# Patient Record
Sex: MALE | Race: WHITE | NOT HISPANIC OR LATINO | Employment: FULL TIME | URBAN - METROPOLITAN AREA
[De-identification: names, ages, dates, MRNs, and addresses within clinical notes are randomized per-mention and may not be internally consistent; named-entity substitution may affect disease eponyms.]

---

## 2017-01-17 ENCOUNTER — TRANSCRIBE ORDERS (OUTPATIENT)
Dept: ADMINISTRATIVE | Facility: HOSPITAL | Age: 42
End: 2017-01-17

## 2017-01-17 ENCOUNTER — APPOINTMENT (OUTPATIENT)
Dept: LAB | Facility: MEDICAL CENTER | Age: 42
End: 2017-01-17
Payer: COMMERCIAL

## 2017-01-17 DIAGNOSIS — D69.6 THROMBOCYTOPENIA, UNSPECIFIED (HCC): ICD-10-CM

## 2017-01-17 DIAGNOSIS — R79.89 OTHER ABNORMAL BLOOD CHEMISTRY: Primary | ICD-10-CM

## 2017-01-17 DIAGNOSIS — E11.9 SEVERE DIABETES MELLITUS (HCC): Primary | ICD-10-CM

## 2017-01-17 DIAGNOSIS — D69.59 THROMBOCYTOPENIA DUE TO DIMINISHED PLATELET PRODUCTION: Primary | ICD-10-CM

## 2017-01-17 DIAGNOSIS — I10 ESSENTIAL HYPERTENSION, MALIGNANT: ICD-10-CM

## 2017-01-17 DIAGNOSIS — D69.59 THROMBOCYTOPENIA DUE TO DIMINISHED PLATELET PRODUCTION: ICD-10-CM

## 2017-01-17 DIAGNOSIS — R79.89 OTHER ABNORMAL BLOOD CHEMISTRY: ICD-10-CM

## 2017-01-17 DIAGNOSIS — E11.9 SEVERE DIABETES MELLITUS (HCC): ICD-10-CM

## 2017-01-17 LAB
ALBUMIN SERPL BCP-MCNC: 3.8 G/DL (ref 3.5–5)
ALP SERPL-CCNC: 82 U/L (ref 46–116)
ALT SERPL W P-5'-P-CCNC: 52 U/L (ref 12–78)
ANION GAP SERPL CALCULATED.3IONS-SCNC: 4 MMOL/L (ref 4–13)
AST SERPL W P-5'-P-CCNC: 34 U/L (ref 5–45)
BASOPHILS # BLD AUTO: 0.03 THOUSANDS/ΜL (ref 0–0.1)
BASOPHILS NFR BLD AUTO: 1 % (ref 0–1)
BILIRUB DIRECT SERPL-MCNC: 0.27 MG/DL (ref 0–0.2)
BILIRUB SERPL-MCNC: 0.92 MG/DL (ref 0.2–1)
BILIRUB UR QL STRIP: ABNORMAL
BUN SERPL-MCNC: 14 MG/DL (ref 5–25)
CALCIUM SERPL-MCNC: 9.2 MG/DL (ref 8.3–10.1)
CHLORIDE SERPL-SCNC: 105 MMOL/L (ref 100–108)
CLARITY UR: ABNORMAL
CO2 SERPL-SCNC: 30 MMOL/L (ref 21–32)
COLOR UR: ABNORMAL
CREAT SERPL-MCNC: 0.95 MG/DL (ref 0.6–1.3)
CREAT UR-MCNC: 229 MG/DL
EOSINOPHIL # BLD AUTO: 0.51 THOUSAND/ΜL (ref 0–0.61)
EOSINOPHIL NFR BLD AUTO: 11 % (ref 0–6)
ERYTHROCYTE [DISTWIDTH] IN BLOOD BY AUTOMATED COUNT: 12.9 % (ref 11.6–15.1)
EST. AVERAGE GLUCOSE BLD GHB EST-MCNC: 100 MG/DL
GFR SERPL CREATININE-BSD FRML MDRD: >60 ML/MIN/1.73SQ M
GLUCOSE SERPL-MCNC: 129 MG/DL (ref 65–140)
GLUCOSE UR STRIP-MCNC: NEGATIVE MG/DL
HBA1C MFR BLD: 5.1 % (ref 4.2–6.3)
HBV CORE AB SER QL: NORMAL
HBV CORE IGM SER QL: NORMAL
HBV SURFACE AG SER QL: NORMAL
HCT VFR BLD AUTO: 43.5 % (ref 36.5–49.3)
HCV AB SER QL: NORMAL
HGB BLD-MCNC: 15.5 G/DL (ref 12–17)
HGB UR QL STRIP.AUTO: NEGATIVE
KETONES UR STRIP-MCNC: NEGATIVE MG/DL
LEUKOCYTE ESTERASE UR QL STRIP: NEGATIVE
LYMPHOCYTES # BLD AUTO: 1.09 THOUSANDS/ΜL (ref 0.6–4.47)
LYMPHOCYTES NFR BLD AUTO: 23 % (ref 14–44)
MCH RBC QN AUTO: 37.4 PG (ref 26.8–34.3)
MCHC RBC AUTO-ENTMCNC: 35.6 G/DL (ref 31.4–37.4)
MCV RBC AUTO: 105 FL (ref 82–98)
MICROALBUMIN UR-MCNC: 13.2 MG/L (ref 0–20)
MICROALBUMIN/CREAT 24H UR: 6 MG/G CREATININE (ref 0–30)
MONOCYTES # BLD AUTO: 0.43 THOUSAND/ΜL (ref 0.17–1.22)
MONOCYTES NFR BLD AUTO: 9 % (ref 4–12)
NEUTROPHILS # BLD AUTO: 2.73 THOUSANDS/ΜL (ref 1.85–7.62)
NEUTS SEG NFR BLD AUTO: 56 % (ref 43–75)
NITRITE UR QL STRIP: NEGATIVE
NRBC BLD AUTO-RTO: 0 /100 WBCS
PH UR STRIP.AUTO: 6 [PH] (ref 4.5–8)
PLATELET # BLD AUTO: 108 THOUSANDS/UL (ref 149–390)
PMV BLD AUTO: 9.8 FL (ref 8.9–12.7)
POTASSIUM SERPL-SCNC: 4.1 MMOL/L (ref 3.5–5.3)
PROT SERPL-MCNC: 7.2 G/DL (ref 6.4–8.2)
PROT UR STRIP-MCNC: NEGATIVE MG/DL
RBC # BLD AUTO: 4.14 MILLION/UL (ref 3.88–5.62)
SODIUM SERPL-SCNC: 139 MMOL/L (ref 136–145)
SP GR UR STRIP.AUTO: 1.02 (ref 1–1.03)
UROBILINOGEN UR QL STRIP.AUTO: 1 E.U./DL
WBC # BLD AUTO: 4.81 THOUSAND/UL (ref 4.31–10.16)

## 2017-01-17 PROCEDURE — 86704 HEP B CORE ANTIBODY TOTAL: CPT

## 2017-01-17 PROCEDURE — 83036 HEMOGLOBIN GLYCOSYLATED A1C: CPT

## 2017-01-17 PROCEDURE — 85025 COMPLETE CBC W/AUTO DIFF WBC: CPT

## 2017-01-17 PROCEDURE — 86705 HEP B CORE ANTIBODY IGM: CPT

## 2017-01-17 PROCEDURE — 87340 HEPATITIS B SURFACE AG IA: CPT

## 2017-01-17 PROCEDURE — 81003 URINALYSIS AUTO W/O SCOPE: CPT | Performed by: INTERNAL MEDICINE

## 2017-01-17 PROCEDURE — 82043 UR ALBUMIN QUANTITATIVE: CPT | Performed by: INTERNAL MEDICINE

## 2017-01-17 PROCEDURE — 36415 COLL VENOUS BLD VENIPUNCTURE: CPT

## 2017-01-17 PROCEDURE — 80053 COMPREHEN METABOLIC PANEL: CPT

## 2017-01-17 PROCEDURE — 82248 BILIRUBIN DIRECT: CPT

## 2017-01-17 PROCEDURE — 86803 HEPATITIS C AB TEST: CPT

## 2017-01-17 PROCEDURE — 82570 ASSAY OF URINE CREATININE: CPT | Performed by: INTERNAL MEDICINE

## 2017-01-18 ENCOUNTER — GENERIC CONVERSION - ENCOUNTER (OUTPATIENT)
Dept: OTHER | Facility: OTHER | Age: 42
End: 2017-01-18

## 2017-04-05 ENCOUNTER — GENERIC CONVERSION - ENCOUNTER (OUTPATIENT)
Dept: OTHER | Facility: OTHER | Age: 42
End: 2017-04-05

## 2017-04-05 LAB
A/G RATIO (HISTORICAL): 1.8 (ref 1.2–2.2)
ALBUMIN SERPL BCP-MCNC: 4.1 G/DL (ref 3.5–5.5)
ALP SERPL-CCNC: 69 IU/L (ref 39–117)
ALT SERPL W P-5'-P-CCNC: 52 IU/L (ref 0–44)
AMBIG ABBREV CMP14 DEFAULT (HISTORICAL): NORMAL
AST SERPL W P-5'-P-CCNC: 38 IU/L (ref 0–40)
BASOPHILS # BLD AUTO: 0 X10E3/UL (ref 0–0.2)
BASOPHILS # BLD AUTO: 1 %
BILIRUB SERPL-MCNC: 1 MG/DL (ref 0–1.2)
BUN SERPL-MCNC: 18 MG/DL (ref 6–24)
BUN/CREA RATIO (HISTORICAL): 19 (ref 9–20)
CALCIUM SERPL-MCNC: 9.1 MG/DL (ref 8.7–10.2)
CHLORIDE SERPL-SCNC: 100 MMOL/L (ref 96–106)
CO2 SERPL-SCNC: 22 MMOL/L (ref 18–29)
CREAT SERPL-MCNC: 0.95 MG/DL (ref 0.76–1.27)
DEPRECATED RDW RBC AUTO: 13.5 % (ref 12.3–15.4)
EGFR AFRICAN AMERICAN (HISTORICAL): 114 ML/MIN/1.73
EGFR-AMERICAN CALC (HISTORICAL): 98 ML/MIN/1.73
EOSINOPHIL # BLD AUTO: 0.1 X10E3/UL (ref 0–0.4)
EOSINOPHIL # BLD AUTO: 1 %
GLUCOSE SERPL-MCNC: 102 MG/DL (ref 65–99)
HCT VFR BLD AUTO: 42.8 % (ref 37.5–51)
HGB BLD-MCNC: 15 G/DL (ref 12.6–17.7)
IMM.GRANULOCYTES (CD4/8) (HISTORICAL): 0 X10E3/UL (ref 0–0.1)
IMM.GRANULOCYTES (CD4/8) (HISTORICAL): 1 %
LYMPHOCYTES # BLD AUTO: 0.9 X10E3/UL (ref 0.7–3.1)
LYMPHOCYTES # BLD AUTO: 20 %
MCH RBC QN AUTO: 37 PG (ref 26.6–33)
MCHC RBC AUTO-ENTMCNC: 35 G/DL (ref 31.5–35.7)
MCV RBC AUTO: 106 FL (ref 79–97)
MONOCYTES # BLD AUTO: 0.4 X10E3/UL (ref 0.1–0.9)
MONOCYTES (HISTORICAL): 10 %
NEUTROPHILS # BLD AUTO: 3 X10E3/UL (ref 1.4–7)
NEUTROPHILS # BLD AUTO: 67 %
PLATELET # BLD AUTO: 123 X10E3/UL (ref 150–379)
POTASSIUM SERPL-SCNC: 4.1 MMOL/L (ref 3.5–5.2)
RBC (HISTORICAL): 4.05 X10E6/UL (ref 4.14–5.8)
SODIUM SERPL-SCNC: 140 MMOL/L (ref 134–144)
TOT. GLOBULIN, SERUM (HISTORICAL): 2.3 G/DL (ref 1.5–4.5)
TOTAL PROTEIN (HISTORICAL): 6.4 G/DL (ref 6–8.5)
WBC # BLD AUTO: 4.4 X10E3/UL (ref 3.4–10.8)

## 2017-05-19 ENCOUNTER — ALLSCRIPTS OFFICE VISIT (OUTPATIENT)
Dept: OTHER | Facility: OTHER | Age: 42
End: 2017-05-19

## 2018-01-12 NOTE — RESULT NOTES
Message   Vital hepatitis panel is negative    Liver enzymes are back down to normal    Well hepatitis panel is normal     Verified Results  (1) BILIRUBIN, DIRECT [id number] 08:45AM [facility]     Test Name Result Flag Reference   BILI, DIRECT 0 27 mg/dL H 0 00-0 20     (1) CHRONIC HEPATITIS PANEL [id number] 08:45AM [facility]     Test Name Result Flag Reference   HEPATITIS B SURFACE ANTIGEN Non-reactive  Non-reactive, NonReactive - Confirmed   HEPATITIS C ANTIBODY Non-reactive  Non-reactive   HEPATITIS B CORE IGM ANTIBODY Non-reactive  Non-reactive   HEPATITIS B CORE TOTAL ANTIBODY Non-reactive  Non-reactive

## 2018-01-14 VITALS
TEMPERATURE: 98.5 F | HEART RATE: 53 BPM | RESPIRATION RATE: 16 BRPM | WEIGHT: 287.31 LBS | DIASTOLIC BLOOD PRESSURE: 82 MMHG | OXYGEN SATURATION: 96 % | BODY MASS INDEX: 36.87 KG/M2 | HEIGHT: 74 IN | SYSTOLIC BLOOD PRESSURE: 124 MMHG
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
## 2018-05-14 ENCOUNTER — TELEPHONE (OUTPATIENT)
Dept: HEMATOLOGY ONCOLOGY | Facility: HOSPITAL | Age: 43
End: 2018-05-14
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
## 2019-03-01 ENCOUNTER — OFFICE VISIT (OUTPATIENT)
Dept: HEMATOLOGY ONCOLOGY | Facility: CLINIC | Age: 44
End: 2019-03-01
Payer: COMMERCIAL

## 2019-03-01 VITALS
WEIGHT: 305 LBS | OXYGEN SATURATION: 97 % | RESPIRATION RATE: 16 BRPM | TEMPERATURE: 98.1 F | DIASTOLIC BLOOD PRESSURE: 82 MMHG | BODY MASS INDEX: 39.14 KG/M2 | HEART RATE: 83 BPM | HEIGHT: 74 IN | SYSTOLIC BLOOD PRESSURE: 124 MMHG

## 2019-03-01 DIAGNOSIS — R79.89 ELEVATED LFTS: ICD-10-CM

## 2019-03-01 DIAGNOSIS — Z83.2 FAMILY HISTORY OF THROMBOCYTOPENIA: ICD-10-CM

## 2019-03-01 DIAGNOSIS — D69.3 IDIOPATHIC THROMBOCYTOPENIA (HCC): ICD-10-CM

## 2019-03-01 DIAGNOSIS — R71.8 ELEVATED MCV: ICD-10-CM

## 2019-03-01 DIAGNOSIS — D69.6 ACQUIRED THROMBOCYTOPENIA (HCC): Primary | ICD-10-CM

## 2019-03-01 DIAGNOSIS — K76.0 FATTY LIVER: ICD-10-CM

## 2019-03-01 PROCEDURE — 99214 OFFICE O/P EST MOD 30 MIN: CPT | Performed by: PHYSICIAN ASSISTANT

## 2019-03-01 RX ORDER — OMEPRAZOLE 40 MG/1
40 CAPSULE, DELAYED RELEASE ORAL DAILY
COMMUNITY
Start: 2019-02-26 | End: 2021-04-22 | Stop reason: SDUPTHER

## 2019-03-01 RX ORDER — METOPROLOL SUCCINATE 25 MG/1
25 TABLET, EXTENDED RELEASE ORAL DAILY
COMMUNITY
Start: 2019-02-25 | End: 2021-03-07 | Stop reason: CLARIF

## 2019-03-01 RX ORDER — GLIMEPIRIDE 1 MG/1
TABLET ORAL
COMMUNITY
Start: 2019-02-26 | End: 2020-10-29

## 2019-03-01 RX ORDER — LOSARTAN POTASSIUM 25 MG/1
50 TABLET ORAL
COMMUNITY
Start: 2019-02-25 | End: 2021-01-21 | Stop reason: DRUGHIGH

## 2019-03-01 NOTE — PATIENT INSTRUCTIONS
Please obtain blood work 1 week prior to Dr Denny Zapata visit  CBC, CMP, MMA  We will schedule a bone marrow biopsy, and CT abdomen

## 2019-03-20 ENCOUNTER — HOSPITAL ENCOUNTER (OUTPATIENT)
Dept: RADIOLOGY | Facility: HOSPITAL | Age: 44
Discharge: HOME | End: 2019-03-20
Attending: RADIOLOGY | Admitting: RADIOLOGY
Payer: COMMERCIAL

## 2019-03-20 VITALS
RESPIRATION RATE: 18 BRPM | HEIGHT: 74 IN | WEIGHT: 301 LBS | HEART RATE: 66 BPM | SYSTOLIC BLOOD PRESSURE: 118 MMHG | DIASTOLIC BLOOD PRESSURE: 62 MMHG | TEMPERATURE: 96 F | BODY MASS INDEX: 38.63 KG/M2 | OXYGEN SATURATION: 92 %

## 2019-03-20 DIAGNOSIS — D69.3 IDIOPATHIC THROMBOCYTOPENIA (HCC): ICD-10-CM

## 2019-03-20 DIAGNOSIS — D69.6 ACQUIRED THROMBOCYTOPENIA (HCC): ICD-10-CM

## 2019-03-20 PROCEDURE — 88341 IMHCHEM/IMCYTCHM EA ADD ANTB: CPT | Performed by: PATHOLOGY

## 2019-03-20 PROCEDURE — 88262 CHROMOSOME ANALYSIS 15-20: CPT | Performed by: INTERNAL MEDICINE

## 2019-03-20 PROCEDURE — 88313 SPECIAL STAINS GROUP 2: CPT | Performed by: PATHOLOGY

## 2019-03-20 PROCEDURE — 88184 FLOWCYTOMETRY/ TC 1 MARKER: CPT | Performed by: INTERNAL MEDICINE

## 2019-03-20 PROCEDURE — 81406 MOPATH PROCEDURE LEVEL 7: CPT | Performed by: INTERNAL MEDICINE

## 2019-03-20 PROCEDURE — 88342 IMHCHEM/IMCYTCHM 1ST ANTB: CPT | Performed by: PATHOLOGY

## 2019-03-20 PROCEDURE — 99152 MOD SED SAME PHYS/QHP 5/>YRS: CPT

## 2019-03-20 PROCEDURE — 77012 CT SCAN FOR NEEDLE BIOPSY: CPT | Performed by: RADIOLOGY

## 2019-03-20 PROCEDURE — 99152 MOD SED SAME PHYS/QHP 5/>YRS: CPT | Performed by: RADIOLOGY

## 2019-03-20 PROCEDURE — 85097 BONE MARROW INTERPRETATION: CPT | Performed by: PATHOLOGY

## 2019-03-20 PROCEDURE — 88185 FLOWCYTOMETRY/TC ADD-ON: CPT

## 2019-03-20 PROCEDURE — 88305 TISSUE EXAM BY PATHOLOGIST: CPT | Performed by: PATHOLOGY

## 2019-03-20 PROCEDURE — 38222 DX BONE MARROW BX & ASPIR: CPT

## 2019-03-20 PROCEDURE — 81450 HL NEO GSAP 5-50DNA/DNA&RNA: CPT | Performed by: INTERNAL MEDICINE

## 2019-03-20 PROCEDURE — 77012 CT SCAN FOR NEEDLE BIOPSY: CPT

## 2019-03-20 PROCEDURE — 88311 DECALCIFY TISSUE: CPT | Performed by: PATHOLOGY

## 2019-03-20 PROCEDURE — 38222 DX BONE MARROW BX & ASPIR: CPT | Performed by: RADIOLOGY

## 2019-03-20 PROCEDURE — 88237 TISSUE CULTURE BONE MARROW: CPT | Performed by: INTERNAL MEDICINE

## 2019-03-20 PROCEDURE — 99153 MOD SED SAME PHYS/QHP EA: CPT

## 2019-03-20 RX ORDER — FENTANYL CITRATE 50 UG/ML
INJECTION, SOLUTION INTRAMUSCULAR; INTRAVENOUS CODE/TRAUMA/SEDATION MEDICATION
Status: COMPLETED | OUTPATIENT
Start: 2019-03-20 | End: 2019-03-20

## 2019-03-20 RX ORDER — MIDAZOLAM HYDROCHLORIDE 1 MG/ML
INJECTION INTRAMUSCULAR; INTRAVENOUS CODE/TRAUMA/SEDATION MEDICATION
Status: COMPLETED | OUTPATIENT
Start: 2019-03-20 | End: 2019-03-20

## 2019-03-20 RX ORDER — SODIUM CHLORIDE 9 MG/ML
75 INJECTION, SOLUTION INTRAVENOUS CONTINUOUS
Status: DISCONTINUED | OUTPATIENT
Start: 2019-03-20 | End: 2019-03-20 | Stop reason: HOSPADM

## 2019-03-20 RX ADMIN — FENTANYL CITRATE 50 MCG: 50 INJECTION, SOLUTION INTRAMUSCULAR; INTRAVENOUS at 11:22

## 2019-03-20 RX ADMIN — MIDAZOLAM 1 MG: 1 INJECTION INTRAMUSCULAR; INTRAVENOUS at 11:37

## 2019-03-20 RX ADMIN — MIDAZOLAM 1 MG: 1 INJECTION INTRAMUSCULAR; INTRAVENOUS at 11:29

## 2019-03-20 RX ADMIN — FENTANYL CITRATE 50 MCG: 50 INJECTION, SOLUTION INTRAMUSCULAR; INTRAVENOUS at 11:29

## 2019-03-20 RX ADMIN — SODIUM CHLORIDE 75 ML/HR: 0.9 INJECTION, SOLUTION INTRAVENOUS at 09:56

## 2019-03-20 RX ADMIN — FENTANYL CITRATE 50 MCG: 50 INJECTION, SOLUTION INTRAMUSCULAR; INTRAVENOUS at 11:37

## 2019-03-20 RX ADMIN — MIDAZOLAM 1 MG: 1 INJECTION INTRAMUSCULAR; INTRAVENOUS at 11:22

## 2019-03-20 NOTE — DISCHARGE INSTRUCTIONS
Bone Marrow Biopsy     WHAT YOU NEED TO KNOW:   A bone marrow biopsy is a procedure to remove a small amount of bone marrow from your bone  Bone marrow is the soft tissue inside your bone that helps to make blood cells  The sample is tested for disease or infection  DISCHARGE INSTRUCTIONS:     1  Limit your activities day of biopsy as directed by your doctor  2  Use medication as ordered  3  Return to your normal diet  Small sips of flat soda will help with nausea  4  Remove band-aid or dressing 24 hours after procedure  Contact Interventional Radiology at [phone] [place] PATIENTS: Contact Interventional Radiology at [phone]) Ashley Farris PATIENTS: Contact Interventional Radiology at [phone]) if:    1  Difficulty breathing, nausea or vomiting  2  Chills or fever above 101 F     3  Pain at biopsy site not relieved by medication  4  Develop any redness, swelling, heat, unusual drainage, heavy bruising or bleeding from biopsy site

## 2019-03-20 NOTE — H&P
IR H&P    HPI:  39year old male with mild thrombocytopenia is referred for bone marrow biopsy  PMH:  GERD  HTN    PSH:  Cholecystectomy    Physical exam:  Gen: NAD    A/P:  39year old male with mild thrombocytopenia     - CT guided bone marrow biopsy

## 2019-03-20 NOTE — BRIEF OP NOTE (RAD/CATH)
IR BONE MARROW BIOPSY/ASPIRATION  Procedure Note    PATIENT NAME: Nelia Valerio  : 1975  MRN: [id number]     Pre-op Diagnosis:   1  Acquired thrombocytopenia ([facility] [address] )    2  Idiopathic thrombocytopenia (HCC)      Post-op Diagnosis:   1  [facility]    2  Idiopathic thrombocytopenia [facility])        Surgeon:   Cassia Mcghee MD    Estimated Blood Loss: 1 cc    Findings: Successful CT guided right iliac bone marrow biopsy and aspirate  Specimens: 7 cc bone marrow aspirate and 2 core needle samples obtained      Complications:  None    Anesthesia: Conscious sedation and Local    Cassia Mcghee MD     Date: 3/20/2019  Time: 11:53 AM

## 2019-03-22 LAB — SCAN RESULT: NORMAL

## 2019-03-27 LAB
MISCELLANEOUS LAB TEST RESULT: NORMAL
MISCELLANEOUS LAB TEST RESULT: NORMAL
SCAN RESULT: NORMAL

## 2019-03-29 ENCOUNTER — HOSPITAL ENCOUNTER (OUTPATIENT)
Dept: CT IMAGING | Facility: HOSPITAL | Age: 44
Discharge: HOME | End: 2019-03-29
Payer: COMMERCIAL

## 2019-03-29 DIAGNOSIS — K76.0 FATTY LIVER: ICD-10-CM

## 2019-03-29 DIAGNOSIS — R79.89 ELEVATED LFTS: ICD-10-CM

## 2019-03-29 DIAGNOSIS — D69.6 ACQUIRED THROMBOCYTOPENIA (HCC): ICD-10-CM

## 2019-03-29 PROCEDURE — 74160 CT ABDOMEN W/CONTRAST: CPT

## 2019-03-29 RX ADMIN — IOHEXOL 100 ML: 350 INJECTION, SOLUTION INTRAVENOUS at 07:02

## 2019-04-03 LAB
BASOPHILS # BLD AUTO: 0 X10E3/UL (ref 0–0.2)
BASOPHILS NFR BLD AUTO: 0 %
EOSINOPHIL # BLD AUTO: 0.1 X10E3/UL (ref 0–0.4)
EOSINOPHIL NFR BLD AUTO: 2 %
ERYTHROCYTE [DISTWIDTH] IN BLOOD BY AUTOMATED COUNT: 13.8 % (ref 12.3–15.4)
HCT VFR BLD AUTO: 41.4 % (ref 37.5–51)
HGB BLD-MCNC: 14 G/DL (ref 13–17.7)
IMM GRANULOCYTES # BLD: 0 X10E3/UL (ref 0–0.1)
IMM GRANULOCYTES NFR BLD: 0 %
LYMPHOCYTES # BLD AUTO: 0.8 X10E3/UL (ref 0.7–3.1)
LYMPHOCYTES NFR BLD AUTO: 30 %
MCH RBC QN AUTO: 35.7 PG (ref 26.6–33)
MCHC RBC AUTO-ENTMCNC: 33.8 G/DL (ref 31.5–35.7)
MCV RBC AUTO: 106 FL (ref 79–97)
METHYLMALONATE SERPL-SCNC: 176 NMOL/L (ref 0–378)
MONOCYTES # BLD AUTO: 0.2 X10E3/UL (ref 0.1–0.9)
MONOCYTES NFR BLD AUTO: 8 %
MORPHOLOGY BLD-IMP: ABNORMAL
NEUTROPHILS # BLD AUTO: 1.6 X10E3/UL (ref 1.4–7)
NEUTROPHILS NFR BLD AUTO: 60 %
PLATELET # BLD AUTO: 42 X10E3/UL (ref 150–379)
RBC # BLD AUTO: 3.92 X10E6/UL (ref 4.14–5.8)
SL AMB DISCLAIMER: NORMAL
WBC # BLD AUTO: 2.6 X10E3/UL (ref 3.4–10.8)

## 2019-04-05 ENCOUNTER — OFFICE VISIT (OUTPATIENT)
Dept: HEMATOLOGY ONCOLOGY | Facility: CLINIC | Age: 44
End: 2019-04-05
Payer: COMMERCIAL

## 2019-04-05 VITALS
OXYGEN SATURATION: 97 % | BODY MASS INDEX: 39.27 KG/M2 | SYSTOLIC BLOOD PRESSURE: 122 MMHG | HEART RATE: 83 BPM | HEIGHT: 74 IN | TEMPERATURE: 97.5 F | WEIGHT: 306 LBS | RESPIRATION RATE: 16 BRPM | DIASTOLIC BLOOD PRESSURE: 82 MMHG

## 2019-04-05 DIAGNOSIS — D69.3 IDIOPATHIC THROMBOCYTOPENIA (HCC): Primary | ICD-10-CM

## 2019-04-05 DIAGNOSIS — R16.1 SPLENOMEGALY: ICD-10-CM

## 2019-04-05 PROCEDURE — 99214 OFFICE O/P EST MOD 30 MIN: CPT | Performed by: INTERNAL MEDICINE

## 2019-04-15 ENCOUNTER — DOCUMENTATION (OUTPATIENT)
Dept: HEMATOLOGY ONCOLOGY | Facility: CLINIC | Age: 44
End: 2019-04-15

## 2019-04-15 ENCOUNTER — TELEPHONE (OUTPATIENT)
Dept: HEMATOLOGY ONCOLOGY | Facility: CLINIC | Age: 44
End: 2019-04-15

## 2019-04-16 DIAGNOSIS — D69.6 THROMBOCYTOPENIA (HCC): Primary | ICD-10-CM

## 2019-05-16 ENCOUNTER — TELEPHONE (OUTPATIENT)
Dept: HEMATOLOGY ONCOLOGY | Facility: CLINIC | Age: 44
End: 2019-05-16

## 2019-05-22 ENCOUNTER — TELEPHONE (OUTPATIENT)
Dept: HEMATOLOGY ONCOLOGY | Facility: CLINIC | Age: 44
End: 2019-05-22

## 2019-05-24 ENCOUNTER — OFFICE VISIT (OUTPATIENT)
Dept: HEMATOLOGY ONCOLOGY | Facility: CLINIC | Age: 44
End: 2019-05-24
Payer: COMMERCIAL

## 2019-05-24 VITALS
BODY MASS INDEX: 38.89 KG/M2 | RESPIRATION RATE: 16 BRPM | OXYGEN SATURATION: 97 % | HEIGHT: 74 IN | SYSTOLIC BLOOD PRESSURE: 130 MMHG | HEART RATE: 85 BPM | DIASTOLIC BLOOD PRESSURE: 78 MMHG | TEMPERATURE: 98.1 F | WEIGHT: 303 LBS

## 2019-05-24 DIAGNOSIS — D69.6 ACQUIRED THROMBOCYTOPENIA (HCC): Primary | ICD-10-CM

## 2019-05-24 DIAGNOSIS — D69.3 IDIOPATHIC THROMBOCYTOPENIA (HCC): ICD-10-CM

## 2019-05-24 DIAGNOSIS — R16.1 SPLENOMEGALY: ICD-10-CM

## 2019-05-24 LAB
ALBUMIN SERPL-MCNC: 4.1 G/DL (ref 3.5–5.5)
ALBUMIN/GLOB SERPL: 1.8 {RATIO} (ref 1.2–2.2)
ALP SERPL-CCNC: 145 IU/L (ref 39–117)
ALT SERPL-CCNC: 60 IU/L (ref 0–44)
AST SERPL-CCNC: 61 IU/L (ref 0–40)
BASOPHILS # BLD AUTO: 0 X10E3/UL (ref 0–0.2)
BASOPHILS NFR BLD AUTO: 0 %
BILIRUB SERPL-MCNC: 0.8 MG/DL (ref 0–1.2)
BUN SERPL-MCNC: 17 MG/DL (ref 6–24)
BUN/CREAT SERPL: 19 (ref 9–20)
CALCIUM SERPL-MCNC: 9.1 MG/DL (ref 8.7–10.2)
CHLORIDE SERPL-SCNC: 105 MMOL/L (ref 96–106)
CO2 SERPL-SCNC: 21 MMOL/L (ref 20–29)
CREAT SERPL-MCNC: 0.9 MG/DL (ref 0.76–1.27)
EOSINOPHIL # BLD AUTO: 0.1 X10E3/UL (ref 0–0.4)
EOSINOPHIL NFR BLD AUTO: 3 %
ERYTHROCYTE [DISTWIDTH] IN BLOOD BY AUTOMATED COUNT: 14 % (ref 12.3–15.4)
GLOBULIN SER-MCNC: 2.3 G/DL (ref 1.5–4.5)
GLUCOSE SERPL-MCNC: 211 MG/DL (ref 65–99)
HCT VFR BLD AUTO: 38.8 % (ref 37.5–51)
HGB BLD-MCNC: 13.5 G/DL (ref 13–17.7)
IMM GRANULOCYTES # BLD: 0 X10E3/UL (ref 0–0.1)
IMM GRANULOCYTES NFR BLD: 0 %
LABCORP COMMENT: NORMAL
LDH SERPL-CCNC: 225 IU/L (ref 121–224)
LYMPHOCYTES # BLD AUTO: 0.7 X10E3/UL (ref 0.7–3.1)
LYMPHOCYTES NFR BLD AUTO: 28 %
MCH RBC QN AUTO: 36.7 PG (ref 26.6–33)
MCHC RBC AUTO-ENTMCNC: 34.8 G/DL (ref 31.5–35.7)
MCV RBC AUTO: 105 FL (ref 79–97)
MONOCYTES # BLD AUTO: 0.2 X10E3/UL (ref 0.1–0.9)
MONOCYTES NFR BLD AUTO: 7 %
MORPHOLOGY BLD-IMP: ABNORMAL
NEUTROPHILS # BLD AUTO: 1.4 X10E3/UL (ref 1.4–7)
NEUTROPHILS NFR BLD AUTO: 62 %
PLATELET # BLD AUTO: 35 X10E3/UL (ref 150–450)
POTASSIUM SERPL-SCNC: 4 MMOL/L (ref 3.5–5.2)
PROT SERPL-MCNC: 6.4 G/DL (ref 6–8.5)
RBC # BLD AUTO: 3.68 X10E6/UL (ref 4.14–5.8)
SL AMB EGFR AFRICAN AMERICAN: 120 ML/MIN/1.73
SL AMB EGFR NON AFRICAN AMERICAN: 104 ML/MIN/1.73
SODIUM SERPL-SCNC: 141 MMOL/L (ref 134–144)
WBC # BLD AUTO: 2.3 X10E3/UL (ref 3.4–10.8)

## 2019-05-24 PROCEDURE — 99214 OFFICE O/P EST MOD 30 MIN: CPT | Performed by: INTERNAL MEDICINE

## 2019-05-24 RX ORDER — DEXAMETHASONE 4 MG/1
40 TABLET ORAL
Qty: 40 TABLET | Refills: 0 | Status: SHIPPED | OUTPATIENT
Start: 2019-05-24 | End: 2019-05-28

## 2019-06-06 LAB
ALBUMIN SERPL-MCNC: 3.8 G/DL (ref 3.5–5.5)
ALBUMIN/GLOB SERPL: 1.7 {RATIO} (ref 1.2–2.2)
ALP SERPL-CCNC: 160 IU/L (ref 39–117)
ALT SERPL-CCNC: 111 IU/L (ref 0–44)
AST SERPL-CCNC: 77 IU/L (ref 0–40)
BASOPHILS # BLD AUTO: 0 X10E3/UL (ref 0–0.2)
BASOPHILS NFR BLD AUTO: 0 %
BILIRUB SERPL-MCNC: 0.7 MG/DL (ref 0–1.2)
BUN SERPL-MCNC: 15 MG/DL (ref 6–24)
BUN/CREAT SERPL: 16 (ref 9–20)
CALCIUM SERPL-MCNC: 8.8 MG/DL (ref 8.7–10.2)
CHLORIDE SERPL-SCNC: 106 MMOL/L (ref 96–106)
CO2 SERPL-SCNC: 21 MMOL/L (ref 20–29)
CREAT SERPL-MCNC: 0.92 MG/DL (ref 0.76–1.27)
EOSINOPHIL # BLD AUTO: 0 X10E3/UL (ref 0–0.4)
EOSINOPHIL NFR BLD AUTO: 1 %
ERYTHROCYTE [DISTWIDTH] IN BLOOD BY AUTOMATED COUNT: 13.6 % (ref 12.3–15.4)
GLOBULIN SER-MCNC: 2.3 G/DL (ref 1.5–4.5)
GLUCOSE SERPL-MCNC: 156 MG/DL (ref 65–99)
HCT VFR BLD AUTO: 40.3 % (ref 37.5–51)
HGB BLD-MCNC: 14.4 G/DL (ref 13–17.7)
IMM GRANULOCYTES # BLD: 0 X10E3/UL (ref 0–0.1)
IMM GRANULOCYTES NFR BLD: 1 %
LABCORP COMMENT: NORMAL
LDH SERPL-CCNC: 218 IU/L (ref 121–224)
LYMPHOCYTES # BLD AUTO: 0.8 X10E3/UL (ref 0.7–3.1)
LYMPHOCYTES NFR BLD AUTO: 24 %
MCH RBC QN AUTO: 36.3 PG (ref 26.6–33)
MCHC RBC AUTO-ENTMCNC: 35.7 G/DL (ref 31.5–35.7)
MCV RBC AUTO: 102 FL (ref 79–97)
MONOCYTES # BLD AUTO: 0.4 X10E3/UL (ref 0.1–0.9)
MONOCYTES NFR BLD AUTO: 12 %
MORPHOLOGY BLD-IMP: ABNORMAL
NEUTROPHILS # BLD AUTO: 2 X10E3/UL (ref 1.4–7)
NEUTROPHILS NFR BLD AUTO: 62 %
PLATELET # BLD AUTO: 37 X10E3/UL (ref 150–450)
POTASSIUM SERPL-SCNC: 4.4 MMOL/L (ref 3.5–5.2)
PROT SERPL-MCNC: 6.1 G/DL (ref 6–8.5)
RBC # BLD AUTO: 3.97 X10E6/UL (ref 4.14–5.8)
SL AMB EGFR AFRICAN AMERICAN: 117 ML/MIN/1.73
SL AMB EGFR NON AFRICAN AMERICAN: 101 ML/MIN/1.73
SODIUM SERPL-SCNC: 140 MMOL/L (ref 134–144)
WBC # BLD AUTO: 3.3 X10E3/UL (ref 3.4–10.8)

## 2019-06-11 ENCOUNTER — DOCUMENTATION (OUTPATIENT)
Dept: HEMATOLOGY ONCOLOGY | Facility: CLINIC | Age: 44
End: 2019-06-11

## 2019-06-11 ENCOUNTER — OFFICE VISIT (OUTPATIENT)
Dept: HEMATOLOGY ONCOLOGY | Facility: CLINIC | Age: 44
End: 2019-06-11
Payer: COMMERCIAL

## 2019-06-11 VITALS
SYSTOLIC BLOOD PRESSURE: 128 MMHG | WEIGHT: 299.6 LBS | BODY MASS INDEX: 38.45 KG/M2 | HEIGHT: 74 IN | TEMPERATURE: 98.2 F | RESPIRATION RATE: 16 BRPM | DIASTOLIC BLOOD PRESSURE: 70 MMHG | HEART RATE: 76 BPM | OXYGEN SATURATION: 98 %

## 2019-06-11 DIAGNOSIS — D69.3 IDIOPATHIC THROMBOCYTOPENIA (HCC): ICD-10-CM

## 2019-06-11 DIAGNOSIS — D69.3 ACUTE ITP (HCC): Primary | ICD-10-CM

## 2019-06-11 PROCEDURE — 99214 OFFICE O/P EST MOD 30 MIN: CPT | Performed by: INTERNAL MEDICINE

## 2019-06-11 RX ORDER — ACETAMINOPHEN 325 MG/1
650 TABLET ORAL ONCE
Status: CANCELLED | OUTPATIENT
Start: 2019-07-03

## 2019-06-11 RX ORDER — ACETAMINOPHEN 325 MG/1
650 TABLET ORAL ONCE
Status: CANCELLED | OUTPATIENT
Start: 2019-06-27

## 2019-06-11 RX ORDER — ACETAMINOPHEN 325 MG/1
650 TABLET ORAL ONCE
Status: CANCELLED | OUTPATIENT
Start: 2019-06-20

## 2019-06-11 RX ORDER — ACETAMINOPHEN 325 MG/1
650 TABLET ORAL ONCE
Status: CANCELLED | OUTPATIENT
Start: 2019-07-11

## 2019-06-17 RX ORDER — SODIUM CHLORIDE 9 MG/ML
20 INJECTION, SOLUTION INTRAVENOUS ONCE
Status: CANCELLED | OUTPATIENT
Start: 2019-07-03

## 2019-06-17 RX ORDER — SODIUM CHLORIDE 9 MG/ML
20 INJECTION, SOLUTION INTRAVENOUS ONCE
Status: CANCELLED | OUTPATIENT
Start: 2019-06-27

## 2019-06-17 RX ORDER — SODIUM CHLORIDE 9 MG/ML
20 INJECTION, SOLUTION INTRAVENOUS ONCE
Status: CANCELLED | OUTPATIENT
Start: 2019-07-11

## 2019-06-20 ENCOUNTER — HOSPITAL ENCOUNTER (OUTPATIENT)
Dept: INFUSION CENTER | Facility: CLINIC | Age: 44
Discharge: HOME | End: 2019-06-20
Payer: COMMERCIAL

## 2019-06-20 VITALS
HEART RATE: 78 BPM | RESPIRATION RATE: 16 BRPM | TEMPERATURE: 98 F | WEIGHT: 294.5 LBS | HEIGHT: 74 IN | SYSTOLIC BLOOD PRESSURE: 124 MMHG | OXYGEN SATURATION: 98 % | BODY MASS INDEX: 37.79 KG/M2 | DIASTOLIC BLOOD PRESSURE: 78 MMHG

## 2019-06-20 DIAGNOSIS — D69.3 IDIOPATHIC THROMBOCYTOPENIA (HCC): Primary | ICD-10-CM

## 2019-06-20 DIAGNOSIS — D69.3 ACUTE ITP (HCC): ICD-10-CM

## 2019-06-20 PROCEDURE — 96413 CHEMO IV INFUSION 1 HR: CPT

## 2019-06-20 PROCEDURE — 96375 TX/PRO/DX INJ NEW DRUG ADDON: CPT

## 2019-06-20 PROCEDURE — 96415 CHEMO IV INFUSION ADDL HR: CPT

## 2019-06-20 PROCEDURE — 96367 TX/PROPH/DG ADDL SEQ IV INF: CPT

## 2019-06-20 RX ORDER — SODIUM CHLORIDE 9 MG/ML
20 INJECTION, SOLUTION INTRAVENOUS ONCE
Status: COMPLETED | OUTPATIENT
Start: 2019-06-20 | End: 2019-06-20

## 2019-06-20 RX ORDER — ACETAMINOPHEN 325 MG/1
650 TABLET ORAL ONCE
Status: COMPLETED | OUTPATIENT
Start: 2019-06-20 | End: 2019-06-20

## 2019-06-20 RX ADMIN — ACETAMINOPHEN 650 MG: 325 TABLET, FILM COATED ORAL at 08:23

## 2019-06-20 RX ADMIN — DIPHENHYDRAMINE HYDROCHLORIDE 25 MG: 50 INJECTION, SOLUTION INTRAMUSCULAR; INTRAVENOUS at 08:30

## 2019-06-20 RX ADMIN — RITUXIMAB 967.6 MG: 10 INJECTION, SOLUTION INTRAVENOUS at 09:18

## 2019-06-20 RX ADMIN — HYDROCORTISONE SODIUM SUCCINATE 100 MG: 100 INJECTION, POWDER, FOR SOLUTION INTRAMUSCULAR; INTRAVENOUS at 08:30

## 2019-06-20 RX ADMIN — SODIUM CHLORIDE 20 ML/HR: 0.9 INJECTION, SOLUTION INTRAVENOUS at 08:26

## 2019-06-20 NOTE — PROGRESS NOTES
Patient tolerated initial rate of Rituxan today without issues  Patient verified upcoming appointments and AVS provided

## 2019-06-20 NOTE — PATIENT INSTRUCTIONS
June 2019 Sunday Monday Tuesday Wednesday Thursday Friday Saturday                                 1                2     3     4     5     6     7     8                9     10     11    FOLLOW UP PG   7:45 AM   (30 min )   Halle Rosenberg MD   [facility] 12     13     14     15         Prescriptions, Day 1       16     17     18     19     20    INF ONCOLOGY TX-TREATMENT PLAN   8:00 AM   (350 min )   AN INF CHAIR [address]     22           Cycle 1, Day 1     23     24     25     26     27    INF IV MED RITUXAN 90 MIN   9:00 AM   (210 min )   BE INF CHAIR [address] 28     29           Cycle 1, Day 8     30                                                   Treatment Details       6/11/2019 - Prescriptions, Day 1      Supportive Care: [facility] PROVIDER Oscar Cerna [facility] PROVIDER COMMUNICATION, ON[facility] PROVIDER COMMUNICATION3    6/20/2019 - Cycle 1, Day 1      Chemotherapy: RITUXIMAB FIRST TITRATED CHEMO INFUSION    6/27/2019 - Cycle 1, Day 8      Chemotherapy: RITUXIMAB SUBSEQUENT TITRATED CHEMO INFUSION        July 2019 Sunday Monday Tuesday Wednesday Thursday Friday Saturday        1     2     3    INF IV MED RITUXAN 90 MIN  10:00 AM   (300 min )   BE INF CHAIR [address] 4     5     6          Cycle 1, Day 15      7     8     9     10     11    INF ONCOLOGY TX-TREATMENT PLAN   8:00 AM   (350 min )   AN INF CHAIR [address]    FOLLOW UP PG   3:45 PM   (30 min )   Halle Rosenberg MD   [facility] [address]           Cycle 1, Day 22     14     15     16     17     18     19     20                21     22     23     24     25     26     27                28     29     30     31                                    Treatment Details       7/3/2019 - Cycle 1, Day 15      Chemotherapy: RITUXIMAB SUBSEQUENT TITRATED CHEMO INFUSION    7/11/2019 - Cycle 1, Day 22      Chemotherapy: RITUXIMAB SUBSEQUENT TITRATED CHEMO INFUSION

## 2019-06-26 LAB
ALBUMIN SERPL-MCNC: 3.9 G/DL (ref 3.5–5.5)
ALBUMIN/GLOB SERPL: 2 {RATIO} (ref 1.2–2.2)
ALP SERPL-CCNC: 139 IU/L (ref 39–117)
ALT SERPL-CCNC: 67 IU/L (ref 0–44)
AST SERPL-CCNC: 62 IU/L (ref 0–40)
BASOPHILS # BLD AUTO: 0 X10E3/UL (ref 0–0.2)
BASOPHILS NFR BLD AUTO: 1 %
BILIRUB SERPL-MCNC: 0.8 MG/DL (ref 0–1.2)
BUN SERPL-MCNC: 11 MG/DL (ref 6–24)
BUN/CREAT SERPL: 11 (ref 9–20)
CALCIUM SERPL-MCNC: 9 MG/DL (ref 8.7–10.2)
CHLORIDE SERPL-SCNC: 103 MMOL/L (ref 96–106)
CO2 SERPL-SCNC: 20 MMOL/L (ref 20–29)
CREAT SERPL-MCNC: 0.99 MG/DL (ref 0.76–1.27)
EOSINOPHIL # BLD AUTO: 0.1 X10E3/UL (ref 0–0.4)
EOSINOPHIL NFR BLD AUTO: 3 %
ERYTHROCYTE [DISTWIDTH] IN BLOOD BY AUTOMATED COUNT: 14.4 % (ref 12.3–15.4)
GLOBULIN SER-MCNC: 2 G/DL (ref 1.5–4.5)
GLUCOSE SERPL-MCNC: 274 MG/DL (ref 65–99)
HCT VFR BLD AUTO: 40 % (ref 37.5–51)
HGB BLD-MCNC: 13.4 G/DL (ref 13–17.7)
IMM GRANULOCYTES # BLD: 0 X10E3/UL (ref 0–0.1)
IMM GRANULOCYTES NFR BLD: 1 %
LABCORP COMMENT: NORMAL
LYMPHOCYTES # BLD AUTO: 0.4 X10E3/UL (ref 0.7–3.1)
LYMPHOCYTES NFR BLD AUTO: 23 %
MCH RBC QN AUTO: 35.9 PG (ref 26.6–33)
MCHC RBC AUTO-ENTMCNC: 33.5 G/DL (ref 31.5–35.7)
MCV RBC AUTO: 107 FL (ref 79–97)
MONOCYTES # BLD AUTO: 0.2 X10E3/UL (ref 0.1–0.9)
MONOCYTES NFR BLD AUTO: 8 %
MORPHOLOGY BLD-IMP: ABNORMAL
NEUTROPHILS # BLD AUTO: 1.2 X10E3/UL (ref 1.4–7)
NEUTROPHILS NFR BLD AUTO: 64 %
PLATELET # BLD AUTO: 56 X10E3/UL (ref 150–450)
POTASSIUM SERPL-SCNC: 4.2 MMOL/L (ref 3.5–5.2)
PROT SERPL-MCNC: 5.9 G/DL (ref 6–8.5)
RBC # BLD AUTO: 3.73 X10E6/UL (ref 4.14–5.8)
SL AMB EGFR AFRICAN AMERICAN: 107 ML/MIN/1.73
SL AMB EGFR NON AFRICAN AMERICAN: 92 ML/MIN/1.73
SODIUM SERPL-SCNC: 138 MMOL/L (ref 134–144)
WBC # BLD AUTO: 1.9 X10E3/UL (ref 3.4–10.8)

## 2019-06-27 ENCOUNTER — HOSPITAL ENCOUNTER (OUTPATIENT)
Dept: INFUSION CENTER | Facility: HOSPITAL | Age: 44
Discharge: HOME | End: 2019-06-27
Payer: COMMERCIAL

## 2019-06-27 VITALS
SYSTOLIC BLOOD PRESSURE: 130 MMHG | HEART RATE: 77 BPM | DIASTOLIC BLOOD PRESSURE: 65 MMHG | RESPIRATION RATE: 18 BRPM | WEIGHT: 297.62 LBS | TEMPERATURE: 97.3 F | HEIGHT: 75 IN | BODY MASS INDEX: 37.01 KG/M2

## 2019-06-27 DIAGNOSIS — D69.3 IDIOPATHIC THROMBOCYTOPENIA (HCC): Primary | ICD-10-CM

## 2019-06-27 DIAGNOSIS — D69.3 ACUTE ITP (HCC): ICD-10-CM

## 2019-06-27 PROCEDURE — 96375 TX/PRO/DX INJ NEW DRUG ADDON: CPT

## 2019-06-27 PROCEDURE — 96415 CHEMO IV INFUSION ADDL HR: CPT

## 2019-06-27 PROCEDURE — 96367 TX/PROPH/DG ADDL SEQ IV INF: CPT

## 2019-06-27 PROCEDURE — 96413 CHEMO IV INFUSION 1 HR: CPT

## 2019-06-27 RX ORDER — SODIUM CHLORIDE 9 MG/ML
20 INJECTION, SOLUTION INTRAVENOUS ONCE
Status: COMPLETED | OUTPATIENT
Start: 2019-06-27 | End: 2019-06-27

## 2019-06-27 RX ORDER — ACETAMINOPHEN 325 MG/1
650 TABLET ORAL ONCE
Status: COMPLETED | OUTPATIENT
Start: 2019-06-27 | End: 2019-06-27

## 2019-06-27 RX ADMIN — RITUXIMAB 967.6 MG: 10 INJECTION, SOLUTION INTRAVENOUS at 10:41

## 2019-06-27 RX ADMIN — HYDROCORTISONE SODIUM SUCCINATE 100 MG: 100 INJECTION, POWDER, FOR SOLUTION INTRAMUSCULAR; INTRAVENOUS at 09:30

## 2019-06-27 RX ADMIN — ACETAMINOPHEN 650 MG: 325 TABLET ORAL at 09:30

## 2019-06-27 RX ADMIN — SODIUM CHLORIDE 20 ML/HR: 0.9 INJECTION, SOLUTION INTRAVENOUS at 09:30

## 2019-06-27 RX ADMIN — DIPHENHYDRAMINE HYDROCHLORIDE 25 MG: 50 INJECTION, SOLUTION INTRAMUSCULAR; INTRAVENOUS at 09:30

## 2019-06-27 NOTE — PLAN OF CARE
Problem: Potential for Falls  Goal: Patient will remain free of falls  Description  INTERVENTIONS:  - Assess patient frequently for physical needs  -  Identify cognitive and physical deficits and behaviors that affect risk of falls    -  [place] fall precautions as indicated by assessment   - Educate patient/family on patient safety including physical limitations  - Instruct patient to call for assistance with activity based on assessment  - Modify environment to reduce risk of injury  - Consider OT/PT consult to assist with strengthening/mobility  Outcome: Progressing

## 2019-07-02 LAB
ALBUMIN SERPL-MCNC: 4 G/DL (ref 3.5–5.5)
ALBUMIN/GLOB SERPL: 1.9 {RATIO} (ref 1.2–2.2)
ALP SERPL-CCNC: 141 IU/L (ref 39–117)
ALT SERPL-CCNC: 62 IU/L (ref 0–44)
AST SERPL-CCNC: 55 IU/L (ref 0–40)
BASOPHILS # BLD AUTO: 0 X10E3/UL (ref 0–0.2)
BASOPHILS NFR BLD AUTO: 0 %
BILIRUB SERPL-MCNC: 1.1 MG/DL (ref 0–1.2)
BUN SERPL-MCNC: 17 MG/DL (ref 6–24)
BUN/CREAT SERPL: 18 (ref 9–20)
CALCIUM SERPL-MCNC: 9.1 MG/DL (ref 8.7–10.2)
CHLORIDE SERPL-SCNC: 105 MMOL/L (ref 96–106)
CO2 SERPL-SCNC: 21 MMOL/L (ref 20–29)
CREAT SERPL-MCNC: 0.97 MG/DL (ref 0.76–1.27)
EOSINOPHIL # BLD AUTO: 0 X10E3/UL (ref 0–0.4)
EOSINOPHIL NFR BLD AUTO: 1 %
ERYTHROCYTE [DISTWIDTH] IN BLOOD BY AUTOMATED COUNT: 14.3 % (ref 12.3–15.4)
GLOBULIN SER-MCNC: 2.1 G/DL (ref 1.5–4.5)
GLUCOSE SERPL-MCNC: 222 MG/DL (ref 65–99)
HCT VFR BLD AUTO: 42.2 % (ref 37.5–51)
HGB BLD-MCNC: 13.9 G/DL (ref 13–17.7)
IMM GRANULOCYTES # BLD: 0 X10E3/UL (ref 0–0.1)
IMM GRANULOCYTES NFR BLD: 0 %
LABCORP COMMENT: NORMAL
LYMPHOCYTES # BLD AUTO: 0.5 X10E3/UL (ref 0.7–3.1)
LYMPHOCYTES NFR BLD AUTO: 19 %
MCH RBC QN AUTO: 35.5 PG (ref 26.6–33)
MCHC RBC AUTO-ENTMCNC: 32.9 G/DL (ref 31.5–35.7)
MCV RBC AUTO: 108 FL (ref 79–97)
MONOCYTES # BLD AUTO: 0.2 X10E3/UL (ref 0.1–0.9)
MONOCYTES NFR BLD AUTO: 8 %
MORPHOLOGY BLD-IMP: ABNORMAL
NEUTROPHILS # BLD AUTO: 2 X10E3/UL (ref 1.4–7)
NEUTROPHILS NFR BLD AUTO: 72 %
PLATELET # BLD AUTO: 43 X10E3/UL (ref 150–450)
POTASSIUM SERPL-SCNC: 3.9 MMOL/L (ref 3.5–5.2)
PROT SERPL-MCNC: 6.1 G/DL (ref 6–8.5)
RBC # BLD AUTO: 3.92 X10E6/UL (ref 4.14–5.8)
SL AMB EGFR AFRICAN AMERICAN: 109 ML/MIN/1.73
SL AMB EGFR NON AFRICAN AMERICAN: 95 ML/MIN/1.73
SODIUM SERPL-SCNC: 140 MMOL/L (ref 134–144)
WBC # BLD AUTO: 2.8 X10E3/UL (ref 3.4–10.8)

## 2019-07-03 ENCOUNTER — HOSPITAL ENCOUNTER (OUTPATIENT)
Dept: INFUSION CENTER | Facility: HOSPITAL | Age: 44
Discharge: HOME | End: 2019-07-03
Attending: INTERNAL MEDICINE
Payer: COMMERCIAL

## 2019-07-03 VITALS
SYSTOLIC BLOOD PRESSURE: 130 MMHG | HEIGHT: 74 IN | WEIGHT: 300.27 LBS | BODY MASS INDEX: 38.54 KG/M2 | TEMPERATURE: 97.4 F | DIASTOLIC BLOOD PRESSURE: 76 MMHG | RESPIRATION RATE: 18 BRPM | HEART RATE: 71 BPM

## 2019-07-03 DIAGNOSIS — D69.3 ACUTE ITP (HCC): ICD-10-CM

## 2019-07-03 DIAGNOSIS — D69.3 IDIOPATHIC THROMBOCYTOPENIA (HCC): Primary | ICD-10-CM

## 2019-07-03 PROCEDURE — 96367 TX/PROPH/DG ADDL SEQ IV INF: CPT

## 2019-07-03 PROCEDURE — 96375 TX/PRO/DX INJ NEW DRUG ADDON: CPT

## 2019-07-03 PROCEDURE — 96415 CHEMO IV INFUSION ADDL HR: CPT

## 2019-07-03 PROCEDURE — 96413 CHEMO IV INFUSION 1 HR: CPT

## 2019-07-03 RX ORDER — SODIUM CHLORIDE 9 MG/ML
20 INJECTION, SOLUTION INTRAVENOUS ONCE
Status: COMPLETED | OUTPATIENT
Start: 2019-07-03 | End: 2019-07-03

## 2019-07-03 RX ORDER — ACETAMINOPHEN 325 MG/1
650 TABLET ORAL ONCE
Status: COMPLETED | OUTPATIENT
Start: 2019-07-03 | End: 2019-07-03

## 2019-07-03 RX ADMIN — SODIUM CHLORIDE 20 ML/HR: 0.9 INJECTION, SOLUTION INTRAVENOUS at 10:46

## 2019-07-03 RX ADMIN — ACETAMINOPHEN 650 MG: 325 TABLET ORAL at 10:46

## 2019-07-03 RX ADMIN — DIPHENHYDRAMINE HYDROCHLORIDE 25 MG: 50 INJECTION, SOLUTION INTRAMUSCULAR; INTRAVENOUS at 10:46

## 2019-07-03 RX ADMIN — HYDROCORTISONE SODIUM SUCCINATE 100 MG: 100 INJECTION, POWDER, FOR SOLUTION INTRAMUSCULAR; INTRAVENOUS at 10:46

## 2019-07-03 RX ADMIN — RITUXIMAB 1000 MG: 10 INJECTION, SOLUTION INTRAVENOUS at 11:42

## 2019-07-03 NOTE — PLAN OF CARE
Problem: Potential for Falls  Goal: Patient will remain free of falls  Description  INTERVENTIONS:  - Assess patient frequently for physical needs  -  Identify cognitive and physical deficits and behaviors that affect risk of falls    -  [place] fall precautions as indicated by assessment   - Educate patient/family on patient safety including physical limitations  - Instruct patient to call for assistance with activity based on assessment  - Modify environment to reduce risk of injury  - Consider OT/PT consult to assist with strengthening/mobility  Outcome: Progressing

## 2019-07-11 ENCOUNTER — HOSPITAL ENCOUNTER (OUTPATIENT)
Dept: INFUSION CENTER | Facility: CLINIC | Age: 44
Discharge: HOME | End: 2019-07-11
Payer: COMMERCIAL

## 2019-07-11 VITALS
RESPIRATION RATE: 18 BRPM | HEART RATE: 72 BPM | HEIGHT: 74 IN | WEIGHT: 298 LBS | BODY MASS INDEX: 38.24 KG/M2 | DIASTOLIC BLOOD PRESSURE: 80 MMHG | SYSTOLIC BLOOD PRESSURE: 110 MMHG | TEMPERATURE: 97.3 F

## 2019-07-11 DIAGNOSIS — D69.3 ACUTE ITP (HCC): ICD-10-CM

## 2019-07-11 DIAGNOSIS — D69.3 IDIOPATHIC THROMBOCYTOPENIA (HCC): Primary | ICD-10-CM

## 2019-07-11 LAB
ALBUMIN SERPL BCP-MCNC: 3.4 G/DL (ref 3.5–5)
ALP SERPL-CCNC: 163 U/L (ref 46–116)
ALT SERPL W P-5'-P-CCNC: 89 U/L (ref 12–78)
ANION GAP SERPL CALCULATED.3IONS-SCNC: 6 MMOL/L (ref 4–13)
AST SERPL W P-5'-P-CCNC: 80 U/L (ref 5–45)
BASOPHILS NFR BLD AUTO: 1 % (ref 0–1)
BILIRUB SERPL-MCNC: 1.4 MG/DL (ref 0.2–1)
BUN SERPL-MCNC: 14 MG/DL (ref 5–25)
CALCIUM SERPL-MCNC: 8.1 MG/DL (ref 8.3–10.1)
CHLORIDE SERPL-SCNC: 105 MMOL/L (ref 100–108)
CO2 SERPL-SCNC: 24 MMOL/L (ref 21–32)
CREAT SERPL-MCNC: 0.94 MG/DL (ref 0.6–1.3)
EOSINOPHIL NFR BLD AUTO: 2 % (ref 0–6)
ERYTHROCYTE [DISTWIDTH] IN BLOOD BY AUTOMATED COUNT: 13.2 % (ref 11.6–15.1)
GFR SERPL CREATININE-BSD FRML MDRD: 98 ML/MIN/1.73SQ M
GLUCOSE SERPL-MCNC: 330 MG/DL (ref 65–140)
HCT VFR BLD AUTO: 39.1 % (ref 36.5–49.3)
HGB BLD-MCNC: 14.1 G/DL (ref 12–17)
IMM GRANULOCYTES NFR BLD AUTO: 0 % (ref 0–2)
LYMPHOCYTES NFR BLD AUTO: 16 % (ref 14–44)
MCH RBC QN AUTO: 36.9 PG (ref 26.8–34.3)
MCHC RBC AUTO-ENTMCNC: 36.1 G/DL (ref 31.4–37.4)
MCV RBC AUTO: 102 FL (ref 82–98)
MONOCYTES NFR BLD AUTO: 9 % (ref 4–12)
NEUTS SEG NFR BLD AUTO: 72 % (ref 43–75)
NRBC BLD AUTO-RTO: 0 /100 WBCS
PLATELET # BLD AUTO: 46 THOUSANDS/UL (ref 149–390)
PMV BLD AUTO: 10.7 FL (ref 8.9–12.7)
POTASSIUM SERPL-SCNC: 4.2 MMOL/L (ref 3.5–5.3)
PROT SERPL-MCNC: 6.6 G/DL (ref 6.4–8.2)
RBC # BLD AUTO: 3.82 MILLION/UL (ref 3.88–5.62)
SODIUM SERPL-SCNC: 135 MMOL/L (ref 136–145)
WBC # BLD AUTO: 2.35 THOUSAND/UL (ref 4.31–10.16)

## 2019-07-11 PROCEDURE — 96415 CHEMO IV INFUSION ADDL HR: CPT

## 2019-07-11 PROCEDURE — 96375 TX/PRO/DX INJ NEW DRUG ADDON: CPT

## 2019-07-11 PROCEDURE — 80053 COMPREHEN METABOLIC PANEL: CPT

## 2019-07-11 PROCEDURE — 85025 COMPLETE CBC W/AUTO DIFF WBC: CPT

## 2019-07-11 PROCEDURE — 36415 COLL VENOUS BLD VENIPUNCTURE: CPT

## 2019-07-11 PROCEDURE — 96367 TX/PROPH/DG ADDL SEQ IV INF: CPT

## 2019-07-11 PROCEDURE — 96413 CHEMO IV INFUSION 1 HR: CPT

## 2019-07-11 RX ORDER — SODIUM CHLORIDE 9 MG/ML
20 INJECTION, SOLUTION INTRAVENOUS ONCE
Status: COMPLETED | OUTPATIENT
Start: 2019-07-11 | End: 2019-07-11

## 2019-07-11 RX ORDER — ACETAMINOPHEN 325 MG/1
650 TABLET ORAL ONCE
Status: COMPLETED | OUTPATIENT
Start: 2019-07-11 | End: 2019-07-11

## 2019-07-11 RX ADMIN — ACETAMINOPHEN 650 MG: 325 TABLET, FILM COATED ORAL at 10:40

## 2019-07-11 RX ADMIN — SODIUM CHLORIDE 20 ML/HR: 0.9 INJECTION, SOLUTION INTRAVENOUS at 08:50

## 2019-07-11 RX ADMIN — RITUXIMAB 1000 MG: 10 INJECTION, SOLUTION INTRAVENOUS at 11:09

## 2019-07-11 RX ADMIN — HYDROCORTISONE SODIUM SUCCINATE 100 MG: 100 INJECTION, POWDER, FOR SOLUTION INTRAMUSCULAR; INTRAVENOUS at 10:42

## 2019-07-11 RX ADMIN — DIPHENHYDRAMINE HYDROCHLORIDE 25 MG: 50 INJECTION, SOLUTION INTRAMUSCULAR; INTRAVENOUS at 10:40

## 2019-07-11 NOTE — PROGRESS NOTES
Pt is without complaints  Labs done yesterday at [facility]  Waiting on results to be sent   PIV in place  Pt has call bell within reach

## 2019-07-11 NOTE — PROGRESS NOTES
[facility] response was that the "systems were down yesterday,and pts labs have not been resulted yet"  Infusion  Staff Gentry Patrick was unable to obtain a timeframe for lab results for pts treatment today    I called Crystal Farnsworth RN at [facility] office for orders to draw labs today and send     Pt agreeable to the plan to avoid furtherdelay in todays treatment starting    CBC CMP drawn and sent from peripheral Iv stick

## 2019-07-11 NOTE — PROGRESS NOTES
Parameters reviewed- critical lab value noted with Onofre Hess RN Myrl Patches - pt can proceed as ordered for todays Rituxan

## 2019-07-12 ENCOUNTER — OFFICE VISIT (OUTPATIENT)
Dept: HEMATOLOGY ONCOLOGY | Facility: CLINIC | Age: 44
End: 2019-07-12
Payer: COMMERCIAL

## 2019-07-12 VITALS
SYSTOLIC BLOOD PRESSURE: 146 MMHG | WEIGHT: 300 LBS | RESPIRATION RATE: 16 BRPM | HEART RATE: 80 BPM | OXYGEN SATURATION: 97 % | DIASTOLIC BLOOD PRESSURE: 84 MMHG | TEMPERATURE: 97.5 F | BODY MASS INDEX: 38.5 KG/M2 | HEIGHT: 74 IN

## 2019-07-12 DIAGNOSIS — D69.3 ACUTE ITP (HCC): Primary | ICD-10-CM

## 2019-07-12 LAB
ALBUMIN SERPL-MCNC: 4.1 G/DL (ref 3.5–5.5)
ALBUMIN/GLOB SERPL: 2.1 {RATIO} (ref 1.2–2.2)
ALP SERPL-CCNC: 162 IU/L (ref 39–117)
ALT SERPL-CCNC: 62 IU/L (ref 0–44)
AST SERPL-CCNC: 60 IU/L (ref 0–40)
BASOPHILS # BLD AUTO: 0 X10E3/UL (ref 0–0.2)
BASOPHILS NFR BLD AUTO: 0 %
BILIRUB SERPL-MCNC: 0.7 MG/DL (ref 0–1.2)
BUN SERPL-MCNC: 17 MG/DL (ref 6–24)
BUN/CREAT SERPL: 17 (ref 9–20)
CALCIUM SERPL-MCNC: 9.1 MG/DL (ref 8.7–10.2)
CHLORIDE SERPL-SCNC: 107 MMOL/L (ref 96–106)
CO2 SERPL-SCNC: 20 MMOL/L (ref 20–29)
CREAT SERPL-MCNC: 0.98 MG/DL (ref 0.76–1.27)
EOSINOPHIL # BLD AUTO: 0 X10E3/UL (ref 0–0.4)
EOSINOPHIL NFR BLD AUTO: 2 %
ERYTHROCYTE [DISTWIDTH] IN BLOOD BY AUTOMATED COUNT: 14.2 % (ref 12.3–15.4)
GLOBULIN SER-MCNC: 2 G/DL (ref 1.5–4.5)
GLUCOSE SERPL-MCNC: 182 MG/DL (ref 65–99)
HAV IGM SERPL QL IA: NEGATIVE
HBV CORE IGM SERPL QL IA: NEGATIVE
HBV SURFACE AG SERPL QL IA: NEGATIVE
HCT VFR BLD AUTO: 42.3 % (ref 37.5–51)
HCV AB S/CO SERPL IA: <0.1 S/CO RATIO (ref 0–0.9)
HGB BLD-MCNC: 14.1 G/DL (ref 13–17.7)
IMM GRANULOCYTES # BLD: 0 X10E3/UL (ref 0–0.1)
IMM GRANULOCYTES NFR BLD: 0 %
LYMPHOCYTES # BLD AUTO: 0.5 X10E3/UL (ref 0.7–3.1)
LYMPHOCYTES NFR BLD AUTO: 18 %
MCH RBC QN AUTO: 35.3 PG (ref 26.6–33)
MCHC RBC AUTO-ENTMCNC: 33.3 G/DL (ref 31.5–35.7)
MCV RBC AUTO: 106 FL (ref 79–97)
MONOCYTES # BLD AUTO: 0.2 X10E3/UL (ref 0.1–0.9)
MONOCYTES NFR BLD AUTO: 8 %
MORPHOLOGY BLD-IMP: ABNORMAL
NEUTROPHILS # BLD AUTO: 1.9 X10E3/UL (ref 1.4–7)
NEUTROPHILS NFR BLD AUTO: 72 %
PLATELET # BLD AUTO: 45 X10E3/UL (ref 150–450)
POTASSIUM SERPL-SCNC: 4 MMOL/L (ref 3.5–5.2)
PROT SERPL-MCNC: 6.1 G/DL (ref 6–8.5)
RBC # BLD AUTO: 4 X10E6/UL (ref 4.14–5.8)
SL AMB EGFR AFRICAN AMERICAN: 108 ML/MIN/1.73
SL AMB EGFR NON AFRICAN AMERICAN: 93 ML/MIN/1.73
SODIUM SERPL-SCNC: 139 MMOL/L (ref 134–144)
WBC # BLD AUTO: 2.6 X10E3/UL (ref 3.4–10.8)

## 2019-07-12 PROCEDURE — 99214 OFFICE O/P EST MOD 30 MIN: CPT | Performed by: INTERNAL MEDICINE

## 2019-07-12 NOTE — PROGRESS NOTES
Hematology/Oncology Outpatient Follow- up Note  Lizabeth Trinidad 39 y o  male MRN: @ Encounter: [id number]        Date:  7/12/2019    Presenting Complaint/Diagnosis :      Splenomegaly with thrombocytopenia with a question of ITP  Previous Hematologic/ Oncologic History:      Workup    Current Hematologic/ Oncologic Treatment:      The patient has failed high-dose steroids as he was treated with Decadron  He then got 4 doses of Rituxan and the last one being on 11 July  Interval History:      The patient returns for follow-up visit  He has finished 4 doses of Rituxan  His platelet count Day #4 was 46  It does not seem he has had much of a response to the Rituxan  The patient's white count was 2 35  Hemoglobin was normal at 14 1  Patient is otherwise at baseline  Denies any nausea denies any vomiting denies any diarrhea  The rest of his 14 point review of systems today was negative  Test Results:    Imaging: No results found  Labs:   Lab Results   Component Value Date    WBC 2 35 (L) 07/11/2019    HGB 14 1 07/11/2019    HCT 39 1 07/11/2019     (H) 07/11/2019    PLT 46 (LL) 07/11/2019     Lab Results   Component Value Date     04/05/2017    K 4 2 07/11/2019     07/11/2019    CO2 24 07/11/2019    BUN 14 07/11/2019    CREATININE 0 94 07/11/2019    GLUCOSE 102 (H) 04/05/2017    CALCIUM 8 1 (L) 07/11/2019    AST 80 (H) 07/11/2019    ALT 89 (H) 07/11/2019    ALKPHOS 163 (H) 07/11/2019    PROT 6 4 04/05/2017    BILITOT 1 0 04/05/2017    EGFR 98 07/11/2019         ROS: As stated in the history of present illness otherwise his 14 point review of systems today was negative        Active Problems:   Patient Active Problem List   Diagnosis    Acquired thrombocytopenia ([facility] [address] )    Elevated LFTs    Fatty liver    GERD (gastroesophageal reflux disease)    Hypertension    Tick bite    Acute upper respiratory infection    Diabetes ([facility] [address] [facility]    Idiopathic thrombocytopenia (HCC)    Elevated MCV    Family history of thrombocytopenia    Splenomegaly    Acute ITP ([facility] [address] )       Past Medical History:   Past Medical History:   Diagnosis Date    Fatty liver     GERD (gastroesophageal reflux disease)     Hyperglycemia     Hypertension        Surgical History:   Past Surgical History:   Procedure Laterality Date    CHOLECYSTECTOMY  1's    IR BONE MARROW BIOPSY/ASPIRATION  3/20/2019    WISDOM TOOTH EXTRACTION      In early to mid 19's       Family History:    Family History   Problem Relation Age of Onset    Heart attack Mother     Thrombocytopenia Mother     Testicular cancer Father     No Known Problems Brother     No Known Problems Brother     No Known Problems Brother     No Known Problems Son        Cancer-related family history includes Testicular cancer in his father  Social History:   Social History     Socioeconomic History    Marital status: /Civil Union     Spouse name: Not on file    Number of children: Not on file    Years of education: Not on file    Highest education level: Not on file   Occupational History    Not on file   Social Needs    Financial resource strain: Not on file    Food insecurity:     Worry: Not on file     Inability: Not on file    Transportation needs:     Medical: Not on file     Non-medical: Not on file   Tobacco Use    Smoking status: Former Smoker    Smokeless tobacco: Former User     Quit date: 3/1/2014    Tobacco comment: Social   Last cigarette years ago     Substance and Sexual Activity    Alcohol use: Yes     Frequency: 2-4 times a month     Drinks per session: 1 or 2     Binge frequency: Never    Drug use: Not on file    Sexual activity: Not on file     Comment: Resides with wife and kids   Lifestyle    Physical activity:     Days per week: Not on file     Minutes per session: Not on file    Stress: Not on file   Relationships    Social connections:     Talks on phone: Not on file     Gets together: Not on file     Attends [employer] service: Not on file     Active member of club or organization: Not on file     Attends meetings of clubs or organizations: Not on file     Relationship status: Not on file    Intimate partner violence:     Fear of current or ex partner: Not on file     Emotionally abused: Not on file     Physically abused: Not on file     Forced sexual activity: Not on file   Other Topics Concern    Not on file   Social History Narrative    Not on file       Current Medications:   Current Outpatient Medications   Medication Sig Dispense Refill    glimepiride (AMARYL) 1 mg tablet       losartan (COZAAR) 25 mg tablet       metoprolol succinate (TOPROL-XL) 25 mg 24 hr tablet       omeprazole (PriLOSEC) 40 MG capsule        No current facility-administered medications for this visit  Allergies: Allergies   Allergen Reactions    Aspirin        Physical Exam:    Body surface area is 2 58 meters squared  Wt Readings from Last 3 Encounters:   07/12/19 136 kg (300 lb)   07/11/19 135 kg (298 lb)   07/03/19 (!) 136 kg (300 lb 4 3 oz)        Temp Readings from Last 3 Encounters:   07/12/19 97 5 °F (36 4 °C) (Tympanic)   07/11/19 (!) 97 3 °F (36 3 °C) (Temporal)   07/03/19 (!) 97 4 °F (36 3 °C) (Tympanic)        BP Readings from Last 3 Encounters:   07/12/19 146/84   07/11/19 110/80   07/03/19 130/76         Pulse Readings from Last 3 Encounters:   07/12/19 80   07/11/19 72   07/03/19 71        Physical Exam     Constitutional   General appearance: No acute distress, well appearing and well nourished  Eyes   Conjunctiva and lids: No swelling, erythema or discharge  Pupils and irises: Equal, round and reactive to light  Ears, Nose, Mouth, and Throat   External inspection of ears and nose: Normal     Nasal mucosa, septum, and turbinates: Normal without edema or erythema  Oropharynx: Normal with no erythema, edema, exudate or lesions      Pulmonary   Respiratory effort: No increased work of breathing or signs of respiratory distress  Auscultation of lungs: Clear to auscultation  Cardiovascular   Palpation of heart: Normal PMI, no thrills  Auscultation of heart: Normal rate and rhythm, normal S1 and S2, without murmurs  Examination of extremities for edema and/or varicosities: Normal     Carotid pulses: Normal     Abdomen   Abdomen: Non-tender, no masses  Liver and spleen: No hepatomegaly or splenomegaly  Lymphatic   Palpation of lymph nodes in neck: No lymphadenopathy  Musculoskeletal   Gait and station: Normal     Digits and nails: Normal without clubbing or cyanosis  Inspection/palpation of joints, bones, and muscles: Normal     Skin   Skin and subcutaneous tissue: Normal without rashes or lesions  Neurologic   Cranial nerves: Cranial nerves 2-12 intact  Sensation: No sensory loss  Psychiatric   Orientation to person, place, and time: Normal     Mood and affect: Normal         Assessment / Plan:      The patient is a pleasant 68-year-old male with what appears to be ITP which has been resistant to both steroids and Rituxan  I advised him we would wait a few weeks for the Rituxan to have an effect  If his platelet count does not go up we will start him on Promacta  The patient agrees with this  The patient got his last dose of Rituxan yesterday  We will wait a few weeks and see him back in a month  If his platelet count does not Respond, I will start him on his new medication  Goals and Barriers:  Current Goal:  Prolong Survival from ITP  Barriers: None  Patient's Capacity to Self Care:  Patient able to self care  Portions of the record may have been created with voice recognition software   Occasional wrong word or "sound a like" substitutions may have occurred due to the inherent limitations of voice recognition software   Read the chart carefully and recognize, using context, where substitutions have occurred

## 2019-08-03 LAB
ALBUMIN SERPL-MCNC: 4 G/DL (ref 3.5–5.5)
ALBUMIN/GLOB SERPL: 1.8 {RATIO} (ref 1.2–2.2)
ALP SERPL-CCNC: 138 IU/L (ref 39–117)
ALT SERPL-CCNC: 61 IU/L (ref 0–44)
AST SERPL-CCNC: 59 IU/L (ref 0–40)
BASOPHILS # BLD AUTO: 0 X10E3/UL (ref 0–0.2)
BASOPHILS NFR BLD AUTO: 1 %
BILIRUB SERPL-MCNC: 0.9 MG/DL (ref 0–1.2)
BUN SERPL-MCNC: 16 MG/DL (ref 6–24)
BUN/CREAT SERPL: 17 (ref 9–20)
CALCIUM SERPL-MCNC: 9.2 MG/DL (ref 8.7–10.2)
CHLORIDE SERPL-SCNC: 107 MMOL/L (ref 96–106)
CO2 SERPL-SCNC: 21 MMOL/L (ref 20–29)
CREAT SERPL-MCNC: 0.92 MG/DL (ref 0.76–1.27)
EOSINOPHIL # BLD AUTO: 0 X10E3/UL (ref 0–0.4)
EOSINOPHIL NFR BLD AUTO: 2 %
ERYTHROCYTE [DISTWIDTH] IN BLOOD BY AUTOMATED COUNT: 14 % (ref 12.3–15.4)
GLOBULIN SER-MCNC: 2.2 G/DL (ref 1.5–4.5)
GLUCOSE SERPL-MCNC: 167 MG/DL (ref 65–99)
HCT VFR BLD AUTO: 39.5 % (ref 37.5–51)
HGB BLD-MCNC: 13.7 G/DL (ref 13–17.7)
IMM GRANULOCYTES # BLD: 0 X10E3/UL (ref 0–0.1)
IMM GRANULOCYTES NFR BLD: 0 %
LYMPHOCYTES # BLD AUTO: 0.4 X10E3/UL (ref 0.7–3.1)
LYMPHOCYTES NFR BLD AUTO: 22 %
MCH RBC QN AUTO: 35.8 PG (ref 26.6–33)
MCHC RBC AUTO-ENTMCNC: 34.7 G/DL (ref 31.5–35.7)
MCV RBC AUTO: 103 FL (ref 79–97)
MONOCYTES # BLD AUTO: 0.2 X10E3/UL (ref 0.1–0.9)
MONOCYTES NFR BLD AUTO: 12 %
MORPHOLOGY BLD-IMP: ABNORMAL
NEUTROPHILS # BLD AUTO: 1.2 X10E3/UL (ref 1.4–7)
NEUTROPHILS NFR BLD AUTO: 63 %
PLATELET # BLD AUTO: 39 X10E3/UL (ref 150–450)
POTASSIUM SERPL-SCNC: 4.3 MMOL/L (ref 3.5–5.2)
PROT SERPL-MCNC: 6.2 G/DL (ref 6–8.5)
RBC # BLD AUTO: 3.83 X10E6/UL (ref 4.14–5.8)
SL AMB EGFR AFRICAN AMERICAN: 117 ML/MIN/1.73
SL AMB EGFR NON AFRICAN AMERICAN: 101 ML/MIN/1.73
SODIUM SERPL-SCNC: 141 MMOL/L (ref 134–144)
WBC # BLD AUTO: 1.8 X10E3/UL (ref 3.4–10.8)

## 2019-08-09 ENCOUNTER — OFFICE VISIT (OUTPATIENT)
Dept: HEMATOLOGY ONCOLOGY | Facility: CLINIC | Age: 44
End: 2019-08-09
Payer: COMMERCIAL

## 2019-08-09 ENCOUNTER — TELEPHONE (OUTPATIENT)
Dept: HEMATOLOGY ONCOLOGY | Facility: CLINIC | Age: 44
End: 2019-08-09

## 2019-08-09 ENCOUNTER — DOCUMENTATION (OUTPATIENT)
Dept: HEMATOLOGY ONCOLOGY | Facility: CLINIC | Age: 44
End: 2019-08-09

## 2019-08-09 ENCOUNTER — APPOINTMENT (OUTPATIENT)
Dept: LAB | Facility: CLINIC | Age: 44
End: 2019-08-09
Payer: COMMERCIAL

## 2019-08-09 VITALS
WEIGHT: 302 LBS | HEART RATE: 81 BPM | OXYGEN SATURATION: 97 % | DIASTOLIC BLOOD PRESSURE: 86 MMHG | SYSTOLIC BLOOD PRESSURE: 122 MMHG | HEIGHT: 74 IN | TEMPERATURE: 98.5 F | BODY MASS INDEX: 38.76 KG/M2 | RESPIRATION RATE: 16 BRPM

## 2019-08-09 DIAGNOSIS — R16.1 SPLENOMEGALY: ICD-10-CM

## 2019-08-09 DIAGNOSIS — D69.3 IDIOPATHIC THROMBOCYTOPENIA (HCC): Primary | ICD-10-CM

## 2019-08-09 DIAGNOSIS — D69.3 ACUTE ITP (HCC): Primary | ICD-10-CM

## 2019-08-09 DIAGNOSIS — D69.3 IDIOPATHIC THROMBOCYTOPENIA (HCC): ICD-10-CM

## 2019-08-09 LAB
BASOPHILS # BLD AUTO: 0.02 THOUSANDS/ΜL (ref 0–0.1)
BASOPHILS NFR BLD AUTO: 1 % (ref 0–1)
EOSINOPHIL # BLD AUTO: 0.04 THOUSAND/ΜL (ref 0–0.61)
EOSINOPHIL NFR BLD AUTO: 2 % (ref 0–6)
ERYTHROCYTE [DISTWIDTH] IN BLOOD BY AUTOMATED COUNT: 12.8 % (ref 11.6–15.1)
HCT VFR BLD AUTO: 40 % (ref 36.5–49.3)
HGB BLD-MCNC: 14.4 G/DL (ref 12–17)
IMM GRANULOCYTES # BLD AUTO: 0.01 THOUSAND/UL (ref 0–0.2)
IMM GRANULOCYTES NFR BLD AUTO: 0 % (ref 0–2)
LYMPHOCYTES # BLD AUTO: 0.48 THOUSANDS/ΜL (ref 0.6–4.47)
LYMPHOCYTES NFR BLD AUTO: 19 % (ref 14–44)
MCH RBC QN AUTO: 36.6 PG (ref 26.8–34.3)
MCHC RBC AUTO-ENTMCNC: 36 G/DL (ref 31.4–37.4)
MCV RBC AUTO: 102 FL (ref 82–98)
MONOCYTES # BLD AUTO: 0.32 THOUSAND/ΜL (ref 0.17–1.22)
MONOCYTES NFR BLD AUTO: 13 % (ref 4–12)
NEUTROPHILS # BLD AUTO: 1.65 THOUSANDS/ΜL (ref 1.85–7.62)
NEUTS SEG NFR BLD AUTO: 65 % (ref 43–75)
NRBC BLD AUTO-RTO: 0 /100 WBCS
PLATELET # BLD AUTO: 46 THOUSANDS/UL (ref 149–390)
PMV BLD AUTO: 10 FL (ref 8.9–12.7)
RBC # BLD AUTO: 3.93 MILLION/UL (ref 3.88–5.62)
WBC # BLD AUTO: 2.52 THOUSAND/UL (ref 4.31–10.16)

## 2019-08-09 PROCEDURE — 99214 OFFICE O/P EST MOD 30 MIN: CPT | Performed by: NURSE PRACTITIONER

## 2019-08-09 PROCEDURE — 36415 COLL VENOUS BLD VENIPUNCTURE: CPT | Performed by: NURSE PRACTITIONER

## 2019-08-09 PROCEDURE — 85025 COMPLETE CBC W/AUTO DIFF WBC: CPT | Performed by: NURSE PRACTITIONER

## 2019-08-09 NOTE — PROGRESS NOTES
Hematology/Oncology Outpatient Follow- up Note  Leona Messina 39 y o  male MRN: @ Encounter: [id number]        Date:  8/9/2019    Presenting Complaint/Diagnosis :  Splenomegaly with ITP      Previous Hematologic/ Oncologic History:    High dose steroids  Rituxan x4 doses, last dose 7/11/19    Current Hematologic/ Oncologic Treatment:    Will start promacta if stat plt count today is less than 50k  Interval History:    The patient is here for follow up regarding a low plt count  His current platelet count is 38O, however it was reported as possibly inaccurate due to plt aggregation  He reports experiencing extreme fatigue  Besides that, he really denies any other symptoms  Denies any B symptoms  Denies chest pain, SOB, N/V/D, bleeding/bruising, and lymphadenopathy  His WBC today is 1 8, ANC 1 2  He did have a CT scan in March showing enlarged spleen measuring 20cm  Test Results:    Imaging: No results found  Labs:   Lab Results   Component Value Date    WBC 1 8 (LL) 08/02/2019    HGB 13 7 08/02/2019    HCT 39 5 08/02/2019     (H) 08/02/2019    PLT 39 (LL) 08/02/2019     Lab Results   Component Value Date     04/05/2017    K 4 3 08/02/2019     (H) 08/02/2019    CO2 21 08/02/2019    BUN 16 08/02/2019    CREATININE 0 92 08/02/2019    GLUCOSE 102 (H) 04/05/2017    CALCIUM 8 1 (L) 07/11/2019    AST 59 (H) 08/02/2019    ALT 61 (H) 08/02/2019    ALKPHOS 163 (H) 07/11/2019    PROT 6 4 04/05/2017    BILITOT 1 0 04/05/2017    EGFR 98 07/11/2019         No results found for: SPEP, UPEP    No results found for: PSA    No results found for: CEA    No results found for:     No results found for: AFP    No results found for: IRON, TIBC, FERRITIN    No results found for: [id number]      ROS: As stated in the history of present illness otherwise his 14 point review of systems today was negative        Active Problems:   Patient Active Problem List   Diagnosis    Acquired thrombocytopenia (HCC)    Elevated LFTs    Fatty liver    GERD (gastroesophageal reflux disease)    Hypertension    Tick bite    Acute upper respiratory infection    Diabetes ([facility] [address] [facility]    Idiopathic thrombocytopenia (HCC)    Elevated MCV    Family history of thrombocytopenia    Splenomegaly    Acute ITP (HCC)       Past Medical History:   Past Medical History:   Diagnosis Date    Fatty liver     GERD (gastroesophageal reflux disease)     Hyperglycemia     Hypertension        Surgical History:   Past Surgical History:   Procedure Laterality Date    CHOLECYSTECTOMY  1990's    IR BONE MARROW BIOPSY/ASPIRATION  3/20/2019    WISDOM TOOTH EXTRACTION      In early to mid 19's       Family History:    Family History   Problem Relation Age of Onset    Heart attack Mother     Thrombocytopenia Mother     Testicular cancer Father     No Known Problems Brother     No Known Problems Brother     No Known Problems Brother     No Known Problems Son        Cancer-related family history includes Testicular cancer in his father  Social History:   Social History     Socioeconomic History    Marital status: /Civil Union     Spouse name: Not on file    Number of children: Not on file    Years of education: Not on file    Highest education level: Not on file   Occupational History    Not on file   Social Needs    Financial resource strain: Not on file    Food insecurity:     Worry: Not on file     Inability: Not on file    Transportation needs:     Medical: Not on file     Non-medical: Not on file   Tobacco Use    Smoking status: Former Smoker    Smokeless tobacco: Former User     Quit date: 3/1/2014    Tobacco comment: Social   Last cigarette years ago     Substance and Sexual Activity    Alcohol use: Yes     Frequency: 2-4 times a month     Drinks per session: 1 or 2     Binge frequency: Never    Drug use: Not on file    Sexual activity: Not on file     Comment: Resides with wife and kids Lifestyle    Physical activity:     Days per week: Not on file     Minutes per session: Not on file    Stress: Not on file   Relationships    Social connections:     Talks on phone: Not on file     Gets together: Not on file     Attends [employer] service: Not on file     Active member of club or organization: Not on file     Attends meetings of clubs or organizations: Not on file     Relationship status: Not on file    Intimate partner violence:     Fear of current or ex partner: Not on file     Emotionally abused: Not on file     Physically abused: Not on file     Forced sexual activity: Not on file   Other Topics Concern    Not on file   Social History Narrative    Not on file       Current Medications:   Current Outpatient Medications   Medication Sig Dispense Refill    glimepiride (AMARYL) 1 mg tablet       losartan (COZAAR) 25 mg tablet       metoprolol succinate (TOPROL-XL) 25 mg 24 hr tablet       omeprazole (PriLOSEC) 40 MG capsule        No current facility-administered medications for this visit  Allergies: Allergies   Allergen Reactions    Aspirin        Physical Exam:    Body surface area is 2 59 meters squared  Wt Readings from Last 3 Encounters:   08/09/19 (!) 137 kg (302 lb)   07/12/19 136 kg (300 lb)   07/11/19 135 kg (298 lb)        Temp Readings from Last 3 Encounters:   08/09/19 98 5 °F (36 9 °C) (Tympanic)   07/12/19 97 5 °F (36 4 °C) (Tympanic)   07/11/19 (!) 97 3 °F (36 3 °C) (Temporal)        BP Readings from Last 3 Encounters:   08/09/19 122/86   07/12/19 146/84   07/11/19 110/80         Pulse Readings from Last 3 Encounters:   08/09/19 81   07/12/19 80   07/11/19 72     @LASTSAO2(3)@      Physical Exam     Constitutional   General appearance: No acute distress, well appearing and well nourished  Eyes   Conjunctiva and lids: No swelling, erythema or discharge  Pupils and irises: Equal, round and reactive to light      Ears, Nose, Mouth, and Throat   External inspection of ears and nose: Normal     Nasal mucosa, septum, and turbinates: Normal without edema or erythema  Oropharynx: Normal with no erythema, edema, exudate or lesions  Pulmonary   Respiratory effort: No increased work of breathing or signs of respiratory distress  Auscultation of lungs: Clear to auscultation  Cardiovascular   Palpation of heart: Normal PMI, no thrills  Auscultation of heart: Normal rate and rhythm, normal S1 and S2, without murmurs  Examination of extremities for edema and/or varicosities: Normal     Carotid pulses: Normal     Abdomen   Abdomen: Non-tender, no masses  Liver and spleen: No hepatomegaly or splenomegaly  Lymphatic   Palpation of lymph nodes in neck: No lymphadenopathy  Musculoskeletal   Gait and station: Normal     Digits and nails: Normal without clubbing or cyanosis  Inspection/palpation of joints, bones, and muscles: Normal     Skin   Skin and subcutaneous tissue: Normal without rashes or lesions  Neurologic   Cranial nerves: Cranial nerves 2-12 intact  Sensation: No sensory loss  Psychiatric   Orientation to person, place, and time: Normal     Mood and affect: Normal         Assessment / Plan:    The patient is a 36yo male with low plt count  He also has a low WBC  He did receive 4 doses of Rituxan, which could potentially be contributing to this  His bone marrow biopsy did not show abnormality and his hepatitis panel was negative  His enlarged spleen is also potentially the cause of his low WBC  His insurance company did not approve the PET CT that Dr Lyndon Garber had ordered last time  I will attempt to order this again as it is important to rule out an isolated splenic lymphoma  Given the possible inaccurate platelet count, I will also order for the patient to have a stat platelet count with a platelet clumper today to reevaluate this  He has already been treated with steroids and 4 doses of Rituxan and failed these therapies    We will need to start him on promacta 50mg PO daily if his plt count is really less than 50  We will continue this to maintain plt count above 50k  If needed, we can increase this to 75mg  I will also order iron studies to be done prior to his next visit in 4 weeks  I will also order iron studies for him to have completed prior to his next visit  He did admit to snoring, so I encouraged him to follow up with his PCP and be tested for sleep apnea  We will see him back in 4 weeks with blood work and hopefully the PET CT  If we can rule out splenic lymphoma, it is likely that his counts are a result of splenic enlargement  We will continue to monitor this and make sure he remains asymptomatic  We discussed the importance of calling us immediately, or going to the ER if he notices any unprovoked bleeding/bruising  The patient is in agreement with the plan  Goals and Barriers:  Current Goal:  Prolong Survival from thrombocytopenia  Barriers: None  Patient's Capacity to Self Care:  Patient able to self care  Portions of the record may have been created with voice recognition software   Occasional wrong word or "sound a like" substitutions may have occurred due to the inherent limitations of voice recognition software   Read the chart carefully and recognize, using context, where substitutions have occurred

## 2019-08-09 NOTE — TELEPHONE ENCOUNTER
Spoke with Kong Peck on the phone to let him know that his plt count is 46k  WBC 2 52  We will go ahead with starting him on promacta  This can take up to 1 month for him to receive the medication  I advised him that in the meantime, if he noticed any bleeding/bruising that he should call us immediately or go to the ER  He understands and is in agreement with the plan

## 2019-08-09 NOTE — PROGRESS NOTES
8/9/2019  Received notification from clinical pt will be starting promacta 50 mg qd since he has failed rituxan and dexamethasone  Per [facility], no Lala Dub is needed  Clinical and financial were also notified  8/12/2019  Received notification from financial :    For Patient Leidy Beach  [birth year]    This patient has a copay card thru the [employer]  They will pay up to $15,000 a year with a $25 copay due from the patient  ID# [id number]  BIN# [id number]  PCN# [id number]  GRP# [id number]    Clinical and [facility] were also notified

## 2019-08-12 ENCOUNTER — DOCUMENTATION (OUTPATIENT)
Dept: HEMATOLOGY ONCOLOGY | Facility: CLINIC | Age: 44
End: 2019-08-12

## 2019-08-12 NOTE — PROGRESS NOTES
8-12-19      For Patient Jaiden Barboza  [birth year]    This patient has a copay card thru the [employer]  They will pay up to $15,000 a year with a $25 copay due from the patient  ID# [id number]  BIN# [id number]  PCN# [id number]  GRP# [id number]    Hope this helps  Let me know if you need anything further      EPIC NOTED, Email to team

## 2019-08-30 ENCOUNTER — HOSPITAL ENCOUNTER (OUTPATIENT)
Dept: RADIOLOGY | Age: 44
Discharge: HOME | End: 2019-08-30
Payer: COMMERCIAL

## 2019-08-30 DIAGNOSIS — R16.1 SPLENOMEGALY: ICD-10-CM

## 2019-08-30 DIAGNOSIS — D69.3 IDIOPATHIC THROMBOCYTOPENIA (HCC): ICD-10-CM

## 2019-08-30 LAB — GLUCOSE SERPL-MCNC: 143 MG/DL (ref 65–140)

## 2019-08-30 PROCEDURE — 82948 REAGENT STRIP/BLOOD GLUCOSE: CPT

## 2019-08-30 PROCEDURE — A9552 F18 FDG: HCPCS

## 2019-08-30 PROCEDURE — 78815 PET IMAGE W/CT SKULL-THIGH: CPT

## 2019-09-05 ENCOUNTER — TELEPHONE (OUTPATIENT)
Dept: HEMATOLOGY ONCOLOGY | Facility: CLINIC | Age: 44
End: 2019-09-05

## 2019-09-06 ENCOUNTER — OFFICE VISIT (OUTPATIENT)
Dept: HEMATOLOGY ONCOLOGY | Facility: CLINIC | Age: 44
End: 2019-09-06
Payer: COMMERCIAL

## 2019-09-06 ENCOUNTER — TELEPHONE (OUTPATIENT)
Dept: HEMATOLOGY ONCOLOGY | Facility: CLINIC | Age: 44
End: 2019-09-06

## 2019-09-06 VITALS
SYSTOLIC BLOOD PRESSURE: 122 MMHG | DIASTOLIC BLOOD PRESSURE: 78 MMHG | HEART RATE: 73 BPM | RESPIRATION RATE: 16 BRPM | OXYGEN SATURATION: 97 % | HEIGHT: 74 IN | WEIGHT: 300 LBS | BODY MASS INDEX: 38.5 KG/M2 | TEMPERATURE: 96.8 F

## 2019-09-06 DIAGNOSIS — D69.1 PLATELET DYSFUNCTION (HCC): Primary | ICD-10-CM

## 2019-09-06 DIAGNOSIS — D69.3 ACUTE ITP (HCC): Primary | ICD-10-CM

## 2019-09-06 PROCEDURE — 99214 OFFICE O/P EST MOD 30 MIN: CPT | Performed by: INTERNAL MEDICINE

## 2019-09-06 NOTE — PROGRESS NOTES
Hematology/Oncology Outpatient Follow- up Note  Tony Saavedra 39 y o  male MRN: @ Encounter: [id number]        Date:  9/6/2019    Presenting Complaint/Diagnosis : Splenomegaly with thrombocytopenia with a question of ITP  Previous Hematologic/ Oncologic History:    Workup  The patient has failed high-dose steroids as he was treated with Decadron  He then got 4 doses of Rituxan and the last one being on 11 July  Current Hematologic/ Oncologic Treatment:      Promacta    Interval History:    The patient returns for follow-up visit  His platelet count has come up to 69  He is getting Promacta 50 mg daily  He is doing well otherwise  Does have fatigue  Denies any nausea denies any vomiting denies any diarrhea  The rest of his 14 point review of systems today was negative  Test Results:    Imaging: Nm Pet Ct Skull Base To Mid Thigh    Result Date: 8/30/2019  Narrative: PET/CT SCAN INDICATION: Abnormal CT scan  Splenomegaly  Assess for possible splenic lymphoma  Fatigue  D69 3: Immune thrombocytopenic purpura R16 1: Splenomegaly, not elsewhere classified MODIFIER: PI COMPARISON: CT abdomen exam 3/29/2019 CELL TYPE:  Bone marrow biopsy 3/20/2019: no evidence for lymphoproliferative disorder TECHNIQUE:   8 6 mCi F-18-FDG administered IV  Multiplanar attenuation corrected and non attenuation corrected PET images are available for interpretation, and contiguous, low dose, axial CT sections were obtained from the skull vertex through the femurs    Intravenous contrast material was not utilized  This examination, like all CT scans performed in the [facility], was performed utilizing techniques to minimize radiation dose exposure, including the use of iterative reconstruction and automated exposure control  Fasting serum glucose: 143 mg/dl FINDINGS: VISUALIZED BRAIN:   No acute abnormalities are seen  HEAD/NECK:   There is a physiologic distribution of FDG   No FDG avid cervical adenopathy is seen  CT images: Unremarkable  CHEST:   No FDG avid soft tissue lesions are seen  CT images: Unremarkable  ABDOMEN:   No FDG avid soft tissue lesions are seen  Marked splenomegaly, unchanged  Spleen measures just over 20 cm in AP dimension  Splenic activity is below that of the liver, normal   Spleen demonstrates SUV max of 2 8 while the liver demonstrates SUV max of 3 7  No suspicious foci of FDG uptake in the spleen  CT images: Prior cholecystectomy  2 mm calculus in left kidney lower pole  Colonic diverticulosis identified  PELVIS: No FDG avid soft tissue lesions are seen  CT images: Trace free fluid in the pelvis, nonspecific but may be secondary to the splenomegaly  OSSEOUS STRUCTURES: No FDG avid lesions are seen  CT images: No significant findings  Impression: 1  No evidence of hypermetabolic malignancy  2   Persistent splenomegaly, unchanged  Workstation performed: [id number]       Labs:   Lab Results   Component Value Date    WBC 2 52 (L) 08/09/2019    HGB 14 4 08/09/2019    HCT 40 0 08/09/2019     (H) 08/09/2019    PLT 46 (LL) 08/09/2019     Lab Results   Component Value Date     04/05/2017    K 4 3 08/02/2019     (H) 08/02/2019    CO2 21 08/02/2019    BUN 16 08/02/2019    CREATININE 0 92 08/02/2019    GLUCOSE 102 (H) 04/05/2017    CALCIUM 8 1 (L) 07/11/2019    AST 59 (H) 08/02/2019    ALT 61 (H) 08/02/2019    ALKPHOS 163 (H) 07/11/2019    PROT 6 4 04/05/2017    BILITOT 1 0 04/05/2017    EGFR 98 07/11/2019       ROS: As stated in the history of present illness otherwise his 14 point review of systems today was negative        Active Problems:   Patient Active Problem List   Diagnosis    Acquired thrombocytopenia ([address] )    Elevated LFTs    Fatty liver    GERD (gastroesophageal reflux disease)    Hypertension    Tick bite    Acute upper respiratory infection    Diabetes ([address] )    Idiopathic thrombocytopenia (HCC)    Elevated MCV    Family history of thrombocytopenia    Splenomegaly    Acute ITP (HCC)       Past Medical History:   Past Medical History:   Diagnosis Date    Fatty liver     GERD (gastroesophageal reflux disease)     Hyperglycemia     Hypertension        Surgical History:   Past Surgical History:   Procedure Laterality Date    CHOLECYSTECTOMY  1's    IR BONE MARROW BIOPSY/ASPIRATION  3/20/2019    WISDOM TOOTH EXTRACTION      In early to mid 19's       Family History:    Family History   Problem Relation Age of Onset    Heart attack Mother     Thrombocytopenia Mother     Testicular cancer Father     No Known Problems Brother     No Known Problems Brother     No Known Problems Brother     No Known Problems Son        Cancer-related family history includes Testicular cancer in his father  Social History:   Social History     Socioeconomic History    Marital status: /Civil Union     Spouse name: Not on file    Number of children: Not on file    Years of education: Not on file    Highest education level: Not on file   Occupational History    Not on file   Social Needs    Financial resource strain: Not on file    Food insecurity:     Worry: Not on file     Inability: Not on file    Transportation needs:     Medical: Not on file     Non-medical: Not on file   Tobacco Use    Smoking status: Former Smoker    Smokeless tobacco: Former User     Quit date: 3/1/2014    Tobacco comment: Social   Last cigarette years ago     Substance and Sexual Activity    Alcohol use: Yes     Frequency: 2-4 times a month     Drinks per session: 1 or 2     Binge frequency: Never    Drug use: Not on file    Sexual activity: Not on file     Comment: Resides with wife and kids   Lifestyle    Physical activity:     Days per week: Not on file     Minutes per session: Not on file    Stress: Not on file   Relationships    Social connections:     Talks on phone: Not on file     Gets together: Not on file     Attends [employer] service: Not on file     Active member of club or organization: Not on file     Attends meetings of clubs or organizations: Not on file     Relationship status: Not on file    Intimate partner violence:     Fear of current or ex partner: Not on file     Emotionally abused: Not on file     Physically abused: Not on file     Forced sexual activity: Not on file   Other Topics Concern    Not on file   Social History Narrative    Not on file       Current Medications:   Current Outpatient Medications   Medication Sig Dispense Refill    eltrombopag (PROMACTA) 50 MG tablet Take 1 tablet (50 mg total) by mouth daily Administer on an empty stomach, 1 hour before or 2 hours after a meal  30 tablet 6    glimepiride (AMARYL) 1 mg tablet       losartan (COZAAR) 25 mg tablet       metoprolol succinate (TOPROL-XL) 25 mg 24 hr tablet       omeprazole (PriLOSEC) 40 MG capsule        No current facility-administered medications for this visit  Allergies: Allergies   Allergen Reactions    Aspirin        Physical Exam:    Body surface area is 2 58 meters squared  Wt Readings from Last 3 Encounters:   09/06/19 136 kg (300 lb)   08/09/19 (!) 137 kg (302 lb)   07/12/19 136 kg (300 lb)        Temp Readings from Last 3 Encounters:   09/06/19 (!) 96 8 °F (36 °C) (Tympanic)   08/09/19 98 5 °F (36 9 °C) (Tympanic)   07/12/19 97 5 °F (36 4 °C) (Tympanic)        BP Readings from Last 3 Encounters:   09/06/19 122/78   08/09/19 122/86   07/12/19 146/84         Pulse Readings from Last 3 Encounters:   09/06/19 73   08/09/19 81   07/12/19 80        Physical Exam     Constitutional   General appearance: No acute distress, well appearing and well nourished  Eyes   Conjunctiva and lids: No swelling, erythema or discharge  Pupils and irises: Equal, round and reactive to light  Ears, Nose, Mouth, and Throat   External inspection of ears and nose: Normal     Nasal mucosa, septum, and turbinates: Normal without edema or erythema      Oropharynx: Normal with no erythema, edema, exudate or lesions  Pulmonary   Respiratory effort: No increased work of breathing or signs of respiratory distress  Auscultation of lungs: Clear to auscultation  Cardiovascular   Palpation of heart: Normal PMI, no thrills  Auscultation of heart: Normal rate and rhythm, normal S1 and S2, without murmurs  Examination of extremities for edema and/or varicosities: Normal     Carotid pulses: Normal     Abdomen   Abdomen: Non-tender, no masses  Liver and spleen: No hepatomegaly or splenomegaly  Lymphatic   Palpation of lymph nodes in neck: No lymphadenopathy  Musculoskeletal   Gait and station: Normal     Digits and nails: Normal without clubbing or cyanosis  Inspection/palpation of joints, bones, and muscles: Normal     Skin   Skin and subcutaneous tissue: Normal without rashes or lesions  Neurologic   Cranial nerves: Cranial nerves 2-12 intact  Sensation: No sensory loss  Psychiatric   Orientation to person, place, and time: Normal     Mood and affect: Normal         Assessment / Plan:      The patient is a 36yo male with low plt count  He also has a low WBC  He did receive 4 doses of Rituxan, which could potentially be contributing to this  His bone marrow biopsy did not show abnormality and his hepatitis panel was negative  His enlarged spleen is also potentially the cause of his low WBC  Regardless we did a PET/CT scan which did not show any uptake in the spleen ruling out a splenic lymphoma for the most part  He was started on Promacta and His platelet count is coming up and is now 71  At this point I will continue him on his current dose  If his platelet count declines or if needed we could increase to 75 mg per for now we will keep him on the 50 mg dose  The patient agrees with this  I'll see him back in 1 month with repeat blood work  Goals and Barriers:  Current Goal:  Prolong Survival from ITP  Barriers: None        Patient's Capacity to Self Care: Patient  able to self care  Portions of the record may have been created with voice recognition software   Occasional wrong word or "sound a like" substitutions may have occurred due to the inherent limitations of voice recognition software   Read the chart carefully and recognize, using context, where substitutions have occurred

## 2019-09-06 NOTE — TELEPHONE ENCOUNTER
Called and spoke with pt  The pt goes to [facility] and Dr Sigifredo Frias ordered a CBC but his platelets clump so an order for [facility] BLOOD #[id number] should be ordered along with the CBC  Pt was informed

## 2019-09-09 ENCOUNTER — DOCUMENTATION (OUTPATIENT)
Dept: HEMATOLOGY ONCOLOGY | Facility: CLINIC | Age: 44
End: 2019-09-09

## 2019-09-09 DIAGNOSIS — D69.3 ACUTE ITP (HCC): ICD-10-CM

## 2019-09-09 NOTE — PROGRESS NOTES
9-9-19  Received e-mail from [facility] specialty pharmacy stating they were unable to continue the fill due to being out of network  All information, demo, meds list, allergies, auth and funding information sent to [address] [place] [address]

## 2019-10-08 DIAGNOSIS — D69.3 ACUTE ITP (HCC): ICD-10-CM

## 2019-10-08 LAB
ALBUMIN SERPL-MCNC: 4.1 G/DL (ref 3.5–5.5)
ALBUMIN/GLOB SERPL: 2.1 {RATIO} (ref 1.2–2.2)
ALP SERPL-CCNC: 146 IU/L (ref 39–117)
ALT SERPL-CCNC: 62 IU/L (ref 0–44)
AST SERPL-CCNC: 63 IU/L (ref 0–40)
BASOPHILS # BLD AUTO: 0 X10E3/UL (ref 0–0.2)
BASOPHILS NFR BLD AUTO: 1 %
BILIRUB SERPL-MCNC: 1.2 MG/DL (ref 0–1.2)
BUN SERPL-MCNC: 16 MG/DL (ref 6–24)
BUN/CREAT SERPL: 16 (ref 9–20)
CALCIUM SERPL-MCNC: 8.7 MG/DL (ref 8.7–10.2)
CHLORIDE SERPL-SCNC: 103 MMOL/L (ref 96–106)
CO2 SERPL-SCNC: 25 MMOL/L (ref 20–29)
CREAT SERPL-MCNC: 1.03 MG/DL (ref 0.76–1.27)
EOSINOPHIL # BLD AUTO: 0.1 X10E3/UL (ref 0–0.4)
EOSINOPHIL NFR BLD AUTO: 4 %
ERYTHROCYTE [DISTWIDTH] IN BLOOD BY AUTOMATED COUNT: 13.9 % (ref 12.3–15.4)
GLOBULIN SER-MCNC: 2 G/DL (ref 1.5–4.5)
GLUCOSE SERPL-MCNC: 157 MG/DL (ref 65–99)
HCT VFR BLD AUTO: 40.2 % (ref 37.5–51)
HGB BLD-MCNC: 14.1 G/DL (ref 13–17.7)
IMM GRANULOCYTES # BLD: 0 X10E3/UL (ref 0–0.1)
IMM GRANULOCYTES NFR BLD: 0 %
LYMPHOCYTES # BLD AUTO: 0.4 X10E3/UL (ref 0.7–3.1)
LYMPHOCYTES NFR BLD AUTO: 20 %
MCH RBC QN AUTO: 35.8 PG (ref 26.6–33)
MCHC RBC AUTO-ENTMCNC: 35.1 G/DL (ref 31.5–35.7)
MCV RBC AUTO: 102 FL (ref 79–97)
MONOCYTES # BLD AUTO: 0.2 X10E3/UL (ref 0.1–0.9)
MONOCYTES NFR BLD AUTO: 11 %
MORPHOLOGY BLD-IMP: ABNORMAL
NEUTROPHILS # BLD AUTO: 1.3 X10E3/UL (ref 1.4–7)
NEUTROPHILS NFR BLD AUTO: 64 %
PLATELET # BLD AUTO: 65 X10E3/UL (ref 150–450)
PLATELET # BLD AUTO: 68 X10E3/UL (ref 150–450)
POTASSIUM SERPL-SCNC: 4.1 MMOL/L (ref 3.5–5.2)
PROT SERPL-MCNC: 6.1 G/DL (ref 6–8.5)
RBC # BLD AUTO: 3.94 X10E6/UL (ref 4.14–5.8)
SL AMB EGFR AFRICAN AMERICAN: 101 ML/MIN/1.73
SL AMB EGFR NON AFRICAN AMERICAN: 87 ML/MIN/1.73
SODIUM SERPL-SCNC: 141 MMOL/L (ref 134–144)
WBC # BLD AUTO: 2 X10E3/UL (ref 3.4–10.8)

## 2019-10-11 ENCOUNTER — OFFICE VISIT (OUTPATIENT)
Dept: HEMATOLOGY ONCOLOGY | Facility: CLINIC | Age: 44
End: 2019-10-11
Payer: COMMERCIAL

## 2019-10-11 VITALS
TEMPERATURE: 98.1 F | WEIGHT: 301 LBS | BODY MASS INDEX: 38.63 KG/M2 | HEART RATE: 82 BPM | SYSTOLIC BLOOD PRESSURE: 128 MMHG | RESPIRATION RATE: 16 BRPM | HEIGHT: 74 IN | OXYGEN SATURATION: 97 % | DIASTOLIC BLOOD PRESSURE: 86 MMHG

## 2019-10-11 DIAGNOSIS — D69.3 IDIOPATHIC THROMBOCYTOPENIA (HCC): Primary | ICD-10-CM

## 2019-10-11 DIAGNOSIS — R16.1 SPLENOMEGALY: ICD-10-CM

## 2019-10-11 PROCEDURE — 99214 OFFICE O/P EST MOD 30 MIN: CPT | Performed by: NURSE PRACTITIONER

## 2019-10-11 NOTE — PROGRESS NOTES
Hematology/Oncology Outpatient Follow- up Note  Arnulfo Pulido 38 y o  male MRN: @ Encounter: [id number]        Date:  10/11/2019    Presenting Complaint/Diagnosis : Splenomegaly with thrombocytopenia with a question of ITP    Previous Hematologic/ Oncologic History:    Workup  The patient has failed high-dose steroids as he was treated with Decadron  He then got 4 doses of Rituxan and the last one being on 11 July  Current Hematologic/ Oncologic Treatment:    Promacta    Interval History:    The patient returns for follow-up visit  He is now taking Promacta and his most recent platelet count is 65  Still remains neutropenic  ANC 1 2, WBC 2 0  Hemoglobin normal at 14 1  His PET scan on 08/30/2019 showed persistent splenomegaly and no evidence of hypermetabolic malignancy which helps rule out splenic lymphoma  Overall he feels well  Denies any bleeding/bruising  Denies chest pain, shortness of breath, nausea, vomiting, diarrhea, and fevers/infection  Test Results:    Imaging: No results found  Labs:   Lab Results   Component Value Date    WBC 2 0 (LL) 10/07/2019    HGB 14 1 10/07/2019    HCT 40 2 10/07/2019     (H) 10/07/2019    PLT 68 (LL) 10/07/2019     Lab Results   Component Value Date     04/05/2017    K 4 1 10/07/2019     10/07/2019    CO2 25 10/07/2019    BUN 16 10/07/2019    CREATININE 1 03 10/07/2019    GLUCOSE 102 (H) 04/05/2017    CALCIUM 8 1 (L) 07/11/2019    AST 63 (H) 10/07/2019    ALT 62 (H) 10/07/2019    ALKPHOS 163 (H) 07/11/2019    PROT 6 4 04/05/2017    BILITOT 1 0 04/05/2017    EGFR 98 07/11/2019         No results found for: SPEP, UPEP    No results found for: PSA    No results found for: CEA    No results found for:     No results found for: AFP    No results found for: IRON, TIBC, FERRITIN    No results found for: [id number]      ROS: As stated in the history of present illness otherwise his 14 point review of systems today was negative        Active Problems:   Patient Active Problem List   Diagnosis    Acquired thrombocytopenia (HCC)    Elevated LFTs    Fatty liver    GERD (gastroesophageal reflux disease)    Hypertension    Tick bite    Acute upper respiratory infection    Diabetes ([facility] )    Idiopathic thrombocytopenia (HCC)    Elevated MCV    Family history of thrombocytopenia    Splenomegaly    Acute ITP ([facility] )       Past Medical History:   Past Medical History:   Diagnosis Date    Fatty liver     GERD (gastroesophageal reflux disease)     Hyperglycemia     Hypertension        Surgical History:   Past Surgical History:   Procedure Laterality Date    CHOLECYSTECTOMY  1990's    IR BONE MARROW BIOPSY/ASPIRATION  3/20/2019    WISDOM TOOTH EXTRACTION      In early to mid 19's       Family History:    Family History   Problem Relation Age of Onset    Heart attack Mother     Thrombocytopenia Mother     Testicular cancer Father     No Known Problems Brother     No Known Problems Brother     No Known Problems Brother     No Known Problems Son        Cancer-related family history includes Testicular cancer in his father  Social History:   Social History     Socioeconomic History    Marital status: /Civil Union     Spouse name: Not on file    Number of children: Not on file    Years of education: Not on file    Highest education level: Not on file   Occupational History    Not on file   Social Needs    Financial resource strain: Not on file    Food insecurity:     Worry: Not on file     Inability: Not on file    Transportation needs:     Medical: Not on file     Non-medical: Not on file   Tobacco Use    Smoking status: Former Smoker    Smokeless tobacco: Former User     Quit date: 3/1/2014    Tobacco comment: Social   Last cigarette years ago     Substance and Sexual Activity    Alcohol use: Yes     Frequency: 2-4 times a month     Drinks per session: 1 or 2     Binge frequency: Never    Drug use: Not on file    Sexual activity: Not on file     Comment: Resides with wife and kids   Lifestyle    Physical activity:     Days per week: Not on file     Minutes per session: Not on file    Stress: Not on file   Relationships    Social connections:     Talks on phone: Not on file     Gets together: Not on file     Attends [employer] service: Not on file     Active member of club or organization: Not on file     Attends meetings of clubs or organizations: Not on file     Relationship status: Not on file    Intimate partner violence:     Fear of current or ex partner: Not on file     Emotionally abused: Not on file     Physically abused: Not on file     Forced sexual activity: Not on file   Other Topics Concern    Not on file   Social History Narrative    Not on file       Current Medications:   Current Outpatient Medications   Medication Sig Dispense Refill    eltrombopag (PROMACTA) 50 MG tablet Take 1 5 tablets (75 mg total) by mouth daily Administer on an empty stomach, 1 hour before or 2 hours after a meal  45 tablet 6    glimepiride (AMARYL) 1 mg tablet       losartan (COZAAR) 25 mg tablet       metoprolol succinate (TOPROL-XL) 25 mg 24 hr tablet       omeprazole (PriLOSEC) 40 MG capsule        No current facility-administered medications for this visit  Allergies: Allergies   Allergen Reactions    Aspirin        Physical Exam:    There is no height or weight on file to calculate BSA      Wt Readings from Last 3 Encounters:   09/06/19 136 kg (300 lb)   08/09/19 (!) 137 kg (302 lb)   07/12/19 136 kg (300 lb)        Temp Readings from Last 3 Encounters:   09/06/19 (!) 96 8 °F (36 °C) (Tympanic)   08/09/19 98 5 °F (36 9 °C) (Tympanic)   07/12/19 97 5 °F (36 4 °C) (Tympanic)        BP Readings from Last 3 Encounters:   09/06/19 122/78   08/09/19 122/86   07/12/19 146/84         Pulse Readings from Last 3 Encounters:   09/06/19 73   08/09/19 81   07/12/19 80     @LASTSAO2(3)@      Physical Exam     Constitutional General appearance: No acute distress, well appearing and well nourished  Eyes   Conjunctiva and lids: No swelling, erythema or discharge  Pupils and irises: Equal, round and reactive to light  Ears, Nose, Mouth, and Throat   External inspection of ears and nose: Normal     Nasal mucosa, septum, and turbinates: Normal without edema or erythema  Oropharynx: Normal with no erythema, edema, exudate or lesions  Pulmonary   Respiratory effort: No increased work of breathing or signs of respiratory distress  Auscultation of lungs: Clear to auscultation  Cardiovascular   Palpation of heart: Normal PMI, no thrills  Auscultation of heart: Normal rate and rhythm, normal S1 and S2, without murmurs  Examination of extremities for edema and/or varicosities: Normal     Carotid pulses: Normal     Abdomen   Abdomen: Non-tender, no masses  Liver and spleen: No hepatomegaly or splenomegaly  Lymphatic   Palpation of lymph nodes in neck: No lymphadenopathy  Musculoskeletal   Gait and station: Normal     Digits and nails: Normal without clubbing or cyanosis  Inspection/palpation of joints, bones, and muscles: Normal     Skin   Skin and subcutaneous tissue: Normal without rashes or lesions  Neurologic   Cranial nerves: Cranial nerves 2-12 intact  Sensation: No sensory loss  Psychiatric   Orientation to person, place, and time: Normal     Mood and affect: Normal         Assessment / Plan:    The patient is a 65-year-old male with a low platelet count  He also has a low WBC and ANC  He did receive high-dose steroids with Decadron and then 4 doses of Rituxan with no response  His bone marrow biopsy did not show any abnormality, hepatitis panel was negative, and PET-CT did not show any uptake in the spleen which helps rule out splenic lymphoma  He does have persistent splenomegaly  This may be contributing to his low counts    Dr Phil Palacios had increased his Promacta from 50 mg to 75 mg on Tuesday however he has not received a new prescription yet so he is still on Promacta 50 mg daily  His platelet count is running in the 60s, most recently is 65  I will send Promacta 75 to [facility] today  Hopefully he will have this by Monday as that is when he runs out of 50 mg pills  We will plan to see him back in 4-6 weeks with repeat blood work  The patient is in agreement with the plan      Goals and Barriers:  Current Goal:  Prolong Survival from thrombocytopenia  Barriers: None  Patient's Capacity to Self Care:  Patient able to self care  Portions of the record may have been created with voice recognition software   Occasional wrong word or "sound a like" substitutions may have occurred due to the inherent limitations of voice recognition software   Read the chart carefully and recognize, using context, where substitutions have occurred

## 2019-10-16 ENCOUNTER — TELEPHONE (OUTPATIENT)
Dept: HEMATOLOGY ONCOLOGY | Facility: CLINIC | Age: 44
End: 2019-10-16

## 2019-10-16 NOTE — TELEPHONE ENCOUNTER
Patient called in stating that he hasn't received a call back from the office stating if his medication Promacta 75mg has been called in to [facility]  Please call

## 2019-11-15 ENCOUNTER — OFFICE VISIT (OUTPATIENT)
Dept: HEMATOLOGY ONCOLOGY | Facility: CLINIC | Age: 44
End: 2019-11-15
Payer: COMMERCIAL

## 2019-11-15 VITALS
SYSTOLIC BLOOD PRESSURE: 122 MMHG | TEMPERATURE: 98.6 F | OXYGEN SATURATION: 96 % | HEIGHT: 74 IN | BODY MASS INDEX: 38.76 KG/M2 | DIASTOLIC BLOOD PRESSURE: 74 MMHG | WEIGHT: 302 LBS | RESPIRATION RATE: 16 BRPM | HEART RATE: 81 BPM

## 2019-11-15 DIAGNOSIS — R11.0 NAUSEA: ICD-10-CM

## 2019-11-15 DIAGNOSIS — R11.0 NAUSEA: Primary | ICD-10-CM

## 2019-11-15 DIAGNOSIS — D69.6 THROMBOCYTOPENIA (HCC): ICD-10-CM

## 2019-11-15 DIAGNOSIS — R16.1 SPLENOMEGALY: Primary | ICD-10-CM

## 2019-11-15 PROCEDURE — 99214 OFFICE O/P EST MOD 30 MIN: CPT | Performed by: NURSE PRACTITIONER

## 2019-11-15 RX ORDER — PROCHLORPERAZINE MALEATE 10 MG
10 TABLET ORAL EVERY 6 HOURS PRN
Qty: 30 TABLET | Refills: 1 | Status: SHIPPED | OUTPATIENT
Start: 2019-11-15 | End: 2021-04-22 | Stop reason: ALTCHOICE

## 2019-11-15 RX ORDER — PROCHLORPERAZINE MALEATE 10 MG
10 TABLET ORAL EVERY 6 HOURS PRN
Qty: 30 TABLET | Refills: 0 | Status: SHIPPED | OUTPATIENT
Start: 2019-11-15 | End: 2019-11-15

## 2019-11-15 RX ORDER — PROCHLORPERAZINE MALEATE 10 MG
10 TABLET ORAL EVERY 6 HOURS PRN
Qty: 30 TABLET | Refills: 0 | Status: SHIPPED | OUTPATIENT
Start: 2019-11-15 | End: 2019-11-15 | Stop reason: CLARIF

## 2019-11-15 NOTE — PROGRESS NOTES
Hematology/Oncology Outpatient Follow- up Note  Lily Wolf 38 y o  male MRN: @ Encounter: [id number]        Date:  11/15/2019    Presenting Complaint/Diagnosis : Splenomegaly with thrombocytopenia with a question of ITP    Previous Hematologic/ Oncologic History:    Workup  The patient has failed high-dose steroids as he was treated with Decadron  He then got 4 doses of Rituxan and the last one being on 11 July  Current Hematologic/ Oncologic Treatment:    Promacta 75mg daily    Interval History:    The patient returns for follow-up visit  He has been taking Promacta daily and his platelets have increased to 78  His WBC is still low at 2 6  The patient does mention that he has been experiencing nausea and about 1-2 loose stools per day since taking the Promacta  Otherwise feels well  Denies any signs of bleeding or bruising  Denies chest pain, shortness of breath, vomiting, and fevers/infection  Test Results:    Imaging: No results found  Labs:   Lab Results   Component Value Date    WBC 2 0 (LL) 10/07/2019    HGB 14 1 10/07/2019    HCT 40 2 10/07/2019     (H) 10/07/2019    PLT 68 (LL) 10/07/2019     Lab Results   Component Value Date     04/05/2017    K 4 1 10/07/2019     10/07/2019    CO2 25 10/07/2019    BUN 16 10/07/2019    CREATININE 1 03 10/07/2019    GLUCOSE 102 (H) 04/05/2017    CALCIUM 8 1 (L) 07/11/2019    AST 63 (H) 10/07/2019    ALT 62 (H) 10/07/2019    ALKPHOS 163 (H) 07/11/2019    PROT 6 4 04/05/2017    BILITOT 1 0 04/05/2017    EGFR 98 07/11/2019         No results found for: SPEP, UPEP    No results found for: PSA    No results found for: CEA    No results found for:     No results found for: AFP    No results found for: IRON, TIBC, FERRITIN    No results found for: [id number]      ROS: As stated in the history of present illness otherwise his 14 point review of systems today was negative        Active Problems:   Patient Active Problem List   Diagnosis    Acquired thrombocytopenia (HCC)    Elevated LFTs    Fatty liver    GERD (gastroesophageal reflux disease)    Hypertension    Tick bite    Acute upper respiratory infection    Diabetes ([facility] [address] [facility]    Idiopathic thrombocytopenia (HCC)    Elevated MCV    Family history of thrombocytopenia    Splenomegaly    Acute ITP (HCC)       Past Medical History:   Past Medical History:   Diagnosis Date    Fatty liver     GERD (gastroesophageal reflux disease)     Hyperglycemia     Hypertension        Surgical History:   Past Surgical History:   Procedure Laterality Date    CHOLECYSTECTOMY  1990's    IR BONE MARROW BIOPSY/ASPIRATION  3/20/2019    WISDOM TOOTH EXTRACTION      In early to mid 19's       Family History:    Family History   Problem Relation Age of Onset    Heart attack Mother     Thrombocytopenia Mother     Testicular cancer Father     No Known Problems Brother     No Known Problems Brother     No Known Problems Brother     No Known Problems Son        Cancer-related family history includes Testicular cancer in his father  Social History:   Social History     Socioeconomic History    Marital status: /Civil Union     Spouse name: Not on file    Number of children: Not on file    Years of education: Not on file    Highest education level: Not on file   Occupational History    Not on file   Social Needs    Financial resource strain: Not on file    Food insecurity:     Worry: Not on file     Inability: Not on file    Transportation needs:     Medical: Not on file     Non-medical: Not on file   Tobacco Use    Smoking status: Former Smoker    Smokeless tobacco: Former User     Quit date: 3/1/2014    Tobacco comment: Social   Last cigarette years ago     Substance and Sexual Activity    Alcohol use: Yes     Frequency: 2-4 times a month     Drinks per session: 1 or 2     Binge frequency: Never    Drug use: Not on file    Sexual activity: Not on file     Comment: Resides with wife and kids   Lifestyle    Physical activity:     Days per week: Not on file     Minutes per session: Not on file    Stress: Not on file   Relationships    Social connections:     Talks on phone: Not on file     Gets together: Not on file     Attends Religion service: Not on file     Active member of club or organization: Not on file     Attends meetings of clubs or organizations: Not on file     Relationship status: Not on file    Intimate partner violence:     Fear of current or ex partner: Not on file     Emotionally abused: Not on file     Physically abused: Not on file     Forced sexual activity: Not on file   Other Topics Concern    Not on file   Social History Narrative    Not on file       Current Medications:   Current Outpatient Medications   Medication Sig Dispense Refill    eltrombopag (PROMACTA) 75 MG TABS Take 1 tablet (75 mg total) by mouth daily 30 tablet 11    glimepiride (AMARYL) 1 mg tablet       losartan (COZAAR) 25 mg tablet       metoprolol succinate (TOPROL-XL) 25 mg 24 hr tablet       omeprazole (PriLOSEC) 40 MG capsule        No current facility-administered medications for this visit  Allergies: Allergies   Allergen Reactions    Aspirin        Physical Exam:    There is no height or weight on file to calculate BSA  Wt Readings from Last 3 Encounters:   10/11/19 (!) 137 kg (301 lb)   09/06/19 136 kg (300 lb)   08/09/19 (!) 137 kg (302 lb)        Temp Readings from Last 3 Encounters:   10/11/19 98 1 °F (36 7 °C) (Tympanic)   09/06/19 (!) 96 8 °F (36 °C) (Tympanic)   08/09/19 98 5 °F (36 9 °C) (Tympanic)        BP Readings from Last 3 Encounters:   10/11/19 128/86   09/06/19 122/78   08/09/19 122/86         Pulse Readings from Last 3 Encounters:   10/11/19 82   09/06/19 73   08/09/19 81     @LASTSAO2(3)@      Physical Exam     Constitutional   General appearance: No acute distress, well appearing and well nourished      Eyes   Conjunctiva and lids: No swelling, erythema or discharge  Pupils and irises: Equal, round and reactive to light  Ears, Nose, Mouth, and Throat   External inspection of ears and nose: Normal     Nasal mucosa, septum, and turbinates: Normal without edema or erythema  Oropharynx: Normal with no erythema, edema, exudate or lesions  Pulmonary   Respiratory effort: No increased work of breathing or signs of respiratory distress  Auscultation of lungs: Clear to auscultation  Cardiovascular   Palpation of heart: Normal PMI, no thrills  Auscultation of heart: Normal rate and rhythm, normal S1 and S2, without murmurs  Examination of extremities for edema and/or varicosities: Normal     Carotid pulses: Normal     Abdomen   Abdomen: Non-tender, no masses  Liver and spleen: No hepatomegaly or splenomegaly  Lymphatic   Palpation of lymph nodes in neck: No lymphadenopathy  Musculoskeletal   Gait and station: Normal     Digits and nails: Normal without clubbing or cyanosis  Inspection/palpation of joints, bones, and muscles: Normal     Skin   Skin and subcutaneous tissue: Normal without rashes or lesions  Neurologic   Cranial nerves: Cranial nerves 2-12 intact  Sensation: No sensory loss  Psychiatric   Orientation to person, place, and time: Normal     Mood and affect: Normal         Assessment / Plan:    The patient is a 79-year-old male with a low platelet count  He also has a low WBC and ANC  He did receive high-dose steroids with Decadron and then 4 doses of Rituxan with no response  His bone marrow biopsy did not show any abnormality, hepatitis panel was negative, and PET-CT did not show any uptake in the spleen which helps rule out splenic lymphoma  He does have persistent splenomegaly  This may be contributing to his low counts  Today his platelets are 78 and his WBC is 2 6  He is taking Promacta 75 mg daily  He has now been taking this for about 4 weeks    He does have some side effects from this I will send Compazine to the pharmacy for his nausea and if the loose stools become worse he will take Imodium  He did state that when he was on 50 mg of Promacta he started with the same side effects but they improved after a few weeks  We will continue him on Promacta 75 mg and see him back in about 2 months  In the meantime he will call with any questions or concerns  If he notices any signs of bleeding or unprovoked bruising he will go to the emergency room  Goals and Barriers:  Current Goal:  Prolong Survival from splenomegaly and thrombocytopenia  Barriers: None  Patient's Capacity to Self Care:  Patient able to self care  Portions of the record may have been created with voice recognition software   Occasional wrong word or "sound a like" substitutions may have occurred due to the inherent limitations of voice recognition software   Read the chart carefully and recognize, using context, where substitutions have occurred  25-Nov-2017 12:24

## 2020-02-10 LAB
ALBUMIN SERPL-MCNC: 4.2 G/DL (ref 4–5)
ALBUMIN/GLOB SERPL: 1.8 {RATIO} (ref 1.2–2.2)
ALP SERPL-CCNC: 137 IU/L (ref 39–117)
ALT SERPL-CCNC: 68 IU/L (ref 0–44)
AST SERPL-CCNC: 68 IU/L (ref 0–40)
BASOPHILS # BLD AUTO: 0 X10E3/UL (ref 0–0.2)
BASOPHILS NFR BLD AUTO: 0 %
BILIRUB SERPL-MCNC: 1.4 MG/DL (ref 0–1.2)
BUN SERPL-MCNC: 17 MG/DL (ref 6–24)
BUN/CREAT SERPL: 18 (ref 9–20)
CALCIUM SERPL-MCNC: 9 MG/DL (ref 8.7–10.2)
CHLORIDE SERPL-SCNC: 107 MMOL/L (ref 96–106)
CO2 SERPL-SCNC: 19 MMOL/L (ref 20–29)
CREAT SERPL-MCNC: 0.97 MG/DL (ref 0.76–1.27)
EOSINOPHIL # BLD AUTO: 0.1 X10E3/UL (ref 0–0.4)
EOSINOPHIL NFR BLD AUTO: 3 %
ERYTHROCYTE [DISTWIDTH] IN BLOOD BY AUTOMATED COUNT: 14.6 % (ref 11.6–15.4)
GLOBULIN SER-MCNC: 2.4 G/DL (ref 1.5–4.5)
GLUCOSE SERPL-MCNC: 143 MG/DL (ref 65–99)
HCT VFR BLD AUTO: 41.3 % (ref 37.5–51)
HGB BLD-MCNC: 14.7 G/DL (ref 13–17.7)
IMM GRANULOCYTES # BLD: 0 X10E3/UL (ref 0–0.1)
IMM GRANULOCYTES NFR BLD: 0 %
LYMPHOCYTES # BLD AUTO: 0.5 X10E3/UL (ref 0.7–3.1)
LYMPHOCYTES NFR BLD AUTO: 21 %
MCH RBC QN AUTO: 36.2 PG (ref 26.6–33)
MCHC RBC AUTO-ENTMCNC: 35.6 G/DL (ref 31.5–35.7)
MCV RBC AUTO: 102 FL (ref 79–97)
MONOCYTES # BLD AUTO: 0.3 X10E3/UL (ref 0.1–0.9)
MONOCYTES NFR BLD AUTO: 10 %
MORPHOLOGY BLD-IMP: ABNORMAL
NEUTROPHILS # BLD AUTO: 1.6 X10E3/UL (ref 1.4–7)
NEUTROPHILS NFR BLD AUTO: 66 %
PLATELET # BLD AUTO: 76 X10E3/UL (ref 150–450)
PLATELET # BLD AUTO: 85 X10E3/UL (ref 150–450)
POTASSIUM SERPL-SCNC: 4.2 MMOL/L (ref 3.5–5.2)
PROT SERPL-MCNC: 6.6 G/DL (ref 6–8.5)
RBC # BLD AUTO: 4.06 X10E6/UL (ref 4.14–5.8)
SL AMB EGFR AFRICAN AMERICAN: 109 ML/MIN/1.73
SL AMB EGFR NON AFRICAN AMERICAN: 94 ML/MIN/1.73
SODIUM SERPL-SCNC: 143 MMOL/L (ref 134–144)
WBC # BLD AUTO: 2.5 X10E3/UL (ref 3.4–10.8)

## 2020-06-15 ENCOUNTER — TELEPHONE (OUTPATIENT)
Dept: HEMATOLOGY ONCOLOGY | Facility: CLINIC | Age: 45
End: 2020-06-15

## 2020-06-16 ENCOUNTER — TELEPHONE (OUTPATIENT)
Dept: HEMATOLOGY ONCOLOGY | Facility: CLINIC | Age: 45
End: 2020-06-16

## 2020-06-16 ENCOUNTER — DOCUMENTATION (OUTPATIENT)
Dept: HEMATOLOGY ONCOLOGY | Facility: CLINIC | Age: 45
End: 2020-06-16

## 2020-07-17 ENCOUNTER — TELEPHONE (OUTPATIENT)
Dept: HEMATOLOGY ONCOLOGY | Facility: HOSPITAL | Age: 45
End: 2020-07-17

## 2020-07-17 NOTE — TELEPHONE ENCOUNTER
[facility] and Located within [facility] for pt to be rescheduled  He was moved from 7/28 with Dr Kaitlin Chapa to /84 at 230 with Jamey Hansen

## 2020-07-28 DIAGNOSIS — R16.1 SPLENOMEGALY: Primary | ICD-10-CM

## 2020-07-31 ENCOUNTER — TELEPHONE (OUTPATIENT)
Dept: HEMATOLOGY ONCOLOGY | Facility: CLINIC | Age: 45
End: 2020-07-31

## 2020-07-31 NOTE — TELEPHONE ENCOUNTER
Received lab results for patient from [facility] has been sent to [facility]

## 2020-08-12 ENCOUNTER — TELEPHONE (OUTPATIENT)
Dept: HEMATOLOGY ONCOLOGY | Facility: CLINIC | Age: 45
End: 2020-08-12

## 2020-08-12 NOTE — PROGRESS NOTES
Hematology/Oncology Outpatient Follow- up Note  Isreal Montanez 1975, [id number]  2020        Chief Complaint   Patient presents with    Follow-up   ITP    HPI:  Ani Ogden is a 39 yo male who was referred to hematology for low platelet count, seen initially in 2016  He also has a low WBC and h/o splenomegaly  Work up included a bone marrow biopsy which was normal and PET CT scan to evaluate spleen this was negative for uptake and r/o splenic lymphoma  He has been treated with high dose steroids and Rituxan  Currently maintained on Promacta for ITP     Previous Hematologic/ Oncologic History:    Workup  The patient has failed high-dose steroids, he was treated with Decadron  Rituxan x 4 doses     Current Hematologic/ Oncologic Treatment:    Promacta 75mg daily    ECO - Asymptomatic    Interval History:  The patient returns for a follow-up visit  He was last seen on 11/15/2019  He is taking Promacta 75 mg daily  Most recent blood work completed on 2020 was reviewed  His platelet count was 87, this is improved from previous months  His white count is still low at 2 3, this is stable  His LFTs are mildly elevated but also stable  He denies any recent fevers/chills or recurring infections  Denies bleeding or excessive bruising  Overall, he reports he feels well and denies any concerning symptoms today       Cancer Staging:  Cancer Staging  No matching staging information was found for the patient  Molecular Testing:         Test Results:    Imaging: No results found      Labs:   Lab Results   Component Value Date    WBC 2 5 (LL) 2020    HGB 14 7 2020    HCT 41 3 2020     (H) 2020    PLT 85 (LL) 2020     Lab Results   Component Value Date     2017    K 4 2 2020     (H) 2020    CO2 19 (L) 2020    BUN 17 2020    CREATININE 0 97 2020    GLUCOSE 102 (H) 2017    CALCIUM 8 1 (L) 2019 AST 68 (H) 02/07/2020    ALT 68 (H) 02/07/2020    ALKPHOS 163 (H) 07/11/2019    PROT 6 4 04/05/2017    BILITOT 1 0 04/05/2017    EGFR 98 07/11/2019           Review of Systems   Hematological: Does not bruise/bleed easily  All other systems reviewed and are negative  Active Problems:   Patient Active Problem List   Diagnosis    Acquired thrombocytopenia (HCC)    Elevated LFTs    Fatty liver    GERD (gastroesophageal reflux disease)    Hypertension    Tick bite    Acute upper respiratory infection    Diabetes ([facility] )    Idiopathic thrombocytopenia (HCC)    Elevated MCV    Family history of thrombocytopenia    Splenomegaly    Acute ITP ([facility] )       Past Medical History:   Past Medical History:   Diagnosis Date    Fatty liver     GERD (gastroesophageal reflux disease)     Hyperglycemia     Hypertension        Surgical History:   Past Surgical History:   Procedure Laterality Date    CHOLECYSTECTOMY  1990's    IR BONE MARROW BIOPSY/ASPIRATION  3/20/2019    WISDOM TOOTH EXTRACTION      In early to mid 19's       Family History:    Family History   Problem Relation Age of Onset    Heart attack Mother     Thrombocytopenia Mother     Testicular cancer Father     No Known Problems Brother     No Known Problems Brother     No Known Problems Brother     No Known Problems Son        Cancer-related family history includes Testicular cancer in his father      Social History:   Social History     Socioeconomic History    Marital status: /Civil Union     Spouse name: Not on file    Number of children: Not on file    Years of education: Not on file    Highest education level: Not on file   Occupational History    Not on file   Social Needs    Financial resource strain: Not on file    Food insecurity     Worry: Not on file     Inability: Not on file   [employer] needs     Medical: Not on file     Non-medical: Not on file   Tobacco Use    Smoking status: Former Smoker    Smokeless tobacco: Former User     Quit date: 3/1/2014    Tobacco comment: Social   Last cigarette years ago  Substance and Sexual Activity    Alcohol use: Yes     Frequency: 2-4 times a month     Drinks per session: 1 or 2     Binge frequency: Never    Drug use: Not on file    Sexual activity: Not on file     Comment: Resides with wife and kids   Lifestyle    Physical activity     Days per week: Not on file     Minutes per session: Not on file    Stress: Not on file   Relationships    Social connections     Talks on phone: Not on file     Gets together: Not on file     Attends Buddhism service: Not on file     Active member of club or organization: Not on file     Attends meetings of clubs or organizations: Not on file     Relationship status: Not on file    Intimate partner violence     Fear of current or ex partner: Not on file     Emotionally abused: Not on file     Physically abused: Not on file     Forced sexual activity: Not on file   Other Topics Concern    Not on file   Social History Narrative    Not on file       Current Medications:   Current Outpatient Medications   Medication Sig Dispense Refill    eltrombopag (PROMACTA) 75 MG TABS Take 1 tablet (75 mg total) by mouth daily 30 tablet 11    losartan (COZAAR) 25 mg tablet 50 mg       omeprazole (PriLOSEC) 40 MG capsule       prochlorperazine (COMPAZINE) 10 mg tablet Take 1 tablet (10 mg total) by mouth every 6 (six) hours as needed for nausea or vomiting 30 tablet 1    glimepiride (AMARYL) 1 mg tablet       metoprolol succinate (TOPROL-XL) 25 mg 24 hr tablet        No current facility-administered medications for this visit  Allergies:    Allergies   Allergen Reactions    Aspirin        Physical Exam:  /74 (BP Location: Left arm, Patient Position: Sitting, Cuff Size: Adult)   Pulse 91   Temp 99 3 °F (37 4 °C) (Tympanic)   Resp 16   Ht 6' 2" (1 88 m)   Wt 136 kg (300 lb)   SpO2 96%   BMI 38 52 kg/m²   Body surface area is 2 58 meters squared  Wt Readings from Last 3 Encounters:   08/13/20 136 kg (300 lb)   11/15/19 (!) 137 kg (302 lb)   10/11/19 (!) 137 kg (301 lb)           Physical Exam  Constitutional:       Appearance: Normal appearance  HENT:      Head: Normocephalic and atraumatic  Eyes:      General: No scleral icterus  Right eye: No discharge  Left eye: No discharge  Extraocular Movements: Extraocular movements intact  Neck:      Musculoskeletal: Normal range of motion and neck supple  Cardiovascular:      Rate and Rhythm: Normal rate and regular rhythm  Heart sounds: Normal heart sounds  Pulmonary:      Effort: Pulmonary effort is normal       Breath sounds: Normal breath sounds  Abdominal:      General: Bowel sounds are normal       Palpations: Abdomen is soft  Musculoskeletal:      Right lower leg: No edema  Left lower leg: No edema  Lymphadenopathy:      Cervical: No cervical adenopathy  Skin:     General: Skin is warm and dry  Neurological:      General: No focal deficit present  Mental Status: He is alert and oriented to person, place, and time  Psychiatric:         Mood and Affect: Mood normal          Behavior: Behavior normal          Thought Content: Thought content normal          Judgment: Judgment normal          Assessment / Plan:    1  Acquired thrombocytopenia ([address] )    2  ITP      The patient is a 39 yo male who was referred to Hematology with a low plt count   He also has a history of low WBC   He was treated with 4 doses of Rituxan, which could potentially be contributing to this   His bone marrow biopsy did not show abnormality and his hepatitis panel was negative   His enlarged spleen is also potentially the cause of his low WBC  PET/CT scan did not show any uptake in the spleen ruling out a splenic lymphoma  He is taking Promacta 75 mg daily  His platelet count is coming up and is now 87      Patient will continue with current dose of Promacta without dose change  I will repeat his blood work in 2 months  He will return for follow up in 2 months  Patient was in agreement with plan of care  He was instructed to call with any questions or concerns prior to his next office visit  Goals and Barriers:  Current Goal:  Prolong Survival from ITP  Barriers: None  Patient's Capacity to Self Care:  Patient is able to self care  Portions of the record may have been created with voice recognition software  Occasional wrong word or "sound a like" substitutions may have occurred due to the inherent limitations of voice recognition software  Read the chart carefully and recognize, using context, where substitutions have occurred

## 2020-08-13 ENCOUNTER — OFFICE VISIT (OUTPATIENT)
Dept: HEMATOLOGY ONCOLOGY | Facility: CLINIC | Age: 45
End: 2020-08-13
Payer: COMMERCIAL

## 2020-08-13 VITALS
HEIGHT: 74 IN | OXYGEN SATURATION: 96 % | HEART RATE: 91 BPM | DIASTOLIC BLOOD PRESSURE: 74 MMHG | RESPIRATION RATE: 16 BRPM | TEMPERATURE: 99.3 F | WEIGHT: 300 LBS | SYSTOLIC BLOOD PRESSURE: 130 MMHG | BODY MASS INDEX: 38.5 KG/M2

## 2020-08-13 DIAGNOSIS — D69.3 ACUTE ITP (HCC): ICD-10-CM

## 2020-08-13 DIAGNOSIS — D69.6 ACQUIRED THROMBOCYTOPENIA (HCC): Primary | ICD-10-CM

## 2020-08-13 PROCEDURE — 99214 OFFICE O/P EST MOD 30 MIN: CPT | Performed by: NURSE PRACTITIONER

## 2020-10-20 ENCOUNTER — TELEPHONE (OUTPATIENT)
Dept: HEMATOLOGY ONCOLOGY | Facility: CLINIC | Age: 45
End: 2020-10-20

## 2020-10-20 DIAGNOSIS — D69.3 IDIOPATHIC THROMBOCYTOPENIA (HCC): ICD-10-CM

## 2020-10-29 ENCOUNTER — OFFICE VISIT (OUTPATIENT)
Dept: HEMATOLOGY ONCOLOGY | Facility: CLINIC | Age: 45
End: 2020-10-29
Payer: COMMERCIAL

## 2020-10-29 VITALS
RESPIRATION RATE: 16 BRPM | WEIGHT: 299 LBS | HEIGHT: 74 IN | BODY MASS INDEX: 38.37 KG/M2 | OXYGEN SATURATION: 98 % | HEART RATE: 79 BPM | DIASTOLIC BLOOD PRESSURE: 72 MMHG | TEMPERATURE: 98 F | SYSTOLIC BLOOD PRESSURE: 128 MMHG

## 2020-10-29 DIAGNOSIS — D69.3 IDIOPATHIC THROMBOCYTOPENIA (HCC): Primary | ICD-10-CM

## 2020-10-29 PROCEDURE — 99214 OFFICE O/P EST MOD 30 MIN: CPT | Performed by: NURSE PRACTITIONER

## 2020-12-07 DIAGNOSIS — D69.3 IDIOPATHIC THROMBOCYTOPENIA (HCC): ICD-10-CM

## 2020-12-07 RX ORDER — ELTROMBOPAG OLAMINE 75 MG/1
TABLET, FILM COATED ORAL
Qty: 30 TABLET | Refills: 2 | Status: SHIPPED | OUTPATIENT
Start: 2020-12-07 | End: 2021-01-21 | Stop reason: SDUPTHER

## 2020-12-28 DIAGNOSIS — Z20.822 EXPOSURE TO COVID-19 VIRUS: ICD-10-CM

## 2020-12-28 PROCEDURE — U0003 INFECTIOUS AGENT DETECTION BY NUCLEIC ACID (DNA OR RNA); SEVERE ACUTE RESPIRATORY SYNDROME CORONAVIRUS 2 (SARS-COV-2) (CORONAVIRUS DISEASE [COVID-19]), AMPLIFIED PROBE TECHNIQUE, MAKING USE OF HIGH THROUGHPUT TECHNOLOGIES AS DESCRIBED BY CMS-2020-01-R: HCPCS | Performed by: INTERNAL MEDICINE

## 2020-12-29 LAB — SARS-COV-2 RNA SPEC QL NAA+PROBE: NOT DETECTED

## 2021-01-04 ENCOUNTER — TELEPHONE (OUTPATIENT)
Dept: INTERNAL MEDICINE CLINIC | Facility: CLINIC | Age: 46
End: 2021-01-04

## 2021-01-04 DIAGNOSIS — Z20.822 EXPOSURE TO COVID-19 VIRUS: ICD-10-CM

## 2021-01-04 DIAGNOSIS — Z20.822 EXPOSURE TO COVID-19 VIRUS: Primary | ICD-10-CM

## 2021-01-04 PROCEDURE — U0003 INFECTIOUS AGENT DETECTION BY NUCLEIC ACID (DNA OR RNA); SEVERE ACUTE RESPIRATORY SYNDROME CORONAVIRUS 2 (SARS-COV-2) (CORONAVIRUS DISEASE [COVID-19]), AMPLIFIED PROBE TECHNIQUE, MAKING USE OF HIGH THROUGHPUT TECHNOLOGIES AS DESCRIBED BY CMS-2020-01-R: HCPCS | Performed by: INTERNAL MEDICINE

## 2021-01-04 NOTE — TELEPHONE ENCOUNTER
Pt was exposed to covid and would like to go for a test  Roel Grady  Would like a call when this script is sent

## 2021-01-04 NOTE — TELEPHONE ENCOUNTER
Patient called  and requesting a COVID-19 test as he has exposure  I called the patient to get  detailed information  I order  test for him to be done at [facility]

## 2021-01-05 LAB — SARS-COV-2 RNA SPEC QL NAA+PROBE: NOT DETECTED

## 2021-01-12 ENCOUNTER — TELEPHONE (OUTPATIENT)
Dept: HEMATOLOGY ONCOLOGY | Facility: CLINIC | Age: 46
End: 2021-01-12

## 2021-01-13 ENCOUNTER — TELEPHONE (OUTPATIENT)
Dept: HEMATOLOGY ONCOLOGY | Facility: HOSPITAL | Age: 46
End: 2021-01-13

## 2021-01-13 ENCOUNTER — TELEPHONE (OUTPATIENT)
Dept: HEMATOLOGY ONCOLOGY | Facility: CLINIC | Age: 46
End: 2021-01-13

## 2021-01-13 NOTE — TELEPHONE ENCOUNTER
Reschedule Appointment     Who is calling in Patient    Doctor Appointment Scheduled with Brigitte Mcghee date and time 01/14 at  [address] date and time 01/21 at 8:00am   Location [facility]   Patient verbalized understanding    Yes

## 2021-01-13 NOTE — TELEPHONE ENCOUNTER
Called and left a voicemail asking the patient to get labs drawn prior to his appointment or we will have to reschedule

## 2021-01-14 LAB
ALBUMIN SERPL-MCNC: 3.9 G/DL (ref 4–5)
ALBUMIN/GLOB SERPL: 1.6 {RATIO} (ref 1.2–2.2)
ALP SERPL-CCNC: 134 IU/L (ref 39–117)
ALT SERPL-CCNC: 50 IU/L (ref 0–44)
AST SERPL-CCNC: 62 IU/L (ref 0–40)
BASOPHILS # BLD AUTO: 0 X10E3/UL (ref 0–0.2)
BASOPHILS NFR BLD AUTO: 1 %
BILIRUB SERPL-MCNC: 1.2 MG/DL (ref 0–1.2)
BUN SERPL-MCNC: 17 MG/DL (ref 6–24)
BUN/CREAT SERPL: 20 (ref 9–20)
CALCIUM SERPL-MCNC: 8.9 MG/DL (ref 8.7–10.2)
CHLORIDE SERPL-SCNC: 108 MMOL/L (ref 96–106)
CO2 SERPL-SCNC: 23 MMOL/L (ref 20–29)
CREAT SERPL-MCNC: 0.84 MG/DL (ref 0.76–1.27)
EOSINOPHIL # BLD AUTO: 0.1 X10E3/UL (ref 0–0.4)
EOSINOPHIL NFR BLD AUTO: 5 %
ERYTHROCYTE [DISTWIDTH] IN BLOOD BY AUTOMATED COUNT: 13.1 % (ref 11.6–15.4)
GLOBULIN SER-MCNC: 2.4 G/DL (ref 1.5–4.5)
GLUCOSE SERPL-MCNC: 134 MG/DL (ref 65–99)
HCT VFR BLD AUTO: 38.4 % (ref 37.5–51)
HGB BLD-MCNC: 13 G/DL (ref 13–17.7)
IMM GRANULOCYTES # BLD: 0 X10E3/UL (ref 0–0.1)
IMM GRANULOCYTES NFR BLD: 1 %
LYMPHOCYTES # BLD AUTO: 0.5 X10E3/UL (ref 0.7–3.1)
LYMPHOCYTES NFR BLD AUTO: 26 %
MCH RBC QN AUTO: 34 PG (ref 26.6–33)
MCHC RBC AUTO-ENTMCNC: 33.9 G/DL (ref 31.5–35.7)
MCV RBC AUTO: 101 FL (ref 79–97)
MONOCYTES # BLD AUTO: 0.2 X10E3/UL (ref 0.1–0.9)
MONOCYTES NFR BLD AUTO: 10 %
MORPHOLOGY BLD-IMP: ABNORMAL
NEUTROPHILS # BLD AUTO: 1.2 X10E3/UL (ref 1.4–7)
NEUTROPHILS NFR BLD AUTO: 57 %
PLATELET # BLD AUTO: 71 X10E3/UL (ref 150–450)
PLATELET # BLD AUTO: 77 X10E3/UL (ref 150–450)
POTASSIUM SERPL-SCNC: 4.4 MMOL/L (ref 3.5–5.2)
PROT SERPL-MCNC: 6.3 G/DL (ref 6–8.5)
RBC # BLD AUTO: 3.82 X10E6/UL (ref 4.14–5.8)
SL AMB EGFR AFRICAN AMERICAN: 121 ML/MIN/1.73
SL AMB EGFR NON AFRICAN AMERICAN: 105 ML/MIN/1.73
SODIUM SERPL-SCNC: 142 MMOL/L (ref 134–144)
WBC # BLD AUTO: 2.1 X10E3/UL (ref 3.4–10.8)

## 2021-01-19 NOTE — PROGRESS NOTES
Hematology/Oncology Outpatient Follow- up Note  Emery Wren 1975, [id number]  2021        Chief Complaint   Patient presents with    Follow-up     HPI:  Lorie Quintanilla is a 39 yo male who was referred to hematology for low platelet count, seen initially in 2016  Doug Zhou also has a low WBC and h/o splenomegaly  Work up included a bone marrow biopsy which was normal and PET CT scan to evaluate spleen this was negative for uptake and r/o splenic lymphoma       He has been treated with high dose steroids and Rituxan  Currently maintained on Promacta for ITP    Previous Hematologic/ Oncologic History:    Workup  The patient has failed high-dose steroids, he was treated with Decadron  Rituxan x 4 doses     Current Hematologic/ Oncologic Treatment:    Promacta 75mg daily    ECO - Asymptomatic    Interval History:   The patient presents for routine follow up  He was last seen on 10/29/2020  Patient denies any change to his overall health / medical history since last visit  Most recent blood work completed on 21 was reviewed  His white count 2 1, hemoglobin 13, platelet count 77  Alk Phos 134, AST 62, ALT 50  T Bili normal 1 2 Liver function is stable  He denies any recent fevers/chills or recurring infections  Denies bleeding or excessive bruising    Overall, he reports he feels well and denies any concerning symptoms today   He continues to take his Promacta daily as directed without any reported side effects    Test Results:    Imaging: No results found      Labs:   Lab Results   Component Value Date    WBC 2 1 (LL) 2021    HGB 13 0 2021    HCT 38 4 2021     (H) 2021    PLT 77 (LL) 2021     Lab Results   Component Value Date     2017    K 4 4 2021     (H) 2021    CO2 23 2021    BUN 17 2021    CREATININE 0 84 2021    GLUCOSE 102 (H) 2017    CALCIUM 8 1 (L) 2019    AST 62 (H) 2021    ALT 50 (H) 01/12/2021    ALKPHOS 163 (H) 07/11/2019    PROT 6 4 04/05/2017    BILITOT 1 0 04/05/2017    EGFR 98 07/11/2019           Review of Systems   Hematological: Does not bruise/bleed easily  All other systems reviewed and are negative  Active Problems:   Patient Active Problem List   Diagnosis    Acquired thrombocytopenia (HCC)    Elevated LFTs    Fatty liver    GERD (gastroesophageal reflux disease)    Hypertension    Tick bite    Acute upper respiratory infection    [facility] [address] [facility]    Idiopathic thrombocytopenia (HCC)    Elevated MCV    Family history of thrombocytopenia    Splenomegaly    [facility]       Past Medical History:   Past Medical History:   Diagnosis Date    Fatty liver     GERD (gastroesophageal reflux disease)     Hyperglycemia     Hypertension        Surgical History:   Past Surgical History:   Procedure Laterality Date    CHOLECYSTECTOMY  1990's    IR BIOPSY BONE MARROW  3/20/2019    WISDOM TOOTH EXTRACTION      In early to mid 19's       Family History:    Family History   Problem Relation Age of Onset    Heart attack Mother     Thrombocytopenia Mother     Testicular cancer Father     No Known Problems Brother     No Known Problems Brother     No Known Problems Brother     No Known Problems Son        Cancer-related family history includes Testicular cancer in his father      Social History:   Social History     Socioeconomic History    Marital status: /Civil Union     Spouse name: Not on file    Number of children: Not on file    Years of education: Not on file    Highest education level: Not on file   Occupational History    Not on file   Social Needs    Financial resource strain: Not on file    Food insecurity     Worry: Not on file     Inability: Not on file   [employer] needs     Medical: Not on file     Non-medical: Not on file   Tobacco Use    Smoking status: Former Smoker     Types: Cigarettes    Smokeless tobacco: Former User Quit date: 3/1/2014    Tobacco comment: Social   Last cigarette years ago  Substance and Sexual Activity    Alcohol use: Yes     Frequency: 2-4 times a month     Drinks per session: 1 or 2     Binge frequency: Never    Drug use: Not Currently    Sexual activity: Not on file     Comment: Resides with wife and kids   Lifestyle    Physical activity     Days per week: Not on file     Minutes per session: Not on file    Stress: Not on file   Relationships    Social connections     Talks on phone: Not on file     Gets together: Not on file     Attends Qu[employer] service: Not on file     Active member of club or organization: Not on file     Attends meetings of clubs or organizations: Not on file     Relationship status: Not on file    Intimate partner violence     Fear of current or ex partner: Not on file     Emotionally abused: Not on file     Physically abused: Not on file     Forced sexual activity: Not on file   Other Topics Concern    Not on file   Social History Narrative    Not on file       Current Medications:   Current Outpatient Medications   Medication Sig Dispense Refill    eltrombopag (Promacta) 75 MG TABS Take 1 tablet (75 mg total) by mouth daily On an empty stomach 30 tablet 2    glimepiride (AMARYL) 4 mg tablet Take 4 mg by mouth daily      losartan (COZAAR) 50 mg tablet Take 50 mg by mouth daily       metoprolol succinate (TOPROL-XL) 25 mg 24 hr tablet Take 25 mg by mouth daily       omeprazole (PriLOSEC) 40 MG capsule Take 40 mg by mouth daily       prochlorperazine (COMPAZINE) 10 mg tablet Take 1 tablet (10 mg total) by mouth every 6 (six) hours as needed for nausea or vomiting (Patient not taking: Reported on 1/21/2021) 30 tablet 1     No current facility-administered medications for this visit  Allergies: Allergies   Allergen Reactions    Aspirin Other (See Comments)     Reaction unknown to patient         Physical Exam:  /60 (BP Location: Left arm, Patient Position: Sitting, Cuff Size: Large)   Pulse 87   Temp 97 6 °F (36 4 °C) (Temporal)   Resp 12   Ht 6' 2" (1 88 m)   Wt 134 kg (294 lb 8 oz)   SpO2 98%   BMI 37 81 kg/m²   Body surface area is 2 56 meters squared  Wt Readings from Last 3 Encounters:   01/21/21 134 kg (294 lb 8 oz)   10/29/20 136 kg (299 lb)   08/13/20 136 kg (300 lb)           Physical Exam  Constitutional:       General: He is not in acute distress  Appearance: He is well-developed  He is not diaphoretic  HENT:      Head: Normocephalic and atraumatic  Mouth/Throat:      Pharynx: No oropharyngeal exudate  Eyes:      General: No scleral icterus  Pupils: Pupils are equal, round, and reactive to light  Neck:      Musculoskeletal: Normal range of motion and neck supple  Cardiovascular:      Rate and Rhythm: Normal rate and regular rhythm  Heart sounds: No murmur  Pulmonary:      Effort: Pulmonary effort is normal  No respiratory distress  Breath sounds: Normal breath sounds  Abdominal:      General: Bowel sounds are normal  There is no distension  Palpations: Abdomen is soft  There is no mass  Tenderness: There is no abdominal tenderness  Musculoskeletal: Normal range of motion  Lymphadenopathy:      Cervical: No cervical adenopathy  Skin:     General: Skin is warm and dry  Comments: No evidence of petechial rash   Neurological:      Mental Status: He is alert and oriented to person, place, and time  Psychiatric:         Behavior: Behavior normal          Thought Content: Thought content normal          Judgment: Judgment normal          Assessment / Plan:    1  Idiopathic thrombocytopenia (HCC)      The patient is a 44 yo male who was referred to Hematology with a low plt count   He also has a history of low WBC   He was treated with 4 doses of Rituxan, which could potentially be contributing to this   His bone marrow biopsy did not show abnormality and his hepatitis panel was negative   His enlarged spleen is also potentially the cause of his low WBC   PET/CT scan did not show any uptake in the spleen ruling out a splenic lymphoma      He is taking Promacta 75 mg daily   His platelet count is stable at 77   His LFTs remain  mildlyelevated, these are stable  His T bili is normal    Patient will continue with current dose of Promacta without dose change  I will repeat his blood work in 3 months  He will return for follow up in 3 months  He knows to call with any symptoms of bleeding,   Easy bruisability      Patient was in agreement with plan of care  He was instructed to call with any questions or concerns prior to his next office visit        Goals and Barriers:  Current Goal:  Prolong Survival from  ITP   Barriers: None  Patient's Capacity to Self Care:  Patient  able to self care  Portions of the record may have been created with voice recognition software  Occasional wrong word or "sound a like" substitutions may have occurred due to the inherent limitations of voice recognition software  Read the chart carefully and recognize, using context, where substitutions have occurred

## 2021-01-21 ENCOUNTER — OFFICE VISIT (OUTPATIENT)
Dept: HEMATOLOGY ONCOLOGY | Facility: CLINIC | Age: 46
End: 2021-01-21
Payer: COMMERCIAL

## 2021-01-21 VITALS
OXYGEN SATURATION: 98 % | RESPIRATION RATE: 12 BRPM | DIASTOLIC BLOOD PRESSURE: 60 MMHG | HEIGHT: 74 IN | BODY MASS INDEX: 37.79 KG/M2 | SYSTOLIC BLOOD PRESSURE: 120 MMHG | TEMPERATURE: 97.6 F | WEIGHT: 294.5 LBS | HEART RATE: 87 BPM

## 2021-01-21 DIAGNOSIS — D69.3 IDIOPATHIC THROMBOCYTOPENIA (HCC): ICD-10-CM

## 2021-01-21 PROCEDURE — 3078F DIAST BP <80 MM HG: CPT | Performed by: NURSE PRACTITIONER

## 2021-01-21 PROCEDURE — 99214 OFFICE O/P EST MOD 30 MIN: CPT | Performed by: NURSE PRACTITIONER

## 2021-01-21 PROCEDURE — 3008F BODY MASS INDEX DOCD: CPT | Performed by: NURSE PRACTITIONER

## 2021-01-21 PROCEDURE — 1036F TOBACCO NON-USER: CPT | Performed by: NURSE PRACTITIONER

## 2021-01-21 PROCEDURE — 3074F SYST BP LT 130 MM HG: CPT | Performed by: NURSE PRACTITIONER

## 2021-01-21 RX ORDER — GLIMEPIRIDE 4 MG/1
4 TABLET ORAL DAILY
COMMUNITY
Start: 2020-12-31 | End: 2021-04-22 | Stop reason: SDUPTHER

## 2021-01-21 RX ORDER — LOSARTAN POTASSIUM 50 MG/1
50 TABLET ORAL DAILY
COMMUNITY
Start: 2020-12-10 | End: 2021-04-22 | Stop reason: SDUPTHER

## 2021-01-30 DIAGNOSIS — E11.9 TYPE 2 DIABETES MELLITUS WITHOUT COMPLICATION, WITHOUT LONG-TERM CURRENT USE OF INSULIN (HCC): Primary | ICD-10-CM

## 2021-01-30 RX ORDER — BLOOD SUGAR DIAGNOSTIC
STRIP MISCELLANEOUS
Qty: 25 EACH | Refills: 3 | Status: SHIPPED | OUTPATIENT
Start: 2021-01-30 | End: 2021-07-09

## 2021-03-07 PROBLEM — D72.819 LEUKOPENIA: Status: ACTIVE | Noted: 2021-03-07

## 2021-03-07 PROBLEM — S06.0X1A CONCUSSION WITH LOSS OF CONSCIOUSNESS OF 30 MINUTES OR LESS: Status: ACTIVE | Noted: 2021-03-07

## 2021-03-10 DIAGNOSIS — Z23 ENCOUNTER FOR IMMUNIZATION: ICD-10-CM

## 2021-03-26 DIAGNOSIS — E11.9 TYPE 2 DIABETES MELLITUS WITHOUT COMPLICATIONS (HCC): ICD-10-CM

## 2021-03-26 NOTE — TELEPHONE ENCOUNTER
Katiuska Fam please call this patient to schedule to see me for a refill of his medications and follow-up

## 2021-04-18 DIAGNOSIS — D69.3 IDIOPATHIC THROMBOCYTOPENIA (HCC): ICD-10-CM

## 2021-04-19 RX ORDER — ELTROMBOPAG OLAMINE 75 MG/1
TABLET, FILM COATED ORAL
Qty: 30 TABLET | Refills: 2 | Status: SHIPPED | OUTPATIENT
Start: 2021-04-19 | End: 2021-08-12 | Stop reason: SDUPTHER

## 2021-04-22 ENCOUNTER — OFFICE VISIT (OUTPATIENT)
Dept: INTERNAL MEDICINE CLINIC | Facility: CLINIC | Age: 46
End: 2021-04-22
Payer: COMMERCIAL

## 2021-04-22 VITALS
TEMPERATURE: 98.6 F | HEIGHT: 74 IN | BODY MASS INDEX: 37.22 KG/M2 | SYSTOLIC BLOOD PRESSURE: 128 MMHG | WEIGHT: 290 LBS | HEART RATE: 80 BPM | DIASTOLIC BLOOD PRESSURE: 80 MMHG

## 2021-04-22 DIAGNOSIS — E11.9 TYPE 2 DIABETES MELLITUS WITHOUT COMPLICATION, WITHOUT LONG-TERM CURRENT USE OF INSULIN (HCC): Primary | ICD-10-CM

## 2021-04-22 DIAGNOSIS — D69.3 IDIOPATHIC THROMBOCYTOPENIA (HCC): ICD-10-CM

## 2021-04-22 DIAGNOSIS — D72.819 LEUKOPENIA, UNSPECIFIED TYPE: ICD-10-CM

## 2021-04-22 DIAGNOSIS — R79.89 ELEVATED LFTS: ICD-10-CM

## 2021-04-22 DIAGNOSIS — I10 ESSENTIAL HYPERTENSION: ICD-10-CM

## 2021-04-22 DIAGNOSIS — K21.9 GASTROESOPHAGEAL REFLUX DISEASE WITHOUT ESOPHAGITIS: ICD-10-CM

## 2021-04-22 PROCEDURE — 3008F BODY MASS INDEX DOCD: CPT | Performed by: INTERNAL MEDICINE

## 2021-04-22 PROCEDURE — 99214 OFFICE O/P EST MOD 30 MIN: CPT | Performed by: INTERNAL MEDICINE

## 2021-04-22 PROCEDURE — 4010F ACE/ARB THERAPY RXD/TAKEN: CPT | Performed by: INTERNAL MEDICINE

## 2021-04-22 PROCEDURE — 1036F TOBACCO NON-USER: CPT | Performed by: INTERNAL MEDICINE

## 2021-04-22 RX ORDER — OMEPRAZOLE 40 MG/1
40 CAPSULE, DELAYED RELEASE ORAL DAILY
Qty: 90 CAPSULE | Refills: 1 | Status: SHIPPED | OUTPATIENT
Start: 2021-04-22 | End: 2021-10-12

## 2021-04-22 RX ORDER — GLIMEPIRIDE 4 MG/1
4 TABLET ORAL DAILY
Qty: 90 TABLET | Refills: 1 | Status: SHIPPED | OUTPATIENT
Start: 2021-04-22 | End: 2021-06-29 | Stop reason: SDUPTHER

## 2021-04-22 RX ORDER — LOSARTAN POTASSIUM 50 MG/1
50 TABLET ORAL DAILY
Qty: 90 TABLET | Refills: 1 | Status: SHIPPED | OUTPATIENT
Start: 2021-04-22 | End: 2021-05-08

## 2021-04-22 RX ORDER — LOSARTAN POTASSIUM 50 MG/1
TABLET ORAL
Qty: 90 TABLET | Refills: 3 | OUTPATIENT
Start: 2021-04-22

## 2021-04-22 NOTE — ASSESSMENT & PLAN NOTE
Secondary to fatty liver  Advised to lose weight exercise  Drinks alcohol socially  Liver enzymes stable

## 2021-04-22 NOTE — PATIENT INSTRUCTIONS
Patient advised to continue present medications discussed with the patient medications and laboratory data in detail  Follow-up with me in 3 months    If any blood test was ordered please do 1 week prior to next appointment  If you have any questions please call the office [phone]

## 2021-04-22 NOTE — PROGRESS NOTES
Assessment/Plan:    1  Type 2 diabetes mellitus without complication, without long-term current use of insulin [facility])  Assessment & Plan:    Lab Results   Component Value Date    HGBA1C 5 1 01/17/2017   Her hemoglobin A1c was 6 2 July 2020  Patient blood sugar at home FBS below 120  Advised for 1800 calorie diet  Will follow fasting blood sugar hemoglobin A1c   He was advised to see an ophthalmologist     Orders:  -     glimepiride (AMARYL) 4 mg tablet; Take 1 tablet (4 mg total) by mouth daily  -     Glucometer test strips  -     Comprehensive metabolic panel; Future  -     Hemoglobin A1C; Future  -     UA (URINE) with reflex to Scope  -     Microalbumin / creatinine urine ratio    2  Essential hypertension  Assessment & Plan:  Blood pressure well controlled  Advised to continue present medication  Advised for low-salt diet  Orders:  -     losartan (COZAAR) 50 mg tablet; Take 1 tablet (50 mg total) by mouth daily  -     Comprehensive metabolic panel; Future  -     UA (URINE) with reflex to Scope    3  Gastroesophageal reflux disease without esophagitis  Assessment & Plan:  Patient needs omeprazole daily to control her symptoms  H2 blocker not effective  Discussed with the patient diet and GE reflux precautions  And side effect of omeprazole medications  Will check vitamin B12 and magnesium level  Orders:  -     omeprazole (PriLOSEC) 40 MG capsule; Take 1 capsule (40 mg total) by mouth daily  -     Vitamin B12; Future  -     Magnesium; Future    4  Idiopathic thrombocytopenia (HCC)  Assessment & Plan:  Last platelet count 60H,  Patient has been followed by [facility] oncologist     Orders:  -     Vitamin B12; Future    5  Leukopenia, unspecified type  Assessment & Plan:  Last WBC 2 1 patient has been followed by [facility] onc[facility]     Orders:  -     Vitamin B12; Future    6  BMI 37 0-37 9, adult    7  Elevated LFTs  Assessment & Plan:  Secondary to fatty liver  Advised to lose weight exercise    Drinks alcohol socially  Liver enzymes stable  BMI Counseling: Body mass index is 37 23 kg/m²  The BMI is above normal  Nutrition recommendations include decreasing portion sizes, decreasing fast food intake, consuming healthier snacks, limiting drinks that contain sugar, moderation in carbohydrate intake, reducing intake of saturated and trans fat and reducing intake of cholesterol  Exercise recommendations include exercising 3-5 times per week and strength training exercises  No pharmacotherapy was ordered  Subjective:  Patient presents for follow-up  Patient ID: Lawrence Dominique is a 54 y o  male  HPI   44-year-old white male patient presents for follow-up of his medical problems  He denies any chest pain, shortness with, pain abdomen  Denies any cough, fever, chills   Denies any nausea, vomiting, diarrhea  Patient got his COVID19 vaccination  The following portions of the patient's history were reviewed and updated as appropriate:     Past Medical History:  He has a past medical history of BMI 37 0-37 9, adult (4/22/2021), Cervicalgia, Concussion with loss of consciousness of 30 minutes or less (3/7/2021), Depression, Diabetes mellitus out of control ([facility] [address] ), Elevated liver enzymes, Erectile dysfunction, Exposure to COVID-19 virus, Fatty liver, GERD (gastroesophageal reflux disease), Hyperglycemia, Hypertension, Left cervical radiculopathy, Left elbow pain, Left foot pain, Leukopenia (3/7/2021), and Thrombocytopenia ([facility] [address] )  ,  _______________________________________________________________________  Past Surgical History:   has a past surgical history that includes Cholecystectomy (1990's); Ware tooth extraction; IR biopsy bone marrow (3/20/2019); EGD (07/10/2014); and Colonoscopy (06/2005)  ,  _______________________________________________________________________  Family History:  family history includes Coronary artery disease in his mother; Heart attack in his mother; Hypertension in his brother, father, and mother; No Known Problems in his brother, brother, and son; Testicular cancer in his father; Thrombocytopenia in his mother ,  _______________________________________________________________________  Social History:   reports that he has quit smoking  His smoking use included cigarettes  He quit smokeless tobacco use about 7 years ago  He reports current alcohol use  He reports previous drug use ,  _______________________________________________________________________  Allergies:  is allergic to aspirin; diltiazem; and lisinopril     _______________________________________________________________________  Current Outpatient Medications   Medication Sig Dispense Refill    glimepiride (AMARYL) 4 mg tablet Take 1 tablet (4 mg total) by mouth daily 90 tablet 1    losartan (COZAAR) 50 mg tablet Take 1 tablet (50 mg total) by mouth daily 90 tablet 1    omeprazole (PriLOSEC) 40 MG capsule Take 1 capsule (40 mg total) by mouth daily 90 capsule 1    OneTouch Verio test strip TEST ONCE DAILY 25 each 3    Promacta 75 MG TABS TAKE 1 TABLET BY MOUTH ONCE DAILY ON AN EMPTY STOMACH  AT LEAST 1 HOUR BEFORE OR 2 HOURS AFTER A MEAL 30 tablet 2     No current facility-administered medications for this visit       _______________________________________________________________________  Review of Systems   Constitutional: Negative for chills and fever  HENT: Negative for congestion, ear pain, hearing loss, nosebleeds, sinus pain, sore throat and trouble swallowing  Eyes: Negative for discharge, redness and visual disturbance  Respiratory: Negative for cough, chest tightness and shortness of breath  Cardiovascular: Negative for chest pain and palpitations  Gastrointestinal: Negative for abdominal pain, blood in stool, constipation, diarrhea, nausea and vomiting  Genitourinary: Negative for dysuria, flank pain, frequency and hematuria     Musculoskeletal: Negative for arthralgias, myalgias and neck pain    Skin: Negative for color change and rash  Neurological: Negative for dizziness, speech difficulty, weakness and headaches  Hematological: Does not bruise/bleed easily  Psychiatric/Behavioral: Negative for agitation and behavioral problems  Objective:  Vitals:    04/22/21 0907   BP: 128/80   Pulse: 80   Temp: 98 6 °F (37 °C)   Weight: 132 kg (290 lb)   Height: 6' 2" (1 88 m)     Body mass index is 37 23 kg/m²  Physical Exam  Vitals signs and nursing note reviewed  Constitutional:       General: He is not in acute distress  Appearance: Normal appearance  He is obese  HENT:      Head: Normocephalic and atraumatic  Right Ear: Ear canal and external ear normal       Left Ear: Ear canal and external ear normal       Mouth/Throat:      Comments: Patient has a face mask on  Eyes:      General: No scleral icterus  Extraocular Movements: Extraocular movements intact  Conjunctiva/sclera: Conjunctivae normal    Neck:      Musculoskeletal: Normal range of motion and neck supple  No muscular tenderness  Cardiovascular:      Rate and Rhythm: Normal rate and regular rhythm  Pulses: Normal pulses  no weak pulses          Dorsalis pedis pulses are 2+ on the right side and 2+ on the left side  Posterior tibial pulses are 2+ on the right side and 2+ on the left side  Heart sounds: No murmur  Pulmonary:      Effort: Pulmonary effort is normal       Breath sounds: Normal breath sounds  Abdominal:      General: Bowel sounds are normal       Palpations: Abdomen is soft  Tenderness: There is no abdominal tenderness  Musculoskeletal: Normal range of motion  Right lower leg: No edema  Left lower leg: No edema  Feet:      Right foot:      Skin integrity: No ulcer, skin breakdown, erythema, warmth, callus or dry skin  Left foot:      Skin integrity: No ulcer, skin breakdown, erythema, warmth, callus or dry skin  Skin:     General: Skin is warm  Findings: No rash  Neurological:      General: No focal deficit present  Mental Status: He is alert and oriented to person, place, and time  Psychiatric:         Mood and Affect: Mood normal          Behavior: Behavior normal        Patient's shoes and socks removed  Right Foot/Ankle   Right Foot Inspection  Skin Exam: skin normal and skin intact no dry skin, no warmth, no callus, no erythema, no maceration, no abnormal color, no pre-ulcer, no ulcer and no callus                            Sensory   Vibration: intact  Proprioception: intact   Monofilament testing: intact  Vascular    The right DP pulse is 2+  The right PT pulse is 2+  Left Foot/Ankle  Left Foot Inspection  Skin Exam: skin normal and skin intactno dry skin, no warmth, no erythema, no maceration, normal color, no pre-ulcer, no ulcer and no callus                                         Sensory   Vibration: intact  Proprioception: intact  Monofilament: intact  Vascular    The left DP pulse is 2+  The left PT pulse is 2+  Assign Risk Category:  No deformity present; No loss of protective sensation; No weak pulses       Risk: 0    I spent 30 minutes with the patient today    More than 50% time spent for reviewing of external notes, reviewing of the results of diagnostics test, management of care, patient education and ordering of test

## 2021-04-22 NOTE — ASSESSMENT & PLAN NOTE
Lab Results   Component Value Date    HGBA1C 5 1 01/17/2017   Her hemoglobin A1c was 6 2 July 2020  Patient blood sugar at home FBS below 120  Advised for 1800 calorie diet  Will follow fasting blood sugar hemoglobin A1c     He was advised to see an ophthalmologist

## 2021-04-22 NOTE — ASSESSMENT & PLAN NOTE
Patient needs omeprazole daily to control her symptoms  H2 blocker not effective  Discussed with the patient diet and GE reflux precautions  And side effect of omeprazole medications  Will check vitamin B12 and magnesium level

## 2021-04-30 ENCOUNTER — TELEPHONE (OUTPATIENT)
Dept: HEMATOLOGY ONCOLOGY | Facility: HOSPITAL | Age: 46
End: 2021-04-30

## 2021-04-30 DIAGNOSIS — D69.3 ACUTE ITP (HCC): ICD-10-CM

## 2021-04-30 DIAGNOSIS — D69.1 PLATELET DYSFUNCTION (HCC): ICD-10-CM

## 2021-04-30 DIAGNOSIS — D69.3 IDIOPATHIC THROMBOCYTOPENIA (HCC): Primary | ICD-10-CM

## 2021-04-30 NOTE — PROGRESS NOTES
Hematology/Oncology Outpatient Follow- up Note  Seven Barahona, 1975, [id number]  2021        No chief complaint on file  HPI:  Blanca Quintanilla is a 39 yo male who was referred to hematology for low platelet count, seen initially in 2016  Joana tGz also has a low WBC and h/o splenomegaly  Work up included a bone marrow biopsy which was normal and PET CT scan to evaluate spleen this was negative for uptake and r/o splenic lymphoma       He has been treated with high dose steroids and Rituxan  Currently maintained on Promacta for ITP    Previous Hematologic/ Oncologic History:    Workup  The patient has failed high-dose steroids, he was treated with Decadron  Rituxan x 4 doses     Current Hematologic/ Oncologic Treatment:    Promacta 75mg daily    ECO - Asymptomatic    Interval History:   The patient presents for routine follow up  He was last seen on 21  Patient denies any change to his overall health / medical history since last visit  Most recent blood work completed this morning was reviewed  His white count remains low 1 71, hemoglobin 12 4, platelet count 74  These numbers are essentially stable  Alk Phos 128, AST 62, ALT 57  T Bili normal 1 4 Liver function is stable  He denies any recent fevers/chills or recurring or recent infections  Denies bleeding or excessive bruising    Overall, he reports he feels well and denies any concerning symptoms today   He continues to take his Promacta daily as directed without any reported side effects    Test Results:    Imaging: No results found      Labs:   Lab Results   Component Value Date    WBC 2 1 (LL) 2021    HGB 13 0 2021    HCT 38 4 2021     (H) 2021    PLT 77 (LL) 2021     Lab Results   Component Value Date     2017    K 4 4 2021     (H) 2021    CO2 23 2021    BUN 17 2021    CREATININE 0 84 2021    GLUCOSE 102 (H) 2017    CALCIUM 8 1 (L) 2019 AST 62 (H) 01/12/2021    ALT 50 (H) 01/12/2021    ALKPHOS 163 (H) 07/11/2019    PROT 6 4 04/05/2017    BILITOT 1 0 04/05/2017    EGFR 98 07/11/2019           Review of Systems   All other systems reviewed and are negative          Active Problems:   Patient Active Problem List   Diagnosis    Acquired thrombocytopenia (HCC)    Elevated LFTs    Fatty liver    GERD (gastroesophageal reflux disease)    Hypertension    Tick bite    Acute upper respiratory infection    Diabetes ([address] )    Idiopathic thrombocytopenia (HCC)    Elevated MCV    Family history of thrombocytopenia    Splenomegaly    Chronic ITP (idiopathic thrombocytopenic purpura) (HCC)    Leukopenia    Concussion with loss of consciousness of 30 minutes or less    BMI 37 0-37 9, adult       Past Medical History:   Past Medical History:   Diagnosis Date    BMI 37 0-37 9, adult 4/22/2021    Cervicalgia     Concussion with loss of consciousness of 30 minutes or less 3/7/2021    Depression     Diabetes mellitus out of control ([address] )     Elevated liver enzymes     Erectile dysfunction     Exposure to COVID-19 virus     Fatty liver     GERD (gastroesophageal reflux disease)     Hyperglycemia     Hypertension     Left cervical radiculopathy     Left elbow pain     Left foot pain     Leukopenia 3/7/2021    Thrombocytopenia ([address] )        Surgical History:   Past Surgical History:   Procedure Laterality Date    CHOLECYSTECTOMY  1990's    COLONOSCOPY  06/2005    EGD  07/10/2014    IR BIOPSY BONE MARROW  3/20/2019    WISDOM TOOTH EXTRACTION      In early to mid 19's       Family History:    Family History   Problem Relation Age of Onset    Heart attack Mother     Thrombocytopenia Mother     Coronary artery disease Mother     Hypertension Mother     Testicular cancer Father     Hypertension Father     No Known Problems Brother     Hypertension Brother     No Known Problems Brother     No Known Problems Son        Cancer-related family history includes Testicular cancer in his father  Social History:   Social History     Socioeconomic History    Marital status: /Civil Union     Spouse name: Not on file    Number of children: Not on file    Years of education: Not on file    Highest education level: Not on file   Occupational History    Occupation:    Social Needs    Financial resource strain: Not on file    Food insecurity     Worry: Not on file     Inability: Not on file   [employer] needs     Medical: Not on file     Non-medical: Not on file   Tobacco Use    Smoking status: Former Smoker     Types: Cigarettes    Smokeless tobacco: Former User     Quit date: 3/1/2014    Tobacco comment: Social   Last cigarette years ago     Substance and Sexual Activity    Alcohol use: Yes     Frequency: 2-4 times a month     Drinks per session: 1 or 2     Binge frequency: Never    Drug use: Not Currently     Comment: No drug use - As per [employer]    Sexual activity: Not on file     Comment: Resides with wife and kids   Lifestyle    Physical activity     Days per week: Not on file     Minutes per session: Not on file    Stress: Not on file   Relationships    Social connections     Talks on phone: Not on file     Gets together: Not on file     Attends Confucianist service: Not on file     Active member of club or organization: Not on file     Attends meetings of clubs or organizations: Not on file     Relationship status: Not on file    Intimate partner violence     Fear of current or ex partner: Not on file     Emotionally abused: Not on file     Physically abused: Not on file     Forced sexual activity: Not on file   Other Topics Concern    Not on file   Social History Narrative    Tobacco use: Never smoker    Annual eye exam: Sees ophth q yr    Annual dental checkup: He goes to dentist     - As per [employer]       Current Medications:   Current Outpatient Medications   Medication Sig Dispense Refill    glimepiride (AMARYL) 4 mg tablet Take 1 tablet (4 mg total) by mouth daily 90 tablet 1    losartan (COZAAR) 50 mg tablet Take 1 tablet (50 mg total) by mouth daily 90 tablet 1    omeprazole (PriLOSEC) 40 MG capsule Take 1 capsule (40 mg total) by mouth daily 90 capsule 1    OneTouch Verio test strip TEST ONCE DAILY 25 each 3    Promacta 75 MG TABS TAKE 1 TABLET BY MOUTH ONCE DAILY ON AN EMPTY STOMACH  AT LEAST 1 HOUR BEFORE OR 2 HOURS AFTER A MEAL 30 tablet 2     No current facility-administered medications for this visit  Allergies: Allergies   Allergen Reactions    Aspirin Other (See Comments)     Reaction unknown to patient   Diltiazem Other (See Comments)     Patient does not recall what happened    Lisinopril Cough       Physical Exam:  There were no vitals taken for this visit  There is no height or weight on file to calculate BSA  Wt Readings from Last 3 Encounters:   04/22/21 132 kg (290 lb)   01/21/21 134 kg (294 lb 8 oz)   10/29/20 136 kg (299 lb)           Physical Exam  Constitutional:       General: He is not in acute distress  Appearance: He is well-developed  He is not diaphoretic  HENT:      Head: Normocephalic and atraumatic  Mouth/Throat:      Pharynx: No oropharyngeal exudate  Eyes:      General: No scleral icterus  Pupils: Pupils are equal, round, and reactive to light  Neck:      Musculoskeletal: Normal range of motion and neck supple  Cardiovascular:      Rate and Rhythm: Normal rate and regular rhythm  Heart sounds: No murmur  Pulmonary:      Effort: Pulmonary effort is normal  No respiratory distress  Breath sounds: Normal breath sounds  Abdominal:      General: Bowel sounds are normal  There is no distension  Palpations: Abdomen is soft  There is no mass  Tenderness: There is no abdominal tenderness  Musculoskeletal: Normal range of motion  Lymphadenopathy:      Cervical: No cervical adenopathy        Upper Body:      Right upper body: No supraclavicular, axillary or pectoral adenopathy  Left upper body: No supraclavicular, axillary or pectoral adenopathy  Skin:     General: Skin is warm and dry  Findings: No bruising or rash (No evidence of petechial rash)  Neurological:      General: No focal deficit present  Mental Status: He is alert and oriented to person, place, and time  Psychiatric:         Mood and Affect: Mood normal          Behavior: Behavior normal          Thought Content: Thought content normal          Judgment: Judgment normal          Assessment / Plan:    1  Chronic ITP (idiopathic thrombocytopenic purpura) (HCC)      The patient is a 50 yo male who was referred to Hematology with a low plt count   He also has a history of low WBC  He was treated with 4 doses of Rituxan in 2019   His bone marrow biopsy did not show abnormality and his hepatitis panel was negative   His enlarged spleen is a potential contributor for his low WBC   PET/CT scan did not show any uptake in the spleen ruling out a splenic lymphoma  His baseline white count averages around 2 with an ANC of 1 2  Most recent white count was 1 71  Patient is asymptomatic  Clinically, there are no concerns on exam today      He is taking Promacta 75 mg daily   His platelet count is stable at 74   His LFTs remain mildly elevated, these are stable  Patient will continue with current dose of Promacta without dose change  I  Have requested monthly CBC to closely monitor his white count and ensure that is not trending down  Patient will be called with results  He will return for follow up in 3 months with repeat blood work to include CBC, CMP, specimen for platelet clumping protocol  He knows to call with any symptoms of bleeding, easy bruisability  He was also instructed to notify us if he develops any fevers/chills /recurrent infections      Patient was in agreement with plan of care   He was instructed to call with any questions or concerns prior to his next office visit        Goals and Barriers:  Current Goal:  Prolong Survival from  ITP   Barriers: None  Patient's Capacity to Self Care:  Patient  able to self care  Portions of the record may have been created with voice recognition software  Occasional wrong word or "sound a like" substitutions may have occurred due to the inherent limitations of voice recognition software  Read the chart carefully and recognize, using context, where substitutions have occurred

## 2021-04-30 NOTE — TELEPHONE ENCOUNTER
Called patient to remind him to have his labs completed prior to his upcoming appointment on Tuesday 5/4/2021  Voice mailbox full, unable to leave a message

## 2021-05-03 NOTE — TELEPHONE ENCOUNTER
Called patient's cell, no answer  Voice mailbox full, unable to leave a message  Called additional number on file  Patient stated that he usually goes to [facility], but is able to use the [facility] lab if the orders are placed STAT  Called the lab to confirm hours  Lab will open at 6:30 am, patient will have STAT labs completed the morning of his appointment

## 2021-05-04 ENCOUNTER — TRANSCRIBE ORDERS (OUTPATIENT)
Dept: LAB | Facility: CLINIC | Age: 46
End: 2021-05-04

## 2021-05-04 ENCOUNTER — APPOINTMENT (OUTPATIENT)
Dept: LAB | Facility: CLINIC | Age: 46
End: 2021-05-04
Payer: COMMERCIAL

## 2021-05-04 ENCOUNTER — TELEPHONE (OUTPATIENT)
Dept: OTHER | Facility: OTHER | Age: 46
End: 2021-05-04

## 2021-05-04 ENCOUNTER — OFFICE VISIT (OUTPATIENT)
Dept: HEMATOLOGY ONCOLOGY | Facility: CLINIC | Age: 46
End: 2021-05-04
Payer: COMMERCIAL

## 2021-05-04 VITALS
TEMPERATURE: 98.7 F | BODY MASS INDEX: 37.35 KG/M2 | HEIGHT: 74 IN | RESPIRATION RATE: 14 BRPM | HEART RATE: 84 BPM | WEIGHT: 291 LBS | DIASTOLIC BLOOD PRESSURE: 84 MMHG | SYSTOLIC BLOOD PRESSURE: 138 MMHG

## 2021-05-04 DIAGNOSIS — D69.1 PLATELET DYSFUNCTION (HCC): Primary | ICD-10-CM

## 2021-05-04 DIAGNOSIS — D69.3 IDIOPATHIC THROMBOCYTOPENIA (HCC): Primary | ICD-10-CM

## 2021-05-04 DIAGNOSIS — D69.3 CHRONIC ITP (IDIOPATHIC THROMBOCYTOPENIC PURPURA) (HCC): Primary | ICD-10-CM

## 2021-05-04 DIAGNOSIS — D69.3 ACUTE ITP (HCC): ICD-10-CM

## 2021-05-04 DIAGNOSIS — D69.3 IDIOPATHIC THROMBOCYTOPENIA (HCC): ICD-10-CM

## 2021-05-04 DIAGNOSIS — D69.1 PLATELET DYSFUNCTION (HCC): ICD-10-CM

## 2021-05-04 LAB
ALBUMIN SERPL BCP-MCNC: 2.8 G/DL (ref 3.5–5)
ALP SERPL-CCNC: 128 U/L (ref 46–116)
ALT SERPL W P-5'-P-CCNC: 57 U/L (ref 12–78)
ANION GAP SERPL CALCULATED.3IONS-SCNC: 11 MMOL/L (ref 4–13)
AST SERPL W P-5'-P-CCNC: 62 U/L (ref 5–45)
BASOPHILS NFR BLD AUTO: 1 % (ref 0–1)
BILIRUB SERPL-MCNC: 1.49 MG/DL (ref 0.2–1)
BUN SERPL-MCNC: 13 MG/DL (ref 5–25)
CALCIUM ALBUM COR SERPL-MCNC: 9.5 MG/DL (ref 8.3–10.1)
CALCIUM SERPL-MCNC: 8.5 MG/DL (ref 8.3–10.1)
CHLORIDE SERPL-SCNC: 111 MMOL/L (ref 100–108)
CO2 SERPL-SCNC: 22 MMOL/L (ref 21–32)
CREAT SERPL-MCNC: 1.05 MG/DL (ref 0.6–1.3)
EOSINOPHIL NFR BLD AUTO: 7 % (ref 0–6)
ERYTHROCYTE [DISTWIDTH] IN BLOOD BY AUTOMATED COUNT: 14.1 % (ref 11.6–15.1)
GFR SERPL CREATININE-BSD FRML MDRD: 85 ML/MIN/1.73SQ M
GLUCOSE P FAST SERPL-MCNC: 116 MG/DL (ref 65–99)
HCT VFR BLD AUTO: 35.5 % (ref 36.5–49.3)
HGB BLD-MCNC: 12.4 G/DL (ref 12–17)
IMM GRANULOCYTES NFR BLD AUTO: 0 % (ref 0–2)
LYMPHOCYTES NFR BLD AUTO: 30 % (ref 14–44)
MCH RBC QN AUTO: 34 PG (ref 26.8–34.3)
MCHC RBC AUTO-ENTMCNC: 34.9 G/DL (ref 31.4–37.4)
MCV RBC AUTO: 97 FL (ref 82–98)
MONOCYTES NFR BLD AUTO: 9 % (ref 4–12)
NEUTS SEG NFR BLD AUTO: 53 % (ref 43–75)
NRBC BLD AUTO-RTO: 0 /100 WBCS
PLATELET # BLD AUTO: 67 THOUSANDS/UL (ref 149–390)
PLATELET # BLD AUTO: 74 THOUSANDS/UL (ref 149–390)
PMV BLD AUTO: 10.7 FL (ref 8.9–12.7)
POTASSIUM SERPL-SCNC: 4 MMOL/L (ref 3.5–5.3)
PROT SERPL-MCNC: 6.4 G/DL (ref 6.4–8.2)
RBC # BLD AUTO: 3.65 MILLION/UL (ref 3.88–5.62)
SODIUM SERPL-SCNC: 144 MMOL/L (ref 136–145)
WBC # BLD AUTO: 1.71 THOUSAND/UL (ref 4.31–10.16)

## 2021-05-04 PROCEDURE — 99214 OFFICE O/P EST MOD 30 MIN: CPT | Performed by: NURSE PRACTITIONER

## 2021-05-04 PROCEDURE — 80053 COMPREHEN METABOLIC PANEL: CPT

## 2021-05-04 PROCEDURE — 1036F TOBACCO NON-USER: CPT | Performed by: NURSE PRACTITIONER

## 2021-05-04 PROCEDURE — 3008F BODY MASS INDEX DOCD: CPT | Performed by: NURSE PRACTITIONER

## 2021-05-04 PROCEDURE — 85025 COMPLETE CBC W/AUTO DIFF WBC: CPT

## 2021-05-04 PROCEDURE — 36415 COLL VENOUS BLD VENIPUNCTURE: CPT

## 2021-05-07 DIAGNOSIS — I10 ESSENTIAL HYPERTENSION: ICD-10-CM

## 2021-05-08 PROCEDURE — 4010F ACE/ARB THERAPY RXD/TAKEN: CPT | Performed by: NURSE PRACTITIONER

## 2021-05-08 RX ORDER — LOSARTAN POTASSIUM 50 MG/1
TABLET ORAL
Qty: 90 TABLET | Refills: 1 | Status: SHIPPED | OUTPATIENT
Start: 2021-05-08 | End: 2021-10-15

## 2021-06-14 ENCOUNTER — TELEPHONE (OUTPATIENT)
Dept: HEMATOLOGY ONCOLOGY | Facility: CLINIC | Age: 46
End: 2021-06-14

## 2021-06-14 NOTE — TELEPHONE ENCOUNTER
Gary Longo calling from [address] [place] [address] to inform us that they no longer offer platelet clumping test  She is able to do CBC and CMP only       Gary Longo can be reached at   [phone]

## 2021-06-14 NOTE — TELEPHONE ENCOUNTER
If patient is going to need platelet clumping protocol he will need to have this done at [facility] I assume

## 2021-06-15 LAB
ALBUMIN SERPL-MCNC: 4 G/DL (ref 4–5)
ALBUMIN/GLOB SERPL: 2.1 {RATIO} (ref 1.2–2.2)
ALP SERPL-CCNC: 120 IU/L (ref 48–121)
ALT SERPL-CCNC: 41 IU/L (ref 0–44)
AST SERPL-CCNC: 57 IU/L (ref 0–40)
BASOPHILS # BLD AUTO: 0 X10E3/UL (ref 0–0.2)
BASOPHILS NFR BLD AUTO: 1 %
BILIRUB SERPL-MCNC: 1.3 MG/DL (ref 0–1.2)
BUN SERPL-MCNC: 15 MG/DL (ref 6–24)
BUN/CREAT SERPL: 15 (ref 9–20)
CALCIUM SERPL-MCNC: 8.5 MG/DL (ref 8.7–10.2)
CHLORIDE SERPL-SCNC: 108 MMOL/L (ref 96–106)
CO2 SERPL-SCNC: 23 MMOL/L (ref 20–29)
CREAT SERPL-MCNC: 0.98 MG/DL (ref 0.76–1.27)
EOSINOPHIL # BLD AUTO: 0.1 X10E3/UL (ref 0–0.4)
EOSINOPHIL NFR BLD AUTO: 7 %
ERYTHROCYTE [DISTWIDTH] IN BLOOD BY AUTOMATED COUNT: 14.4 % (ref 11.6–15.4)
GLOBULIN SER-MCNC: 1.9 G/DL (ref 1.5–4.5)
GLUCOSE SERPL-MCNC: 93 MG/DL (ref 65–99)
HCT VFR BLD AUTO: 31.9 % (ref 37.5–51)
HGB BLD-MCNC: 11.2 G/DL (ref 13–17.7)
IMM GRANULOCYTES # BLD: 0 X10E3/UL (ref 0–0.1)
IMM GRANULOCYTES NFR BLD: 0 %
LYMPHOCYTES # BLD AUTO: 0.5 X10E3/UL (ref 0.7–3.1)
LYMPHOCYTES NFR BLD AUTO: 29 %
MCH RBC QN AUTO: 34 PG (ref 26.6–33)
MCHC RBC AUTO-ENTMCNC: 35.1 G/DL (ref 31.5–35.7)
MCV RBC AUTO: 97 FL (ref 79–97)
MONOCYTES # BLD AUTO: 0.2 X10E3/UL (ref 0.1–0.9)
MONOCYTES NFR BLD AUTO: 10 %
MORPHOLOGY BLD-IMP: ABNORMAL
NEUTROPHILS # BLD AUTO: 0.9 X10E3/UL (ref 1.4–7)
NEUTROPHILS NFR BLD AUTO: 53 %
PLATELET # BLD AUTO: 65 X10E3/UL (ref 150–450)
POTASSIUM SERPL-SCNC: 3.9 MMOL/L (ref 3.5–5.2)
PROT SERPL-MCNC: 5.9 G/DL (ref 6–8.5)
RBC # BLD AUTO: 3.29 X10E6/UL (ref 4.14–5.8)
SL AMB EGFR AFRICAN AMERICAN: 106 ML/MIN/1.73
SL AMB EGFR NON AFRICAN AMERICAN: 92 ML/MIN/1.73
SODIUM SERPL-SCNC: 141 MMOL/L (ref 134–144)
WBC # BLD AUTO: 1.7 X10E3/UL (ref 3.4–10.8)

## 2021-06-29 DIAGNOSIS — E11.9 TYPE 2 DIABETES MELLITUS WITHOUT COMPLICATION, WITHOUT LONG-TERM CURRENT USE OF INSULIN (HCC): ICD-10-CM

## 2021-06-29 RX ORDER — GLIMEPIRIDE 4 MG/1
4 TABLET ORAL DAILY
Qty: 90 TABLET | Refills: 1 | Status: SHIPPED | OUTPATIENT
Start: 2021-06-29 | End: 2021-07-09 | Stop reason: SDUPTHER

## 2021-06-29 RX ORDER — GLIMEPIRIDE 4 MG/1
TABLET ORAL
Qty: 30 TABLET | Refills: 3 | OUTPATIENT
Start: 2021-06-29

## 2021-07-06 ENCOUNTER — TELEPHONE (OUTPATIENT)
Dept: HEMATOLOGY ONCOLOGY | Facility: CLINIC | Age: 46
End: 2021-07-06

## 2021-07-06 NOTE — TELEPHONE ENCOUNTER
Gertrudis from [facility]  WBC 1 8 plt 73,000 drawn on 7/3/2021  Hard copy will be faxed to [facility] office  Will send to Dr Codey Mercer team

## 2021-07-06 NOTE — TELEPHONE ENCOUNTER
Platelet count stable  Copy of lab in [facility] for Dr Lilli Cabezas to review with patient tomorrow

## 2021-07-07 ENCOUNTER — OFFICE VISIT (OUTPATIENT)
Dept: HEMATOLOGY ONCOLOGY | Facility: CLINIC | Age: 46
End: 2021-07-07
Payer: COMMERCIAL

## 2021-07-07 VITALS
BODY MASS INDEX: 36.51 KG/M2 | RESPIRATION RATE: 18 BRPM | HEIGHT: 74 IN | SYSTOLIC BLOOD PRESSURE: 154 MMHG | TEMPERATURE: 98 F | OXYGEN SATURATION: 97 % | WEIGHT: 284.5 LBS | DIASTOLIC BLOOD PRESSURE: 72 MMHG | HEART RATE: 86 BPM

## 2021-07-07 DIAGNOSIS — D69.3 ACUTE ITP (HCC): ICD-10-CM

## 2021-07-07 DIAGNOSIS — D69.3 IDIOPATHIC THROMBOCYTOPENIA (HCC): Primary | ICD-10-CM

## 2021-07-07 DIAGNOSIS — R16.1 SPLENOMEGALY: ICD-10-CM

## 2021-07-07 PROCEDURE — 3008F BODY MASS INDEX DOCD: CPT | Performed by: INTERNAL MEDICINE

## 2021-07-07 PROCEDURE — 99214 OFFICE O/P EST MOD 30 MIN: CPT | Performed by: INTERNAL MEDICINE

## 2021-07-07 PROCEDURE — 1036F TOBACCO NON-USER: CPT | Performed by: INTERNAL MEDICINE

## 2021-07-07 NOTE — PROGRESS NOTES
Hematology/Oncology Outpatient Follow- up Note  Nelia Valerio 54 y o  male MRN: @ Encounter: [id number]        Date:  7/7/2021    Presenting Complaint/Diagnosis : Splenomegaly with thrombocytopenia with a question of ITP  Previous Hematologic/ Oncologic History:    Workup  The patient has failed high-dose steroids as he was treated with Decadron  He then got 4 doses of Rituxan and the last one being on 11 July  Current Hematologic/ Oncologic Treatment:    Promacta    Interval History:       The patient returns for follow-up visit  He states he is doing well  He is followed for ITP and is currently on Promacta  His blood work is somewhat stable with a white count was 1 8 with a hemoglobin of  11 6 and a platelet count of 73  This is around his baseline  ANC was 1 2  These are   Stable over the last few years  Platelet count continues to run in an acceptable range  As far symptoms are concerned he denies any nausea denies any vomiting denies any diarrhea  Denies any abdominal pain or any other issues  He does have some issues with insomnia and states he does get to sleep but feels he is restless  Also states he feels a little escoto per what his wife said  I advised him to discuss this with his primary care physician to see if they wish to start him on something  This could be a sleep aid or even depending on his symptoms an antidepressant or antianxiety medication   That could help with sleep  The rest of his 14 point review of systems today was negative  Test Results:    Imaging: No results found      Labs:   Lab Results   Component Value Date    WBC 1 7 (LL) 06/14/2021    HGB 11 2 (L) 06/14/2021    HCT 31 9 (L) 06/14/2021    MCV 97 06/14/2021    PLT 65 (LL) 06/14/2021     Lab Results   Component Value Date     04/05/2017    K 3 9 06/14/2021     (H) 06/14/2021    CO2 23 06/14/2021    BUN 15 06/14/2021    CREATININE 0 98 06/14/2021    GLUCOSE 102 (H) 04/05/2017    GLUF 116 (H) 05/04/2021    CALCIUM 8 5 05/04/2021    CORRECTEDCA 9 5 05/04/2021    AST 57 (H) 06/14/2021    ALT 41 06/14/2021    ALKPHOS 128 (H) 05/04/2021    PROT 6 4 04/05/2017    BILITOT 1 0 04/05/2017    EGFR 85 05/04/2021       ROS: As stated in the history of present illness otherwise his 14 point review of systems today was negative        Active Problems:   Patient Active Problem List   Diagnosis    Acquired thrombocytopenia (HCC)    Elevated LFTs    Fatty liver    GERD (gastroesophageal reflux disease)    Hypertension    Tick bite    Acute upper respiratory infection    Diabetes ([address] )    Idiopathic thrombocytopenia (HCC)    Elevated MCV    Family history of thrombocytopenia    Splenomegaly    Chronic ITP (idiopathic thrombocytopenic purpura) (HCC)    Leukopenia    Concussion with loss of consciousness of 30 minutes or less    BMI 37 0-37 9, adult       Past Medical History:   Past Medical History:   Diagnosis Date    BMI 37 0-37 9, adult 4/22/2021    Cervicalgia     Concussion with loss of consciousness of 30 minutes or less 3/7/2021    Depression     Diabetes mellitus out of control ([facility] )     Elevated liver enzymes     Erectile dysfunction     Exposure to COVID-19 virus     Fatty liver     GERD (gastroesophageal reflux disease)     Hyperglycemia     Hypertension     Left cervical radiculopathy     Left elbow pain     Left foot pain     Leukopenia 3/7/2021    Thrombocytopenia ([address] )        Surgical History:   Past Surgical History:   Procedure Laterality Date    CHOLECYSTECTOMY  1990's    COLONOSCOPY  06/2005    EGD  07/10/2014    IR BIOPSY BONE MARROW  3/20/2019    WISDOM TOOTH EXTRACTION      In early to mid 19's       Family History:    Family History   Problem Relation Age of Onset    Heart attack Mother     Thrombocytopenia Mother     Coronary artery disease Mother     Hypertension Mother     Testicular cancer Father     Hypertension Father     No Known Problems Brother  Hypertension Brother     No Known Problems Brother     No Known Problems Son        Cancer-related family history includes Testicular cancer in his father  Social History:   Social History     Socioeconomic History    Marital status: /Civil Union     Spouse name: Not on file    Number of children: Not on file    Years of education: Not on file    Highest education level: Not on file   Occupational History    Occupation:    Tobacco Use    Smoking status: Former Smoker     Types: Cigarettes    Smokeless tobacco: Former User     Quit date: 3/1/2014    Tobacco comment: Social   Last cigarette years ago  Vaping Use    Vaping Use: Never used   Substance and Sexual Activity    Alcohol use: Yes    Drug use: Not Currently     Comment: No drug use - As per [employer]    Sexual activity: Not on file     Comment: Resides with wife and kids   Other Topics Concern    Not on file   Social History Narrative    Tobacco use: Never smoker    Annual eye exam: Sees ophth q yr    Annual dental checkup: He goes to dentist     - As per [employer]     Social Determinants of Health     Financial Resource Strain:     Difficulty of Paying Living Expenses:    Food Insecurity:     Worried About [address] in the Last Year:     [address] in the Last Year:    Transportation Needs:     Lack of Transportation (Medical):      Lack of Transportation (Non-Medical):    Physical Activity:     Days of Exercise per Week:     Minutes of Exercise per Session:    Stress:     Feeling of Stress :    Social Connections:     Frequency of Communication with Friends and Family:     Frequency of Social Gatherings with Friends and Family:     Attends Mu-ism Services:     Active Member of Clubs or Organizations:     Attends Club or Organization Meetings:     Marital Status:    Intimate Partner Violence:     Fear of Current or Ex-Partner:     Emotionally Abused:     Physically Abused:     Sexually Abused:        Current Medications:   Current Outpatient Medications   Medication Sig Dispense Refill    glimepiride (AMARYL) 4 mg tablet Take 1 tablet (4 mg total) by mouth daily 90 tablet 1    losartan (COZAAR) 50 mg tablet TAKE 1 TABLET BY MOUTH  DAILY 90 tablet 1    omeprazole (PriLOSEC) 40 MG capsule Take 1 capsule (40 mg total) by mouth daily 90 capsule 1    OneTouch Verio test strip TEST ONCE DAILY 25 each 3    Promacta 75 MG TABS TAKE 1 TABLET BY MOUTH ONCE DAILY ON AN EMPTY STOMACH  AT LEAST 1 HOUR BEFORE OR 2 HOURS AFTER A MEAL 30 tablet 2     No current facility-administered medications for this visit  Allergies: Allergies   Allergen Reactions    Aspirin Other (See Comments)     Reaction unknown to patient   Diltiazem Other (See Comments)     Patient does not recall what happened    Lisinopril Cough       Physical Exam:    Body surface area is 2 52 meters squared  Wt Readings from Last 3 Encounters:   07/07/21 129 kg (284 lb 8 oz)   05/04/21 132 kg (291 lb)   04/22/21 132 kg (290 lb)        Temp Readings from Last 3 Encounters:   07/07/21 98 °F (36 7 °C) (Temporal)   05/04/21 98 7 °F (37 1 °C) (Temporal)   04/22/21 98 6 °F (37 °C)        BP Readings from Last 3 Encounters:   07/07/21 154/72   05/04/21 138/84   04/22/21 128/80         Pulse Readings from Last 3 Encounters:   07/07/21 86   05/04/21 84   04/22/21 80        Physical Exam     Constitutional   General appearance: No acute distress, well appearing and well nourished  Eyes   Conjunctiva and lids: No swelling, erythema or discharge  Pupils and irises: Equal, round and reactive to light  Ears, Nose, Mouth, and Throat   External inspection of ears and nose: Normal     Nasal mucosa, septum, and turbinates: Normal without edema or erythema  Oropharynx: Normal with no erythema, edema, exudate or lesions      Pulmonary   Respiratory effort: No increased work of breathing or signs of respiratory distress  Auscultation of lungs: Clear to auscultation  Cardiovascular   Palpation of heart: Normal PMI, no thrills  Auscultation of heart: Normal rate and rhythm, normal S1 and S2, without murmurs  Examination of extremities for edema and/or varicosities: Normal     Carotid pulses: Normal     Abdomen   Abdomen: Non-tender, no masses  Liver and spleen: No hepatomegaly or splenomegaly  Lymphatic   Palpation of lymph nodes in neck: No lymphadenopathy  Musculoskeletal   Gait and station: Normal     Digits and nails: Normal without clubbing or cyanosis  Inspection/palpation of joints, bones, and muscles: Normal     Skin   Skin and subcutaneous tissue: Normal without rashes or lesions  Neurologic   Cranial nerves: Cranial nerves 2-12 intact  Sensation: No sensory loss  Psychiatric   Orientation to person, place, and time: Normal     Mood and affect: Normal         Assessment / Plan: This is a pleasant 50-year-old male with a past medical history of a low platelet count  He also has a history of a low white count  He has received 4 doses of Rituxan in the past and had a bone marrow biopsy which showed no abnormalities  Hepatitis panel was negative  A PET-CT scan in the past had shown no uptake in the spleen ruling out a splenic lymphoma for the most part  He was started on Promacta and his counts are running consistently above 50  He will stay on his current dose  I will see him back in 3 months  He will get blood work every month like he does  If he has clumping issues we can order stat labs for platelet clumping as apparently he goes to [facility] and they have stopped doing citrated platelets  The patient is in agreement with this plan  He will discuss a sleep aid or other medication with his primary care physician in regards to his poor sleep  Goals and Barriers:  Current Goal:  Prolong Survival from   Thrombocytopenia/ ITP/splenomegaly  Barriers: None  Patient's Capacity to Self Care:  Patient able to self care  Portions of the record may have been created with voice recognition software   Occasional wrong word or "sound a like" substitutions may have occurred due to the inherent limitations of voice recognition software   Read the chart carefully and recognize, using context, where substitutions have occurred

## 2021-07-14 ENCOUNTER — TELEPHONE (OUTPATIENT)
Dept: HEMATOLOGY ONCOLOGY | Facility: CLINIC | Age: 46
End: 2021-07-14

## 2021-07-14 ENCOUNTER — DOCUMENTATION (OUTPATIENT)
Dept: HEMATOLOGY ONCOLOGY | Facility: CLINIC | Age: 46
End: 2021-07-14

## 2021-07-14 NOTE — TELEPHONE ENCOUNTER
Per patient , promacta 75 mg requires a PA and patient is out of drug  Message sent to oncology fiance with high priority  FYI to  [facility] RN

## 2021-07-14 NOTE — PROGRESS NOTES
Received a in basket from [employer] stating patient was out of his promacta and he needs a new prior auth for it  Elijah Alexis is submitted and pending via cover my meds   Key: [id number]  ID # [id number]  Kelly  [id number]  N #[id number]    UPDATE:  This has been approved and is valid until 07/14/2022  Request Reference Number: [id number]   PROMACTA TAB 75MG

## 2021-08-04 PROCEDURE — 3061F NEG MICROALBUMINURIA REV: CPT | Performed by: INTERNAL MEDICINE

## 2021-08-05 LAB
ALBUMIN SERPL-MCNC: 3.8 G/DL (ref 3.6–5.1)
ALBUMIN/GLOB SERPL: 1.7 (CALC) (ref 1–2.5)
ALP SERPL-CCNC: 110 U/L (ref 36–130)
ALT SERPL-CCNC: 35 U/L (ref 9–46)
AST SERPL-CCNC: 41 U/L (ref 10–40)
BASOPHILS # BLD AUTO: 10 CELLS/UL (ref 0–200)
BASOPHILS NFR BLD AUTO: 0.6 %
BILIRUB SERPL-MCNC: 1.5 MG/DL (ref 0.2–1.2)
BUN SERPL-MCNC: 15 MG/DL (ref 7–25)
BUN/CREAT SERPL: ABNORMAL (CALC) (ref 6–22)
CALCIUM SERPL-MCNC: 8.6 MG/DL (ref 8.6–10.3)
CHLORIDE SERPL-SCNC: 110 MMOL/L (ref 98–110)
CO2 SERPL-SCNC: 25 MMOL/L (ref 20–32)
CREAT SERPL-MCNC: 0.88 MG/DL (ref 0.6–1.35)
EOSINOPHIL # BLD AUTO: 61 CELLS/UL (ref 15–500)
EOSINOPHIL NFR BLD AUTO: 3.6 %
ERYTHROCYTE [DISTWIDTH] IN BLOOD BY AUTOMATED COUNT: 13.2 % (ref 11–15)
GLOBULIN SER CALC-MCNC: 2.2 G/DL (CALC) (ref 1.9–3.7)
GLUCOSE SERPL-MCNC: 111 MG/DL (ref 65–99)
HCT VFR BLD AUTO: 33.8 % (ref 38.5–50)
HGB BLD-MCNC: 11.5 G/DL (ref 13.2–17.1)
INR PPP: 1.2
LYMPHOCYTES # BLD AUTO: 454 CELLS/UL (ref 850–3900)
LYMPHOCYTES NFR BLD AUTO: 26.7 %
MCH RBC QN AUTO: 32.7 PG (ref 27–33)
MCHC RBC AUTO-ENTMCNC: 34 G/DL (ref 32–36)
MCV RBC AUTO: 96 FL (ref 80–100)
MONOCYTES # BLD AUTO: 165 CELLS/UL (ref 200–950)
MONOCYTES NFR BLD AUTO: 9.7 %
NEUTROPHILS # BLD AUTO: 1010 CELLS/UL (ref 1500–7800)
NEUTROPHILS NFR BLD AUTO: 59.4 %
PLATELET # BLD AUTO: 62 THOUSAND/UL (ref 140–400)
PMV BLD REES-ECKER: 10.9 FL (ref 7.5–12.5)
POTASSIUM SERPL-SCNC: 4.2 MMOL/L (ref 3.5–5.3)
PROT SERPL-MCNC: 6 G/DL (ref 6.1–8.1)
PROTHROMBIN TIME: 12.6 SEC (ref 9–11.5)
RBC # BLD AUTO: 3.52 MILLION/UL (ref 4.2–5.8)
SL AMB EGFR AFRICAN AMERICAN: 119 ML/MIN/1.73M2
SL AMB EGFR NON AFRICAN AMERICAN: 103 ML/MIN/1.73M2
SL AMB PLATELET ESTIMATION: ABNORMAL
SODIUM SERPL-SCNC: 140 MMOL/L (ref 135–146)
WBC # BLD AUTO: 1.7 THOUSAND/UL (ref 3.8–10.8)

## 2021-08-12 DIAGNOSIS — D69.3 IDIOPATHIC THROMBOCYTOPENIA (HCC): ICD-10-CM

## 2021-08-17 ENCOUNTER — TELEPHONE (OUTPATIENT)
Dept: HEMATOLOGY ONCOLOGY | Facility: CLINIC | Age: 46
End: 2021-08-17

## 2021-08-17 NOTE — TELEPHONE ENCOUNTER
Left message for patient that Stephanie Moraes NP sent his Promacta prescription to [facility] today at 8:11 AM     ELDER-ROOPA

## 2021-09-09 ENCOUNTER — TELEPHONE (OUTPATIENT)
Dept: INTERNAL MEDICINE CLINIC | Facility: CLINIC | Age: 46
End: 2021-09-09

## 2021-09-09 NOTE — TELEPHONE ENCOUNTER
JENIFERI    Pt called to sched apt - he missed his last one  Needs 1st or last of the day  Does not want to take a day off to come here  Plus he doesn't know his work schedule more than 3 months out  I told him I would put him on my waitlist and would call him if someone cancels      Your next 1st or last apt of the day is not until Jan 2022

## 2021-09-10 LAB
ALBUMIN SERPL-MCNC: 3.9 G/DL (ref 3.6–5.1)
ALBUMIN/GLOB SERPL: 1.6 (CALC) (ref 1–2.5)
ALP SERPL-CCNC: 105 U/L (ref 36–130)
ALT SERPL-CCNC: 36 U/L (ref 9–46)
AST SERPL-CCNC: 43 U/L (ref 10–40)
BASOPHILS # BLD AUTO: 9 CELLS/UL (ref 0–200)
BASOPHILS NFR BLD AUTO: 0.6 %
BILIRUB SERPL-MCNC: 1.8 MG/DL (ref 0.2–1.2)
BUN SERPL-MCNC: 15 MG/DL (ref 7–25)
BUN/CREAT SERPL: ABNORMAL (CALC) (ref 6–22)
CALCIUM SERPL-MCNC: 8.9 MG/DL (ref 8.6–10.3)
CHLORIDE SERPL-SCNC: 108 MMOL/L (ref 98–110)
CO2 SERPL-SCNC: 25 MMOL/L (ref 20–32)
CREAT SERPL-MCNC: 0.93 MG/DL (ref 0.6–1.35)
EOSINOPHIL # BLD AUTO: 107 CELLS/UL (ref 15–500)
EOSINOPHIL NFR BLD AUTO: 7.1 %
ERYTHROCYTE [DISTWIDTH] IN BLOOD BY AUTOMATED COUNT: 13.9 % (ref 11–15)
GLOBULIN SER CALC-MCNC: 2.4 G/DL (CALC) (ref 1.9–3.7)
GLUCOSE SERPL-MCNC: 126 MG/DL (ref 65–99)
HCT VFR BLD AUTO: 34.5 % (ref 38.5–50)
HGB BLD-MCNC: 11.7 G/DL (ref 13.2–17.1)
INR PPP: 1.2
LYMPHOCYTES # BLD AUTO: 429 CELLS/UL (ref 850–3900)
LYMPHOCYTES NFR BLD AUTO: 28.6 %
MCH RBC QN AUTO: 31.6 PG (ref 27–33)
MCHC RBC AUTO-ENTMCNC: 33.9 G/DL (ref 32–36)
MCV RBC AUTO: 93.2 FL (ref 80–100)
MONOCYTES # BLD AUTO: 126 CELLS/UL (ref 200–950)
MONOCYTES NFR BLD AUTO: 8.4 %
NEUTROPHILS # BLD AUTO: 830 CELLS/UL (ref 1500–7800)
NEUTROPHILS NFR BLD AUTO: 55.3 %
PLATELET # BLD AUTO: 62 THOUSAND/UL (ref 140–400)
PMV BLD REES-ECKER: 11.1 FL (ref 7.5–12.5)
POTASSIUM SERPL-SCNC: 4.4 MMOL/L (ref 3.5–5.3)
PROT SERPL-MCNC: 6.3 G/DL (ref 6.1–8.1)
PROTHROMBIN TIME: 12.7 SEC (ref 9–11.5)
RBC # BLD AUTO: 3.7 MILLION/UL (ref 4.2–5.8)
SERVICE CMNT-IMP: ABNORMAL
SL AMB EGFR AFRICAN AMERICAN: 114 ML/MIN/1.73M2
SL AMB EGFR NON AFRICAN AMERICAN: 98 ML/MIN/1.73M2
SODIUM SERPL-SCNC: 139 MMOL/L (ref 135–146)
WBC # BLD AUTO: 1.5 THOUSAND/UL (ref 3.8–10.8)

## 2021-10-04 ENCOUNTER — TELEPHONE (OUTPATIENT)
Dept: HEMATOLOGY ONCOLOGY | Facility: HOSPITAL | Age: 46
End: 2021-10-04

## 2021-10-05 ENCOUNTER — OFFICE VISIT (OUTPATIENT)
Dept: HEMATOLOGY ONCOLOGY | Facility: CLINIC | Age: 46
End: 2021-10-05
Payer: COMMERCIAL

## 2021-10-05 ENCOUNTER — TELEPHONE (OUTPATIENT)
Dept: SURGICAL ONCOLOGY | Facility: CLINIC | Age: 46
End: 2021-10-05

## 2021-10-05 VITALS
TEMPERATURE: 98.2 F | SYSTOLIC BLOOD PRESSURE: 112 MMHG | BODY MASS INDEX: 36.45 KG/M2 | OXYGEN SATURATION: 98 % | DIASTOLIC BLOOD PRESSURE: 70 MMHG | RESPIRATION RATE: 18 BRPM | HEART RATE: 84 BPM | WEIGHT: 284 LBS | HEIGHT: 74 IN

## 2021-10-05 DIAGNOSIS — D69.3 IDIOPATHIC THROMBOCYTOPENIA (HCC): ICD-10-CM

## 2021-10-05 DIAGNOSIS — D61.818 PANCYTOPENIA (HCC): ICD-10-CM

## 2021-10-05 DIAGNOSIS — D69.3 CHRONIC ITP (IDIOPATHIC THROMBOCYTOPENIC PURPURA) (HCC): Primary | ICD-10-CM

## 2021-10-05 LAB
ALBUMIN SERPL-MCNC: 4 G/DL (ref 3.6–5.1)
ALBUMIN/GLOB SERPL: 1.7 (CALC) (ref 1–2.5)
ALP SERPL-CCNC: 103 U/L (ref 36–130)
ALT SERPL-CCNC: 35 U/L (ref 9–46)
AST SERPL-CCNC: 39 U/L (ref 10–40)
BASOPHILS # BLD AUTO: 20 CELLS/UL (ref 0–200)
BASOPHILS NFR BLD AUTO: 1.2 %
BILIRUB SERPL-MCNC: 1.5 MG/DL (ref 0.2–1.2)
BUN SERPL-MCNC: 19 MG/DL (ref 7–25)
BUN/CREAT SERPL: ABNORMAL (CALC) (ref 6–22)
CALCIUM SERPL-MCNC: 8.8 MG/DL (ref 8.6–10.3)
CHLORIDE SERPL-SCNC: 109 MMOL/L (ref 98–110)
CO2 SERPL-SCNC: 25 MMOL/L (ref 20–32)
CREAT SERPL-MCNC: 0.87 MG/DL (ref 0.6–1.35)
EOSINOPHIL # BLD AUTO: 92 CELLS/UL (ref 15–500)
EOSINOPHIL NFR BLD AUTO: 5.4 %
ERYTHROCYTE [DISTWIDTH] IN BLOOD BY AUTOMATED COUNT: 14 % (ref 11–15)
GLOBULIN SER CALC-MCNC: 2.4 G/DL (CALC) (ref 1.9–3.7)
GLUCOSE SERPL-MCNC: 101 MG/DL (ref 65–99)
HCT VFR BLD AUTO: 33.6 % (ref 38.5–50)
HGB BLD-MCNC: 11.5 G/DL (ref 13.2–17.1)
INR PPP: 1.3
LYMPHOCYTES # BLD AUTO: 507 CELLS/UL (ref 850–3900)
LYMPHOCYTES NFR BLD AUTO: 29.8 %
MCH RBC QN AUTO: 31.8 PG (ref 27–33)
MCHC RBC AUTO-ENTMCNC: 34.2 G/DL (ref 32–36)
MCV RBC AUTO: 92.8 FL (ref 80–100)
MONOCYTES # BLD AUTO: 141 CELLS/UL (ref 200–950)
MONOCYTES NFR BLD AUTO: 8.3 %
NEUTROPHILS # BLD AUTO: 940 CELLS/UL (ref 1500–7800)
NEUTROPHILS NFR BLD AUTO: 55.3 %
PLATELET # BLD AUTO: 59 THOUSAND/UL (ref 140–400)
PMV BLD REES-ECKER: 10.4 FL (ref 7.5–12.5)
POTASSIUM SERPL-SCNC: 4.1 MMOL/L (ref 3.5–5.3)
PROT SERPL-MCNC: 6.4 G/DL (ref 6.1–8.1)
PROTHROMBIN TIME: 13.4 SEC (ref 9–11.5)
RBC # BLD AUTO: 3.62 MILLION/UL (ref 4.2–5.8)
SL AMB EGFR AFRICAN AMERICAN: 119 ML/MIN/1.73M2
SL AMB EGFR NON AFRICAN AMERICAN: 103 ML/MIN/1.73M2
SL AMB PLATELET ESTIMATION: ABNORMAL
SODIUM SERPL-SCNC: 140 MMOL/L (ref 135–146)
WBC # BLD AUTO: 1.7 THOUSAND/UL (ref 3.8–10.8)

## 2021-10-05 PROCEDURE — 1036F TOBACCO NON-USER: CPT | Performed by: INTERNAL MEDICINE

## 2021-10-05 PROCEDURE — 3074F SYST BP LT 130 MM HG: CPT | Performed by: INTERNAL MEDICINE

## 2021-10-05 PROCEDURE — 3008F BODY MASS INDEX DOCD: CPT | Performed by: INTERNAL MEDICINE

## 2021-10-05 PROCEDURE — 3078F DIAST BP <80 MM HG: CPT | Performed by: INTERNAL MEDICINE

## 2021-10-05 PROCEDURE — 99214 OFFICE O/P EST MOD 30 MIN: CPT | Performed by: INTERNAL MEDICINE

## 2021-10-05 RX ORDER — GLIMEPIRIDE 4 MG/1
4 TABLET ORAL DAILY
COMMUNITY
Start: 2021-09-11 | End: 2021-11-12

## 2021-10-12 DIAGNOSIS — K21.9 GASTROESOPHAGEAL REFLUX DISEASE WITHOUT ESOPHAGITIS: ICD-10-CM

## 2021-10-12 RX ORDER — OMEPRAZOLE 40 MG/1
CAPSULE, DELAYED RELEASE ORAL
Qty: 30 CAPSULE | Refills: 5 | Status: SHIPPED | OUTPATIENT
Start: 2021-10-12 | End: 2021-12-16 | Stop reason: SDUPTHER

## 2021-10-14 DIAGNOSIS — I10 ESSENTIAL HYPERTENSION: ICD-10-CM

## 2021-10-15 PROCEDURE — 4010F ACE/ARB THERAPY RXD/TAKEN: CPT | Performed by: INTERNAL MEDICINE

## 2021-10-15 RX ORDER — LOSARTAN POTASSIUM 50 MG/1
TABLET ORAL
Qty: 90 TABLET | Refills: 3 | Status: SHIPPED | OUTPATIENT
Start: 2021-10-15 | End: 2021-12-16 | Stop reason: SDUPTHER

## 2021-11-07 DIAGNOSIS — E11.9 TYPE 2 DIABETES MELLITUS WITHOUT COMPLICATION, WITHOUT LONG-TERM CURRENT USE OF INSULIN (HCC): ICD-10-CM

## 2021-11-08 RX ORDER — BLOOD SUGAR DIAGNOSTIC
STRIP MISCELLANEOUS
Qty: 25 STRIP | Refills: 3 | Status: SHIPPED | OUTPATIENT
Start: 2021-11-08 | End: 2022-02-21

## 2021-11-12 DIAGNOSIS — E11.9 TYPE 2 DIABETES MELLITUS WITHOUT COMPLICATIONS (HCC): ICD-10-CM

## 2021-11-12 RX ORDER — GLIMEPIRIDE 4 MG/1
TABLET ORAL
Qty: 30 TABLET | Refills: 0 | Status: SHIPPED | OUTPATIENT
Start: 2021-11-12 | End: 2021-12-10

## 2021-11-14 LAB
ALBUMIN SERPL-MCNC: 3.8 G/DL (ref 3.6–5.1)
ALBUMIN/GLOB SERPL: 1.5 (CALC) (ref 1–2.5)
ALP SERPL-CCNC: 100 U/L (ref 36–130)
ALT SERPL-CCNC: 38 U/L (ref 9–46)
AST SERPL-CCNC: 46 U/L (ref 10–40)
BASOPHILS # BLD AUTO: 10 CELLS/UL (ref 0–200)
BASOPHILS NFR BLD AUTO: 0.6 %
BILIRUB SERPL-MCNC: 1.9 MG/DL (ref 0.2–1.2)
BUN SERPL-MCNC: 19 MG/DL (ref 7–25)
BUN/CREAT SERPL: ABNORMAL (CALC) (ref 6–22)
CALCIUM SERPL-MCNC: 8.6 MG/DL (ref 8.6–10.3)
CHLORIDE SERPL-SCNC: 109 MMOL/L (ref 98–110)
CO2 SERPL-SCNC: 25 MMOL/L (ref 20–32)
CREAT SERPL-MCNC: 0.82 MG/DL (ref 0.6–1.35)
EOSINOPHIL # BLD AUTO: 41 CELLS/UL (ref 15–500)
EOSINOPHIL NFR BLD AUTO: 2.4 %
ERYTHROCYTE [DISTWIDTH] IN BLOOD BY AUTOMATED COUNT: 14.4 % (ref 11–15)
GLOBULIN SER CALC-MCNC: 2.5 G/DL (CALC) (ref 1.9–3.7)
GLUCOSE SERPL-MCNC: 118 MG/DL (ref 65–99)
HCT VFR BLD AUTO: 31.4 % (ref 38.5–50)
HGB BLD-MCNC: 10.8 G/DL (ref 13.2–17.1)
INR PPP: 1.2
LYMPHOCYTES # BLD AUTO: 405 CELLS/UL (ref 850–3900)
LYMPHOCYTES NFR BLD AUTO: 23.8 %
MCH RBC QN AUTO: 31.6 PG (ref 27–33)
MCHC RBC AUTO-ENTMCNC: 34.4 G/DL (ref 32–36)
MCV RBC AUTO: 91.8 FL (ref 80–100)
MONOCYTES # BLD AUTO: 172 CELLS/UL (ref 200–950)
MONOCYTES NFR BLD AUTO: 10.1 %
NEUTROPHILS # BLD AUTO: 1073 CELLS/UL (ref 1500–7800)
NEUTROPHILS NFR BLD AUTO: 63.1 %
PLATELET # BLD AUTO: 70 THOUSAND/UL (ref 140–400)
PMV BLD REES-ECKER: 11 FL (ref 7.5–12.5)
POTASSIUM SERPL-SCNC: 4 MMOL/L (ref 3.5–5.3)
PROT SERPL-MCNC: 6.3 G/DL (ref 6.1–8.1)
PROTHROMBIN TIME: 12.9 SEC (ref 9–11.5)
RBC # BLD AUTO: 3.42 MILLION/UL (ref 4.2–5.8)
SL AMB EGFR AFRICAN AMERICAN: 122 ML/MIN/1.73M2
SL AMB EGFR NON AFRICAN AMERICAN: 105 ML/MIN/1.73M2
SL AMB PLATELET ESTIMATION: ABNORMAL
SODIUM SERPL-SCNC: 139 MMOL/L (ref 135–146)
WBC # BLD AUTO: 1.7 THOUSAND/UL (ref 3.8–10.8)

## 2021-11-19 DIAGNOSIS — D69.3 IDIOPATHIC THROMBOCYTOPENIA (HCC): ICD-10-CM

## 2021-11-19 RX ORDER — ELTROMBOPAG OLAMINE 75 MG/1
TABLET, FILM COATED ORAL
Qty: 30 TABLET | Refills: 3 | Status: SHIPPED | OUTPATIENT
Start: 2021-11-19 | End: 2022-03-03

## 2021-12-10 DIAGNOSIS — E11.9 TYPE 2 DIABETES MELLITUS WITHOUT COMPLICATIONS (HCC): ICD-10-CM

## 2021-12-10 RX ORDER — GLIMEPIRIDE 4 MG/1
TABLET ORAL
Qty: 30 TABLET | Refills: 0 | Status: SHIPPED | OUTPATIENT
Start: 2021-12-10 | End: 2021-12-16 | Stop reason: SDUPTHER

## 2021-12-16 ENCOUNTER — OFFICE VISIT (OUTPATIENT)
Dept: INTERNAL MEDICINE CLINIC | Facility: CLINIC | Age: 46
End: 2021-12-16
Payer: COMMERCIAL

## 2021-12-16 ENCOUNTER — HOSPITAL ENCOUNTER (OUTPATIENT)
Dept: NON INVASIVE DIAGNOSTICS | Facility: CLINIC | Age: 46
Discharge: HOME | End: 2021-12-16
Payer: COMMERCIAL

## 2021-12-16 VITALS
WEIGHT: 299 LBS | SYSTOLIC BLOOD PRESSURE: 124 MMHG | BODY MASS INDEX: 39.63 KG/M2 | TEMPERATURE: 98.7 F | HEART RATE: 80 BPM | OXYGEN SATURATION: 98 % | HEIGHT: 73 IN | DIASTOLIC BLOOD PRESSURE: 76 MMHG

## 2021-12-16 DIAGNOSIS — E11.9 TYPE 2 DIABETES MELLITUS WITHOUT COMPLICATIONS (HCC): ICD-10-CM

## 2021-12-16 DIAGNOSIS — R60.0 EDEMA OF BOTH LOWER LEGS: ICD-10-CM

## 2021-12-16 DIAGNOSIS — E83.51 HYPOCALCEMIA: ICD-10-CM

## 2021-12-16 DIAGNOSIS — I10 ESSENTIAL HYPERTENSION: ICD-10-CM

## 2021-12-16 DIAGNOSIS — K21.9 GASTROESOPHAGEAL REFLUX DISEASE WITHOUT ESOPHAGITIS: ICD-10-CM

## 2021-12-16 DIAGNOSIS — M79.672 PAIN IN BOTH FEET: ICD-10-CM

## 2021-12-16 DIAGNOSIS — R79.89 ELEVATED LFTS: ICD-10-CM

## 2021-12-16 DIAGNOSIS — M79.671 PAIN IN BOTH FEET: ICD-10-CM

## 2021-12-16 DIAGNOSIS — D61.818 PANCYTOPENIA (HCC): ICD-10-CM

## 2021-12-16 DIAGNOSIS — E11.9 TYPE 2 DIABETES MELLITUS WITHOUT COMPLICATION, WITHOUT LONG-TERM CURRENT USE OF INSULIN (HCC): ICD-10-CM

## 2021-12-16 DIAGNOSIS — R21 SKIN RASH: ICD-10-CM

## 2021-12-16 DIAGNOSIS — Z00.00 ANNUAL PHYSICAL EXAM: Primary | ICD-10-CM

## 2021-12-16 DIAGNOSIS — Z23 NEED FOR PROPHYLACTIC VACCINATION AND INOCULATION AGAINST INFLUENZA: ICD-10-CM

## 2021-12-16 PROCEDURE — 90682 RIV4 VACC RECOMBINANT DNA IM: CPT | Performed by: INTERNAL MEDICINE

## 2021-12-16 PROCEDURE — 1036F TOBACCO NON-USER: CPT | Performed by: INTERNAL MEDICINE

## 2021-12-16 PROCEDURE — 93970 EXTREMITY STUDY: CPT

## 2021-12-16 PROCEDURE — 99396 PREV VISIT EST AGE 40-64: CPT | Performed by: INTERNAL MEDICINE

## 2021-12-16 PROCEDURE — 90471 IMMUNIZATION ADMIN: CPT | Performed by: INTERNAL MEDICINE

## 2021-12-16 PROCEDURE — 3008F BODY MASS INDEX DOCD: CPT | Performed by: INTERNAL MEDICINE

## 2021-12-16 PROCEDURE — 3078F DIAST BP <80 MM HG: CPT | Performed by: INTERNAL MEDICINE

## 2021-12-16 PROCEDURE — 3074F SYST BP LT 130 MM HG: CPT | Performed by: INTERNAL MEDICINE

## 2021-12-16 PROCEDURE — 4010F ACE/ARB THERAPY RXD/TAKEN: CPT | Performed by: INTERNAL MEDICINE

## 2021-12-16 PROCEDURE — 93970 EXTREMITY STUDY: CPT | Performed by: SURGERY

## 2021-12-16 PROCEDURE — 99213 OFFICE O/P EST LOW 20 MIN: CPT | Performed by: INTERNAL MEDICINE

## 2021-12-16 RX ORDER — OMEPRAZOLE 40 MG/1
40 CAPSULE, DELAYED RELEASE ORAL DAILY
Qty: 90 CAPSULE | Refills: 1 | Status: SHIPPED | OUTPATIENT
Start: 2021-12-16 | End: 2022-06-11 | Stop reason: SDUPTHER

## 2021-12-16 RX ORDER — LOSARTAN POTASSIUM 50 MG/1
50 TABLET ORAL DAILY
Qty: 90 TABLET | Refills: 1 | Status: SHIPPED | OUTPATIENT
Start: 2021-12-16 | End: 2022-01-07 | Stop reason: ALTCHOICE

## 2021-12-16 RX ORDER — GLIMEPIRIDE 4 MG/1
4 TABLET ORAL DAILY
Qty: 90 TABLET | Refills: 1 | Status: SHIPPED | OUTPATIENT
Start: 2021-12-16 | End: 2022-07-05

## 2021-12-16 RX ORDER — CLOTRIMAZOLE AND BETAMETHASONE DIPROPIONATE 10; .64 MG/G; MG/G
CREAM TOPICAL 2 TIMES DAILY
Qty: 45 G | Refills: 0 | Status: SHIPPED | OUTPATIENT
Start: 2021-12-16

## 2022-01-05 ENCOUNTER — OFFICE VISIT (OUTPATIENT)
Dept: HEMATOLOGY ONCOLOGY | Facility: CLINIC | Age: 47
End: 2022-01-05
Payer: COMMERCIAL

## 2022-01-05 VITALS
BODY MASS INDEX: 40.56 KG/M2 | HEIGHT: 73 IN | TEMPERATURE: 97.3 F | SYSTOLIC BLOOD PRESSURE: 124 MMHG | OXYGEN SATURATION: 98 % | RESPIRATION RATE: 18 BRPM | WEIGHT: 306 LBS | DIASTOLIC BLOOD PRESSURE: 78 MMHG | HEART RATE: 84 BPM

## 2022-01-05 DIAGNOSIS — R16.1 SPLENOMEGALY: ICD-10-CM

## 2022-01-05 DIAGNOSIS — D69.3 IDIOPATHIC THROMBOCYTOPENIA (HCC): Primary | ICD-10-CM

## 2022-01-05 PROCEDURE — 99214 OFFICE O/P EST MOD 30 MIN: CPT | Performed by: INTERNAL MEDICINE

## 2022-01-05 NOTE — PROGRESS NOTES
Hematology/Oncology Outpatient Follow- up Note  Sky Parents 51 y o  male MRN: @ Encounter: [id number]        Date:  1/5/2022    Presenting Complaint/Diagnosis :     Splenomegaly with thrombocytopenia with a question of ITP  Previous Hematologic/ Oncologic History:      Workup  The patient has failed high-dose steroids as he was treated with Decadron  He then got 4 doses of Rituxan     Current Hematologic/ Oncologic Treatment:      Promacta    Interval History:      The patient returns for follow-up visit  White count is slightly low  Hemoglobin has dropped slightly and I advised him to see his gastroenterologist for consideration of an EGD and colonoscopy as he is due  Kidney function was within acceptable limits  Bilirubin is slightly elevated  He follows with Elba Mayer and I advised him to consider seeing him  He has had some problems with pitting edema and dependent edema  I advised him weight loss might help with this  His PCP could consider diuretic  He has tried compression stockings which have not helped  I will do an ultrasound of the liver to make sure he does not have steatohepatitis or anything else like cirrhosis  His last imaging a few years ago had not revealed this  Platelet count continues to run in a stable range above 50  White count continues to fluctuate and has been low with an ANC between 0 8 and 1 2  I suspect this is from his splenomegaly  The rest of his 14 point review of systems today was negative  Test Results:    Imaging: VAS lower limb venous duplex study, complete bilateral    Result Date: 12/16/2021  Narrative:  [facility] REPORT [facility]INICAL: Indications: Patient presents with a recent onset of bilateral lower extremity pain and swelling x 1 month  Operative History: No cardiovascular surgeries Risk Factors The patient has history of HTN and Diabetes    FINDINGS:  Segment       Right              Left                        Impression         Impression CFV           Normal (Patent)    Normal (Patent)  GSV Inguinal  Normal (Patent)    Normal (Patent)  Peroneal      Poorly visualized                      CONCLUSION:  Impression:  RIGHT LOWER LIMB: No evidence of acute or chronic deep vein thrombosis  No evidence of superficial thrombophlebitis noted  Doppler evaluation shows a normal response to augmentation maneuvers  Popliteal, posterior tibial and anterior tibial arterial Doppler waveforms are triphasic  The peroneal veins were poorly visualized secondary to depth and edema  LEFT LOWER LIMB: No evidence of acute or chronic deep vein thrombosis  No evidence of superficial thrombophlebitis noted  Doppler evaluation shows a normal response to augmentation maneuvers  Popliteal, posterior tibial and anterior tibial arterial Doppler waveforms are triphasic  Technical findings were Tiger texted to Dr Marlena Kapoor at [facility] @ 11:03  SIGNATURE: Electronically Signed by: Indra Leon MD on 2021-12-16 01:13:29 PM      Labs:   Lab Results   Component Value Date    WBC 1 7 (L) 11/13/2021    HGB 10 8 (L) 11/13/2021    HCT 31 4 (L) 11/13/2021    MCV 91 8 11/13/2021    PLT 70 (L) 11/13/2021     Lab Results   Component Value Date     04/05/2017    K 4 0 11/13/2021     11/13/2021    CO2 25 11/13/2021    BUN 19 11/13/2021    CREATININE 0 82 11/13/2021    GLUCOSE 102 (H) 04/05/2017    GLUF 116 (H) 05/04/2021    CALCIUM 8 6 11/13/2021    CORRECTEDCA 9 5 05/04/2021    AST 46 (H) 11/13/2021    ALT 38 11/13/2021    ALKPHOS 100 11/13/2021    PROT 6 4 04/05/2017    BILITOT 1 0 04/05/2017    EGFR 85 05/04/2021         ROS: As stated in the history of present illness otherwise his 14 point review of systems today was negative        Active Problems:   Patient Active Problem List   Diagnosis    Acquired thrombocytopenia ([address] )    Elevated LFTs    Fatty liver    GERD (gastroesophageal reflux disease)    Tick bite    Acute upper respiratory infection    Diabetes ([facility] )    Idiopathic thrombocytopenia (HCC)    Elevated MCV    Family history of thrombocytopenia    Splenomegaly    Chronic ITP (idiopathic thrombocytopenic purpura) (HCC)    Leukopenia    Concussion with loss of consciousness of 30 minutes or less    BMI 37 0-37 9, adult    Edema of both lower legs    Pancytopenia (HCC)    BMI 39 0-39 9,adult    Pain in both feet    Skin rash    Essential hypertension    Hypocalcemia       Past Medical History:   Past Medical History:   Diagnosis Date    BMI 37 0-37 9, adult 4/22/2021    BMI 39 0-39 9,adult 12/16/2021    Cervicalgia     Concussion with loss of consciousness of 30 minutes or less 3/7/2021    Depression     Diabetes mellitus out of control ([facility] )     Edema of both lower legs 12/16/2021    Elevated liver enzymes     Erectile dysfunction     Exposure to COVID-19 virus     Fatty liver     GERD (gastroesophageal reflux disease)     Hyperglycemia     Hypertension     Left cervical radiculopathy     Left elbow pain     Left foot pain     Leukopenia 3/7/2021    Pain in both feet 12/16/2021    Pancytopenia ([facility] ) 12/16/2021    Skin rash 12/16/2021    Thrombocytopenia ([facility] )        Surgical History:   Past Surgical History:   Procedure Laterality Date    CHOLECYSTECTOMY  1990's    COLONOSCOPY  06/2005    EGD  07/10/2014    IR BIOPSY BONE MARROW  3/20/2019    WISDOM TOOTH EXTRACTION      In early to mid 19's       Family History:    Family History   Problem Relation Age of Onset    Heart attack Mother     Thrombocytopenia Mother     Coronary artery disease Mother     Hypertension Mother     Testicular cancer Father     Hypertension Father     No Known Problems Brother     Hypertension Brother     No Known Problems Brother     No Known Problems Son        Cancer-related family history includes Testicular cancer in his father      Social History:   Social History     Socioeconomic History    Marital status: /Civil [employer]     Spouse name: Not on file    Number of children: Not on file    Years of education: Not on file    Highest education level: Not on file   Occupational History    Occupation:    Tobacco Use    Smoking status: Former Smoker     Types: Cigarettes    Smokeless tobacco: Former User     Quit date: 3/1/2014    Tobacco comment: Social   Last cigarette years ago  Vaping Use    Vaping Use: Never used   Substance and Sexual Activity    Alcohol use: Yes    Drug use: Not Currently     Comment: No drug use - As per [employer]    Sexual activity: Not on file     Comment: Resides with wife and kids   Other Topics Concern    Not on file   Social History Narrative    Tobacco use: Never smoker    Annual eye exam: Sees ophth q yr    Annual dental checkup: He goes to dentist     - As per [employer]     Social Determinants of Health     Financial Resource Strain: Not on file   Food Insecurity: Not on file   Transportation Needs: Not on file   Physical Activity: Not on file   Stress: Not on file   Social Connections: Not on file   Intimate Partner Violence: Not on file   Housing Stability: Not on file       Current Medications:   Current Outpatient Medications   Medication Sig Dispense Refill    clotrimazole-betamethasone (LOTRISONE) 1-0 05 % cream Apply topically 2 (two) times a day 45 g 0    glimepiride (AMARYL) 4 mg tablet Take 1 tablet (4 mg total) by mouth daily 90 tablet 1    losartan (COZAAR) 50 mg tablet Take 1 tablet (50 mg total) by mouth daily 90 tablet 1    omeprazole (PriLOSEC) 40 MG capsule Take 1 capsule (40 mg total) by mouth daily 90 capsule 1    OneTouch Verio test strip USE TO TEST ONCE DAILY 25 strip 3    Promacta 75 MG TABS TAKE 1 TABLET BY MOUTH ONCE DAILY ON AN EMPTY STOMACH  AT LEAST 1 HOUR BEFORE OR 2 HOURS AFTER A MEAL 30 tablet 3     No current facility-administered medications for this visit  Allergies:    Allergies   Allergen Reactions  Aspirin Other (See Comments)     Reaction unknown to patient   Diltiazem Other (See Comments)     Patient does not recall what happened    Lisinopril Cough       Physical Exam:    Body surface area is 2 58 meters squared  Wt Readings from Last 3 Encounters:   01/05/22 (!) 139 kg (306 lb)   12/16/21 136 kg (299 lb)   10/05/21 129 kg (284 lb)        Temp Readings from Last 3 Encounters:   01/05/22 (!) 97 3 °F (36 3 °C)   12/16/21 98 7 °F (37 1 °C) (Tympanic)   10/05/21 98 2 °F (36 8 °C) (Temporal)        BP Readings from Last 3 Encounters:   01/05/22 124/78   12/16/21 124/76   10/05/21 112/70         Pulse Readings from Last 3 Encounters:   01/05/22 84   12/16/21 80   10/05/21 84         Physical Exam     Constitutional   General appearance: No acute distress, well appearing and well nourished  Eyes   Conjunctiva and lids: No swelling, erythema or discharge  Pupils and irises: Equal, round and reactive to light  Ears, Nose, Mouth, and Throat   External inspection of ears and nose: Normal     Nasal mucosa, septum, and turbinates: Normal without edema or erythema  Oropharynx: Normal with no erythema, edema, exudate or lesions  Pulmonary   Respiratory effort: No increased work of breathing or signs of respiratory distress  Auscultation of lungs: Clear to auscultation  Cardiovascular   Palpation of heart: Normal PMI, no thrills  Auscultation of heart: Normal rate and rhythm, normal S1 and S2, without murmurs  Examination of extremities for edema and/or varicosities: Normal     Carotid pulses: Normal     Abdomen   Abdomen: Non-tender, no masses  Liver and spleen: No hepatomegaly or splenomegaly  Lymphatic   Palpation of lymph nodes in neck: No lymphadenopathy  Musculoskeletal   Gait and station: Normal     Digits and nails: Normal without clubbing or cyanosis      Inspection/palpation of joints, bones, and muscles: Normal     Skin   Skin and subcutaneous tissue: Normal without rashes or lesions  Neurologic   Cranial nerves: Cranial nerves 2-12 intact  Sensation: No sensory loss  Psychiatric   Orientation to person, place, and time: Normal     Mood and affect: Normal         Assessment / Plan: This is a pleasant 58-year-old male with a past medical history of a low platelet count   He also has a history of a low white count   He has received 4 doses of Rituxan in the past and had a bone marrow biopsy which showed no abnormalities   Hepatitis panel was negative   A PET-CT scan in the past had shown no uptake in the spleen  Making a splenic lymphoma less likely  Duncan Barahona was started on Promacta and his counts are running consistently above 50   He will stay on his current dose   I will see him back in 3 months  Duncan Barahona will get blood work every month like he does  If his blood counts go lower we will consider another bone marrow biopsy  I will reimage his spleen and his liver at this point  I will see him back in 3 months  Imaging will be done sooner than that  If he has any questions he will call our office  Goals and Barriers:  Current Goal:  Prolong Survival from low platelet count and splenomegaly   Barriers: None  Patient's Capacity to Self Care:  Patient  able to self care  Portions of the record may have been created with voice recognition software  Occasional wrong word or "sound a like" substitutions may have occurred due to the inherent limitations of voice recognition software  Read the chart carefully and recognize, using context, where substitutions have occurred

## 2022-01-06 ENCOUNTER — HOSPITAL ENCOUNTER (OUTPATIENT)
Dept: RADIOLOGY | Age: 47
Discharge: HOME | End: 2022-01-06
Payer: COMMERCIAL

## 2022-01-06 DIAGNOSIS — R16.1 SPLENOMEGALY: ICD-10-CM

## 2022-01-06 DIAGNOSIS — D69.3 IDIOPATHIC THROMBOCYTOPENIA (HCC): ICD-10-CM

## 2022-01-06 PROCEDURE — 76700 US EXAM ABDOM COMPLETE: CPT

## 2022-01-07 ENCOUNTER — OFFICE VISIT (OUTPATIENT)
Dept: INTERNAL MEDICINE CLINIC | Facility: CLINIC | Age: 47
End: 2022-01-07
Payer: COMMERCIAL

## 2022-01-07 VITALS
HEART RATE: 79 BPM | WEIGHT: 303 LBS | HEIGHT: 73 IN | TEMPERATURE: 98.7 F | SYSTOLIC BLOOD PRESSURE: 140 MMHG | BODY MASS INDEX: 40.16 KG/M2 | OXYGEN SATURATION: 99 % | DIASTOLIC BLOOD PRESSURE: 72 MMHG

## 2022-01-07 DIAGNOSIS — D69.3 IDIOPATHIC THROMBOCYTOPENIA (HCC): ICD-10-CM

## 2022-01-07 DIAGNOSIS — R01.1 HEART MURMUR: ICD-10-CM

## 2022-01-07 DIAGNOSIS — R60.0 EDEMA OF BOTH LOWER LEGS: Primary | ICD-10-CM

## 2022-01-07 DIAGNOSIS — D61.818 PANCYTOPENIA (HCC): ICD-10-CM

## 2022-01-07 DIAGNOSIS — E83.51 HYPOCALCEMIA: ICD-10-CM

## 2022-01-07 DIAGNOSIS — I10 ESSENTIAL HYPERTENSION: ICD-10-CM

## 2022-01-07 DIAGNOSIS — R21 SKIN RASH: ICD-10-CM

## 2022-01-07 DIAGNOSIS — E11.9 TYPE 2 DIABETES MELLITUS WITHOUT COMPLICATION, WITHOUT LONG-TERM CURRENT USE OF INSULIN (HCC): ICD-10-CM

## 2022-01-07 DIAGNOSIS — E55.9 VITAMIN D DEFICIENCY: ICD-10-CM

## 2022-01-07 PROCEDURE — 99214 OFFICE O/P EST MOD 30 MIN: CPT | Performed by: INTERNAL MEDICINE

## 2022-01-07 RX ORDER — ERGOCALCIFEROL 1.25 MG/1
50000 CAPSULE ORAL WEEKLY
Qty: 8 CAPSULE | Refills: 0 | Status: SHIPPED | OUTPATIENT
Start: 2022-01-07 | End: 2022-01-31

## 2022-01-07 RX ORDER — TRIAMTERENE AND HYDROCHLOROTHIAZIDE 37.5; 25 MG/1; MG/1
1 CAPSULE ORAL EVERY MORNING
Qty: 30 CAPSULE | Refills: 1 | Status: SHIPPED | OUTPATIENT
Start: 2022-01-07 | End: 2022-01-14

## 2022-01-07 NOTE — ASSESSMENT & PLAN NOTE
Vitamin-D level 22  Will  start  vitamin-D 43344 International Units once a week for 8 weeks then after 2000 International Units daily  Will repeat a vitamin-D level after 4 months

## 2022-01-07 NOTE — ASSESSMENT & PLAN NOTE
Patient  was advised to decrease portion size  Advised to decrease carb, sugar, cholesterol intake  Advised to exercise 3-5 times per week  Advised to lose weight

## 2022-01-07 NOTE — ASSESSMENT & PLAN NOTE
Patient has been followed by [facility],THE oncologist   Had a recently office rigid with his hemodialysis  Who  Requested ultrasound abdomen

## 2022-01-07 NOTE — ASSESSMENT & PLAN NOTE
Workup so far unremarkable  Metabolic workup unremarkable  venous Doppler no DVT  Ultrasound of Abdomen increasing splenomegaly  otherwise unremarkable  Will discontinue losartan as does not have a microalbuminuria and try Dyazide  We will order chest x-ray  Will defer to cardiologist for any cardiac etiology of his edema of her legs and has faint murmur left sternal border  Advised to keep legs elevated and low salt diet

## 2022-01-07 NOTE — PROGRESS NOTES
Assessment/Plan:    1  Edema of both lower legs  Assessment & Plan:  Workup so far unremarkable  Metabolic workup unremarkable  venous Doppler no DVT  Ultrasound of Abdomen increasing splenomegaly  otherwise unremarkable  Will discontinue losartan as does not have a microalbuminuria and try Dyazide  We will order chest x-ray  Will defer to cardiologist for any cardiac etiology of his edema of her legs and has faint murmur left sternal border  Advised to keep legs elevated and low salt diet  Orders:  -     triamterene-hydrochlorothiazide (DYAZIDE) 37 5-25 mg per capsule; Take 1 capsule by mouth every morning  -     Basic metabolic panel; Future  -     XR chest pa & lateral; Future; Expected date: 01/07/2022  -     Ambulatory referral to Cardiology; Future    2  Heart murmur  Assessment & Plan:  Has a faint heart murmur left sternal border  Referred to cardiologist     Orders:  -     Ambulatory referral to Cardiology; Future    3  Hypocalcemia  Assessment & Plan:  Calcium increased from 8 4-8 6 but still low  Phosphorus and intact  PTH normal possible secondary to vitamin-D deficiency will replace vitamin-D and follow calcium      4  Vitamin D deficiency  Assessment & Plan:  Vitamin-D level 22  Will  start  vitamin-D 44410 International Units once a week for 8 weeks then after 2000 International Units daily  Will repeat a vitamin-D level after 4 months  Orders:  -     ergocalciferol (VITAMIN D2) 50,000 units; Take 1 capsule (50,000 Units total) by mouth once a week    5  Essential hypertension  Assessment & Plan:  Blood pressure well controlled  Advised to continue present medication  Advised for low-salt diet  His repeat blood pressure was 122/70  Orders:  -     triamterene-hydrochlorothiazide (DYAZIDE) 37 5-25 mg per capsule; Take 1 capsule by mouth every morning  -     XR chest pa & lateral; Future; Expected date: 01/07/2022    6   Type 2 diabetes mellitus without complication, without long-term current use of insulin [facility])  Assessment & Plan:    Lab Results   Component Value Date    HGBA1C 5 5 08/04/2021   Well controlled  No hypoglycemic continue present medications  Continue 1800 ADA diet  Follow-up fasting blood sugar and hemoglobin A1c next time  Orders:  -     Basic metabolic panel; Future  -     Hemoglobin A1C; Future    7  Idiopathic thrombocytopenia ([facility] [address] )  Assessment & Plan:  Has been followed by hematologist       8  Pancytopenia [facility])  Assessment & Plan:  Patient has been followed by [facility] oncologist   Had a recently office rigid with his hemodialysis  Who  Requested ultrasound abdomen  9  Skin rash  Assessment & Plan:  Of  lower legs questionable venous stasis dermatitis  The  noticed worsening of his redness of his lower legs after he applied Lotrisone cream so he discontinued  Will try triamcinolone ointment as he gets itching and dry scaly skin  Orders:  -     triamcinolone (KENALOG) 0 1 % ointment; Apply topically 2 (two) times a day    10  BMI 39 0-39 9,adult  Assessment & Plan:  Patient  was advised to decrease portion size  Advised to decrease carb, sugar, cholesterol intake  Advised to exercise 3-5 times per week  Advised to lose weight  Subjective:  Patient presents for follow-up  Patient ID: Lexy Brown is a 51 y o  male  HPI   43-year-old white male patient presents for follow-up of his medical problems  This still has a edema of both lower extremities  He tried the Lotrisone cream on her lower legs rash, but it got worse so he stopped it  He was seen by his hematologist had ultrasound of abdomen  His venous Doppler of legs was  negative for DVT  He denies any chest pain, cough, orthopnea, PND  Denies any fever or chills  denies nausea vomiting diarrhea or pain abdomen      The following portions of the patient's history were reviewed and updated as appropriate:     Past Medical History:  He has a past medical history of BMI 37 0-37 9, adult (4/22/2021), BMI 39 0-39 9,adult (12/16/2021), Cervicalgia, Concussion with loss of consciousness of 30 minutes or less (3/7/2021), Depression, Diabetes mellitus out of control ([facility] ), Edema of both lower legs (12/16/2021), Elevated liver enzymes, Erectile dysfunction, Exposure to COVID-19 virus, Fatty liver, GERD (gastroesophageal reflux disease), Heart murmur (1/7/2022), Hyperglycemia, Hypertension, Left cervical radiculopathy, Left elbow pain, Left foot pain, Leukopenia (3/7/2021), Pain in both feet (12/16/2021), Pancytopenia ([facility] [address] [address] ) (12/16/2021), Skin rash (12/16/2021), Thrombocytopenia ([place] [address] ), and Vitamin D deficiency (1/7/2022)  ,  _______________________________________________________________________  Past Surgical History:   has a past surgical history that includes Cholecystectomy (1990's); [place] tooth extraction; IR biopsy bone marrow (3/20/2019); EGD (07/10/2014); and Colonoscopy (06/2005)  ,  _______________________________________________________________________  Family History:  family history includes Coronary artery disease in his mother; Heart attack in his mother; Hypertension in his brother, father, and mother; No Known Problems in his brother, brother, and son; Testicular cancer in his father; Thrombocytopenia in his mother ,  _______________________________________________________________________  Social History:   reports that he has quit smoking  His smoking use included cigarettes  He quit smokeless tobacco use about 7 years ago  He reports current alcohol use  He reports previous drug use ,  _______________________________________________________________________  Allergies:  is allergic to aspirin, diltiazem, and lisinopril     _______________________________________________________________________  Current Outpatient Medications   Medication Sig Dispense Refill    glimepiride (AMARYL) 4 mg tablet Take 1 tablet (4 mg total) by mouth daily 90 tablet 1    omeprazole (PriLOSEC) 40 MG capsule Take 1 capsule (40 mg total) by mouth daily 90 capsule 1    OneTouch Verio test strip USE TO TEST ONCE DAILY 25 strip 3    Promacta 75 MG TABS TAKE 1 TABLET BY MOUTH ONCE DAILY ON AN EMPTY STOMACH  AT LEAST 1 HOUR BEFORE OR 2 HOURS AFTER A MEAL 30 tablet 3    clotrimazole-betamethasone (LOTRISONE) 1-0 05 % cream Apply topically 2 (two) times a day (Patient not taking: Reported on 1/7/2022 ) 45 g 0    ergocalciferol (VITAMIN D2) 50,000 units Take 1 capsule (50,000 Units total) by mouth once a week 8 capsule 0    triamcinolone (KENALOG) 0 1 % ointment Apply topically 2 (two) times a day 80 g 0    triamterene-hydrochlorothiazide (DYAZIDE) 37 5-25 mg per capsule Take 1 capsule by mouth every morning 30 capsule 1     No current facility-administered medications for this visit      _______________________________________________________________________  Review of Systems   Constitutional: Negative for chills and fever  HENT: Negative for congestion, ear pain, hearing loss, nosebleeds, sinus pain, sore throat and trouble swallowing  Eyes: Negative for discharge, redness and visual disturbance  Respiratory: Negative for cough, chest tightness and shortness of breath  Cardiovascular: Positive for leg swelling (Both lower legs)  Negative for chest pain and palpitations  Gastrointestinal: Negative for abdominal pain, blood in stool, constipation, diarrhea, nausea and vomiting  Genitourinary: Negative for dysuria, flank pain, frequency and hematuria  Musculoskeletal: Negative for arthralgias, myalgias and neck pain  Skin: Positive for rash ( both the lower legs skin red and itchy  above ankles to upper 3rd lower legs area  )  Negative for color change  Neurological: Negative for dizziness, speech difficulty, weakness and headaches  Hematological: Does not bruise/bleed easily  Psychiatric/Behavioral: Negative for agitation and behavioral problems           Objective:  Vitals:    01/07/22 0850   BP: 140/72   BP Location: Left arm   Patient Position: Sitting   Cuff Size: Adult   Pulse: 79   Temp: 98 7 °F (37 1 °C)   TempSrc: Tympanic   SpO2: 99%   Weight: (!) 137 kg (303 lb)   Height: 6' 1" (1 854 m)     Body mass index is 39 98 kg/m²  Physical Exam  Vitals and nursing note reviewed  Constitutional:       General: He is not in acute distress  Appearance: Normal appearance  He is normal weight  HENT:      Head: Normocephalic and atraumatic  Right Ear: Ear canal and external ear normal       Left Ear: Ear canal and external ear normal       Nose:      Comments: Patient has a face mask on     Mouth/Throat:      Comments: Patient has a face mask on  Eyes:      General: No scleral icterus  Right eye: No discharge  Left eye: No discharge  Extraocular Movements: Extraocular movements intact  Conjunctiva/sclera: Conjunctivae normal    Cardiovascular:      Rate and Rhythm: Normal rate and regular rhythm  Pulses: Normal pulses  Heart sounds: Murmur (1/vi murmur left sternal border area) heard  Pulmonary:      Effort: Pulmonary effort is normal  No respiratory distress  Breath sounds: Normal breath sounds  Abdominal:      General: Bowel sounds are normal       Palpations: Abdomen is soft  Tenderness: There is no abdominal tenderness  Musculoskeletal:         General: Normal range of motion  Cervical back: Normal range of motion and neck supple  Right lower leg: Edema (One to 2+ edema below knee to feet ) present  Left lower leg: Edema ( 1 to 2+ pitting edema below knee to feet) present  Skin:     General: Skin is warm  Findings: Rash ( skin of both lower legs slightly red dry scaly above  ankles to upper 3rd of both lower legs ) present  Neurological:      General: No focal deficit present  Mental Status: He is alert and oriented to person, place, and time  Motor: No weakness        Coordination: Coordination normal    Psychiatric:         Mood and Affect: Mood normal          Behavior: Behavior normal        I spent 30 minutes with the patient today    More than 50% time spent for reviewing of external notes, reviewing of the results of diagnostics test, management of care, patient education and ordering of test

## 2022-01-07 NOTE — ASSESSMENT & PLAN NOTE
Of  lower legs questionable venous stasis dermatitis  The  noticed worsening of his redness of his lower legs after he applied Lotrisone cream so he discontinued  Will try triamcinolone ointment as he gets itching and dry scaly skin

## 2022-01-07 NOTE — PATIENT INSTRUCTIONS
Patient was advised to continue present medications  discussed with the patient medications and laboratory data in detail  Follow-up with me as advised  If any blood test was ordered please do 1 week prior to next appointment unless advise to get earlier    If you have any questions please call the office [phone]

## 2022-01-07 NOTE — ASSESSMENT & PLAN NOTE
Blood pressure well controlled  Advised to continue present medication  Advised for low-salt diet  His repeat blood pressure was 122/70

## 2022-01-07 NOTE — ASSESSMENT & PLAN NOTE
Lab Results   Component Value Date    HGBA1C 5 5 08/04/2021   Well controlled  No hypoglycemic continue present medications  Continue 1800 ADA diet  Follow-up fasting blood sugar and hemoglobin A1c next time

## 2022-01-07 NOTE — ASSESSMENT & PLAN NOTE
Calcium increased from 8 4-8 6 but still low  Phosphorus and intact  PTH normal possible secondary to vitamin-D deficiency will replace vitamin-D and follow calcium

## 2022-01-14 ENCOUNTER — OFFICE VISIT (OUTPATIENT)
Dept: CARDIOLOGY CLINIC | Facility: MEDICAL CENTER | Age: 47
End: 2022-01-14
Payer: COMMERCIAL

## 2022-01-14 VITALS
SYSTOLIC BLOOD PRESSURE: 130 MMHG | DIASTOLIC BLOOD PRESSURE: 74 MMHG | WEIGHT: 299 LBS | OXYGEN SATURATION: 98 % | HEART RATE: 76 BPM | HEIGHT: 73 IN | BODY MASS INDEX: 39.63 KG/M2

## 2022-01-14 DIAGNOSIS — R60.0 EDEMA OF BOTH LOWER LEGS: ICD-10-CM

## 2022-01-14 DIAGNOSIS — R01.1 HEART MURMUR: Primary | ICD-10-CM

## 2022-01-14 DIAGNOSIS — I10 ESSENTIAL HYPERTENSION: ICD-10-CM

## 2022-01-14 PROCEDURE — 3078F DIAST BP <80 MM HG: CPT | Performed by: INTERNAL MEDICINE

## 2022-01-14 PROCEDURE — 3075F SYST BP GE 130 - 139MM HG: CPT | Performed by: INTERNAL MEDICINE

## 2022-01-14 PROCEDURE — 3008F BODY MASS INDEX DOCD: CPT | Performed by: INTERNAL MEDICINE

## 2022-01-14 PROCEDURE — 1036F TOBACCO NON-USER: CPT | Performed by: INTERNAL MEDICINE

## 2022-01-14 PROCEDURE — 4010F ACE/ARB THERAPY RXD/TAKEN: CPT | Performed by: INTERNAL MEDICINE

## 2022-01-14 PROCEDURE — 99204 OFFICE O/P NEW MOD 45 MIN: CPT | Performed by: INTERNAL MEDICINE

## 2022-01-14 PROCEDURE — 93000 ELECTROCARDIOGRAM COMPLETE: CPT | Performed by: INTERNAL MEDICINE

## 2022-01-14 RX ORDER — LOSARTAN POTASSIUM 25 MG/1
25 TABLET ORAL DAILY
Qty: 90 TABLET | Refills: 3
Start: 2022-01-14 | End: 2022-04-21 | Stop reason: SDUPTHER

## 2022-01-14 RX ORDER — FUROSEMIDE 40 MG/1
40 TABLET ORAL 2 TIMES DAILY
Qty: 90 TABLET | Refills: 3 | Status: SHIPPED | OUTPATIENT
Start: 2022-01-14 | End: 2022-02-08 | Stop reason: CLARIF

## 2022-01-14 NOTE — PROGRESS NOTES
Cardiology   Lyly Cole 51 y o  male MRN: [id number]        Impression:  1  LE edema - unclear etiology  No evidence of DVT, medication induced, or thyroid disease  Will want to exclude cardiomyopathy or valvular heart disease  2  Hypertension - controlled  3  Heart murmur - none auscultated today  Recommendations:  1  Discontinue Dyazide  2  Restart losartan 25mg daily  3  Start Furosemide 40mg daily  4  Check echo to evaluate for structural heart disease  5  Follow up in one month  HPI: Lyly Cole is a 51y o  year old male with hypertension, DM, ITP, and LE edema, who presents for evaluation of a murmur and LE edema  Edema has been occurring for 2-3 months  Some mild improvement with switching from losartan to Dyazide  Started "out of the blue"  No dyspnea  Review of Systems   Constitutional: Negative  HENT: Negative  Eyes: Negative  Respiratory: Negative for chest tightness and shortness of breath  Cardiovascular: Positive for leg swelling  Negative for chest pain and palpitations  Gastrointestinal: Negative  Endocrine: Negative  Genitourinary: Negative  Musculoskeletal: Negative  Skin: Negative  Allergic/Immunologic: Negative  Neurological: Negative  Hematological: Negative  Psychiatric/Behavioral: Negative  All other systems reviewed and are negative          Past Medical History:   Diagnosis Date    BMI 37 0-37 9, adult 4/22/2021    BMI 39 0-39 9,adult 12/16/2021    Cervicalgia     Concussion with loss of consciousness of 30 minutes or less 3/7/2021    Depression     Diabetes mellitus out of control ([facility] [address] )     Edema of both lower legs 12/16/2021    Elevated liver enzymes     Erectile dysfunction     Exposure to COVID-19 virus     Fatty liver     GERD (gastroesophageal reflux disease)     Heart murmur 1/7/2022    Hyperglycemia     Hypertension     Left cervical radiculopathy     Left elbow pain     Left foot pain  Leukopenia 3/7/2021    Pain in both feet 12/16/2021    Pancytopenia ([facility] [address] 12/16/2021    Skin rash 12/16/2021    Thrombocytopenia ([facility] [address] [facility]     Vitamin D deficiency 1/7/2022     Past Surgical History:   Procedure Laterality Date    CHOLECYSTECTOMY  1990's    COLONOSCOPY  06/2005    EGD  07/10/2014    IR BIOPSY BONE MARROW  3/20/2019    WISDOM TOOTH EXTRACTION      In early to mid 19's     Social History     Substance and Sexual Activity   Alcohol Use Yes     Social History     Substance and Sexual Activity   Drug Use Not Currently    Comment: No drug use - As per [employer]     Social History     Tobacco Use   Smoking Status Former Smoker    Types: Cigarettes   Smokeless Tobacco Former User    Quit date: 3/1/2014   Tobacco Comment    Social   Last cigarette years ago  Family History   Problem Relation Age of Onset    Heart attack Mother     Thrombocytopenia Mother     Coronary artery disease Mother     Hypertension Mother     Testicular cancer Father     Hypertension Father     No Known Problems Brother     Hypertension Brother     No Known Problems Brother     No Known Problems Son        Allergies: Allergies   Allergen Reactions    Aspirin Other (See Comments)     Reaction unknown to patient      Diltiazem Other (See Comments)     Patient does not recall what happened    Lisinopril Cough       Medications:     Current Outpatient Medications:     ergocalciferol (VITAMIN D2) 50,000 units, Take 1 capsule (50,000 Units total) by mouth once a week, Disp: 8 capsule, Rfl: 0    glimepiride (AMARYL) 4 mg tablet, Take 1 tablet (4 mg total) by mouth daily, Disp: 90 tablet, Rfl: 1    omeprazole (PriLOSEC) 40 MG capsule, Take 1 capsule (40 mg total) by mouth daily, Disp: 90 capsule, Rfl: 1    OneTouch Verio test strip, USE TO TEST ONCE DAILY, Disp: 25 strip, Rfl: 3    Promacta 75 MG TABS, TAKE 1 TABLET BY MOUTH ONCE DAILY ON AN EMPTY STOMACH  AT LEAST 1 HOUR BEFORE OR 2 HOURS AFTER A MEAL, Disp: 30 tablet, Rfl: 3    triamcinolone (KENALOG) 0 1 % ointment, Apply topically 2 (two) times a day, Disp: 80 g, Rfl: 0    triamterene-hydrochlorothiazide (DYAZIDE) 37 5-25 mg per capsule, Take 1 capsule by mouth every morning, Disp: 30 capsule, Rfl: 1    clotrimazole-betamethasone (LOTRISONE) 1-0 05 % cream, Apply topically 2 (two) times a day (Patient not taking: Reported on 1/7/2022 ), Disp: 45 g, Rfl: 0      Wt Readings from Last 3 Encounters:   01/14/22 104 kg (229 lb)   01/07/22 (!) 137 kg (303 lb)   01/05/22 (!) 139 kg (306 lb)     Temp Readings from Last 3 Encounters:   01/07/22 98 7 °F (37 1 °C) (Tympanic)   01/05/22 (!) 97 3 °F (36 3 °C)   12/16/21 98 7 °F (37 1 °C) (Tympanic)     BP Readings from Last 3 Encounters:   01/14/22 130/74   01/07/22 140/72   01/05/22 124/78     Pulse Readings from Last 3 Encounters:   01/14/22 76   01/07/22 79   01/05/22 84         Physical Exam  HENT:      Head: Atraumatic  Mouth/Throat:      Mouth: Mucous membranes are moist    Eyes:      Extraocular Movements: Extraocular movements intact  Cardiovascular:      Rate and Rhythm: Normal rate and regular rhythm  Heart sounds: Normal heart sounds  Pulmonary:      Effort: Pulmonary effort is normal       Breath sounds: Normal breath sounds  Abdominal:      General: Abdomen is flat  Musculoskeletal:         General: Normal range of motion  Cervical back: Normal range of motion  Skin:     General: Skin is warm  Neurological:      General: No focal deficit present  Mental Status: He is alert and oriented to person, place, and time     Psychiatric:         Mood and Affect: Mood normal          Behavior: Behavior normal            Laboratory Studies:  CMP:  Lab Results   Component Value Date     04/05/2017    K 4 0 11/13/2021     11/13/2021    CO2 25 11/13/2021    BUN 19 11/13/2021    CREATININE 0 82 11/13/2021    GLUCOSE 102 (H) 04/05/2017    AST 46 (H) 11/13/2021    ALT 38 11/13/2021 BILITOT 1 0 04/05/2017    EGFR 85 05/04/2021         Cardiac testing:   EKG reviewed personally: JOSHUA Roy LVH

## 2022-01-14 NOTE — PATIENT INSTRUCTIONS
Recommendations:  1  Discontinue Dyazide  2  Restart losartan 25mg daily  3  Start Furosemide 40mg daily  4  Check echo to evaluate for structural heart disease  5  Follow up in one month

## 2022-01-28 ENCOUNTER — HOSPITAL ENCOUNTER (OUTPATIENT)
Dept: NON INVASIVE DIAGNOSTICS | Facility: HOSPITAL | Age: 47
Discharge: HOME | End: 2022-01-28
Attending: INTERNAL MEDICINE
Payer: COMMERCIAL

## 2022-01-28 VITALS
SYSTOLIC BLOOD PRESSURE: 130 MMHG | WEIGHT: 299 LBS | HEIGHT: 73 IN | DIASTOLIC BLOOD PRESSURE: 74 MMHG | BODY MASS INDEX: 39.63 KG/M2 | HEART RATE: 77 BPM

## 2022-01-28 DIAGNOSIS — I10 ESSENTIAL HYPERTENSION: ICD-10-CM

## 2022-01-28 DIAGNOSIS — R01.1 HEART MURMUR: ICD-10-CM

## 2022-01-28 DIAGNOSIS — R60.0 EDEMA OF BOTH LOWER LEGS: ICD-10-CM

## 2022-01-28 LAB
AORTIC ROOT: 3.6 CM
APICAL FOUR CHAMBER EJECTION FRACTION: 58 %
ASCENDING AORTA: 3.4 CM (ref 2.35–3.53)
BUN SERPL-MCNC: 18 MG/DL (ref 7–25)
BUN/CREAT SERPL: ABNORMAL (CALC) (ref 6–22)
CALCIUM SERPL-MCNC: 8.7 MG/DL (ref 8.6–10.3)
CHLORIDE SERPL-SCNC: 109 MMOL/L (ref 98–110)
CO2 SERPL-SCNC: 25 MMOL/L (ref 20–32)
CREAT SERPL-MCNC: 0.87 MG/DL (ref 0.6–1.35)
DOP CALC LVOT AREA: 4.91 CM2
DOP CALC LVOT DIAMETER: 2.5 CM
E WAVE DECELERATION TIME: 199 MS
FRACTIONAL SHORTENING: 46 % (ref 28–44)
GLUCOSE SERPL-MCNC: 136 MG/DL (ref 65–99)
HBA1C MFR BLD: 6.6 % OF TOTAL HGB
INTERVENTRICULAR SEPTUM IN DIASTOLE (PARASTERNAL SHORT AXIS VIEW): 1.2 CM (ref 0.6–1.13)
LAAS-AP2: 30.8 CM2
LAAS-AP4: 31.3 CM2
LEFT ATRIUM AREA SYSTOLE SINGLE PLANE A4C: 30.1 CM2
LEFT ATRIUM SIZE: 4.8 CM
LEFT INTERNAL DIMENSION IN SYSTOLE: 3.1 CM (ref 2.1–4)
LEFT VENTRICULAR INTERNAL DIMENSION IN DIASTOLE: 5.7 CM (ref 20.95–31.24)
LEFT VENTRICULAR POSTERIOR WALL IN END DIASTOLE: 1.2 CM (ref 0.59–1.11)
LEFT VENTRICULAR STROKE VOLUME: 124 ML
MV E'TISSUE VEL-SEP: 11 CM/S
MV PEAK A VEL: 0.72 M/S
MV PEAK E VEL: 94 CM/S
MV STENOSIS PRESSURE HALF TIME: 0 MS
POTASSIUM SERPL-SCNC: 4.2 MMOL/L (ref 3.5–5.3)
RIGHT ATRIUM AREA SYSTOLE A4C: 23.5 CM2
RIGHT VENTRICLE ID DIMENSION: 4.9 CM
SL AMB EGFR AFRICAN AMERICAN: 119 ML/MIN/1.73M2
SL AMB EGFR NON AFRICAN AMERICAN: 103 ML/MIN/1.73M2
SL CV LEFT ATRIUM LENGTH A2C: 6.3 CM
SL CV LV EF: 65
SL CV PED ECHO LEFT VENTRICLE DIASTOLIC VOLUME (MOD BIPLANE) 2D: 162 ML
SL CV PED ECHO LEFT VENTRICLE SYSTOLIC VOLUME (MOD BIPLANE) 2D: 39 ML
SODIUM SERPL-SCNC: 140 MMOL/L (ref 135–146)
TR MAX PG: 33 MMHG
TRICUSPID ANNULAR PLANE SYSTOLIC EXCURSION: 2.9 CM
TRICUSPID VALVE PEAK REGURGITATION VELOCITY: 2.88 M/S
Z-SCORE OF ASCENDING AORTA: 1.55
Z-SCORE OF INTERVENTRICULAR SEPTUM IN END DIASTOLE: 2.52
Z-SCORE OF LEFT VENTRICULAR DIMENSION IN END SYSTOLE: -15.01
Z-SCORE OF LEFT VENTRICULAR POSTERIOR WALL IN END DIASTOLE: 2.63

## 2022-01-28 PROCEDURE — 3044F HG A1C LEVEL LT 7.0%: CPT | Performed by: INTERNAL MEDICINE

## 2022-01-28 PROCEDURE — 93306 TTE W/DOPPLER COMPLETE: CPT | Performed by: INTERNAL MEDICINE

## 2022-01-28 PROCEDURE — 93306 TTE W/DOPPLER COMPLETE: CPT

## 2022-01-30 DIAGNOSIS — R21 SKIN RASH: ICD-10-CM

## 2022-01-30 DIAGNOSIS — E55.9 VITAMIN D DEFICIENCY: ICD-10-CM

## 2022-01-31 RX ORDER — ERGOCALCIFEROL 1.25 MG/1
CAPSULE ORAL
Qty: 4 CAPSULE | Refills: 1 | Status: SHIPPED | OUTPATIENT
Start: 2022-01-31 | End: 2022-05-13 | Stop reason: SDUPTHER

## 2022-02-04 ENCOUNTER — OFFICE VISIT (OUTPATIENT)
Dept: PODIATRY | Facility: CLINIC | Age: 47
End: 2022-02-04
Payer: COMMERCIAL

## 2022-02-04 ENCOUNTER — APPOINTMENT (OUTPATIENT)
Dept: RADIOLOGY | Facility: MEDICAL CENTER | Age: 47
End: 2022-02-04
Payer: COMMERCIAL

## 2022-02-04 VITALS
BODY MASS INDEX: 39.45 KG/M2 | HEIGHT: 73 IN | HEART RATE: 90 BPM | SYSTOLIC BLOOD PRESSURE: 128 MMHG | DIASTOLIC BLOOD PRESSURE: 75 MMHG

## 2022-02-04 DIAGNOSIS — I10 ESSENTIAL HYPERTENSION: ICD-10-CM

## 2022-02-04 DIAGNOSIS — R60.0 EDEMA OF BOTH LOWER LEGS: ICD-10-CM

## 2022-02-04 DIAGNOSIS — E11.9 CONTROLLED TYPE 2 DIABETES MELLITUS WITHOUT COMPLICATION, WITHOUT LONG-TERM CURRENT USE OF INSULIN (HCC): Primary | ICD-10-CM

## 2022-02-04 DIAGNOSIS — R60.0 LOWER EXTREMITY EDEMA: ICD-10-CM

## 2022-02-04 DIAGNOSIS — I87.2 VENOUS INSUFFICIENCY OF BOTH LOWER EXTREMITIES: ICD-10-CM

## 2022-02-04 PROCEDURE — 99203 OFFICE O/P NEW LOW 30 MIN: CPT | Performed by: PODIATRIST

## 2022-02-04 PROCEDURE — 71046 X-RAY EXAM CHEST 2 VIEWS: CPT

## 2022-02-04 NOTE — PATIENT INSTRUCTIONS
Foot Care for People with Diabetes   AMBULATORY CARE:   What you need to know about foot care:   · Foot care helps protect your feet and prevent foot ulcers or sores  Long-term high blood sugar levels can damage the blood vessels and nerves in your legs and feet  This damage makes it hard to feel pressure, pain, temperature, and touch  You may not be able to feel a cut or sore, or shoes that are too tight  Foot care is needed to prevent serious problems, such as an infection or amputation  · Diabetes may cause your toes to become crooked or curved under  These changes may affect the way you walk and can lead to increased pressure on your foot  The pressure can decrease blood flow to your feet  Lack of blood flow increases your risk for a foot ulcer  Do not ignore small problems, such as dry skin or small wounds  These can become life-threatening over time without proper care  Call your care team provider if:   · Your feet become numb, weak, or hard to move  · You have pus draining from a sore on your foot  · You have a wound on your foot that gets bigger, deeper, or does not heal      · You see blisters, cuts, scratches, calluses, or sores on your foot  · You have a fever, and your feet become red, warm, and swollen  · Your toenails become thick, curled, or yellow  · You find it hard to check your feet because your vision is poor  · You have questions or concerns about your condition or care  How to care for your feet:   · Check your feet each day  Look at your whole foot, including the bottom, and between and under your toes  Check for wounds, corns, and calluses  Use a mirror to see the bottom of your feet  The skin on your feet may be shiny, tight, or darker than normal  Your feet may also be cold and pale  Feel your feet by running your hands along the tops, bottoms, sides, and between your toes   Redness, swelling, and warmth are signs of blood flow problems that can lead to a foot ulcer  Do not try to remove corns or calluses yourself  · Wash your feet each day with soap and warm water  Do not use hot water, because this can injure your foot  Dry your feet gently with a towel after you wash them  Dry between and under your toes  · Apply lotion or a moisturizer on your dry feet  Ask your care team provider what lotions are best to use  Do not put lotion or moisturizer between your toes  Moisture between your toes could lead to skin breakdown  · Cut your toenails correctly  File or cut your toenails straight across  Use a soft brush to clean around your toenails  If your toenails are very thick, you may need to have a care team provider or specialist cut them  · Protect your feet  Do not walk barefoot or wear your shoes without socks  Check your shoes for rocks or other objects that can hurt your feet  Wear cotton socks to help keep your feet dry  Wear socks without toe seams, or wear them with the seams inside out  Change your socks each day  Do not wear socks that are dirty or damp  · Wear shoes that fit well  Wear shoes that do not rub against any area of your feet  Your shoes should be ½ to ¾ inch (1 to 2 centimeters) longer than your feet  Your shoes should also have extra space around the widest part of your feet  Walking or athletic shoes with laces or straps that adjust are best  Ask your care team provider for help to choose shoes that fit you best  Ask him or her if you need to wear an insert, orthotic, or bandage on your feet  · Go to your follow-up visits  Your care team provider will do a foot exam at least once a year  You may need a foot exam more often if you have nerve damage, foot deformities, or ulcers  He will check for nerve damage and how well you can feel your feet  He will check your shoes to see if they fit well  · Do not smoke  Smoking can damage your blood vessels and put you at increased risk for foot ulcers   Ask your care team provider for information if you currently smoke and need help to quit  E-cigarettes or smokeless tobacco still contain nicotine  Talk to your care team provider before you use these products  Follow up with your diabetes care team provider or foot specialist as directed: You will need to have your feet checked at least once a year  You may need a foot exam more often if you have nerve damage, foot deformities, or ulcers  Write down your questions so you remember to ask them during your visits  © Copyright [employer] 2021 Information is for End User's use only and may not be sold, redistributed or otherwise used for commercial purposes  All illustrations and images included in CareNotes® are the copyrighted property of [employer]  or [facility] Penelope Solorio   The above information is an  only  It is not intended as medical advice for individual conditions or treatments  Talk to your doctor, nurse or pharmacist before following any medical regimen to see if it is safe and effective for you

## 2022-02-04 NOTE — PROGRESS NOTES
PATIENT:  Lyly Cole    1975        ASSESSMENT:     1  Controlled type 2 diabetes mellitus without complication, without long-term current use of insulin ([facility])     2  Venous insufficiency of both lower extremities  VAS reflux lower limb venous duplex study with reflux assessment, complete bilateral    Compression Stocking   3  Lower extremity edema  VAS reflux lower limb venous duplex study with reflux assessment, complete bilateral    Compression Stocking         PLAN:  1  Patient was counseled on the condition and diagnosis  2   Educated disease prevention and risks related to diabetes  3   Educated proper daily foot care and exam   Instructed proper skin care / protection and footwear  Instructed to identify any signs of infection and related foot problem  4   The recent blood work was reviewed and the last HbA1c was 6 6  Discussed proper blood glucose control with diet and exercise  5   Reviewed the note from PCP  Reviewed venous doppler test   He has LE edema with mild venous stasis skin changes  Will send him for venous reflux study to rule out venous insufficiency  6  Sent him for compression stockings  Educated management of LE edema with compression, diet, elevation, and weight loss  7  He has low risk diabetic foot and will return in 6 months for diabetic foot exam       Subjective:      HPI  The patient was referred for diabetic foot evaluation and LE edema  The patient has diabetes for about 15 years  The blood glucose is under control  The patient denied any history of diabetic foot ulcer, related foot infection, or amputation  No significant numbness or paresthesia  Denied weakness or significant functional deficit  He complained of LE edema since September last year  He is on diuretic and seeing cardiologist   No significant pain  He has discomfort if his legs well up at the end of the day  No calf pain        The following portions of the patient's history were reviewed and updated as appropriate: allergies, current medications, past family history, past medical history, past social history, past surgical history and problem list   All pertinent labs and images were reviewed      Past Medical History  Past Medical History:   Diagnosis Date    BMI 37 0-37 9, adult 4/22/2021    BMI 39 0-39 9,adult 12/16/2021    Cervicalgia     Concussion with loss of consciousness of 30 minutes or less 3/7/2021    Depression     Diabetes mellitus out of control ([facility] [address] [facility]     Edema of both lower legs 12/16/2021    Elevated liver enzymes     Erectile dysfunction     Exposure to COVID-19 virus     Fatty liver     GERD (gastroesophageal reflux disease)     Heart murmur 1/7/2022    Hyperglycemia     Hypertension     Left cervical radiculopathy     Left elbow pain     Left foot pain     Leukopenia 3/7/2021    Pain in both feet 12/16/2021    Pancytopenia ([facility] [address] ) 12/16/2021    Skin rash 12/16/2021    Thrombocytopenia ([facility] )     Vitamin D deficiency 1/7/2022       Past Surgical History  Past Surgical History:   Procedure Laterality Date    CHOLECYSTECTOMY  1990's    COLONOSCOPY  06/2005    EGD  07/10/2014    IR BIOPSY BONE MARROW  3/20/2019    WISDOM TOOTH EXTRACTION      In early to mid 20's        Allergies:  Aspirin, Diltiazem, and Lisinopril    Medications:  Current Outpatient Medications   Medication Sig Dispense Refill    ergocalciferol (VITAMIN D2) 50,000 units TAKE 1 CAPSULE BY MOUTH ONE TIME PER WEEK 4 capsule 1    furosemide (LASIX) 40 mg tablet Take 1 tablet (40 mg total) by mouth 2 (two) times a day 90 tablet 3    glimepiride (AMARYL) 4 mg tablet Take 1 tablet (4 mg total) by mouth daily 90 tablet 1    losartan (COZAAR) 25 mg tablet Take 1 tablet (25 mg total) by mouth daily 90 tablet 3    omeprazole (PriLOSEC) 40 MG capsule Take 1 capsule (40 mg total) by mouth daily 90 capsule 1    OneTouch Verio test strip USE TO TEST ONCE DAILY 25 strip 3    Promacta 75 MG TABS TAKE 1 TABLET BY MOUTH ONCE DAILY ON AN EMPTY STOMACH  AT LEAST 1 HOUR BEFORE OR 2 HOURS AFTER A MEAL 30 tablet 3    triamcinolone (KENALOG) 0 1 % ointment APPLY TO AFFECTED AREA TWICE A DAY 80 g 0    clotrimazole-betamethasone (LOTRISONE) 1-0 05 % cream Apply topically 2 (two) times a day (Patient not taking: Reported on 1/7/2022 ) 45 g 0     No current facility-administered medications for this visit  Social History:  Social History     Socioeconomic History    Marital status: /Civil Union     Spouse name: None    Number of children: None    Years of education: None    Highest education level: None   Occupational History    Occupation:    Tobacco Use    Smoking status: Former Smoker     Types: Cigarettes    Smokeless tobacco: Former User     Quit date: 3/1/2014    Tobacco comment: Social   Last cigarette years ago  Vaping Use    Vaping Use: Never used   Substance and Sexual Activity    Alcohol use: Yes    Drug use: Not Currently     Comment: No drug use - As per [employer]    Sexual activity: Not Currently     Comment: Resides with wife and kids   Other Topics Concern    None   Social History Narrative    Tobacco use: Never smoker    Annual eye exam: Sees ophth q yr    Annual dental checkup: He goes to dentist     - As per [employer]     Social Determinants of Health     Financial Resource Strain: Not on file   Food Insecurity: Not on file   Transportation Needs: Not on file   Physical Activity: Not on file   Stress: Not on file   Social Connections: Not on file   Intimate Partner Violence: Not on file   Housing Stability: Not on file        Review of Systems   Constitutional: Negative for chills, fatigue and fever  HENT: Negative for sore throat  Eyes: Negative for visual disturbance  Respiratory: Negative for cough and shortness of breath  Cardiovascular: Positive for leg swelling  Negative for chest pain     Gastrointestinal: Negative for diarrhea, nausea and vomiting  Musculoskeletal: Negative for gait problem  Skin: Negative for wound  Neurological: Negative for weakness and numbness  Hematological: Negative  Psychiatric/Behavioral: Negative for behavioral problems and confusion  Objective:      /75   Pulse 90   Ht 6' 1" (1 854 m) Comment: verbal  BMI 39 45 kg/m²          Physical Exam  Vitals reviewed  Constitutional:       General: He is not in acute distress  Appearance: He is obese  He is not toxic-appearing  HENT:      Head: Normocephalic and atraumatic  Eyes:      Extraocular Movements: Extraocular movements intact  Cardiovascular:      Rate and Rhythm: Normal rate and regular rhythm  Pulses: Normal pulses  no weak pulses          Dorsalis pedis pulses are 2+ on the right side and 2+ on the left side  Posterior tibial pulses are 2+ on the right side and 2+ on the left side  Comments: Mild venous stasis skin changes in his legs  Pulmonary:      Effort: Pulmonary effort is normal  No respiratory distress  Musculoskeletal:         General: Deformity present  No tenderness or signs of injury  Cervical back: Normal range of motion and neck supple  Right lower leg: Edema present  Left lower leg: Edema present  Right foot: No Charcot foot or foot drop  Left foot: No Charcot foot or foot drop  Comments: Hammertoe deformity noted bilaterally  Feet:      Right foot:      Protective Sensation: 10 sites tested  10 sites sensed  Skin integrity: No ulcer, skin breakdown, erythema or callus  Left foot:      Protective Sensation: 10 sites tested  10 sites sensed  Skin integrity: No ulcer, skin breakdown, erythema or callus  Skin:     General: Skin is warm  Capillary Refill: Capillary refill takes less than 2 seconds  Coloration: Skin is not cyanotic or mottled  Findings: No abscess, ecchymosis, rash or wound  Nails:  There is no clubbing  Neurological:      General: No focal deficit present  Mental Status: He is alert and oriented to person, place, and time  Cranial Nerves: No cranial nerve deficit  Sensory: No sensory deficit  Motor: No weakness  Coordination: Coordination normal       Gait: Gait normal    Psychiatric:         Mood and Affect: Mood normal          Behavior: Behavior normal          Thought Content: Thought content normal          Judgment: Judgment normal              Diabetic Foot Exam    Patient's shoes and socks removed  Right Foot/Ankle   Right Foot Inspection  Skin Exam: skin intact  No callus, no erythema, no maceration, no pre-ulcer, no ulcer and no callus  Toe Exam: right toe deformity  No swelling, no tenderness and erythema    Sensory   Vibration: intact  Proprioception: intact  Monofilament testing: intact    Vascular  Capillary refills: < 3 seconds  The right DP pulse is 2+  The right PT pulse is 2+  Left Foot/Ankle  Left Foot Inspection  Skin Exam: skin intact  No erythema, no maceration, no pre-ulcer, no ulcer and no callus  Toe Exam: left toe deformity  No swelling, no tenderness and no erythema  Sensory   Vibration: intact  Proprioception: intact  Monofilament testing: intact    Vascular  Capillary refills: < 3 seconds  The left DP pulse is 2+  The left PT pulse is 2+       Assign Risk Category  Deformity present  No loss of protective sensation  No weak pulses  Risk: 0

## 2022-02-04 NOTE — LETTER
February 4, 2022     Chely Laws, [address] [address]    Patient: Mustapha Presley   YOB: 1975   Date of Visit: 2/4/2022       Dear Dr Umberto Sharp: Thank you for referring Irving Perez to me for evaluation  Below are my notes for this consultation  If you have questions, please do not hesitate to call me  I look forward to following your patient along with you  Sincerely,        Sadie Suazo DPM        CC: No Recipients  [facility]  2/4/2022 12:30 PM  Sign when Signing Visit        PATIENT:  Mustapha Presley    1975        ASSESSMENT:     1  Controlled type 2 diabetes mellitus without complication, without long-term current use of insulin ([facility])     2  Venous insufficiency of both lower extremities  VAS reflux lower limb venous duplex study with reflux assessment, complete bilateral    Compression Stocking   3  Lower extremity edema  VAS reflux lower limb venous duplex study with reflux assessment, complete bilateral    Compression Stocking         PLAN:  1  Patient was counseled on the condition and diagnosis  2   Educated disease prevention and risks related to diabetes  3   Educated proper daily foot care and exam   Instructed proper skin care / protection and footwear  Instructed to identify any signs of infection and related foot problem  4   The recent blood work was reviewed and the last HbA1c was 6 6  Discussed proper blood glucose control with diet and exercise  5   Reviewed the note from PCP  Reviewed venous doppler test   He has LE edema with mild venous stasis skin changes  Will send him for venous reflux study to rule out venous insufficiency  6  Sent him for compression stockings  Educated management of LE edema with compression, diet, elevation, and weight loss    7  He has low risk diabetic foot and will return in 6 months for diabetic foot exam       Subjective:      HPI  The patient was referred for diabetic foot evaluation and LE edema  The patient has diabetes for about 15 years  The blood glucose is under control  The patient denied any history of diabetic foot ulcer, related foot infection, or amputation  No significant numbness or paresthesia  Denied weakness or significant functional deficit  He complained of LE edema since September last year  He is on diuretic and seeing cardiologist   No significant pain  He has discomfort if his legs well up at the end of the day  No calf pain  The following portions of the patient's history were reviewed and updated as appropriate: allergies, current medications, past family history, past medical history, past social history, past surgical history and problem list   All pertinent labs and images were reviewed      Past Medical History  Past Medical History:   Diagnosis Date    BMI 37 0-37 9, adult 4/22/2021    BMI 39 0-39 9,adult 12/16/2021    Cervicalgia     Concussion with loss of consciousness of 30 minutes or less 3/7/2021    Depression     Diabetes mellitus out of control ([address] )     Edema of both lower legs 12/16/2021    Elevated liver enzymes     Erectile dysfunction     Exposure to COVID-19 virus     Fatty liver     GERD (gastroesophageal reflux disease)     Heart murmur 1/7/2022    Hyperglycemia     Hypertension     Left cervical radiculopathy     Left elbow pain     Left foot pain     Leukopenia 3/7/2021    Pain in both feet 12/16/2021    Pancytopenia ([address] ) 12/16/2021    Skin rash 12/16/2021    Thrombocytopenia ([address] )     Vitamin D deficiency 1/7/2022       Past Surgical History  Past Surgical History:   Procedure Laterality Date    CHOLECYSTECTOMY  1990's    COLONOSCOPY  06/2005    EGD  07/10/2014    IR BIOPSY BONE MARROW  3/20/2019    WISDOM TOOTH EXTRACTION      In early to mid 20's        Allergies:  Aspirin, Diltiazem, and Lisinopril    Medications:  Current Outpatient Medications   Medication Sig Dispense Refill    ergocalciferol (VITAMIN D2) 50,000 units TAKE 1 CAPSULE BY MOUTH ONE TIME PER WEEK 4 capsule 1    furosemide (LASIX) 40 mg tablet Take 1 tablet (40 mg total) by mouth 2 (two) times a day 90 tablet 3    glimepiride (AMARYL) 4 mg tablet Take 1 tablet (4 mg total) by mouth daily 90 tablet 1    losartan (COZAAR) 25 mg tablet Take 1 tablet (25 mg total) by mouth daily 90 tablet 3    omeprazole (PriLOSEC) 40 MG capsule Take 1 capsule (40 mg total) by mouth daily 90 capsule 1    OneTouch Verio test strip USE TO TEST ONCE DAILY 25 strip 3    Promacta 75 MG TABS TAKE 1 TABLET BY MOUTH ONCE DAILY ON AN EMPTY STOMACH  AT LEAST 1 HOUR BEFORE OR 2 HOURS AFTER A MEAL 30 tablet 3    triamcinolone (KENALOG) 0 1 % ointment APPLY TO AFFECTED AREA TWICE A DAY 80 g 0    clotrimazole-betamethasone (LOTRISONE) 1-0 05 % cream Apply topically 2 (two) times a day (Patient not taking: Reported on 1/7/2022 ) 45 g 0     No current facility-administered medications for this visit  Social History:  Social History     Socioeconomic History    Marital status: /Civil Union     Spouse name: None    Number of children: None    Years of education: None    Highest education level: None   Occupational History    Occupation:    Tobacco Use    Smoking status: Former Smoker     Types: Cigarettes    Smokeless tobacco: Former User     Quit date: 3/1/2014    Tobacco comment: Social   Last cigarette years ago  Vaping Use    Vaping Use: Never used   Substance and Sexual Activity    Alcohol use:  Yes    Drug use: Not Currently     Comment: No drug use - As per [employer]    Sexual activity: Not Currently     Comment: Resides with wife and kids   Other Topics Concern    None   Social History Narrative    Tobacco use: Never smoker    Annual eye exam: Sees ophth q yr    Annual dental checkup: He goes to dentist     - As per [employer]     Social Determinants of Health     Financial Resource Strain: Not on file   Food Insecurity: Not on file   Transportation Needs: Not on file   Physical Activity: Not on file   Stress: Not on file   Social Connections: Not on file   Intimate Partner Violence: Not on file   Housing Stability: Not on file        Review of Systems   Constitutional: Negative for chills, fatigue and fever  HENT: Negative for sore throat  Eyes: Negative for visual disturbance  Respiratory: Negative for cough and shortness of breath  Cardiovascular: Positive for leg swelling  Negative for chest pain  Gastrointestinal: Negative for diarrhea, nausea and vomiting  Musculoskeletal: Negative for gait problem  Skin: Negative for wound  Neurological: Negative for weakness and numbness  Hematological: Negative  Psychiatric/Behavioral: Negative for behavioral problems and confusion  Objective:      /75   Pulse 90   Ht 6' 1" (1 854 m) Comment: verbal  BMI 39 45 kg/m²          Physical Exam  Vitals reviewed  Constitutional:       General: He is not in acute distress  Appearance: He is obese  He is not toxic-appearing  HENT:      Head: Normocephalic and atraumatic  Eyes:      Extraocular Movements: Extraocular movements intact  Cardiovascular:      Rate and Rhythm: Normal rate and regular rhythm  Pulses: Normal pulses  no weak pulses          Dorsalis pedis pulses are 2+ on the right side and 2+ on the left side  Posterior tibial pulses are 2+ on the right side and 2+ on the left side  Comments: Mild venous stasis skin changes in his legs  Pulmonary:      Effort: Pulmonary effort is normal  No respiratory distress  Musculoskeletal:         General: Deformity present  No tenderness or signs of injury  Cervical back: Normal range of motion and neck supple  Right lower leg: Edema present  Left lower leg: Edema present  Right foot: No Charcot foot or foot drop  Left foot: No Charcot foot or foot drop        Comments: Hammertoe deformity noted bilaterally  Feet:      Right foot:      Protective Sensation: 10 sites tested  10 sites sensed  Skin integrity: No ulcer, skin breakdown, erythema or callus  Left foot:      Protective Sensation: 10 sites tested  10 sites sensed  Skin integrity: No ulcer, skin breakdown, erythema or callus  Skin:     General: Skin is warm  Capillary Refill: Capillary refill takes less than 2 seconds  Coloration: Skin is not cyanotic or mottled  Findings: No abscess, ecchymosis, rash or wound  Nails: There is no clubbing  Neurological:      General: No focal deficit present  Mental Status: He is alert and oriented to person, place, and time  Cranial Nerves: No cranial nerve deficit  Sensory: No sensory deficit  Motor: No weakness  Coordination: Coordination normal       Gait: Gait normal    Psychiatric:         Mood and Affect: Mood normal          Behavior: Behavior normal          Thought Content: Thought content normal          Judgment: Judgment normal              Diabetic Foot Exam    Patient's shoes and socks removed  Right Foot/Ankle   Right Foot Inspection  Skin Exam: skin intact  No callus, no erythema, no maceration, no pre-ulcer, no ulcer and no callus  Toe Exam: right toe deformity  No swelling, no tenderness and erythema    Sensory   Vibration: intact  Proprioception: intact  Monofilament testing: intact    Vascular  Capillary refills: < 3 seconds  The right DP pulse is 2+  The right PT pulse is 2+  Left Foot/Ankle  Left Foot Inspection  Skin Exam: skin intact  No erythema, no maceration, no pre-ulcer, no ulcer and no callus  Toe Exam: left toe deformity  No swelling, no tenderness and no erythema  Sensory   Vibration: intact  Proprioception: intact  Monofilament testing: intact    Vascular  Capillary refills: < 3 seconds  The left DP pulse is 2+  The left PT pulse is 2+       Assign Risk Category  Deformity present  No loss of protective sensation  No weak pulses  Risk: 0

## 2022-02-08 ENCOUNTER — OFFICE VISIT (OUTPATIENT)
Dept: INTERNAL MEDICINE CLINIC | Facility: CLINIC | Age: 47
End: 2022-02-08
Payer: COMMERCIAL

## 2022-02-08 VITALS
HEART RATE: 84 BPM | WEIGHT: 295 LBS | SYSTOLIC BLOOD PRESSURE: 126 MMHG | TEMPERATURE: 98.2 F | OXYGEN SATURATION: 98 % | DIASTOLIC BLOOD PRESSURE: 62 MMHG | BODY MASS INDEX: 38.92 KG/M2

## 2022-02-08 DIAGNOSIS — K21.9 GASTROESOPHAGEAL REFLUX DISEASE WITHOUT ESOPHAGITIS: ICD-10-CM

## 2022-02-08 DIAGNOSIS — D61.818 PANCYTOPENIA (HCC): ICD-10-CM

## 2022-02-08 DIAGNOSIS — I10 ESSENTIAL HYPERTENSION: Primary | ICD-10-CM

## 2022-02-08 DIAGNOSIS — E11.9 TYPE 2 DIABETES MELLITUS WITHOUT COMPLICATION, WITHOUT LONG-TERM CURRENT USE OF INSULIN (HCC): ICD-10-CM

## 2022-02-08 DIAGNOSIS — D69.3 IDIOPATHIC THROMBOCYTOPENIA (HCC): ICD-10-CM

## 2022-02-08 DIAGNOSIS — R60.0 EDEMA OF BOTH LOWER LEGS: ICD-10-CM

## 2022-02-08 DIAGNOSIS — E55.9 VITAMIN D DEFICIENCY: ICD-10-CM

## 2022-02-08 PROCEDURE — 99213 OFFICE O/P EST LOW 20 MIN: CPT | Performed by: INTERNAL MEDICINE

## 2022-02-08 RX ORDER — FUROSEMIDE 20 MG/1
20 TABLET ORAL 2 TIMES DAILY
COMMUNITY
End: 2022-02-09 | Stop reason: SDUPTHER

## 2022-02-08 NOTE — PATIENT INSTRUCTIONS
Patient was advised to continue present medications  discussed with the patient medications and laboratory data in detail  Follow-up with me in 3 months or as advised  If any blood test was ordered please do 1 week prior to next appointment unless advise to get earlier    If you have any questions please call the office [phone]

## 2022-02-08 NOTE — PROGRESS NOTES
Assessment/Plan:    1  Essential hypertension  Assessment & Plan:  Blood pressure well controlled  Advised to continue present medication  Advised for low-salt diet  Orders:  -     Basic metabolic panel; Future    2  Edema of both lower legs  Assessment & Plan:  He was seen by cardiologist had a echocardiogram revealed ejection fraction 65%  Chest x-ray unremarkable  Venous Doppler no DVT  Was seen by podiatrist who  requested venous reflux study of the lower legs  He was placed on furosemide 20 mg b i d by cardiologist  since then edema somewhat better  Orders:  -     Basic metabolic panel; Future    3  Type 2 diabetes mellitus without complication, without long-term current use of insulin ([facility])  Assessment & Plan:    Lab Results   Component Value Date    HGBA1C 6 6 (H) 01/28/2022   Diabetes mellitus well controlled advised to continue present medication and 1800 calorie ADA diet  Orders:  -     Basic metabolic panel; Future  -     Hemoglobin A1C; Future    4  Gastroesophageal reflux disease without esophagitis  Assessment & Plan:  His symptoms are well controlled on omeprazole  Has been watching diet and taking reflux precautions  5  Idiopathic thrombocytopenia ([address] )  Assessment & Plan:  Patient has been followed by [facility] oncologist       6  Pancytopenia [facility])  Assessment & Plan:  He has been followed by [facility] oncologist       7  BMI 38 0-38 9,adult  Assessment & Plan:  Patient  was advised to decrease portion size  Advised to decrease carb, sugar, cholesterol intake  Advised to exercise 3-5 times per week  Advised to lose weight  8  Vitamin D deficiency  Assessment & Plan:  His vitamin-D level was 22 last time  Has been taking vitamin-D 31897 International Units once a week  Advised to take for  8 weeks and then 2000 International Units daily  Will follow vitamin-D level  Orders:  -     Vitamin D 25 hydroxy;  Future          Subjective:  Patient presents for follow-up Patient ID: Jennifer Antony is a 51 y o  male  HPI   48-year-old white male patient presents for follow-up of his medical problems  He denies any chest pain, shortness of breath, pain in abdomen  Denies any cough, fever, chills  Denies any nausea, vomiting, diarrhea  He was seen by podiatrist as well as cardiologist   Had echocardiogram   Was placed on furosemide 20 mg p o  b i d  by cardiologist for edema of legs  He also had a chest x-ray  Edema of legs somewhat better  The following portions of the patient's history were reviewed and updated as appropriate:     Past Medical History:  He has a past medical history of BMI 37 0-37 9, adult (4/22/2021), BMI 38 0-38 9,adult (2/8/2022), BMI 39 0-39 9,adult (12/16/2021), Cervicalgia, Concussion with loss of consciousness of 30 minutes or less (3/7/2021), Depression, Diabetes mellitus out of control ([facility] ), Edema of both lower legs (12/16/2021), Elevated liver enzymes, Erectile dysfunction, Exposure to COVID-19 virus, Fatty liver, GERD (gastroesophageal reflux disease), Heart murmur (1/7/2022), Hyperglycemia, Hypertension, Left cervical radiculopathy, Left elbow pain, Left foot pain, Leukopenia (3/7/2021), Pain in both feet (12/16/2021), Pancytopenia ([address] ) (12/16/2021), Skin rash (12/16/2021), Thrombocytopenia ([facility] [facility] ), and Vitamin D deficiency (1/7/2022)  ,  _______________________________________________________________________  Past Surgical History:   has a past surgical history that includes Cholecystectomy (1990's); [place] tooth extraction; IR biopsy bone marrow (3/20/2019); EGD (07/10/2014); and Colonoscopy (06/2005)  ,  _______________________________________________________________________  Family History:  family history includes Coronary artery disease in his mother; Heart attack in his mother; Hypertension in his brother, father, and mother; No Known Problems in his brother, brother, and son; Testicular cancer in his father;  Thrombocytopenia in his mother ,  _______________________________________________________________________  Social History:   reports that he has quit smoking  His smoking use included cigarettes  He quit smokeless tobacco use about 7 years ago  He reports current alcohol use  He reports previous drug use ,  _______________________________________________________________________  Allergies:  is allergic to aspirin, diltiazem, and lisinopril     _______________________________________________________________________  Current Outpatient Medications   Medication Sig Dispense Refill    ergocalciferol (VITAMIN D2) 50,000 units TAKE 1 CAPSULE BY MOUTH ONE TIME PER WEEK 4 capsule 1    furosemide (LASIX) 20 mg tablet Take 20 mg by mouth 2 (two) times a day      glimepiride (AMARYL) 4 mg tablet Take 1 tablet (4 mg total) by mouth daily 90 tablet 1    losartan (COZAAR) 25 mg tablet Take 1 tablet (25 mg total) by mouth daily 90 tablet 3    omeprazole (PriLOSEC) 40 MG capsule Take 1 capsule (40 mg total) by mouth daily 90 capsule 1    OneTouch Verio test strip USE TO TEST ONCE DAILY 25 strip 3    Promacta 75 MG TABS TAKE 1 TABLET BY MOUTH ONCE DAILY ON AN EMPTY STOMACH  AT LEAST 1 HOUR BEFORE OR 2 HOURS AFTER A MEAL 30 tablet 3    triamcinolone (KENALOG) 0 1 % ointment APPLY TO AFFECTED AREA TWICE A DAY 80 g 0    clotrimazole-betamethasone (LOTRISONE) 1-0 05 % cream Apply topically 2 (two) times a day (Patient not taking: Reported on 1/7/2022 ) 45 g 0     No current facility-administered medications for this visit      _______________________________________________________________________  Review of Systems   Constitutional: Negative for chills, fatigue and fever  HENT: Negative for congestion, ear pain, hearing loss, nosebleeds, sinus pain, sore throat and trouble swallowing  Eyes: Negative for discharge, redness and visual disturbance  Respiratory: Negative for cough, chest tightness and shortness of breath      Cardiovascular: Negative for chest pain and palpitations  Gastrointestinal: Negative for abdominal pain, blood in stool, constipation, diarrhea, nausea and vomiting  Genitourinary: Negative for dysuria, flank pain, frequency and hematuria  Musculoskeletal: Negative for arthralgias, myalgias and neck pain  Skin: Negative for color change and rash  Neurological: Negative for dizziness, speech difficulty, weakness and headaches  Hematological: Does not bruise/bleed easily  Psychiatric/Behavioral: Negative for agitation and behavioral problems  Objective:  Vitals:    02/08/22 0957   BP: 126/62   BP Location: Right arm   Patient Position: Sitting   Cuff Size: Adult   Pulse: 84   Temp: 98 2 °F (36 8 °C)   TempSrc: Tympanic   SpO2: 98%   Weight: 134 kg (295 lb)     Body mass index is 38 92 kg/m²  Physical Exam  Vitals and nursing note reviewed  Constitutional:       General: He is not in acute distress  Appearance: Normal appearance  He is normal weight  HENT:      Head: Normocephalic and atraumatic  Right Ear: Ear canal and external ear normal       Left Ear: Ear canal and external ear normal       Nose:      Comments: Patient has a face mask on     Mouth/Throat:      Comments: Patient has a face mask on  Eyes:      General: No scleral icterus  Right eye: No discharge  Left eye: No discharge  Extraocular Movements: Extraocular movements intact  Conjunctiva/sclera: Conjunctivae normal    Cardiovascular:      Rate and Rhythm: Normal rate and regular rhythm  Pulses: Normal pulses  Heart sounds: No murmur heard  Pulmonary:      Effort: Pulmonary effort is normal  No respiratory distress  Breath sounds: Normal breath sounds  Abdominal:      General: Bowel sounds are normal       Palpations: Abdomen is soft  Tenderness: There is no abdominal tenderness  Musculoskeletal:         General: Normal range of motion        Cervical back: Normal range of motion and neck supple  Right lower leg: Edema (Has trace pedal edema ) present  Left lower leg: Edema ( has trace pedal edema ) present  Skin:     General: Skin is warm  Findings: No rash  Neurological:      General: No focal deficit present  Mental Status: He is alert and oriented to person, place, and time  Motor: No weakness        Coordination: Coordination normal    Psychiatric:         Mood and Affect: Mood normal          Behavior: Behavior normal

## 2022-02-09 ENCOUNTER — PATIENT MESSAGE (OUTPATIENT)
Dept: CARDIOLOGY CLINIC | Facility: MEDICAL CENTER | Age: 47
End: 2022-02-09

## 2022-02-09 ENCOUNTER — OFFICE VISIT (OUTPATIENT)
Dept: CARDIOLOGY CLINIC | Facility: MEDICAL CENTER | Age: 47
End: 2022-02-09
Payer: COMMERCIAL

## 2022-02-09 VITALS
HEART RATE: 77 BPM | BODY MASS INDEX: 39.63 KG/M2 | WEIGHT: 299 LBS | OXYGEN SATURATION: 96 % | DIASTOLIC BLOOD PRESSURE: 60 MMHG | SYSTOLIC BLOOD PRESSURE: 122 MMHG | HEIGHT: 73 IN

## 2022-02-09 DIAGNOSIS — R60.0 EDEMA OF BOTH LOWER LEGS: ICD-10-CM

## 2022-02-09 DIAGNOSIS — R60.9 EDEMA, UNSPECIFIED TYPE: Primary | ICD-10-CM

## 2022-02-09 DIAGNOSIS — I10 ESSENTIAL HYPERTENSION: Primary | ICD-10-CM

## 2022-02-09 PROCEDURE — 99214 OFFICE O/P EST MOD 30 MIN: CPT | Performed by: INTERNAL MEDICINE

## 2022-02-09 PROCEDURE — 3008F BODY MASS INDEX DOCD: CPT | Performed by: INTERNAL MEDICINE

## 2022-02-09 PROCEDURE — 3078F DIAST BP <80 MM HG: CPT | Performed by: INTERNAL MEDICINE

## 2022-02-09 PROCEDURE — 3074F SYST BP LT 130 MM HG: CPT | Performed by: INTERNAL MEDICINE

## 2022-02-09 PROCEDURE — 1036F TOBACCO NON-USER: CPT | Performed by: INTERNAL MEDICINE

## 2022-02-09 RX ORDER — FUROSEMIDE 20 MG/1
20 TABLET ORAL 2 TIMES DAILY
Qty: 60 TABLET | Refills: 3 | Status: SHIPPED | OUTPATIENT
Start: 2022-02-09 | End: 2022-05-11

## 2022-02-09 NOTE — ASSESSMENT & PLAN NOTE
His symptoms are well controlled on omeprazole  Has been watching diet and taking reflux precautions

## 2022-02-09 NOTE — ASSESSMENT & PLAN NOTE
His vitamin-D level was 22 last time  Has been taking vitamin-D 09784 International Units once a week  Advised to take for  8 weeks and then 2000 International Units daily  Will follow vitamin-D level

## 2022-02-09 NOTE — PATIENT INSTRUCTIONS
Recommendations:  1  Continue current medications  Can hold diuretics intermittently if necessary  2  Follow up in 4 months

## 2022-02-09 NOTE — ASSESSMENT & PLAN NOTE
Lab Results   Component Value Date    HGBA1C 6 6 (H) 01/28/2022   Diabetes mellitus well controlled advised to continue present medication and 1800 calorie ADA diet

## 2022-02-09 NOTE — PROGRESS NOTES
Cardiology   Placido Murray 51 y o  male MRN: [id number]        Impression:  1  LE edema - unclear etiology  No evidence of DVT, medication induced, or thyroid disease  No evidence of cardiomyopathy  Improved with furosemide  2  Hypertension - controlled  3  Heart murmur - no valvular abnormalities on echo       Recommendations:  1  Continue current medications  Can hold diuretics intermittently if necessary  2  Follow up in 4 months  HPI: Placido Murray is a 51y o  year old male with hypertension, DM, ITP, and LE edema, who presents for evaluation of a murmur and LE edema  Edema has been occurring for 2-3 months  Echo 1/22 demonstrated normal LV systolic function, moderate left atrial enlargement with no significant valvular abnormalities  Edema much better since switching to furosemide  Review of Systems   Constitutional: Negative  HENT: Negative  Eyes: Negative  Respiratory: Negative for chest tightness and shortness of breath  Cardiovascular: Positive for leg swelling  Negative for chest pain and palpitations  Gastrointestinal: Negative  Endocrine: Negative  Genitourinary: Negative  Musculoskeletal: Negative  Skin: Negative  Allergic/Immunologic: Negative  Neurological: Negative  Hematological: Negative  Psychiatric/Behavioral: Negative  All other systems reviewed and are negative          Past Medical History:   Diagnosis Date    BMI 37 0-37 9, adult 4/22/2021    BMI 38 0-38 9,adult 2/8/2022    BMI 39 0-39 9,adult 12/16/2021    Cervicalgia     Concussion with loss of consciousness of 30 minutes or less 3/7/2021    Depression     Diabetes mellitus out of control ([facility] [address] )     Edema of both lower legs 12/16/2021    Elevated liver enzymes     Erectile dysfunction     Exposure to COVID-19 virus     Fatty liver     GERD (gastroesophageal reflux disease)     Heart murmur 1/7/2022    Hyperglycemia     Hypertension     Left cervical radiculopathy     Left elbow pain     Left foot pain     Leukopenia 3/7/2021    Pain in both feet 12/16/2021    Pancytopenia ([facility] [address] ) 12/16/2021    Skin rash 12/16/2021    Thrombocytopenia ([facility] [address] )     Vitamin D deficiency 1/7/2022     Past Surgical History:   Procedure Laterality Date    CHOLECYSTECTOMY  1990's    COLONOSCOPY  06/2005    EGD  07/10/2014    IR BIOPSY BONE MARROW  3/20/2019    WISDOM TOOTH EXTRACTION      In early to mid 19's     Social History     Substance and Sexual Activity   Alcohol Use Yes     Social History     Substance and Sexual Activity   Drug Use Not Currently    Comment: No drug use - As per [employer]     Social History     Tobacco Use   Smoking Status Former Smoker    Types: Cigarettes   Smokeless Tobacco Former User    Quit date: 3/1/2014   Tobacco Comment    Social   Last cigarette years ago  Family History   Problem Relation Age of Onset    Heart attack Mother     Thrombocytopenia Mother     Coronary artery disease Mother     Hypertension Mother     Testicular cancer Father     Hypertension Father     No Known Problems Brother     Hypertension Brother     No Known Problems Brother     No Known Problems Son        Allergies: Allergies   Allergen Reactions    Aspirin Other (See Comments)     Reaction unknown to patient      Diltiazem Other (See Comments)     Patient does not recall what happened    Lisinopril Cough       Medications:     Current Outpatient Medications:     clotrimazole-betamethasone (LOTRISONE) 1-0 05 % cream, Apply topically 2 (two) times a day, Disp: 45 g, Rfl: 0    ergocalciferol (VITAMIN D2) 50,000 units, TAKE 1 CAPSULE BY MOUTH ONE TIME PER WEEK, Disp: 4 capsule, Rfl: 1    furosemide (LASIX) 20 mg tablet, Take 20 mg by mouth 2 (two) times a day, Disp: , Rfl:     glimepiride (AMARYL) 4 mg tablet, Take 1 tablet (4 mg total) by mouth daily, Disp: 90 tablet, Rfl: 1    losartan (COZAAR) 25 mg tablet, Take 1 tablet (25 mg total) by mouth daily, Disp: 90 tablet, Rfl: 3    omeprazole (PriLOSEC) 40 MG capsule, Take 1 capsule (40 mg total) by mouth daily, Disp: 90 capsule, Rfl: 1    OneTouch Verio test strip, USE TO TEST ONCE DAILY, Disp: 25 strip, Rfl: 3    Promacta 75 MG TABS, TAKE 1 TABLET BY MOUTH ONCE DAILY ON AN EMPTY STOMACH  AT LEAST 1 HOUR BEFORE OR 2 HOURS AFTER A MEAL, Disp: 30 tablet, Rfl: 3    triamcinolone (KENALOG) 0 1 % ointment, APPLY TO AFFECTED AREA TWICE A DAY, Disp: 80 g, Rfl: 0      Wt Readings from Last 3 Encounters:   02/09/22 136 kg (299 lb)   02/08/22 134 kg (295 lb)   01/28/22 136 kg (299 lb)     Temp Readings from Last 3 Encounters:   02/08/22 98 2 °F (36 8 °C) (Tympanic)   01/07/22 98 7 °F (37 1 °C) (Tympanic)   01/05/22 (!) 97 3 °F (36 3 °C)     BP Readings from Last 3 Encounters:   02/09/22 122/60   02/08/22 126/62   02/04/22 128/75     Pulse Readings from Last 3 Encounters:   02/09/22 77   02/08/22 84   02/04/22 90         Physical Exam  HENT:      Head: Atraumatic  Mouth/Throat:      Mouth: Mucous membranes are moist    Eyes:      Extraocular Movements: Extraocular movements intact  Cardiovascular:      Rate and Rhythm: Normal rate and regular rhythm  Heart sounds: Normal heart sounds  Pulmonary:      Effort: Pulmonary effort is normal       Breath sounds: Normal breath sounds  Abdominal:      General: Abdomen is flat  Musculoskeletal:         General: Normal range of motion  Cervical back: Normal range of motion  Skin:     General: Skin is warm  Neurological:      General: No focal deficit present  Mental Status: He is alert and oriented to person, place, and time     Psychiatric:         Mood and Affect: Mood normal          Behavior: Behavior normal            Laboratory Studies:  CMP:  Lab Results   Component Value Date     04/05/2017    K 4 2 01/28/2022     01/28/2022    CO2 25 01/28/2022    BUN 18 01/28/2022    CREATININE 0 87 01/28/2022    GLUCOSE 102 (H) 04/05/2017    AST 46 (H) 11/13/2021    ALT 38 11/13/2021    BILITOT 1 0 04/05/2017    EGFR 85 05/04/2021

## 2022-02-09 NOTE — ASSESSMENT & PLAN NOTE
He was seen by cardiologist had a echocardiogram revealed ejection fraction 65%  Chest x-ray unremarkable  Venous Doppler no DVT  Was seen by podiatrist who  requested venous reflux study of the lower legs  He was placed on furosemide 20 mg b i d by cardiologist  since then edema somewhat better

## 2022-02-20 DIAGNOSIS — E11.9 TYPE 2 DIABETES MELLITUS WITHOUT COMPLICATION, WITHOUT LONG-TERM CURRENT USE OF INSULIN (HCC): ICD-10-CM

## 2022-02-21 RX ORDER — BLOOD SUGAR DIAGNOSTIC
STRIP MISCELLANEOUS
Qty: 25 STRIP | Refills: 3 | Status: SHIPPED | OUTPATIENT
Start: 2022-02-21

## 2022-03-02 ENCOUNTER — TELEPHONE (OUTPATIENT)
Dept: HEMATOLOGY ONCOLOGY | Facility: HOSPITAL | Age: 47
End: 2022-03-02

## 2022-03-02 NOTE — TELEPHONE ENCOUNTER
Spoke with pt informing Dr Kaitlin Chapa will be out of office and confirmed with pt and scheduled a new appt  Pt states he can only come in early morning  He is aware of his U/S result and agrees to f/u with Danielle on 3/8 8AM     I will confirm with Mouna Foster also and call pt back

## 2022-03-03 DIAGNOSIS — D69.3 IDIOPATHIC THROMBOCYTOPENIA (HCC): ICD-10-CM

## 2022-03-03 RX ORDER — ELTROMBOPAG OLAMINE 75 MG/1
TABLET, FILM COATED ORAL
Qty: 30 TABLET | Refills: 3 | Status: SHIPPED | OUTPATIENT
Start: 2022-03-03 | End: 2022-06-24

## 2022-03-05 LAB
ALBUMIN SERPL-MCNC: 3.9 G/DL (ref 4–5)
ALBUMIN/GLOB SERPL: 1.8 {RATIO} (ref 1.2–2.2)
ALP SERPL-CCNC: 131 IU/L (ref 44–121)
ALT SERPL-CCNC: 67 IU/L (ref 0–44)
AST SERPL-CCNC: 81 IU/L (ref 0–40)
BASOPHILS # BLD AUTO: 0 X10E3/UL (ref 0–0.2)
BASOPHILS NFR BLD AUTO: 1 %
BILIRUB SERPL-MCNC: 1.1 MG/DL (ref 0–1.2)
BUN SERPL-MCNC: 14 MG/DL (ref 6–24)
BUN/CREAT SERPL: 17 (ref 9–20)
CALCIUM SERPL-MCNC: 8.6 MG/DL (ref 8.7–10.2)
CHLORIDE SERPL-SCNC: 109 MMOL/L (ref 96–106)
CO2 SERPL-SCNC: 21 MMOL/L (ref 20–29)
CREAT SERPL-MCNC: 0.84 MG/DL (ref 0.76–1.27)
EGFR: 108 ML/MIN/1.73
EOSINOPHIL # BLD AUTO: 0 X10E3/UL (ref 0–0.4)
EOSINOPHIL NFR BLD AUTO: 3 %
ERYTHROCYTE [DISTWIDTH] IN BLOOD BY AUTOMATED COUNT: 15.7 % (ref 11.6–15.4)
GLOBULIN SER-MCNC: 2.2 G/DL (ref 1.5–4.5)
GLUCOSE SERPL-MCNC: 116 MG/DL (ref 65–99)
HCT VFR BLD AUTO: 29.6 % (ref 37.5–51)
HGB BLD-MCNC: 9.7 G/DL (ref 13–17.7)
IMM GRANULOCYTES # BLD: 0 X10E3/UL (ref 0–0.1)
IMM GRANULOCYTES NFR BLD: 0 %
LYMPHOCYTES # BLD AUTO: 0.4 X10E3/UL (ref 0.7–3.1)
LYMPHOCYTES NFR BLD AUTO: 26 %
MCH RBC QN AUTO: 27.9 PG (ref 26.6–33)
MCHC RBC AUTO-ENTMCNC: 32.8 G/DL (ref 31.5–35.7)
MCV RBC AUTO: 85 FL (ref 79–97)
MONOCYTES # BLD AUTO: 0.1 X10E3/UL (ref 0.1–0.9)
MONOCYTES NFR BLD AUTO: 8 %
MORPHOLOGY BLD-IMP: ABNORMAL
NEUTROPHILS # BLD AUTO: 1 X10E3/UL (ref 1.4–7)
NEUTROPHILS NFR BLD AUTO: 62 %
PLATELET # BLD AUTO: 72 X10E3/UL (ref 150–450)
POTASSIUM SERPL-SCNC: 4.1 MMOL/L (ref 3.5–5.2)
PROT SERPL-MCNC: 6.1 G/DL (ref 6–8.5)
RBC # BLD AUTO: 3.48 X10E6/UL (ref 4.14–5.8)
SODIUM SERPL-SCNC: 141 MMOL/L (ref 134–144)
WBC # BLD AUTO: 1.6 X10E3/UL (ref 3.4–10.8)

## 2022-03-08 ENCOUNTER — OFFICE VISIT (OUTPATIENT)
Dept: HEMATOLOGY ONCOLOGY | Facility: CLINIC | Age: 47
End: 2022-03-08
Payer: COMMERCIAL

## 2022-03-08 VITALS
HEIGHT: 73 IN | TEMPERATURE: 98 F | DIASTOLIC BLOOD PRESSURE: 70 MMHG | HEART RATE: 95 BPM | BODY MASS INDEX: 38.83 KG/M2 | WEIGHT: 293 LBS | OXYGEN SATURATION: 95 % | SYSTOLIC BLOOD PRESSURE: 128 MMHG | RESPIRATION RATE: 18 BRPM

## 2022-03-08 DIAGNOSIS — R16.1 SPLENOMEGALY: ICD-10-CM

## 2022-03-08 DIAGNOSIS — D69.3 CHRONIC ITP (IDIOPATHIC THROMBOCYTOPENIC PURPURA) (HCC): Primary | ICD-10-CM

## 2022-03-08 PROCEDURE — 3008F BODY MASS INDEX DOCD: CPT | Performed by: PHYSICIAN ASSISTANT

## 2022-03-08 PROCEDURE — 1036F TOBACCO NON-USER: CPT | Performed by: PHYSICIAN ASSISTANT

## 2022-03-08 PROCEDURE — 3078F DIAST BP <80 MM HG: CPT | Performed by: PHYSICIAN ASSISTANT

## 2022-03-08 PROCEDURE — 99215 OFFICE O/P EST HI 40 MIN: CPT | Performed by: PHYSICIAN ASSISTANT

## 2022-03-08 PROCEDURE — 3074F SYST BP LT 130 MM HG: CPT | Performed by: PHYSICIAN ASSISTANT

## 2022-03-08 NOTE — PROGRESS NOTES
Hematology/Oncology Outpatient Follow- up Note  Vy Cos 1975, [id number]  3/8/2022        Chief Complaint   Patient presents with    Follow-up       HPI:  A pleasant 52 yo male PMH low platelet count and low wbc count s/p failure high dose steroids (decadron), rituxan x 4, now currently maintained on promacta  Bone marrow biopsy 3/20/2019: normocellular marrow, no evidence of leukemia, lymphoma, MPN etc  Flow cytometry from bone marrow aspirate negative for lymphoproliferative disorder  Previous PETCT on 8/30/2019 no evidence of hypermetabolic malignancy with Spleen SUV max of 2 8 compared to liver SUV max 3 7 making splenic lymphoma less likely  Previous Hematologic/ Oncologic History:    Workup  Failure high-dose steroids (decadron)  Rituxan x 4    Current Hematologic/ Oncologic Treatment:  Promacta       Interval History:  Returns for follow up visit for review of CBCdiff, CMP, both from 3/4/22 and US abdomen from 1/6/2022  White count and ANC have remained stable, normocytic anemia with HGB 9 7, MCV 85, thrombocytopenia improved to 72,000  bilirubin improved, kidney function WNL, transaminases mildly elevated since previous  Splenomegaly progressed on imaging from 19 5 x 19 9 x 8 9cm March 2019 - 27 3 x 27 x 9 8cm currently  Patient tolerating promacta well without any difficulty  Does admit to early satiety and poor appetite likely associated with splenomegaly  Saw a cardiologist, started lasix 20mg BID noticed some improvement in b/l lower extremity edema  Scheduled for venous duplex on lower extremities  No other changes in medical or surgical history  Test Results:    Imaging:     US abdomen complete 1/06/22: IMPRESSION:  Severe progressive splenomegaly  Splenic parenchyma otherwise unremarkable  Post cholecystectomy  Remainder of the examination is normal allowing for limitation as above  PET CT 8/30/2019:   Marked splenomegaly, unchanged    Spleen measures just over 20 cm in AP dimension  Splenic activity is below that of the liver, normal   Spleen demonstrates SUV max of 2 8 while the liver demonstrates SUV max of 3 7  No suspicious foci of FDG uptake in   the spleen  1   No evidence of hypermetabolic malignancy  2   Persistent splenomegaly, unchanged  Labs:   Lab Results   Component Value Date    WBC 1 6 (LL) 03/04/2022    HGB 9 7 (L) 03/04/2022    HCT 29 6 (L) 03/04/2022    MCV 85 03/04/2022    PLT 72 (LL) 03/04/2022     Lab Results   Component Value Date     04/05/2017    K 4 1 03/04/2022     (H) 03/04/2022    CO2 21 03/04/2022    BUN 14 03/04/2022    CREATININE 0 84 03/04/2022    GLUCOSE 102 (H) 04/05/2017    GLUF 116 (H) 05/04/2021    CALCIUM 8 7 01/28/2022    CORRECTEDCA 9 5 05/04/2021    AST 81 (H) 03/04/2022    ALT 67 (H) 03/04/2022    ALKPHOS 100 11/13/2021    PROT 6 4 04/05/2017    BILITOT 1 0 04/05/2017    EGFR 108 03/04/2022           Review of Systems   Constitutional: Positive for appetite change (decreased appetite, early satiety)  Negative for chills, fatigue, fever and unexpected weight change  HENT: Negative for ear pain and sore throat  Eyes: Negative for pain and visual disturbance  Respiratory: Negative for cough and shortness of breath  Cardiovascular: Negative for chest pain and palpitations  Gastrointestinal: Negative for abdominal pain, blood in stool, constipation, diarrhea, nausea and vomiting  Genitourinary: Negative for dysuria and hematuria  Musculoskeletal: Negative for arthralgias and back pain  Skin: Negative for color change, pallor and rash  Neurological: Negative for dizziness, seizures, syncope, light-headedness and headaches  Hematological: Negative for adenopathy  Does not bruise/bleed easily  All other systems reviewed and are negative          Active Problems:   Patient Active Problem List   Diagnosis    Acquired thrombocytopenia ([address] )    Elevated LFTs    Fatty liver    GERD (gastroesophageal reflux disease)    Tick bite    Acute upper respiratory infection    Diabetes ([address]    Idiopathic thrombocytopenia (HCC)    Elevated MCV    Family history of thrombocytopenia    Splenomegaly    Chronic ITP (idiopathic thrombocytopenic purpura) (HCC)    Leukopenia    Concussion with loss of consciousness of 30 minutes or less    BMI 37 0-37 9, adult    Edema of both lower legs    Pancytopenia (HCC)    BMI 39 0-39 9,adult    Pain in both feet    Skin rash    Essential hypertension    Hypocalcemia    Heart murmur    Vitamin D deficiency    BMI 38 0-38 9,adult       Past Medical History:   Past Medical History:   Diagnosis Date    BMI 37 0-37 9, adult 4/22/2021    BMI 38 0-38 9,adult 2/8/2022    BMI 39 0-39 9,adult 12/16/2021    Cervicalgia     Concussion with loss of consciousness of 30 minutes or less 3/7/2021    Depression     Diabetes mellitus out of control ([address] )     Edema of both lower legs 12/16/2021    Elevated liver enzymes     Erectile dysfunction     Exposure to COVID-19 virus     Fatty liver     GERD (gastroesophageal reflux disease)     Heart murmur 1/7/2022    Hyperglycemia     Hypertension     Left cervical radiculopathy     Left elbow pain     Left foot pain     Leukopenia 3/7/2021    Pain in both feet 12/16/2021    Pancytopenia ([address] ) 12/16/2021    Skin rash 12/16/2021    Thrombocytopenia ([address] )     Vitamin D deficiency 1/7/2022       Surgical History:   Past Surgical History:   Procedure Laterality Date    CHOLECYSTECTOMY  1990's    COLONOSCOPY  06/2005    EGD  07/10/2014    IR BIOPSY BONE MARROW  3/20/2019    WISDOM TOOTH EXTRACTION      In early to mid 19's       Family History:    Family History   Problem Relation Age of Onset    Heart attack Mother     Thrombocytopenia Mother     Coronary artery disease Mother     Hypertension Mother     Testicular cancer Father     Hypertension Father     No Known Problems Brother     Hypertension Brother  No Known Problems Brother     No Known Problems Son        Cancer-related family history includes Testicular cancer in his father  Social History:   Social History     Socioeconomic History    Marital status: /Civil Union     Spouse name: Not on file    Number of children: Not on file    Years of education: Not on file    Highest education level: Not on file   Occupational History    Occupation:    Tobacco Use    Smoking status: Former Smoker     Types: Cigarettes    Smokeless tobacco: Former User     Quit date: 3/1/2014    Tobacco comment: Social   Last cigarette years ago  Vaping Use    Vaping Use: Never used   Substance and Sexual Activity    Alcohol use:  Yes    Drug use: Not Currently     Comment: No drug use - As per [employer]    Sexual activity: Not Currently     Comment: Resides with wife and kids   Other Topics Concern    Not on file   Social History Narrative    Tobacco use: Never smoker    Annual eye exam: Sees ophth q yr    Annual dental checkup: He goes to dentist     - As per [employer]     Social Determinants of Health     Financial Resource Strain: Not on file   Food Insecurity: Not on file   Transportation Needs: Not on file   Physical Activity: Not on file   Stress: Not on file   Social Connections: Not on file   Intimate Partner Violence: Not on file   Housing Stability: Not on file       Current Medications:   Current Outpatient Medications   Medication Sig Dispense Refill    clotrimazole-betamethasone (LOTRISONE) 1-0 05 % cream Apply topically 2 (two) times a day 45 g 0    ergocalciferol (VITAMIN D2) 50,000 units TAKE 1 CAPSULE BY MOUTH ONE TIME PER WEEK 4 capsule 1    furosemide (LASIX) 20 mg tablet Take 1 tablet (20 mg total) by mouth 2 (two) times a day 60 tablet 3    glimepiride (AMARYL) 4 mg tablet Take 1 tablet (4 mg total) by mouth daily 90 tablet 1    losartan (COZAAR) 25 mg tablet Take 1 tablet (25 mg total) by mouth daily 90 tablet 3    omeprazole (PriLOSEC) 40 MG capsule Take 1 capsule (40 mg total) by mouth daily 90 capsule 1    OneTouch Verio test strip USE TO TEST ONCE DAILY 25 strip 3    Promacta 75 MG TABS TAKE 1 TABLET BY MOUTH ONCE DAILY ON AN EMPTY STOMACH  AT LEAST 1 HOUR BEFORE OR 2 HOURS AFTER A MEAL 30 tablet 3    triamcinolone (KENALOG) 0 1 % ointment APPLY TO AFFECTED AREA TWICE A DAY 80 g 0     No current facility-administered medications for this visit  Allergies: Allergies   Allergen Reactions    Aspirin Other (See Comments)     Reaction unknown to patient   Diltiazem Other (See Comments)     Patient does not recall what happened    Lisinopril Cough       Physical Exam:  /70 (BP Location: Left arm, Cuff Size: Adult)   Pulse 95   Temp 98 °F (36 7 °C)   Resp 18   Ht 6' 1" (1 854 m)   Wt 133 kg (293 lb)   SpO2 95%   BMI 38 66 kg/m²   Body surface area is 2 53 meters squared  Wt Readings from Last 3 Encounters:   03/08/22 133 kg (293 lb)   02/09/22 136 kg (299 lb)   02/08/22 134 kg (295 lb)           Physical Exam  Vitals and nursing note reviewed  Constitutional:       General: He is not in acute distress  Appearance: Normal appearance  He is obese  He is not ill-appearing  HENT:      Head: Normocephalic and atraumatic  Eyes:      General: No scleral icterus  Cardiovascular:      Rate and Rhythm: Normal rate and regular rhythm  Pulses: Normal pulses  Heart sounds: Normal heart sounds  No murmur heard  Pulmonary:      Effort: Pulmonary effort is normal       Breath sounds: Normal breath sounds  No wheezing or rhonchi  Chest:      Chest wall: No tenderness  Abdominal:      General: Bowel sounds are normal  There is no distension  Palpations: Abdomen is soft  There is splenomegaly  There is no mass  Tenderness: There is no abdominal tenderness  Musculoskeletal:      Right lower leg: Edema present  Left lower leg: Edema present        Comments: Non-pitting edema b/l  Lymphadenopathy:      Cervical: No cervical adenopathy  Comments: No adenopathy appreciated on exam     Skin:     General: Skin is warm and dry  Neurological:      Mental Status: He is alert and oriented to person, place, and time  Psychiatric:         Thought Content: Thought content normal          Assessment / Plan:    1  Chronic ITP (idiopathic thrombocytopenic purpura) (HCC)    2  Splenomegaly    3  Normocytic Anemia    1  & 2  Continue promacta, will follow up with repeat CBCdiff, CMP, and abdominal ultrasound in four months  Previous PETCT and bone marrow biopsy negative as in HPI, will continue to trend cell counts and if any changes arise particularly worsening leukopenia would consider repeat bone marrow biopsy  3  Patient has known anemia and if progressing after repeat labs in four months, will complete additional workup  Bilirubin is normal, Denies blood loss (melena, hematochezia, hematuria), denies history of IBS, no dietary changes or restrictions  Not currently symptomatic denies fatigue, palpitations, pica, headaches, dizziness, lightheadedness currently  Goals and Barriers:  Current Goal:  Prolong Survival from ITP  Barriers: None  Patient's Capacity to Self Care:  Patient able to self care  Portions of the record may have been created with voice recognition software  Occasional wrong word or "sound a like" substitutions may have occurred due to the inherent limitations of voice recognition software  Read the chart carefully and recognize, using context, where substitutions have occurred

## 2022-04-10 LAB
ALBUMIN SERPL-MCNC: 3.5 G/DL (ref 3.6–5.1)
ALBUMIN/GLOB SERPL: 1.5 (CALC) (ref 1–2.5)
ALP SERPL-CCNC: 113 U/L (ref 36–130)
ALT SERPL-CCNC: 36 U/L (ref 9–46)
AST SERPL-CCNC: 52 U/L (ref 10–40)
BASOPHILS # BLD AUTO: 10 CELLS/UL (ref 0–200)
BASOPHILS NFR BLD AUTO: 0.7 %
BILIRUB SERPL-MCNC: 1.7 MG/DL (ref 0.2–1.2)
BUN SERPL-MCNC: 16 MG/DL (ref 7–25)
BUN/CREAT SERPL: ABNORMAL (CALC) (ref 6–22)
CALCIUM SERPL-MCNC: 8.6 MG/DL (ref 8.6–10.3)
CHLORIDE SERPL-SCNC: 108 MMOL/L (ref 98–110)
CO2 SERPL-SCNC: 24 MMOL/L (ref 20–32)
CREAT SERPL-MCNC: 0.8 MG/DL (ref 0.6–1.35)
EOSINOPHIL # BLD AUTO: 52 CELLS/UL (ref 15–500)
EOSINOPHIL NFR BLD AUTO: 3.7 %
ERYTHROCYTE [DISTWIDTH] IN BLOOD BY AUTOMATED COUNT: 15.8 % (ref 11–15)
GLOBULIN SER CALC-MCNC: 2.3 G/DL (CALC) (ref 1.9–3.7)
GLUCOSE SERPL-MCNC: 120 MG/DL (ref 65–99)
HCT VFR BLD AUTO: 28.9 % (ref 38.5–50)
HGB BLD-MCNC: 9.1 G/DL (ref 13.2–17.1)
LYMPHOCYTES # BLD AUTO: 421 CELLS/UL (ref 850–3900)
LYMPHOCYTES NFR BLD AUTO: 30.1 %
MCH RBC QN AUTO: 27.7 PG (ref 27–33)
MCHC RBC AUTO-ENTMCNC: 31.5 G/DL (ref 32–36)
MCV RBC AUTO: 87.8 FL (ref 80–100)
MONOCYTES # BLD AUTO: 134 CELLS/UL (ref 200–950)
MONOCYTES NFR BLD AUTO: 9.6 %
NEUTROPHILS # BLD AUTO: 783 CELLS/UL (ref 1500–7800)
NEUTROPHILS NFR BLD AUTO: 55.9 %
PLATELET # BLD AUTO: 66 THOUSAND/UL (ref 140–400)
PMV BLD REES-ECKER: 11.4 FL (ref 7.5–12.5)
POTASSIUM SERPL-SCNC: 3.9 MMOL/L (ref 3.5–5.3)
PROT SERPL-MCNC: 5.8 G/DL (ref 6.1–8.1)
RBC # BLD AUTO: 3.29 MILLION/UL (ref 4.2–5.8)
SL AMB EGFR AFRICAN AMERICAN: 123 ML/MIN/1.73M2
SL AMB EGFR NON AFRICAN AMERICAN: 106 ML/MIN/1.73M2
SL AMB PLATELET ESTIMATION: ABNORMAL
SODIUM SERPL-SCNC: 137 MMOL/L (ref 135–146)
WBC # BLD AUTO: 1.4 THOUSAND/UL (ref 3.8–10.8)

## 2022-04-21 ENCOUNTER — TELEPHONE (OUTPATIENT)
Dept: INTERNAL MEDICINE CLINIC | Facility: CLINIC | Age: 47
End: 2022-04-21

## 2022-04-21 DIAGNOSIS — I10 ESSENTIAL HYPERTENSION: ICD-10-CM

## 2022-04-21 PROCEDURE — 4010F ACE/ARB THERAPY RXD/TAKEN: CPT | Performed by: PHYSICIAN ASSISTANT

## 2022-04-21 RX ORDER — LOSARTAN POTASSIUM 25 MG/1
25 TABLET ORAL DAILY
Qty: 90 TABLET | Refills: 1 | Status: SHIPPED | OUTPATIENT
Start: 2022-04-21

## 2022-04-21 NOTE — TELEPHONE ENCOUNTER
losartan (COZAAR) 25 mg tablet   Sig: Take 1 tablet (25 mg total) by mouth daily  #90    [facility]

## 2022-05-08 LAB
25(OH)D3 SERPL-MCNC: 58 NG/ML (ref 30–100)
ALBUMIN SERPL-MCNC: 3.5 G/DL (ref 3.6–5.1)
ALBUMIN/GLOB SERPL: 1.7 (CALC) (ref 1–2.5)
ALP SERPL-CCNC: 112 U/L (ref 36–130)
ALT SERPL-CCNC: 32 U/L (ref 9–46)
AST SERPL-CCNC: 45 U/L (ref 10–40)
BASOPHILS # BLD AUTO: 10 CELLS/UL (ref 0–200)
BASOPHILS NFR BLD AUTO: 0.8 %
BILIRUB SERPL-MCNC: 1.6 MG/DL (ref 0.2–1.2)
BUN SERPL-MCNC: 15 MG/DL (ref 7–25)
BUN SERPL-MCNC: 15 MG/DL (ref 7–25)
BUN/CREAT SERPL: ABNORMAL (CALC) (ref 6–22)
BUN/CREAT SERPL: ABNORMAL (CALC) (ref 6–22)
CALCIUM SERPL-MCNC: 8.4 MG/DL (ref 8.6–10.3)
CALCIUM SERPL-MCNC: 8.4 MG/DL (ref 8.6–10.3)
CHLORIDE SERPL-SCNC: 108 MMOL/L (ref 98–110)
CHLORIDE SERPL-SCNC: 109 MMOL/L (ref 98–110)
CO2 SERPL-SCNC: 22 MMOL/L (ref 20–32)
CO2 SERPL-SCNC: 24 MMOL/L (ref 20–32)
CREAT SERPL-MCNC: 0.9 MG/DL (ref 0.6–1.35)
CREAT SERPL-MCNC: 0.9 MG/DL (ref 0.6–1.35)
EOSINOPHIL # BLD AUTO: 90 CELLS/UL (ref 15–500)
EOSINOPHIL NFR BLD AUTO: 6.9 %
ERYTHROCYTE [DISTWIDTH] IN BLOOD BY AUTOMATED COUNT: 14.8 % (ref 11–15)
GLOBULIN SER CALC-MCNC: 2.1 G/DL (CALC) (ref 1.9–3.7)
GLUCOSE SERPL-MCNC: 118 MG/DL (ref 65–99)
GLUCOSE SERPL-MCNC: 118 MG/DL (ref 65–99)
HBA1C MFR BLD: 6.3 % OF TOTAL HGB
HCT VFR BLD AUTO: 25.4 % (ref 38.5–50)
HGB BLD-MCNC: 7.8 G/DL (ref 13.2–17.1)
LYMPHOCYTES # BLD AUTO: 358 CELLS/UL (ref 850–3900)
LYMPHOCYTES NFR BLD AUTO: 27.5 %
MCH RBC QN AUTO: 25.8 PG (ref 27–33)
MCHC RBC AUTO-ENTMCNC: 30.7 G/DL (ref 32–36)
MCV RBC AUTO: 84.1 FL (ref 80–100)
MONOCYTES # BLD AUTO: 99 CELLS/UL (ref 200–950)
MONOCYTES NFR BLD AUTO: 7.6 %
NEUTROPHILS # BLD AUTO: 744 CELLS/UL (ref 1500–7800)
NEUTROPHILS NFR BLD AUTO: 57.2 %
PLATELET # BLD AUTO: 67 THOUSAND/UL (ref 140–400)
PMV BLD REES-ECKER: 11.5 FL (ref 7.5–12.5)
POTASSIUM SERPL-SCNC: 3.9 MMOL/L (ref 3.5–5.3)
POTASSIUM SERPL-SCNC: 3.9 MMOL/L (ref 3.5–5.3)
PROT SERPL-MCNC: 5.6 G/DL (ref 6.1–8.1)
RBC # BLD AUTO: 3.02 MILLION/UL (ref 4.2–5.8)
SERVICE CMNT-IMP: ABNORMAL
SL AMB EGFR AFRICAN AMERICAN: 117 ML/MIN/1.73M2
SL AMB EGFR AFRICAN AMERICAN: 117 ML/MIN/1.73M2
SL AMB EGFR NON AFRICAN AMERICAN: 101 ML/MIN/1.73M2
SL AMB EGFR NON AFRICAN AMERICAN: 101 ML/MIN/1.73M2
SL AMB PLATELET ESTIMATION: ABNORMAL
SODIUM SERPL-SCNC: 138 MMOL/L (ref 135–146)
SODIUM SERPL-SCNC: 138 MMOL/L (ref 135–146)
WBC # BLD AUTO: 1.3 THOUSAND/UL (ref 3.8–10.8)

## 2022-05-08 PROCEDURE — 3044F HG A1C LEVEL LT 7.0%: CPT | Performed by: INTERNAL MEDICINE

## 2022-05-11 DIAGNOSIS — R60.0 LOCALIZED EDEMA: Primary | ICD-10-CM

## 2022-05-11 RX ORDER — TORSEMIDE 20 MG/1
20 TABLET ORAL 2 TIMES DAILY
Qty: 180 TABLET | Refills: 3 | Status: SHIPPED | OUTPATIENT
Start: 2022-05-11

## 2022-05-13 ENCOUNTER — OFFICE VISIT (OUTPATIENT)
Dept: INTERNAL MEDICINE CLINIC | Facility: CLINIC | Age: 47
End: 2022-05-13
Payer: COMMERCIAL

## 2022-05-13 VITALS
RESPIRATION RATE: 12 BRPM | HEIGHT: 73 IN | HEART RATE: 80 BPM | BODY MASS INDEX: 38.97 KG/M2 | TEMPERATURE: 98 F | WEIGHT: 294 LBS | OXYGEN SATURATION: 97 % | DIASTOLIC BLOOD PRESSURE: 62 MMHG | SYSTOLIC BLOOD PRESSURE: 126 MMHG

## 2022-05-13 DIAGNOSIS — E55.9 VITAMIN D DEFICIENCY: ICD-10-CM

## 2022-05-13 DIAGNOSIS — R16.1 SPLENOMEGALY: ICD-10-CM

## 2022-05-13 DIAGNOSIS — R60.0 EDEMA OF BOTH LOWER LEGS: ICD-10-CM

## 2022-05-13 DIAGNOSIS — R79.89 ELEVATED LFTS: ICD-10-CM

## 2022-05-13 DIAGNOSIS — E11.9 TYPE 2 DIABETES MELLITUS WITHOUT COMPLICATION, WITHOUT LONG-TERM CURRENT USE OF INSULIN (HCC): Primary | ICD-10-CM

## 2022-05-13 DIAGNOSIS — I87.2 VENOUS STASIS DERMATITIS OF BOTH LOWER EXTREMITIES: ICD-10-CM

## 2022-05-13 DIAGNOSIS — D69.3 IDIOPATHIC THROMBOCYTOPENIA (HCC): ICD-10-CM

## 2022-05-13 DIAGNOSIS — D61.818 PANCYTOPENIA (HCC): ICD-10-CM

## 2022-05-13 DIAGNOSIS — I10 ESSENTIAL HYPERTENSION: ICD-10-CM

## 2022-05-13 DIAGNOSIS — K21.9 GASTROESOPHAGEAL REFLUX DISEASE WITHOUT ESOPHAGITIS: ICD-10-CM

## 2022-05-13 PROBLEM — R21 SKIN RASH: Status: RESOLVED | Noted: 2021-12-16 | Resolved: 2022-05-13

## 2022-05-13 PROCEDURE — 3008F BODY MASS INDEX DOCD: CPT | Performed by: INTERNAL MEDICINE

## 2022-05-13 PROCEDURE — 99214 OFFICE O/P EST MOD 30 MIN: CPT | Performed by: INTERNAL MEDICINE

## 2022-05-13 PROCEDURE — 3074F SYST BP LT 130 MM HG: CPT | Performed by: INTERNAL MEDICINE

## 2022-05-13 PROCEDURE — 3725F SCREEN DEPRESSION PERFORMED: CPT | Performed by: INTERNAL MEDICINE

## 2022-05-13 PROCEDURE — 3078F DIAST BP <80 MM HG: CPT | Performed by: INTERNAL MEDICINE

## 2022-05-13 PROCEDURE — 1036F TOBACCO NON-USER: CPT | Performed by: INTERNAL MEDICINE

## 2022-05-13 RX ORDER — MULTIVIT-MIN/IRON/FOLIC ACID/K 18-600-40
1 CAPSULE ORAL DAILY
COMMUNITY

## 2022-05-13 NOTE — ASSESSMENT & PLAN NOTE
Symptoms are well controlled on omeprazole  H2 blocker not effective in the past   Had seen GI in the past and had EGD  Has been watching diet very closely    Will continue present medication

## 2022-05-13 NOTE — ASSESSMENT & PLAN NOTE
Patient has been followed by [facility] oncologist   He denies any blood in the stool dark-colored stool  Doubt any GI loss causing anemia  Patient stated he was advised by his hematologist may need a bone marrow study

## 2022-05-13 NOTE — ASSESSMENT & PLAN NOTE
Skin of lower legs dry scaly with some discoloration most likely secondary to venous stasis dermatitis  He has been applying triamcinolone ointment p r n  which is effective    No signs of infection

## 2022-05-13 NOTE — ASSESSMENT & PLAN NOTE
Had a cardiac evaluation  Venous Doppler negative for DVT  He just started taking furosemide 20 mg b i d  2 days ago since then patient feels that  his edema is getting better  Patient gets monthly CMP so needs monitor renal function and electrolytes

## 2022-05-13 NOTE — PATIENT INSTRUCTIONS
Patient was advised to continue present medications  discussed with the patient medications and laboratory data in detail  Follow-up with me in 3 months or as advised  If any blood test was ordered please do 1 week prior to next appointment unless advise to get earlier    If you have any questions please call the office [phone]

## 2022-05-13 NOTE — ASSESSMENT & PLAN NOTE
Patient has been followed by [facility] oncologist   His going for ultrasound abdomen

## 2022-05-13 NOTE — ASSESSMENT & PLAN NOTE
His liver enzymes are actually improving  ALT decreased from 81-45 ALT decreased from 67-32  He had a GI evaluation in the past   Most likely secondary to fatty liver    Denies any alcohol abuse

## 2022-05-13 NOTE — PROGRESS NOTES
Assessment/Plan:    1  Type 2 diabetes mellitus without complication, without long-term current use of insulin ([facility])  Assessment & Plan:    Lab Results   Component Value Date    HGBA1C 6 3 (H) 05/07/2022   Diabetes mellitus well controlled  Patient denies any hypoglycemia  Advised to check blood sugar once a day a m  p m  alternate day  Continue present medication  Orders:  -     Microalbumin / creatinine urine ratio  -     Lipid panel; Future  -     Comprehensive metabolic panel; Future  -     UA (URINE) with reflex to Scope    2  Essential hypertension  Assessment & Plan:  Blood pressure well controlled  Advised to continue present medication  Advised for low-salt diet  Orders:  -     Lipid panel; Future  -     Comprehensive metabolic panel; Future  -     UA (URINE) with reflex to Scope    3  Idiopathic thrombocytopenia ([facility] [address] )  Assessment & Plan:  He has been followed by [facility] [place],[facility] oncologist   On Promacta  4  Vitamin D deficiency  Assessment & Plan:  Vitamin-D increased from 22 to  58 so advised to continue vitamin-D 2000 International Units daily  5  BMI 38 0-38 9,adult  Assessment & Plan:  Patient  was advised to decrease portion size  Advised to decrease carb, sugar, cholesterol intake  Advised to exercise 3-5 times per week  Advised to lose weight  6  Edema of both lower legs  Assessment & Plan:  Had a cardiac evaluation  Venous Doppler negative for DVT  He just started taking furosemide 20 mg b i d  2 days ago since then patient feels that  his edema is getting better  Patient gets monthly CMP so needs monitor renal function and electrolytes  Orders:  -     Comprehensive metabolic panel; Future    7  Pancytopenia [facility])  Assessment & Plan:  Patient has been followed by [facility] [place],[facility] oncologist   He denies any blood in the stool dark-colored stool  Doubt any GI loss causing anemia  Patient stated he was advised by his hematologist may need a bone marrow study        8  Splenomegaly  Assessment & Plan:  Patient has been followed by [facility] oncologist   His going for ultrasound abdomen  9  Gastroesophageal reflux disease without esophagitis  Assessment & Plan:  Symptoms are well controlled on omeprazole  H2 blocker not effective in the past   Had seen GI in the past and had EGD  Has been watching diet very closely  Will continue present medication      10  Venous stasis dermatitis of both lower extremities  Assessment & Plan:  Skin of lower legs dry scaly with some discoloration most likely secondary to venous stasis dermatitis  He has been applying triamcinolone ointment p r n  which is effective  No signs of infection    Orders:  -     triamcinolone (KENALOG) 0 1 % ointment; Apply 1 application topically 2 (two) times a day To affected area    11  Elevated LFTs  Assessment & Plan:  His liver enzymes are actually improving  ALT decreased from 81-45 ALT decreased from 67-32  He had a GI evaluation in the past   Most likely secondary to fatty liver  Denies any alcohol abuse    Orders:  -     Comprehensive metabolic panel; Future    BMI Counseling: Body mass index is 38 79 kg/m²  The BMI is above normal  Nutrition recommendations include decreasing portion sizes, decreasing fast food intake, consuming healthier snacks, limiting drinks that contain sugar, moderation in carbohydrate intake, reducing intake of saturated and trans fat and reducing intake of cholesterol  Exercise recommendations include exercising 3-5 times per week  No pharmacotherapy was ordered  Rationale for BMI follow-up plan is due to patient being overweight or obese  Depression Screening and Follow-up Plan: Patient was screened for depression during today's encounter  They screened negative with a PHQ-2 score of 0  Discussed with the patient to get 2nd booster dose of COVID-19 vaccination  Subjective:  Patient presents for follow-up  Patient ID: Robin Cuevas is a 51 y o  male      HPI 25-year-old white male patient presents follow-up his medical problems  He denies any chest pain, shortness of breath, pain abdomen  Denies any cough, fever, chills  Denies any nausea, vomiting, diarrhea  He got COVID-19 vaccination including 1 booster dose  Has been followed by [facility] oncologist he was also seen by cardiologist had a cardiac evaluation    The following portions of the patient's history were reviewed and updated as appropriate:     Past Medical History:  He has a past medical history of BMI 37 0-37 9, adult (4/22/2021), BMI 38 0-38 9,adult (2/8/2022), BMI 39 0-39 9,adult (12/16/2021), Cervicalgia, Concussion with loss of consciousness of 30 minutes or less (3/7/2021), Depression, Diabetes mellitus out of control, Edema of both lower legs (12/16/2021), Elevated liver enzymes, Erectile dysfunction, Exposure to COVID-19 virus, Fatty liver, GERD (gastroesophageal reflux disease), Heart murmur (1/7/2022), Hyperglycemia, Hypertension, Left cervical radiculopathy, Left elbow pain, Left foot pain, Leukopenia (3/7/2021), Pain in both feet (12/16/2021), Pancytopenia ([address] ) (12/16/2021), Skin rash (12/16/2021), Thrombocytopenia ([facility] [address] ), Venous stasis dermatitis of both lower extremities (5/13/2022), and Vitamin D deficiency (1/7/2022)  ,  _______________________________________________________________________  Past Surgical History:   has a past surgical history that includes Cholecystectomy (1990's); [place] tooth extraction; IR biopsy bone marrow (3/20/2019); EGD (07/10/2014); and Colonoscopy (06/2005)  ,  _______________________________________________________________________  Family History:  family history includes Coronary artery disease in his mother; Heart attack in his mother; Hypertension in his brother, father, and mother; No Known Problems in his brother, brother, and son; Testicular cancer in his father;  Thrombocytopenia in his mother ,  _______________________________________________________________________  Social History:   reports that he has quit smoking  His smoking use included cigarettes  He quit smokeless tobacco use about 8 years ago  He reports current alcohol use  He reports previous drug use ,  _______________________________________________________________________  Allergies:  is allergic to aspirin, diltiazem, and lisinopril     _______________________________________________________________________  Current Outpatient Medications   Medication Sig Dispense Refill    clotrimazole-betamethasone (LOTRISONE) 1-0 05 % cream Apply topically 2 (two) times a day 45 g 0    glimepiride (AMARYL) 4 mg tablet Take 1 tablet (4 mg total) by mouth daily 90 tablet 1    losartan (COZAAR) 25 mg tablet Take 1 tablet (25 mg total) by mouth daily 90 tablet 1    omeprazole (PriLOSEC) 40 MG capsule Take 1 capsule (40 mg total) by mouth daily 90 capsule 1    OneTouch Verio test strip USE TO TEST ONCE DAILY 25 strip 3    Promacta 75 MG TABS TAKE 1 TABLET BY MOUTH ONCE DAILY ON AN EMPTY STOMACH  AT LEAST 1 HOUR BEFORE OR 2 HOURS AFTER A MEAL 30 tablet 3    torsemide (DEMADEX) 20 mg tablet Take 1 tablet (20 mg total) by mouth in the morning and 1 tablet (20 mg total) before bedtime  180 tablet 3    triamcinolone (KENALOG) 0 1 % ointment Apply 1 application topically 2 (two) times a day To affected area 80 g 1    Vitamin D, Cholecalciferol, 50 MCG (2000 UT) CAPS Take 1 tablet by mouth in the morning       No current facility-administered medications for this visit      _______________________________________________________________________  Review of Systems   Constitutional: Negative for chills, fatigue and fever  HENT: Negative for congestion, ear pain, hearing loss, nosebleeds, sinus pain, sore throat and trouble swallowing  Eyes: Negative for discharge, redness and visual disturbance     Respiratory: Negative for cough, chest tightness and shortness of breath  Cardiovascular: Negative for chest pain and palpitations  Gastrointestinal: Negative for abdominal pain, blood in stool, constipation, diarrhea, nausea and vomiting  Genitourinary: Negative for dysuria, flank pain, frequency and hematuria  Musculoskeletal: Negative for arthralgias, myalgias and neck pain  Skin: Negative for color change and rash  Neurological: Negative for dizziness, speech difficulty, weakness and headaches  Hematological: Does not bruise/bleed easily  Psychiatric/Behavioral: Negative for agitation and behavioral problems  Objective:  Vitals:    05/13/22 0902   BP: 126/62   BP Location: Left arm   Patient Position: Sitting   Cuff Size: Adult   Pulse: 80   Resp: 12   Temp: 98 °F (36 7 °C)   TempSrc: Tympanic   SpO2: 97%   Weight: 133 kg (294 lb)   Height: 6' 1" (1 854 m)     Body mass index is 38 79 kg/m²  Physical Exam  Vitals and nursing note reviewed  Constitutional:       General: He is not in acute distress  Appearance: Normal appearance  HENT:      Head: Normocephalic and atraumatic  Right Ear: Ear canal and external ear normal       Left Ear: Ear canal and external ear normal       Nose: Nose normal       Mouth/Throat:      Mouth: Mucous membranes are moist    Eyes:      General: No scleral icterus  Right eye: No discharge  Left eye: No discharge  Extraocular Movements: Extraocular movements intact  Conjunctiva/sclera: Conjunctivae normal    Cardiovascular:      Rate and Rhythm: Normal rate and regular rhythm  Pulses: Normal pulses  Heart sounds: No murmur heard  Pulmonary:      Effort: Pulmonary effort is normal  No respiratory distress  Breath sounds: Normal breath sounds  Abdominal:      General: Bowel sounds are normal       Palpations: Abdomen is soft  Tenderness: There is no abdominal tenderness  Musculoskeletal:         General: Normal range of motion        Cervical back: Normal range of motion and neck supple  No muscular tenderness  Right lower leg: Edema (Has trace edema ) present  Left lower leg: Edema (  Has trace edema) present  Skin:     General: Skin is warm  Findings: Rash ( both lower leg skin laterally dry scaly with some slight bluish discoloration  No redness  No discharge  No vesicles ) present  Neurological:      General: No focal deficit present  Mental Status: He is alert and oriented to person, place, and time  Motor: No weakness  Coordination: Coordination normal    Psychiatric:         Mood and Affect: Mood normal          Behavior: Behavior normal          I spent 30 minutes with the patient today    More than 50% time spent for reviewing of external notes, reviewing of the results of diagnostics test, management of care, patient education and ordering of test

## 2022-05-13 NOTE — ASSESSMENT & PLAN NOTE
Lab Results   Component Value Date    HGBA1C 6 3 (H) 05/07/2022   Diabetes mellitus well controlled  Patient denies any hypoglycemia  Advised to check blood sugar once a day a m  p m  alternate day  Continue present medication

## 2022-06-11 DIAGNOSIS — K21.9 GASTROESOPHAGEAL REFLUX DISEASE WITHOUT ESOPHAGITIS: ICD-10-CM

## 2022-06-11 RX ORDER — OMEPRAZOLE 40 MG/1
40 CAPSULE, DELAYED RELEASE ORAL DAILY
Qty: 90 CAPSULE | Refills: 1 | Status: SHIPPED | OUTPATIENT
Start: 2022-06-11 | End: 2022-07-06 | Stop reason: SDUPTHER

## 2022-06-13 ENCOUNTER — HOSPITAL ENCOUNTER (OUTPATIENT)
Dept: ULTRASOUND IMAGING | Facility: HOSPITAL | Age: 47
Discharge: HOME | End: 2022-06-13
Payer: COMMERCIAL

## 2022-06-13 ENCOUNTER — TELEPHONE (OUTPATIENT)
Dept: HEMATOLOGY ONCOLOGY | Facility: CLINIC | Age: 47
End: 2022-06-13

## 2022-06-13 DIAGNOSIS — R16.1 SPLENOMEGALY: ICD-10-CM

## 2022-06-13 DIAGNOSIS — D69.3 CHRONIC ITP (IDIOPATHIC THROMBOCYTOPENIC PURPURA) (HCC): ICD-10-CM

## 2022-06-13 PROCEDURE — 76700 US EXAM ABDOM COMPLETE: CPT

## 2022-06-24 DIAGNOSIS — D69.3 IDIOPATHIC THROMBOCYTOPENIA (HCC): ICD-10-CM

## 2022-06-24 RX ORDER — ELTROMBOPAG OLAMINE 75 MG/1
TABLET, FILM COATED ORAL
Qty: 30 TABLET | Refills: 3 | Status: SHIPPED | OUTPATIENT
Start: 2022-06-24

## 2022-06-29 ENCOUNTER — DOCUMENTATION (OUTPATIENT)
Dept: HEMATOLOGY ONCOLOGY | Facility: CLINIC | Age: 47
End: 2022-06-29

## 2022-06-29 NOTE — PROGRESS NOTES
Received request from [employer] for patient to get a renewel on his Promacta 75 mg   Lurena Back has been submitted via cover my meds and is pending Key: [id number]     Patient has [employer]  ID # [id number]  BIN [id number]  PCN #[id number]

## 2022-07-03 LAB
ALBUMIN SERPL-MCNC: 3.8 G/DL (ref 3.6–5.1)
ALBUMIN/GLOB SERPL: 1.5 (CALC) (ref 1–2.5)
ALP SERPL-CCNC: 103 U/L (ref 36–130)
ALT SERPL-CCNC: 31 U/L (ref 9–46)
AST SERPL-CCNC: 41 U/L (ref 10–40)
BASOPHILS # BLD AUTO: 10 CELLS/UL (ref 0–200)
BASOPHILS NFR BLD AUTO: 0.8 %
BILIRUB SERPL-MCNC: 1.8 MG/DL (ref 0.2–1.2)
BUN SERPL-MCNC: 22 MG/DL (ref 7–25)
BUN/CREAT SERPL: ABNORMAL (CALC) (ref 6–22)
CALCIUM SERPL-MCNC: 8.6 MG/DL (ref 8.6–10.3)
CHLORIDE SERPL-SCNC: 107 MMOL/L (ref 98–110)
CO2 SERPL-SCNC: 26 MMOL/L (ref 20–32)
CREAT SERPL-MCNC: 1.05 MG/DL (ref 0.6–1.35)
EOSINOPHIL # BLD AUTO: 42 CELLS/UL (ref 15–500)
EOSINOPHIL NFR BLD AUTO: 3.2 %
ERYTHROCYTE [DISTWIDTH] IN BLOOD BY AUTOMATED COUNT: 15.1 % (ref 11–15)
GLOBULIN SER CALC-MCNC: 2.5 G/DL (CALC) (ref 1.9–3.7)
GLUCOSE SERPL-MCNC: 152 MG/DL (ref 65–99)
HCT VFR BLD AUTO: 24.2 % (ref 38.5–50)
HGB BLD-MCNC: 7.2 G/DL (ref 13.2–17.1)
LYMPHOCYTES # BLD AUTO: 341 CELLS/UL (ref 850–3900)
LYMPHOCYTES NFR BLD AUTO: 26.2 %
MCH RBC QN AUTO: 24.2 PG (ref 27–33)
MCHC RBC AUTO-ENTMCNC: 29.8 G/DL (ref 32–36)
MCV RBC AUTO: 81.5 FL (ref 80–100)
MONOCYTES # BLD AUTO: 103 CELLS/UL (ref 200–950)
MONOCYTES NFR BLD AUTO: 7.9 %
NEUTROPHILS # BLD AUTO: 805 CELLS/UL (ref 1500–7800)
NEUTROPHILS NFR BLD AUTO: 61.9 %
PLATELET # BLD AUTO: 63 THOUSAND/UL (ref 140–400)
PMV BLD REES-ECKER: 11.4 FL (ref 7.5–12.5)
POTASSIUM SERPL-SCNC: 3.6 MMOL/L (ref 3.5–5.3)
PROT SERPL-MCNC: 6.3 G/DL (ref 6.1–8.1)
RBC # BLD AUTO: 2.97 MILLION/UL (ref 4.2–5.8)
SERVICE CMNT-IMP: ABNORMAL
SL AMB EGFR AFRICAN AMERICAN: 97 ML/MIN/1.73M2
SL AMB EGFR NON AFRICAN AMERICAN: 84 ML/MIN/1.73M2
SL AMB PLATELET ESTIMATION: ABNORMAL
SODIUM SERPL-SCNC: 139 MMOL/L (ref 135–146)
WBC # BLD AUTO: 1.3 THOUSAND/UL (ref 3.8–10.8)

## 2022-07-05 ENCOUNTER — TELEPHONE (OUTPATIENT)
Dept: HEMATOLOGY ONCOLOGY | Facility: CLINIC | Age: 47
End: 2022-07-05

## 2022-07-05 ENCOUNTER — TELEPHONE (OUTPATIENT)
Dept: INTERNAL MEDICINE CLINIC | Facility: CLINIC | Age: 47
End: 2022-07-05

## 2022-07-05 DIAGNOSIS — D69.3 IDIOPATHIC THROMBOCYTOPENIA (HCC): ICD-10-CM

## 2022-07-05 DIAGNOSIS — D64.9 ANEMIA, UNSPECIFIED TYPE: Primary | ICD-10-CM

## 2022-07-05 DIAGNOSIS — E11.9 TYPE 2 DIABETES MELLITUS WITHOUT COMPLICATIONS (HCC): ICD-10-CM

## 2022-07-05 DIAGNOSIS — R16.1 SPLENOMEGALY: ICD-10-CM

## 2022-07-05 DIAGNOSIS — D69.3 CHRONIC ITP (IDIOPATHIC THROMBOCYTOPENIC PURPURA) (HCC): ICD-10-CM

## 2022-07-05 RX ORDER — ACETAMINOPHEN 325 MG/1
650 TABLET ORAL ONCE
Status: CANCELLED | OUTPATIENT
Start: 2022-07-06

## 2022-07-05 RX ORDER — SODIUM CHLORIDE 9 MG/ML
20 INJECTION, SOLUTION INTRAVENOUS ONCE
Status: CANCELLED | OUTPATIENT
Start: 2022-07-06

## 2022-07-05 RX ORDER — ACETAMINOPHEN 325 MG/1
650 TABLET ORAL ONCE
Status: CANCELLED | OUTPATIENT
Start: 2022-07-05

## 2022-07-05 RX ORDER — GLIMEPIRIDE 4 MG/1
TABLET ORAL
Qty: 90 TABLET | Refills: 3 | Status: SHIPPED | OUTPATIENT
Start: 2022-07-05

## 2022-07-05 RX ORDER — SODIUM CHLORIDE 9 MG/ML
20 INJECTION, SOLUTION INTRAVENOUS ONCE
Status: CANCELLED | OUTPATIENT
Start: 2022-07-05

## 2022-07-05 NOTE — TELEPHONE ENCOUNTER
Pt returned my call he is agreeable to type and screen with 1 unit of RBC tomorrow at [facility] infusion  1 unit RBC Thursday 7/7 at [facility] infusion for 7:30 AM  Pt verbalized understanding of the need to proceed to the ED for any worsening symptoms

## 2022-07-05 NOTE — TELEPHONE ENCOUNTER
Please call him that I have written letter with new diagnosis to submit to his insurance company for payment

## 2022-07-05 NOTE — TELEPHONE ENCOUNTER
Attempted to call patient back x2 to discuss transfusion option  VM left asking for patient to return my to discuss transfusion apt's  Also advised pt to proceed to the ED for evaluation prior to transfusion if needed for SOB, fatigue, chest pain, or lightheaded/dizness  My direct number was provided

## 2022-07-05 NOTE — TELEPHONE ENCOUNTER
Pt had a B12 lab test on 8/4/2021 - [facility] is denying the claim - he got a bill - they told him if you write "Medically Necessary" on the order they can resubmit it

## 2022-07-05 NOTE — TELEPHONE ENCOUNTER
Pt was called and notified of below recommendations from Dr Jo Mercer  Pt was agreeable to the plan  Pt denied and SOB, chest pain, fatigue  He does have postural change dizziness from time to time  Pt stated this has been ongoing for a few months  He has not worsened recently and isn't consistent  He denied any noticeable bleeding in his urine or stool  E-mail sent to infusion managers to work on scheduling patient for 2 units of RBC  I will call patient once plan is finalized today  Pt stated he is at work and would prefer early morning at [place] or   Can you please schedule pt for bone marrow biopsy and consolation with GI  "MD Kiara Estrada Do, PA-C   Cc: Panchito Anne, RN   He needs a repeat bone marrow biopsy  He needs a GI workup  He should be transfused 2 units of blood  Previous Messages    ----- Message -----   From: Kiara Perez PA-C   Sent: 7/5/2022 10:02 AM EDT   To: Natalie Pan MD, Panchito Anne, RN   Subject: Hgb 7 2     Labs 3 days ago 7/2     Hemoglobin slowly trending down, 7 2 on last CBCdiff  Dr Jo Mercer please advise - Foote Days may need to order CBCdiff have him repeat next week to make sure he doesn't need transfusion  Next appointment isn't until 08/3 with Dr Jo Mercer       Thanks,     Maria C Clemente   ----- Message -----   From: [employer] Lab Results In   Sent: 7/3/2022 5:15 AM EDT   To: Kiara Perez PA-C "

## 2022-07-06 ENCOUNTER — HOSPITAL ENCOUNTER (OUTPATIENT)
Dept: INFUSION CENTER | Facility: CLINIC | Age: 47
Discharge: HOME | End: 2022-07-06
Payer: COMMERCIAL

## 2022-07-06 VITALS
DIASTOLIC BLOOD PRESSURE: 53 MMHG | TEMPERATURE: 98.3 F | SYSTOLIC BLOOD PRESSURE: 106 MMHG | RESPIRATION RATE: 16 BRPM | HEART RATE: 68 BPM

## 2022-07-06 DIAGNOSIS — D64.9 ANEMIA, UNSPECIFIED TYPE: Primary | ICD-10-CM

## 2022-07-06 DIAGNOSIS — K21.9 GASTROESOPHAGEAL REFLUX DISEASE WITHOUT ESOPHAGITIS: ICD-10-CM

## 2022-07-06 LAB
ABO GROUP BLD: NORMAL
BLD GP AB SCN SERPL QL: NEGATIVE
BLD GP AB SCN SERPL QL: NEGATIVE
RH BLD: NEGATIVE
SPECIMEN EXPIRATION DATE: NORMAL
SPECIMEN EXPIRATION DATE: NORMAL

## 2022-07-06 PROCEDURE — 86901 BLOOD TYPING SEROLOGIC RH(D): CPT | Performed by: INTERNAL MEDICINE

## 2022-07-06 PROCEDURE — 86850 RBC ANTIBODY SCREEN: CPT | Performed by: INTERNAL MEDICINE

## 2022-07-06 PROCEDURE — 86900 BLOOD TYPING SEROLOGIC ABO: CPT | Performed by: INTERNAL MEDICINE

## 2022-07-06 PROCEDURE — 96365 THER/PROPH/DIAG IV INF INIT: CPT

## 2022-07-06 PROCEDURE — 86920 COMPATIBILITY TEST SPIN: CPT

## 2022-07-06 PROCEDURE — P9016 RBC LEUKOCYTES REDUCED: HCPCS

## 2022-07-06 PROCEDURE — 36430 TRANSFUSION BLD/BLD COMPNT: CPT

## 2022-07-06 RX ORDER — SODIUM CHLORIDE 9 MG/ML
20 INJECTION, SOLUTION INTRAVENOUS ONCE
Status: COMPLETED | OUTPATIENT
Start: 2022-07-06 | End: 2022-07-06

## 2022-07-06 RX ORDER — SODIUM CHLORIDE 9 MG/ML
20 INJECTION, SOLUTION INTRAVENOUS ONCE
Status: CANCELLED | OUTPATIENT
Start: 2022-07-07

## 2022-07-06 RX ORDER — ACETAMINOPHEN 325 MG/1
650 TABLET ORAL ONCE
Status: CANCELLED | OUTPATIENT
Start: 2022-07-07

## 2022-07-06 RX ORDER — SODIUM CHLORIDE 9 MG/ML
20 INJECTION, SOLUTION INTRAVENOUS ONCE
Status: CANCELLED | OUTPATIENT
Start: 2022-07-06

## 2022-07-06 RX ORDER — ACETAMINOPHEN 325 MG/1
650 TABLET ORAL ONCE
Status: CANCELLED | OUTPATIENT
Start: 2022-07-06

## 2022-07-06 RX ORDER — OMEPRAZOLE 40 MG/1
40 CAPSULE, DELAYED RELEASE ORAL DAILY
Qty: 90 CAPSULE | Refills: 1 | Status: SHIPPED | OUTPATIENT
Start: 2022-07-06

## 2022-07-06 RX ORDER — ACETAMINOPHEN 325 MG/1
650 TABLET ORAL ONCE
Status: COMPLETED | OUTPATIENT
Start: 2022-07-06 | End: 2022-07-06

## 2022-07-06 RX ADMIN — ACETAMINOPHEN 650 MG: 325 TABLET, FILM COATED ORAL at 11:34

## 2022-07-06 RX ADMIN — SODIUM CHLORIDE 20 ML/HR: 0.9 INJECTION, SOLUTION INTRAVENOUS at 11:34

## 2022-07-06 RX ADMIN — DIPHENHYDRAMINE HYDROCHLORIDE 25 MG: 50 INJECTION, SOLUTION INTRAMUSCULAR; INTRAVENOUS at 11:35

## 2022-07-06 NOTE — PROGRESS NOTES
Patient to come to [facility] infusion 7/7 for second unit of PRBC, currently receiving first unit at [facility] infusion  Confirmed with Cindy Limon in [facility] lab HT received  No orders in [place], confirmed with [place] in [place] infusion patient only receiving 1 unit there today and 2 units released in error, spoke with Xavier Eller RN in med onc office, new order to to be placed for 1 unit for transfusion at [facility] infusion tomorrow at 7:30

## 2022-07-06 NOTE — PROGRESS NOTES
Libia Herron RN verified pt only received 1 unit of RBC at [facility] infusion today  The order for 2 units was released in error  Pt will receive second unit tomorrow at [facility] infusion  New order needs to be placed

## 2022-07-06 NOTE — PROGRESS NOTES
Pt here for 1 unit of PRBC's  Labs drawn peripherally, blood hold tube was courirered to Karina Goyal for his PRBC for tomorrow  Had to obtain an ABO today as well as this was his first time getting blood products  Right arm IV placed with positive blood return noted  Tolerated infusion without incident  PIV removed  Aware of appt time tomorrow at [facility] [place]  AVS declined  Walked out in stable condition

## 2022-07-07 ENCOUNTER — HOSPITAL ENCOUNTER (OUTPATIENT)
Dept: INFUSION CENTER | Facility: CLINIC | Age: 47
Discharge: HOME | End: 2022-07-07
Payer: COMMERCIAL

## 2022-07-07 VITALS
HEART RATE: 70 BPM | RESPIRATION RATE: 18 BRPM | DIASTOLIC BLOOD PRESSURE: 58 MMHG | OXYGEN SATURATION: 98 % | TEMPERATURE: 97.2 F | SYSTOLIC BLOOD PRESSURE: 112 MMHG

## 2022-07-07 DIAGNOSIS — D64.9 ANEMIA, UNSPECIFIED TYPE: Primary | ICD-10-CM

## 2022-07-07 LAB
ABO GROUP BLD BPU: NORMAL
BPU ID: NORMAL
CROSSMATCH: NORMAL
UNIT DISPENSE STATUS: NORMAL
UNIT PRODUCT CODE: NORMAL
UNIT PRODUCT VOLUME: 350 ML
UNIT RH: NORMAL

## 2022-07-07 PROCEDURE — P9016 RBC LEUKOCYTES REDUCED: HCPCS

## 2022-07-07 PROCEDURE — 36430 TRANSFUSION BLD/BLD COMPNT: CPT

## 2022-07-07 PROCEDURE — 96365 THER/PROPH/DIAG IV INF INIT: CPT

## 2022-07-07 PROCEDURE — 86920 COMPATIBILITY TEST SPIN: CPT

## 2022-07-07 RX ORDER — ACETAMINOPHEN 325 MG/1
650 TABLET ORAL ONCE
Status: COMPLETED | OUTPATIENT
Start: 2022-07-07 | End: 2022-07-07

## 2022-07-07 RX ORDER — SODIUM CHLORIDE 9 MG/ML
20 INJECTION, SOLUTION INTRAVENOUS ONCE
Status: COMPLETED | OUTPATIENT
Start: 2022-07-07 | End: 2022-07-07

## 2022-07-07 RX ADMIN — DIPHENHYDRAMINE HYDROCHLORIDE 25 MG: 50 INJECTION, SOLUTION INTRAMUSCULAR; INTRAVENOUS at 07:45

## 2022-07-07 RX ADMIN — SODIUM CHLORIDE 20 ML/HR: 9 INJECTION, SOLUTION INTRAVENOUS at 07:45

## 2022-07-07 RX ADMIN — ACETAMINOPHEN 650 MG: 325 TABLET ORAL at 07:45

## 2022-07-07 NOTE — PROGRESS NOTES
Pt tolerated infusion without complications, pt understands when to get next lab work done   Declined avs

## 2022-07-08 LAB
ABO GROUP BLD BPU: NORMAL
BPU ID: NORMAL
CROSSMATCH: NORMAL
UNIT DISPENSE STATUS: NORMAL
UNIT PRODUCT CODE: NORMAL
UNIT PRODUCT VOLUME: 317 ML
UNIT RH: NORMAL

## 2022-07-25 ENCOUNTER — TELEPHONE (OUTPATIENT)
Dept: HEMATOLOGY ONCOLOGY | Facility: HOSPITAL | Age: 47
End: 2022-07-25

## 2022-07-25 ENCOUNTER — CONSULT (OUTPATIENT)
Dept: GASTROENTEROLOGY | Facility: AMBULARY SURGERY CENTER | Age: 47
End: 2022-07-25
Payer: COMMERCIAL

## 2022-07-25 VITALS
BODY MASS INDEX: 38.91 KG/M2 | OXYGEN SATURATION: 97 % | DIASTOLIC BLOOD PRESSURE: 82 MMHG | WEIGHT: 293.6 LBS | HEIGHT: 73 IN | SYSTOLIC BLOOD PRESSURE: 136 MMHG | HEART RATE: 94 BPM

## 2022-07-25 DIAGNOSIS — K21.9 GASTROESOPHAGEAL REFLUX DISEASE WITHOUT ESOPHAGITIS: Primary | ICD-10-CM

## 2022-07-25 DIAGNOSIS — D69.3 CHRONIC ITP (IDIOPATHIC THROMBOCYTOPENIC PURPURA) (HCC): ICD-10-CM

## 2022-07-25 DIAGNOSIS — R16.1 SPLENOMEGALY: ICD-10-CM

## 2022-07-25 DIAGNOSIS — K76.0 FATTY LIVER: ICD-10-CM

## 2022-07-25 DIAGNOSIS — D64.9 ANEMIA, UNSPECIFIED TYPE: ICD-10-CM

## 2022-07-25 DIAGNOSIS — R79.89 ELEVATED LFTS: ICD-10-CM

## 2022-07-25 PROCEDURE — 3075F SYST BP GE 130 - 139MM HG: CPT | Performed by: PHYSICIAN ASSISTANT

## 2022-07-25 PROCEDURE — 3079F DIAST BP 80-89 MM HG: CPT | Performed by: PHYSICIAN ASSISTANT

## 2022-07-25 PROCEDURE — 99204 OFFICE O/P NEW MOD 45 MIN: CPT | Performed by: PHYSICIAN ASSISTANT

## 2022-07-25 RX ORDER — SODIUM, POTASSIUM,MAG SULFATES 17.5-3.13G
1 SOLUTION, RECONSTITUTED, ORAL ORAL ONCE
Qty: 354 ML | Refills: 0 | Status: SHIPPED | OUTPATIENT
Start: 2022-07-25 | End: 2022-08-19 | Stop reason: ALTCHOICE

## 2022-07-25 NOTE — PATIENT INSTRUCTIONS
Scheduled date of EGD/colonoscopy (as of today):10/14/2022  Physician performing EGD/colonoscopy:DR DAVIS  Location of EGD/colonoscopy:[facility]  Desired bowel prep reviewed with patient:MIRALAX/MAG CITRATE PER MAIK SINGER  Instructions reviewed with patient by:GENEVA ARMENDARIZ  Clearances:

## 2022-07-25 NOTE — H&P (VIEW-ONLY)
[facility] - Outpatient Consultation  Chas Ricketts 51 y o  male MRN: [id number]  Encounter: [id number]          ASSESSMENT AND PLAN:      1  Normocytic anemia  Hemoglobin previously 11, 1 year ago down trending to 7 8  MCV normal   Follows with Hematology closely due to ITP as well as pancytopenia  Has had extensive prior hematological workup including biopsy, PET scan which have been unremarkable  He has repeat bone marrow biopsy scheduled  Recent ultrasound with normal contour of liver  Reports EGD and colonoscopy 4-5 years ago with removal of polyps  No family history of colon cancer  Symptoms possibly in the setting of chronic GI blood loss from peptic ulcer disease, AVM, polyp or lesion however appears more likely due to hematological process  -schedule EGD and colonoscopy  -patient will need CBC prior to procedures due to ITP and to ensure white count is appropriate  Patient given script for lab work   -discussed risks of procedure including bleeding, infection and perforation   -MiraLax with magnesium citrate ordered     -continue follow-up with Hematology  2  Elevated bilirubin  3  Hepatic steatosis  Longstanding fluctuated liver function test   Most recently with mild elevation of AST at 47 with total bilirubin of 1 8  Previous liver biopsy many years ago and was diagnosed with hepatic steatosis  Recent ultrasound last month with normal contours of the liver and mild hepatic steatosis  Rare alcohol use     -discussed with patient the importance of healthy diet and healthy weight loss    Discussed 5-10 lb weight loss could show improvement of fatty liver     -continue follow-up with family doctor for strict management of hypertension, diabetes, hyperlipidemia     -continue follow-up with family doctor for monitoring of liver function test   -no indication for vitamin-E in the setting of diabetes   -discussed with patient the importance of yearly follow-up with GI  Would recommend yearly ultrasound   -discussed possible advancement to cirrhosis from fatty liver in the future  4  GERD  Symptoms well controlled on Prilosec 40 mg daily  Has been on this for over 20 years  Reports possible mention of Barretts esophagus on prior EGD  Denies smoking     -schedule repeat EGD at same time as colonoscopy to assess for Barretts  -continue Prilosec 40 mg daily for now     -continue to avoid smoking and alcohol use     -continue to avoid dietary triggers including spicy food, fatty food, tomato based products, citrus, carbonated beverages  ______________________________________________________________________    HPI:      Carrie Serrano is a 62-year-old male with past medical history of diabetes, ITP, pancytopenia presents to the office as a new patient for anemia  Patient was seen by our office in 2016 for elevated liver tests  He previously had a liver biopsy in the past and was told he had fatty liver disease  Patient also has been following with Hematology/Oncology for several years now for low platelet count as well as low white blood count  He has had extensive workup including a bone marrow biopsy in 2019 with no evidence of leukemia, lymphoma  He also had a PET scan in 2019  He is being maintained on Promacta for ITP as he has previously failed other medications  Repeat bone marrow biopsy may be considered if his leukopenia worsens  Patient is doing well without any GI symptoms  He denies any nausea, vomiting, change in bowel movements, constipation, diarrhea, blood in the stool or melena  He does have symptoms of reflux for over 20 years for which he takes omeprazole 40 mg daily with good control of his symptoms  He has no dysphagia or odynophagia  He reports his appetite fluctuates due to Promacta  Patient also reports a history of a liver biopsy many years ago and was diagnosed with fatty liver disease      Abdominal surgeries consistent for cholecystectomy  No significant GI family history including no GI malignancies  Most recent lab work with of low white count of 1 3, hemoglobin of 7 2, platelets of 63  Hemoglobin was previously around 11 in 2021  LFTs fluctuating  Most recently mild elevation of AST at 41 with a total bilirubin of 1 8  BMP unremarkable besides mildly elevated glucose  Mild elevation of INR 1 2  Patient reports last colonoscopy approximately 4-5 years ago with removal of polyps  He believes he was due for repeat in 3-5 years  Last EGD at that time as well  He reports having EGDs in the past with mention of Barretts however does not believe he has this  REVIEW OF SYSTEMS:    CONSTITUTIONAL: + fluctuating appetite  Denies any fever, chills, rigors, and weight loss  HEENT: No earache or tinnitus  Denies hearing loss or visual disturbances  CARDIOVASCULAR: No chest pain or palpitations  RESPIRATORY: Denies any cough, hemoptysis, shortness of breath or dyspnea on exertion  GASTROINTESTINAL: As noted in the History of Present Illness  GENITOURINARY: No problems with urination  Denies any hematuria or dysuria  NEUROLOGIC: No dizziness or vertigo, denies headaches  MUSCULOSKELETAL: Denies any muscle or joint pain  SKIN: Denies skin rashes or itching  ENDOCRINE: Denies excessive thirst  Denies intolerance to heat or cold  PSYCHOSOCIAL: Denies depression or anxiety  Denies any recent memory loss         Historical Information   Past Medical History:   Diagnosis Date    BMI 37 0-37 9, adult 4/22/2021    BMI 38 0-38 9,adult 2/8/2022    BMI 39 0-39 9,adult 12/16/2021    Cervicalgia     Concussion with loss of consciousness of 30 minutes or less 3/7/2021    Depression     Diabetes mellitus out of control     Edema of both lower legs 12/16/2021    Elevated liver enzymes     Erectile dysfunction     Exposure to COVID-19 virus     Fatty liver     GERD (gastroesophageal reflux disease)     Heart murmur 1/7/2022    Hyperglycemia     Hypertension     Left cervical radiculopathy     Left elbow pain     Left foot pain     Leukopenia 3/7/2021    Pain in both feet 12/16/2021    Pancytopenia ([address] ) 12/16/2021    Skin rash 12/16/2021    Thrombocytopenia (HCC)     Venous stasis dermatitis of both lower extremities 5/13/2022    Vitamin D deficiency 1/7/2022     Past Surgical History:   Procedure Laterality Date    CHOLECYSTECTOMY  1990's    COLONOSCOPY  06/2005    EGD  07/10/2014    IR BIOPSY BONE MARROW  3/20/2019    WISDOM TOOTH EXTRACTION      In early to mid 19's     Social History   Social History     Substance and Sexual Activity   Alcohol Use Yes     Social History     Substance and Sexual Activity   Drug Use Not Currently    Comment: No drug use - As per [employer]     Social History     Tobacco Use   Smoking Status Former Smoker    Types: Cigarettes   Smokeless Tobacco Former User    Quit date: 3/1/2014   Tobacco Comment    Social   Last cigarette years ago  Family History   Problem Relation Age of Onset    Heart attack Mother     Thrombocytopenia Mother     Coronary artery disease Mother     Hypertension Mother     Testicular cancer Father     Hypertension Father     No Known Problems Brother     Hypertension Brother     No Known Problems Brother     No Known Problems Son        Meds/Allergies       Current Outpatient Medications:     omeprazole (PriLOSEC) 40 MG capsule    clotrimazole-betamethasone (LOTRISONE) 1-0 05 % cream    glimepiride (AMARYL) 4 mg tablet    losartan (COZAAR) 25 mg tablet    OneTouch Verio test strip    Promacta 75 MG TABS    torsemide (DEMADEX) 20 mg tablet    triamcinolone (KENALOG) 0 1 % ointment    Vitamin D, Cholecalciferol, 50 MCG (2000 UT) CAPS    Allergies   Allergen Reactions    Aspirin Other (See Comments)     Reaction unknown to patient      Diltiazem Other (See Comments)     Patient does not recall what happened    Lisinopril Cough           Objective     Vitals:    07/25/22 0829   BP: 136/82   Pulse: 94   SpO2: 97%     Weight - 293 lbs, BMI 38 74      PHYSICAL EXAM:      General Appearance:   Alert, cooperative, no distress   HEENT:   Normocephalic, atraumatic, anicteric      Neck:  Supple, symmetrical, trachea midline   Lungs:   Clear to auscultation bilaterally; no rales, rhonchi or wheezing; respirations unlabored    Heart[de-identified]   Regular rate and rhythm; no murmur, rub, or gallop  Abdomen:   Soft, non-tender, non-distended; normal bowel sounds; no masses, no organomegaly    Genitalia:   Deferred    Rectal:   Deferred    Extremities:  No cyanosis, clubbing or edema    Pulses:  2+ and symmetric    Skin:  No jaundice, rashes, or lesions    Lymph nodes:  No palpable cervical lymphadenopathy        Lab Results:   No visits with results within 1 Day(s) from this visit  Latest known visit with results is:   Hospital Outpatient Visit on 07/07/2022   Component Date Value    ABO Grouping 07/06/2022 A     Rh Factor 07/06/2022 Negative     Antibody Screen 07/06/2022 Negative     Specimen Expiration Date 07/06/2022 [id number]     Unit Product Code 07/08/2022 [id number]     Unit Number 07/08/2022 [id number]     Unit ABO 07/08/2022 A     Unit [facility] 07/08/2022 NEG     Crossmatch 07/08/2022 Compatible     Unit Dispense Status 07/08/2022 Presumed Trans     Unit Product Volume 07/08/2022 317          Radiology Results:   No results found

## 2022-07-25 NOTE — PROGRESS NOTES
[facility] - Outpatient Consultation  Lawrence Dominique 51 y o  male MRN: [id number]  Encounter: [id number]          ASSESSMENT AND PLAN:      1  Normocytic anemia  Hemoglobin previously 11, 1 year ago down trending to 7 8  MCV normal   Follows with Hematology closely due to ITP as well as pancytopenia  Has had extensive prior hematological workup including biopsy, PET scan which have been unremarkable  He has repeat bone marrow biopsy scheduled  Recent ultrasound with normal contour of liver  Reports EGD and colonoscopy 4-5 years ago with removal of polyps  No family history of colon cancer  Symptoms possibly in the setting of chronic GI blood loss from peptic ulcer disease, AVM, polyp or lesion however appears more likely due to hematological process  -schedule EGD and colonoscopy  -patient will need CBC prior to procedures due to ITP and to ensure white count is appropriate  Patient given script for lab work   -discussed risks of procedure including bleeding, infection and perforation   -MiraLax with magnesium citrate ordered     -continue follow-up with Hematology  2  Elevated bilirubin  3  Hepatic steatosis  Longstanding fluctuated liver function test   Most recently with mild elevation of AST at 47 with total bilirubin of 1 8  Previous liver biopsy many years ago and was diagnosed with hepatic steatosis  Recent ultrasound last month with normal contours of the liver and mild hepatic steatosis  Rare alcohol use     -discussed with patient the importance of healthy diet and healthy weight loss    Discussed 5-10 lb weight loss could show improvement of fatty liver     -continue follow-up with family doctor for strict management of hypertension, diabetes, hyperlipidemia     -continue follow-up with family doctor for monitoring of liver function test   -no indication for vitamin-E in the setting of diabetes   -discussed with patient the importance of yearly follow-up with GI  Would recommend yearly ultrasound   -discussed possible advancement to cirrhosis from fatty liver in the future  4  GERD  Symptoms well controlled on Prilosec 40 mg daily  Has been on this for over 20 years  Reports possible mention of Barretts esophagus on prior EGD  Denies smoking     -schedule repeat EGD at same time as colonoscopy to assess for Barretts  -continue Prilosec 40 mg daily for now     -continue to avoid smoking and alcohol use     -continue to avoid dietary triggers including spicy food, fatty food, tomato based products, citrus, carbonated beverages  ______________________________________________________________________    HPI:      Lee Manning is a 65-year-old male with past medical history of diabetes, ITP, pancytopenia presents to the office as a new patient for anemia  Patient was seen by our office in 2016 for elevated liver tests  He previously had a liver biopsy in the past and was told he had fatty liver disease  Patient also has been following with Hematology/Oncology for several years now for low platelet count as well as low white blood count  He has had extensive workup including a bone marrow biopsy in 2019 with no evidence of leukemia, lymphoma  He also had a PET scan in 2019  He is being maintained on Promacta for ITP as he has previously failed other medications  Repeat bone marrow biopsy may be considered if his leukopenia worsens  Patient is doing well without any GI symptoms  He denies any nausea, vomiting, change in bowel movements, constipation, diarrhea, blood in the stool or melena  He does have symptoms of reflux for over 20 years for which he takes omeprazole 40 mg daily with good control of his symptoms  He has no dysphagia or odynophagia  He reports his appetite fluctuates due to Promacta  Patient also reports a history of a liver biopsy many years ago and was diagnosed with fatty liver disease      Abdominal surgeries consistent for cholecystectomy  No significant GI family history including no GI malignancies  Most recent lab work with of low white count of 1 3, hemoglobin of 7 2, platelets of 63  Hemoglobin was previously around 11 in 2021  LFTs fluctuating  Most recently mild elevation of AST at 41 with a total bilirubin of 1 8  BMP unremarkable besides mildly elevated glucose  Mild elevation of INR 1 2  Patient reports last colonoscopy approximately 4-5 years ago with removal of polyps  He believes he was due for repeat in 3-5 years  Last EGD at that time as well  He reports having EGDs in the past with mention of Barretts however does not believe he has this  REVIEW OF SYSTEMS:    CONSTITUTIONAL: + fluctuating appetite  Denies any fever, chills, rigors, and weight loss  HEENT: No earache or tinnitus  Denies hearing loss or visual disturbances  CARDIOVASCULAR: No chest pain or palpitations  RESPIRATORY: Denies any cough, hemoptysis, shortness of breath or dyspnea on exertion  GASTROINTESTINAL: As noted in the History of Present Illness  GENITOURINARY: No problems with urination  Denies any hematuria or dysuria  NEUROLOGIC: No dizziness or vertigo, denies headaches  MUSCULOSKELETAL: Denies any muscle or joint pain  SKIN: Denies skin rashes or itching  ENDOCRINE: Denies excessive thirst  Denies intolerance to heat or cold  PSYCHOSOCIAL: Denies depression or anxiety  Denies any recent memory loss         Historical Information   Past Medical History:   Diagnosis Date    BMI 37 0-37 9, adult 4/22/2021    BMI 38 0-38 9,adult 2/8/2022    BMI 39 0-39 9,adult 12/16/2021    Cervicalgia     Concussion with loss of consciousness of 30 minutes or less 3/7/2021    Depression     Diabetes mellitus out of control     Edema of both lower legs 12/16/2021    Elevated liver enzymes     Erectile dysfunction     Exposure to COVID-19 virus     Fatty liver     GERD (gastroesophageal reflux disease)     Heart murmur 1/7/2022    Hyperglycemia     Hypertension     Left cervical radiculopathy     Left elbow pain     Left foot pain     Leukopenia 3/7/2021    Pain in both feet 12/16/2021    Pancytopenia ([address] ) 12/16/2021    Skin rash 12/16/2021    Thrombocytopenia (HCC)     Venous stasis dermatitis of both lower extremities 5/13/2022    Vitamin D deficiency 1/7/2022     Past Surgical History:   Procedure Laterality Date    CHOLECYSTECTOMY  1990's    COLONOSCOPY  06/2005    EGD  07/10/2014    IR BIOPSY BONE MARROW  3/20/2019    WISDOM TOOTH EXTRACTION      In early to mid 19's     Social History   Social History     Substance and Sexual Activity   Alcohol Use Yes     Social History     Substance and Sexual Activity   Drug Use Not Currently    Comment: No drug use - As per [employer]     Social History     Tobacco Use   Smoking Status Former Smoker    Types: Cigarettes   Smokeless Tobacco Former User    Quit date: 3/1/2014   Tobacco Comment    Social   Last cigarette years ago  Family History   Problem Relation Age of Onset    Heart attack Mother     Thrombocytopenia Mother     Coronary artery disease Mother     Hypertension Mother     Testicular cancer Father     Hypertension Father     No Known Problems Brother     Hypertension Brother     No Known Problems Brother     No Known Problems Son        Meds/Allergies       Current Outpatient Medications:     omeprazole (PriLOSEC) 40 MG capsule    clotrimazole-betamethasone (LOTRISONE) 1-0 05 % cream    glimepiride (AMARYL) 4 mg tablet    losartan (COZAAR) 25 mg tablet    OneTouch Verio test strip    Promacta 75 MG TABS    torsemide (DEMADEX) 20 mg tablet    triamcinolone (KENALOG) 0 1 % ointment    Vitamin D, Cholecalciferol, 50 MCG (2000 UT) CAPS    Allergies   Allergen Reactions    Aspirin Other (See Comments)     Reaction unknown to patient      Diltiazem Other (See Comments)     Patient does not recall what happened    Lisinopril Cough           Objective     Vitals:    07/25/22 0829   BP: 136/82   Pulse: 94   SpO2: 97%     Weight - 293 lbs, BMI 38 74      PHYSICAL EXAM:      General Appearance:   Alert, cooperative, no distress   HEENT:   Normocephalic, atraumatic, anicteric      Neck:  Supple, symmetrical, trachea midline   Lungs:   Clear to auscultation bilaterally; no rales, rhonchi or wheezing; respirations unlabored    Heart[de-identified]   Regular rate and rhythm; no murmur, rub, or gallop  Abdomen:   Soft, non-tender, non-distended; normal bowel sounds; no masses, no organomegaly    Genitalia:   Deferred    Rectal:   Deferred    Extremities:  No cyanosis, clubbing or edema    Pulses:  2+ and symmetric    Skin:  No jaundice, rashes, or lesions    Lymph nodes:  No palpable cervical lymphadenopathy        Lab Results:   No visits with results within 1 Day(s) from this visit  Latest known visit with results is:   Hospital Outpatient Visit on 07/07/2022   Component Date Value    ABO Grouping 07/06/2022 A     Rh Factor 07/06/2022 Negative     Antibody Screen 07/06/2022 Negative     Specimen Expiration Date 07/06/2022 [id number]     Unit Product Code 07/08/2022 [id number]     Unit Number 07/08/2022 [id number]     Unit ABO 07/08/2022 A     Unit [facility] 07/08/2022 NEG     Crossmatch 07/08/2022 Compatible     Unit Dispense Status 07/08/2022 Presumed Trans     Unit Product Volume 07/08/2022 317          Radiology Results:   No results found

## 2022-07-27 LAB
CREAT ?TM UR-SCNC: 157.3 UMOL/L
EXT MICROALBUMIN URINE RANDOM: 60.7
MICROALBUMIN/CREAT UR: 39 MG/G{CREAT}

## 2022-07-27 PROCEDURE — 3060F POS MICROALBUMINURIA REV: CPT | Performed by: PHYSICIAN ASSISTANT

## 2022-07-28 LAB
ALBUMIN SERPL-MCNC: 4 G/DL (ref 4–5)
ALBUMIN/GLOB SERPL: 1.7 {RATIO} (ref 1.2–2.2)
ALP SERPL-CCNC: 110 IU/L (ref 44–121)
ALT SERPL-CCNC: 39 IU/L (ref 0–44)
AST SERPL-CCNC: 47 IU/L (ref 0–40)
BASOPHILS # BLD AUTO: 0 X10E3/UL (ref 0–0.2)
BASOPHILS NFR BLD AUTO: 1 %
BILIRUB SERPL-MCNC: 1.1 MG/DL (ref 0–1.2)
BUN SERPL-MCNC: 17 MG/DL (ref 6–24)
BUN/CREAT SERPL: 17 (ref 9–20)
CALCIUM SERPL-MCNC: 8.8 MG/DL (ref 8.7–10.2)
CHLORIDE SERPL-SCNC: 105 MMOL/L (ref 96–106)
CO2 SERPL-SCNC: 24 MMOL/L (ref 20–29)
CREAT SERPL-MCNC: 0.99 MG/DL (ref 0.76–1.27)
EGFR: 95 ML/MIN/1.73
EOSINOPHIL # BLD AUTO: 0.1 X10E3/UL (ref 0–0.4)
EOSINOPHIL NFR BLD AUTO: 5 %
ERYTHROCYTE [DISTWIDTH] IN BLOOD BY AUTOMATED COUNT: 16.4 % (ref 11.6–15.4)
GLOBULIN SER-MCNC: 2.3 G/DL (ref 1.5–4.5)
GLUCOSE SERPL-MCNC: 165 MG/DL (ref 65–99)
HCT VFR BLD AUTO: 27.2 % (ref 37.5–51)
HGB BLD-MCNC: 8.4 G/DL (ref 13–17.7)
IMM GRANULOCYTES # BLD: 0 X10E3/UL (ref 0–0.1)
IMM GRANULOCYTES NFR BLD: 0 %
LYMPHOCYTES # BLD AUTO: 0.3 X10E3/UL (ref 0.7–3.1)
LYMPHOCYTES NFR BLD AUTO: 25 %
MCH RBC QN AUTO: 25.5 PG (ref 26.6–33)
MCHC RBC AUTO-ENTMCNC: 30.9 G/DL (ref 31.5–35.7)
MCV RBC AUTO: 83 FL (ref 79–97)
MONOCYTES # BLD AUTO: 0.1 X10E3/UL (ref 0.1–0.9)
MONOCYTES NFR BLD AUTO: 9 %
MORPHOLOGY BLD-IMP: ABNORMAL
NEUTROPHILS # BLD AUTO: 0.8 X10E3/UL (ref 1.4–7)
NEUTROPHILS NFR BLD AUTO: 60 %
PLATELET # BLD AUTO: 72 X10E3/UL (ref 150–450)
POTASSIUM SERPL-SCNC: 4 MMOL/L (ref 3.5–5.2)
PROT SERPL-MCNC: 6.3 G/DL (ref 6–8.5)
RBC # BLD AUTO: 3.29 X10E6/UL (ref 4.14–5.8)
SODIUM SERPL-SCNC: 138 MMOL/L (ref 134–144)
WBC # BLD AUTO: 1.3 X10E3/UL (ref 3.4–10.8)

## 2022-07-29 ENCOUNTER — HOSPITAL ENCOUNTER (OUTPATIENT)
Dept: CT IMAGING | Facility: HOSPITAL | Age: 47
Discharge: HOME | End: 2022-07-29
Attending: INTERNAL MEDICINE | Admitting: RADIOLOGY
Payer: COMMERCIAL

## 2022-07-29 VITALS
TEMPERATURE: 97.2 F | WEIGHT: 293 LBS | BODY MASS INDEX: 38.83 KG/M2 | DIASTOLIC BLOOD PRESSURE: 74 MMHG | HEIGHT: 73 IN | RESPIRATION RATE: 18 BRPM | SYSTOLIC BLOOD PRESSURE: 152 MMHG | HEART RATE: 71 BPM | OXYGEN SATURATION: 97 %

## 2022-07-29 DIAGNOSIS — D69.3 IDIOPATHIC THROMBOCYTOPENIA (HCC): ICD-10-CM

## 2022-07-29 DIAGNOSIS — D64.9 ANEMIA, UNSPECIFIED TYPE: ICD-10-CM

## 2022-07-29 DIAGNOSIS — D69.3 CHRONIC ITP (IDIOPATHIC THROMBOCYTOPENIC PURPURA) (HCC): ICD-10-CM

## 2022-07-29 LAB — GLUCOSE SERPL-MCNC: 163 MG/DL (ref 65–140)

## 2022-07-29 PROCEDURE — 38222 DX BONE MARROW BX & ASPIR: CPT

## 2022-07-29 PROCEDURE — 88311 DECALCIFY TISSUE: CPT | Performed by: PATHOLOGY

## 2022-07-29 PROCEDURE — 99152 MOD SED SAME PHYS/QHP 5/>YRS: CPT | Performed by: RADIOLOGY

## 2022-07-29 PROCEDURE — 85097 BONE MARROW INTERPRETATION: CPT | Performed by: PATHOLOGY

## 2022-07-29 PROCEDURE — 77012 CT SCAN FOR NEEDLE BIOPSY: CPT | Performed by: RADIOLOGY

## 2022-07-29 PROCEDURE — 82948 REAGENT STRIP/BLOOD GLUCOSE: CPT

## 2022-07-29 PROCEDURE — 88341 IMHCHEM/IMCYTCHM EA ADD ANTB: CPT | Performed by: PATHOLOGY

## 2022-07-29 PROCEDURE — 88305 TISSUE EXAM BY PATHOLOGIST: CPT | Performed by: PATHOLOGY

## 2022-07-29 PROCEDURE — 81450 HL NEO GSAP 5-50DNA/DNA&RNA: CPT | Performed by: INTERNAL MEDICINE

## 2022-07-29 PROCEDURE — 88313 SPECIAL STAINS GROUP 2: CPT | Performed by: PATHOLOGY

## 2022-07-29 PROCEDURE — 88184 FLOWCYTOMETRY/ TC 1 MARKER: CPT | Performed by: INTERNAL MEDICINE

## 2022-07-29 PROCEDURE — 88185 FLOWCYTOMETRY/TC ADD-ON: CPT

## 2022-07-29 PROCEDURE — 38222 DX BONE MARROW BX & ASPIR: CPT | Performed by: RADIOLOGY

## 2022-07-29 PROCEDURE — 88365 INSITU HYBRIDIZATION (FISH): CPT | Performed by: PATHOLOGY

## 2022-07-29 PROCEDURE — 88342 IMHCHEM/IMCYTCHM 1ST ANTB: CPT | Performed by: PATHOLOGY

## 2022-07-29 PROCEDURE — 99152 MOD SED SAME PHYS/QHP 5/>YRS: CPT

## 2022-07-29 PROCEDURE — 88237 TISSUE CULTURE BONE MARROW: CPT | Performed by: INTERNAL MEDICINE

## 2022-07-29 PROCEDURE — 88262 CHROMOSOME ANALYSIS 15-20: CPT

## 2022-07-29 RX ORDER — SODIUM CHLORIDE 9 MG/ML
30 INJECTION, SOLUTION INTRAVENOUS CONTINUOUS
Status: DISCONTINUED | OUTPATIENT
Start: 2022-07-29 | End: 2022-07-30 | Stop reason: HOSPADM

## 2022-07-29 RX ORDER — FENTANYL CITRATE 50 UG/ML
INJECTION, SOLUTION INTRAMUSCULAR; INTRAVENOUS CODE/TRAUMA/SEDATION MEDICATION
Status: COMPLETED | OUTPATIENT
Start: 2022-07-29 | End: 2022-07-29

## 2022-07-29 RX ORDER — MIDAZOLAM HYDROCHLORIDE 2 MG/2ML
INJECTION, SOLUTION INTRAMUSCULAR; INTRAVENOUS CODE/TRAUMA/SEDATION MEDICATION
Status: COMPLETED | OUTPATIENT
Start: 2022-07-29 | End: 2022-07-29

## 2022-07-29 RX ADMIN — MIDAZOLAM HYDROCHLORIDE 1 MG: 1 INJECTION, SOLUTION INTRAMUSCULAR; INTRAVENOUS at 10:05

## 2022-07-29 RX ADMIN — FENTANYL CITRATE 50 MCG: 50 INJECTION, SOLUTION INTRAMUSCULAR; INTRAVENOUS at 10:05

## 2022-07-29 NOTE — INTERVAL H&P NOTE
Update: (This section must be completed if the H&P was completed greater than 24 hrs to procedure or admission)    H&P reviewed  After examining the patient, I find no changed to the H&P since it had been written  Patient re-evaluated   Accept as history and physical     Jacy Frey MD/July 29, 2022/10:22 AM

## 2022-07-29 NOTE — DISCHARGE INSTRUCTIONS
Bone Marrow Biopsy     WHAT YOU NEED TO KNOW:   A bone marrow biopsy is a procedure to remove a small amount of bone marrow from your bone  Bone marrow is the soft tissue inside your bone that helps to make blood cells  The sample is tested for disease or infection  DISCHARGE INSTRUCTIONS:     1  Limit your activities day of biopsy as directed by your doctor  2  Use medication as ordered  3  Return to your normal diet  Small sips of flat soda will help with nausea  4  Remove band-aid or dressing 24 hours after procedure  Contact Interventional Radiology at [phone] [place] PATIENTS: Contact Interventional Radiology at [phone]) [place] PATIENTS: Contact Interventional Radiology at [phone]) if:    1  Difficulty breathing, nausea or vomiting  2  Chills or fever above 101 F     3  Pain at biopsy site not relieved by medication  4  Develop any redness, swelling, heat, unusual drainage, heavy bruising or bleeding from biopsy site

## 2022-07-29 NOTE — BRIEF OP NOTE (RAD/CATH)
INTERVENTIONAL RADIOLOGY PROCEDURE NOTE    Date: 7/29/2022    Procedure: IR BIOPSY BONE MARROW    Preoperative diagnosis:   1  Anemia, unspecified type    2  Chronic ITP (idiopathic thrombocytopenic purpura) (HCC)    3  Idiopathic thrombocytopenia (HCC)         Postoperative diagnosis: Same  Surgeon: Clarita Nunes MD     Assistant: None  No qualified resident was available  Blood loss: Minimal    Specimens: 6 cc bone marrow, 1 bone core     Findings: BM biopsy    Complications: None immediate      Anesthesia: conscious sedation

## 2022-08-01 LAB — SCAN RESULT: NORMAL

## 2022-08-04 LAB — MISCELLANEOUS LAB TEST RESULT: NORMAL

## 2022-08-08 LAB — MISCELLANEOUS LAB TEST RESULT: NORMAL

## 2022-08-10 ENCOUNTER — TELEPHONE (OUTPATIENT)
Dept: HEMATOLOGY ONCOLOGY | Facility: CLINIC | Age: 47
End: 2022-08-10

## 2022-08-10 ENCOUNTER — OFFICE VISIT (OUTPATIENT)
Dept: HEMATOLOGY ONCOLOGY | Facility: CLINIC | Age: 47
End: 2022-08-10
Payer: COMMERCIAL

## 2022-08-10 VITALS
RESPIRATION RATE: 14 BRPM | HEART RATE: 94 BPM | HEIGHT: 73 IN | TEMPERATURE: 97.8 F | OXYGEN SATURATION: 98 % | WEIGHT: 299 LBS | DIASTOLIC BLOOD PRESSURE: 78 MMHG | BODY MASS INDEX: 39.63 KG/M2 | SYSTOLIC BLOOD PRESSURE: 136 MMHG

## 2022-08-10 DIAGNOSIS — D50.0 IRON DEFICIENCY ANEMIA DUE TO CHRONIC BLOOD LOSS: Primary | ICD-10-CM

## 2022-08-10 DIAGNOSIS — E53.8 B12 DEFICIENCY: ICD-10-CM

## 2022-08-10 DIAGNOSIS — D69.3 CHRONIC ITP (IDIOPATHIC THROMBOCYTOPENIC PURPURA) (HCC): ICD-10-CM

## 2022-08-10 PROCEDURE — 3075F SYST BP GE 130 - 139MM HG: CPT | Performed by: INTERNAL MEDICINE

## 2022-08-10 PROCEDURE — 3078F DIAST BP <80 MM HG: CPT | Performed by: INTERNAL MEDICINE

## 2022-08-10 PROCEDURE — 99214 OFFICE O/P EST MOD 30 MIN: CPT | Performed by: INTERNAL MEDICINE

## 2022-08-10 RX ORDER — CYANOCOBALAMIN 1000 UG/ML
1000 INJECTION, SOLUTION INTRAMUSCULAR; SUBCUTANEOUS ONCE
Status: CANCELLED | OUTPATIENT
Start: 2022-08-17

## 2022-08-10 RX ORDER — SODIUM CHLORIDE 9 MG/ML
20 INJECTION, SOLUTION INTRAVENOUS ONCE
Status: CANCELLED | OUTPATIENT
Start: 2022-08-17

## 2022-08-10 NOTE — PROGRESS NOTES
Hematology/Oncology Outpatient Follow- up Note  Thee Montero 51 y o  male MRN: @ Encounter: [id number]        Date:  8/10/2022    Presenting Complaint/Diagnosis : Splenomegaly with thrombocytopenia with a question of ITP  Previous Hematologic/ Oncologic History:    Workup  The patient has failed high-dose steroids as he was treated with Decadron  He then got 4 doses of Rituxan     Current Hematologic/ Oncologic Treatment:    Promacta    Interval History:    Patient returns for follow-up visit  White count was 1 3 with a hemoglobin of 8 4 and MCV of 83 and a platelet count of 72  Based on the fact that his bone marrow biopsy did show decreased iron stores I think it is reasonable to give him a trial of iron  I suspect the low neutrophils and low white count is related to his splenomegaly  We will give him iron and B12  Denies any nausea denies any vomiting denies any diarrhea  Is getting a colonoscopy and endoscopy which I agree with  The rest of his 14 point review of systems today was negative  Test Results:    Imaging: IR biopsy bone marrow    Result Date: 7/29/2022  Narrative: PROCEDURE: CT-guided bone marrow biopsy and bone marrow aspiration STAFF: SOUMYA Lane Coon: 785 18 mGy-cm  This examination, like all CT scans performed in the [facility], was performed utilizing techniques to minimize radiation dose exposure, including the use of iterative reconstruction and automated exposure control  NUMBER OF IMAGES: Multiple  COMPLICATIONS: None  MEDICATIONS: Versed 1 milligrams iv  Fentanyl 50 micrograms iv  Moderate sedation was monitored by radiology nursing staff  Monitoring of conscious sedation totaled 15 minutes  INDICATION: 50-year-old male with low platelets  PROCEDURE: Using intermittent CT guidance, the Gam[employer] system was used to perform bone marrow aspirate via the left posterior superior iliac spine   Approximately 6 mL of marrow was removed and sent to pathology  Next, bone marrow biopsy was performed  The biopsy specimen was placed in formalin and sent to pathology  The needle was then removed and hemostasis was achieved using manual compression  Impression: Bone marrow aspiration and biopsy  Workstation performed: [id number]       Labs:   Lab Results   Component Value Date    WBC 1 3 (LL) 07/27/2022    HGB 8 4 (L) 07/27/2022    HCT 27 2 (L) 07/27/2022    MCV 83 07/27/2022    PLT 72 (LL) 07/27/2022     Lab Results   Component Value Date     04/05/2017    K 4 0 07/27/2022     07/27/2022    CO2 24 07/27/2022    BUN 17 07/27/2022    CREATININE 0 99 07/27/2022    GLUCOSE 102 (H) 04/05/2017    GLUF 116 (H) 05/04/2021    CALCIUM 8 6 07/02/2022    CORRECTEDCA 9 5 05/04/2021    AST 47 (H) 07/27/2022    ALT 39 07/27/2022    ALKPHOS 103 07/02/2022    PROT 6 4 04/05/2017    BILITOT 1 0 04/05/2017    EGFR 95 07/27/2022       ROS: As stated in the history of present illness otherwise his 14 point review of systems today was negative        Active Problems:   Patient Active Problem List   Diagnosis    Acquired thrombocytopenia (HCC)    Elevated LFTs    Fatty liver    GERD (gastroesophageal reflux disease)    Tick bite    Acute upper respiratory infection    Diabetes ([facility] [address] )    Idiopathic thrombocytopenia (HCC)    Elevated MCV    Family history of thrombocytopenia    Splenomegaly    Chronic ITP (idiopathic thrombocytopenic purpura) (HCC)    Leukopenia    Concussion with loss of consciousness of 30 minutes or less    BMI 37 0-37 9, adult    Edema of both lower legs    Pancytopenia (HCC)    BMI 39 0-39 9,adult    Pain in both feet    Essential hypertension    Hypocalcemia    Heart murmur    Vitamin D deficiency    BMI 38 0-38 9,adult    Venous stasis dermatitis of both lower extremities    Anemia, unspecified       Past Medical History:   Past Medical History:   Diagnosis Date    BMI 37 0-37 9, adult 04/22/2021    BMI 38 0-38 9,adult 02/08/2022    BMI 39 0-39 9,adult 12/16/2021    Cervicalgia     Colon polyp     Concussion with loss of consciousness of 30 minutes or less 03/07/2021    Depression     Diabetes mellitus ([facility] [address] )     Diabetes mellitus out of control     Edema of both lower legs 12/16/2021    Elevated liver enzymes     Erectile dysfunction     Exposure to COVID-19 virus     Fatty liver     GERD (gastroesophageal reflux disease)     Heart murmur 01/07/2022    Hyperglycemia     Hypertension     Left cervical radiculopathy     Left elbow pain     Left foot pain     Leukopenia 03/07/2021    Pain in both feet 12/16/2021    Pancytopenia ([facility] [address] ) 12/16/2021    Skin rash 12/16/2021    Thrombocytopenia (HCC)     Venous stasis dermatitis of both lower extremities 05/13/2022    Vitamin D deficiency 01/07/2022       Surgical History:   Past Surgical History:   Procedure Laterality Date    CHOLECYSTECTOMY  1990's    COLONOSCOPY  06/2005    COLONOSCOPY      EGD  07/10/2014    IR BIOPSY BONE MARROW  03/20/2019    IR BIOPSY BONE MARROW  7/29/2022    UPPER GASTROINTESTINAL ENDOSCOPY      WISDOM TOOTH EXTRACTION      In early to mid 19's       Family History:    Family History   Problem Relation Age of Onset    Heart attack Mother     Thrombocytopenia Mother     Coronary artery disease Mother     Hypertension Mother     Testicular cancer Father     Hypertension Father     No Known Problems Brother     Hypertension Brother     No Known Problems Brother     No Known Problems Son        Cancer-related family history includes Testicular cancer in his father      Social History:   Social History     Socioeconomic History    Marital status: /Civil Union     Spouse name: Not on file    Number of children: Not on file    Years of education: Not on file    Highest education level: Not on file   Occupational History    Occupation:    Tobacco Use    Smoking status: Former Smoker     Types: Cigarettes    Smokeless tobacco: Former User     Quit date: 3/1/2014    Tobacco comment: Social   Last cigarette years ago  Vaping Use    Vaping Use: Never used   Substance and Sexual Activity    Alcohol use: Yes     Comment: rarely    Drug use: Not Currently     Comment: No drug use - As per [employer]    Sexual activity: Not Currently     Comment: Resides with wife and kids   Other Topics Concern    Not on file   Social History Narrative    Tobacco use: Never smoker    Annual eye exam: Sees ophth q yr    Annual dental checkup: He goes to dentist     - As per [employer]     Social Determinants of Health     Financial Resource Strain: Not on file   Food Insecurity: Not on file   Transportation Needs: Not on file   Physical Activity: Not on file   Stress: Not on file   Social Connections: Not on file   Intimate Partner Violence: Not on file   Housing Stability: Not on file       Current Medications:   Current Outpatient Medications   Medication Sig Dispense Refill    glimepiride (AMARYL) 4 mg tablet TAKE 1 TABLET BY MOUTH  DAILY 90 tablet 3    losartan (COZAAR) 25 mg tablet Take 1 tablet (25 mg total) by mouth daily 90 tablet 1    omeprazole (PriLOSEC) 40 MG capsule Take 1 capsule (40 mg total) by mouth daily 90 capsule 1    OneTouch Verio test strip USE TO TEST ONCE DAILY 25 strip 3    Promacta 75 MG TABS TAKE 1 TABLET BY MOUTH ONCE DAILY ON AN EMPTY STOMACH  AT LEAST 1 HOUR BEFORE OR 2 HOURS AFTER A MEAL 30 tablet 3    torsemide (DEMADEX) 20 mg tablet Take 1 tablet (20 mg total) by mouth in the morning and 1 tablet (20 mg total) before bedtime   180 tablet 3    clotrimazole-betamethasone (LOTRISONE) 1-0 05 % cream Apply topically 2 (two) times a day 45 g 0    Na Sulfate-K Sulfate-Mg Sulf (Suprep Bowel Prep Kit) 17 5-3 13-1 6 GM/177ML SOLN Take 1 kit by mouth once for 1 dose 354 mL 0    triamcinolone (KENALOG) 0 1 % ointment Apply 1 application topically 2 (two) times a day To affected area 80 g 1    Vitamin D, Cholecalciferol, 50 MCG (2000 UT) CAPS Take 1 tablet by mouth in the morning       No current facility-administered medications for this visit  Allergies: Allergies   Allergen Reactions    Aspirin Other (See Comments)     Reaction unknown to patient   Diltiazem Other (See Comments)     Patient does not recall what happened    Lisinopril Cough       Physical Exam:    Body surface area is 2 56 meters squared  Wt Readings from Last 3 Encounters:   08/10/22 136 kg (299 lb)   07/29/22 133 kg (293 lb)   07/25/22 133 kg (293 lb 9 6 oz)        Temp Readings from Last 3 Encounters:   08/10/22 97 8 °F (36 6 °C) (Temporal)   07/29/22 (!) 97 2 °F (36 2 °C) (Temporal)   07/07/22 (!) 97 2 °F (36 2 °C)        BP Readings from Last 3 Encounters:   08/10/22 136/78   07/29/22 152/74   07/25/22 136/82         Pulse Readings from Last 3 Encounters:   08/10/22 94   07/29/22 71   07/25/22 94          Physical Exam     Constitutional   General appearance: No acute distress, well appearing and well nourished  Eyes   Conjunctiva and lids: No swelling, erythema or discharge  Pupils and irises: Equal, round and reactive to light  Ears, Nose, Mouth, and Throat   External inspection of ears and nose: Normal     Nasal mucosa, septum, and turbinates: Normal without edema or erythema  Oropharynx: Normal with no erythema, edema, exudate or lesions  Pulmonary   Respiratory effort: No increased work of breathing or signs of respiratory distress  Auscultation of lungs: Clear to auscultation  Cardiovascular   Palpation of heart: Normal PMI, no thrills  Auscultation of heart: Normal rate and rhythm, normal S1 and S2, without murmurs  Examination of extremities for edema and/or varicosities: Normal     Carotid pulses: Normal     Abdomen   Abdomen: Non-tender, no masses  Liver and spleen: No hepatomegaly or splenomegaly      Lymphatic   Palpation of lymph nodes in neck: No lymphadenopathy  Musculoskeletal   Gait and station: Normal     Digits and nails: Normal without clubbing or cyanosis  Inspection/palpation of joints, bones, and muscles: Normal     Skin   Skin and subcutaneous tissue: Normal without rashes or lesions  Neurologic   Cranial nerves: Cranial nerves 2-12 intact  Sensation: No sensory loss  Psychiatric   Orientation to person, place, and time: Normal     Mood and affect: Normal         Assessment / Plan: This is a pleasant 50-year-old male with a past medical history of a low platelet count   He also has a history of a low white count   He has received 4 doses of Rituxan in the past and had a bone marrow biopsy which showed no abnormalities   Hepatitis panel was negative   A PET-CT scan in the past had shown no uptake in the spleen  Making a splenic lymphoma less likely  Bebe Costa was started on Promacta and his counts are running consistently above 50   He will stay on his current dose   The patient's most recent bone marrow shows a normocellular marrow with megakaryocytic hyperplasia with no evidence of leukemia or lymphoma decreased iron stores no reticulin fibrosis no mutational abnormalities noted by NGS  According to report these findings are compatible with ITP or platelet destruction  I will give the patient 10 doses of Venofer since he is iron deficient based on his bone marrow  I will give him 5 doses of B12  Hopefully this will improve his hemoglobin as he did require a transfusion  I will see him back in 4 months  He is getting weekly CBC with differential and CMP  We will get iron studies before he comes back  Goals and Barriers:  Current Goal:  Prolong Survival from ITP  Barriers: None  Patient's Capacity to Self Care:  Patient  able to self care  Portions of the record may have been created with voice recognition software    Occasional wrong word or "sound a like" substitutions may have occurred due to the inherent limitations of voice recognition software  Read the chart carefully and recognize, using context, where substitutions have occurred

## 2022-08-10 NOTE — TELEPHONE ENCOUNTER
While we try to accommodate patient requests, our priority is to schedule treatment according to Doctor's orders and site availability  1  Does the Provider use the intake sheet or checkout note?  note  2  What would be a preferred day of the week that would work best for your infusion appointment? Any day but it could depend, pt would not get specific  3  Do you prefer mornings or afternoons for your appointments? Prefer am but can do pm  4  Are there any days or dates that do not work for your schedule, including any upcoming vacations? 5  We are going to try our best to schedule you at the infusion center closest to your home  In the event that we are unable to what would be your next preferred infusion site or sites? 1  [facility]  2  [facility]  3  [facility]    6  Do you have transportation to take you to all of your appointments? yes  7   Would you like the infusion center to draw labs from your port? (disregard if patient doesn't have a port or need labs for infusion appointment) N/A

## 2022-08-10 NOTE — TELEPHONE ENCOUNTER
Last Clinic Visit: 7/3/2020 [facility]       Patient has been scheduled 08/17 at 1230pm  I noticed there are orders for B12 injections, is this to be scheduled along with venofer?

## 2022-08-11 NOTE — TELEPHONE ENCOUNTER
Left another message for patient regarding treatment schedule  Advised patient to check his MyChart for more details or to call back

## 2022-08-11 NOTE — TELEPHONE ENCOUNTER
Left detailed message for patient regarding new iron schedule  Advised patient to call back if he has any questions  He can also view updates on his MyChart

## 2022-08-15 DIAGNOSIS — D50.0 IRON DEFICIENCY ANEMIA DUE TO CHRONIC BLOOD LOSS: Primary | ICD-10-CM

## 2022-08-15 DIAGNOSIS — E53.8 B12 DEFICIENCY: ICD-10-CM

## 2022-08-15 RX ORDER — CYANOCOBALAMIN 1000 UG/ML
1000 INJECTION, SOLUTION INTRAMUSCULAR; SUBCUTANEOUS ONCE
Status: CANCELLED | OUTPATIENT
Start: 2022-08-17

## 2022-08-17 ENCOUNTER — HOSPITAL ENCOUNTER (OUTPATIENT)
Dept: INFUSION CENTER | Facility: CLINIC | Age: 47
End: 2022-08-17

## 2022-08-19 ENCOUNTER — OFFICE VISIT (OUTPATIENT)
Dept: INTERNAL MEDICINE CLINIC | Facility: CLINIC | Age: 47
End: 2022-08-19
Payer: COMMERCIAL

## 2022-08-19 VITALS
SYSTOLIC BLOOD PRESSURE: 128 MMHG | HEART RATE: 80 BPM | HEIGHT: 73 IN | OXYGEN SATURATION: 97 % | WEIGHT: 296 LBS | TEMPERATURE: 98.1 F | DIASTOLIC BLOOD PRESSURE: 62 MMHG | BODY MASS INDEX: 39.23 KG/M2

## 2022-08-19 DIAGNOSIS — I10 ESSENTIAL HYPERTENSION: ICD-10-CM

## 2022-08-19 DIAGNOSIS — D69.3 CHRONIC ITP (IDIOPATHIC THROMBOCYTOPENIC PURPURA) (HCC): ICD-10-CM

## 2022-08-19 DIAGNOSIS — R01.1 HEART MURMUR: ICD-10-CM

## 2022-08-19 DIAGNOSIS — E11.9 TYPE 2 DIABETES MELLITUS WITHOUT COMPLICATION, WITHOUT LONG-TERM CURRENT USE OF INSULIN (HCC): Primary | ICD-10-CM

## 2022-08-19 DIAGNOSIS — E55.9 VITAMIN D DEFICIENCY: ICD-10-CM

## 2022-08-19 DIAGNOSIS — D64.9 ANEMIA, UNSPECIFIED TYPE: ICD-10-CM

## 2022-08-19 DIAGNOSIS — D61.818 PANCYTOPENIA (HCC): ICD-10-CM

## 2022-08-19 DIAGNOSIS — R79.89 ELEVATED LFTS: ICD-10-CM

## 2022-08-19 DIAGNOSIS — K21.9 GASTROESOPHAGEAL REFLUX DISEASE WITHOUT ESOPHAGITIS: ICD-10-CM

## 2022-08-19 DIAGNOSIS — R60.0 EDEMA OF BOTH LOWER LEGS: ICD-10-CM

## 2022-08-19 PROCEDURE — 99214 OFFICE O/P EST MOD 30 MIN: CPT | Performed by: INTERNAL MEDICINE

## 2022-08-19 NOTE — ASSESSMENT & PLAN NOTE
Vitamin-D deficiency resolved after being on vitamin-D supplement  Vitamin-D level was 22 7 and then increased to 58 after being on vitamin-D supplement  Patient has been taking vitamin-D daily

## 2022-08-19 NOTE — ASSESSMENT & PLAN NOTE
Symptoms are well controlled on omeprazole  Advised to watch diet, reflux precautions    He is  going for EGD October 14 ,2022

## 2022-08-19 NOTE — ASSESSMENT & PLAN NOTE
Had a cardiac evaluation  Had echocardiogram   Ejection fraction 65%  Has a mild mitral regurgitation    Patient has been followed by cardiologist

## 2022-08-19 NOTE — ASSESSMENT & PLAN NOTE
Lab Results   Component Value Date    HGBA1C 6 3 (H) 05/07/2022   His fasting blood sugar has been around 150-160  He is not checking evening sugar  Advised to check blood sugar fasting and p m , A m  And p m  Alternate day  Meanwhile will add glimepiride 2 mg every evening and will continue glimepiride 4 mg every morning as well  Call if blood sugar less than 80 will check hemoglobin A1c patient has been watching diet very closely  Patient state he has been followed by eye doctor yearly

## 2022-08-19 NOTE — PROGRESS NOTES
Assessment/Plan:    1  Type 2 diabetes mellitus without complication, without long-term current use of insulin ([facility])  Assessment & Plan:    Lab Results   Component Value Date    HGBA1C 6 3 (H) 05/07/2022   His fasting blood sugar has been around 150-160  He is not checking evening sugar  Advised to check blood sugar fasting and p m , A m  And p m  Alternate day  Meanwhile will add glimepiride 2 mg every evening and will continue glimepiride 4 mg every morning as well  Call if blood sugar less than 80 will check hemoglobin A1c patient has been watching diet very closely  Patient state he has been followed by eye doctor yearly  Orders:  -     Lipid panel; Future  -     Hemoglobin A1C; Future    2  Essential hypertension  Assessment & Plan:  Blood pressure well controlled  Advised to continue present medication  Advised for low-salt diet  Orders:  -     Lipid panel; Future    3  Chronic ITP (idiopathic thrombocytopenic purpura) ([facility])  Assessment & Plan:  Patient has been followed by [facility],THE oncologist   He received 2 units of platelet transfusion per patient last platelet count 72 K        4  Pancytopenia ([address] )  Assessment & Plan:  Patient has been followed by [facility],THE oncologist   Recently had a bone marrow study  5  Gastroesophageal reflux disease without esophagitis  Assessment & Plan:  Symptoms are well controlled on omeprazole  Advised to watch diet, reflux precautions  He is  going for EGD October 14 ,2022      6  Vitamin D deficiency  Assessment & Plan:  Vitamin-D deficiency resolved after being on vitamin-D supplement  Vitamin-D level was 22 7 and then increased to 58 after being on vitamin-D supplement  Patient has been taking vitamin-D daily  7  Elevated LFTs  Assessment & Plan:  Stable and improved  Most likely secondary to fatty liver    His AST 47 ALT 39 on July 27, 2022        8  Anemia, unspecified type  Assessment & Plan:  As mentioned above patient has been followed by hemato oncologist   Recently had a bone marrow study  Will be going for IV iron infusion as well as vitamin B12 injection  He is going for EGD and colonoscopy October 15, 2022 to rule out any GI loss  Although denies any blood in the stool dark-colored stool  9  Edema of both lower legs  Assessment & Plan:  Stable on torsemide 20 mg b i d   Had a cardiac workup  negative for DVT  TSH normal   Chest x-ray no active disease  Possible dependent/venous insufficiency  Cannot tolerate elastic stockings  Advised to continue present medication  Keep legs elevated  10  Heart murmur  Assessment & Plan:  Had a cardiac evaluation  Had echocardiogram   Ejection fraction 65%  Has a mild mitral regurgitation  Patient has been followed by cardiologist             Subjective:  Patient presents for follow-up  Patient ID: Thee Montero is a 51 y o  male  HPI   79-year-old white male patient presents for follow-up of his medical problems  He denies any chest pain, shortness  of breath, pain in abdomen  Denies any cough, fever, chills  Denies any nausea, vomiting, diarrhea  He had a bone marrow study  He received 2 units of platelet infusion  Will be going for EGD and colonoscopy October 15, 2022  Denies blood in the stool dark-colored stool    Also will be going for IV infusion therapy as well as B12 injection by hematologist     The following portions of the patient's history were reviewed and updated as appropriate:     Past Medical History:  He has a past medical history of BMI 37 0-37 9, adult (04/22/2021), BMI 38 0-38 9,adult (02/08/2022), BMI 39 0-39 9,adult (12/16/2021), Cervicalgia, Colon polyp, Concussion with loss of consciousness of 30 minutes or less (03/07/2021), Depression, Diabetes mellitus ([facility] ), Diabetes mellitus out of control, Edema of both lower legs (12/16/2021), Elevated liver enzymes, Erectile dysfunction, Exposure to COVID-19 virus, Fatty liver, GERD (gastroesophageal reflux disease), Heart murmur (01/07/2022), Hyperglycemia, Hypertension, Left cervical radiculopathy, Left elbow pain, Left foot pain, Leukopenia (03/07/2021), Pain in both feet (12/16/2021), Pancytopenia ([address] ) (12/16/2021), Skin rash (12/16/2021), Thrombocytopenia ([facility] [address] ), Venous stasis dermatitis of both lower extremities (05/13/2022), and Vitamin D deficiency (01/07/2022)  ,  _______________________________________________________________________  Past Surgical History:   has a past surgical history that includes Cholecystectomy (1990's); [place] tooth extraction; IR biopsy bone marrow (03/20/2019); EGD (07/10/2014); Colonoscopy (06/2005); Colonoscopy; Upper gastrointestinal endoscopy; and IR biopsy bone marrow (7/29/2022)  ,  _______________________________________________________________________  Family History:  family history includes Coronary artery disease in his mother; Heart attack in his mother; Hypertension in his brother, father, and mother; No Known Problems in his brother, brother, and son; Testicular cancer in his father; Thrombocytopenia in his mother ,  _______________________________________________________________________  Social History:   reports that he has quit smoking  His smoking use included cigarettes  He quit smokeless tobacco use about 8 years ago  He reports current alcohol use  He reports previous drug use ,  _______________________________________________________________________  Allergies:  is allergic to aspirin, diltiazem, and lisinopril     _______________________________________________________________________  Current Outpatient Medications   Medication Sig Dispense Refill    glimepiride (AMARYL) 4 mg tablet TAKE 1 TABLET BY MOUTH  DAILY 90 tablet 3    losartan (COZAAR) 25 mg tablet Take 1 tablet (25 mg total) by mouth daily 90 tablet 1    omeprazole (PriLOSEC) 40 MG capsule Take 1 capsule (40 mg total) by mouth daily 90 capsule 1    OneTouch Verio test strip USE TO TEST ONCE DAILY 25 strip 3  Promacta 75 MG TABS TAKE 1 TABLET BY MOUTH ONCE DAILY ON AN EMPTY STOMACH  AT LEAST 1 HOUR BEFORE OR 2 HOURS AFTER A MEAL 30 tablet 3    torsemide (DEMADEX) 20 mg tablet Take 1 tablet (20 mg total) by mouth in the morning and 1 tablet (20 mg total) before bedtime  180 tablet 3    clotrimazole-betamethasone (LOTRISONE) 1-0 05 % cream Apply topically 2 (two) times a day 45 g 0    Vitamin D, Cholecalciferol, 50 MCG (2000 UT) CAPS Take 1 tablet by mouth in the morning       No current facility-administered medications for this visit      _______________________________________________________________________  Review of Systems   Constitutional: Negative for chills and fever  HENT: Negative for congestion, ear pain, hearing loss, nosebleeds, sinus pain, sore throat and trouble swallowing  Eyes: Negative for discharge, redness and visual disturbance  Respiratory: Negative for cough, chest tightness and shortness of breath  Cardiovascular: Negative for chest pain and palpitations  Gastrointestinal: Negative for abdominal pain, blood in stool, constipation, diarrhea, nausea and vomiting  Genitourinary: Negative for dysuria, flank pain and hematuria  Musculoskeletal: Negative for arthralgias, myalgias and neck pain  Skin: Negative for color change and rash  Neurological: Negative for dizziness, speech difficulty, weakness and headaches  Hematological: Does not bruise/bleed easily  Psychiatric/Behavioral: Negative for agitation and behavioral problems  Objective:  Vitals:    08/19/22 0822   BP: 128/62   BP Location: Left arm   Patient Position: Sitting   Cuff Size: Large   Pulse: 80   Temp: 98 1 °F (36 7 °C)   TempSrc: Temporal   SpO2: 97%   Weight: 134 kg (296 lb)   Height: 6' 1" (1 854 m)     Body mass index is 39 05 kg/m²  Physical Exam  Vitals and nursing note reviewed  Constitutional:       General: He is not in acute distress  Appearance: He is obese     HENT:      Head: Normocephalic and atraumatic  Right Ear: Ear canal and external ear normal       Left Ear: Ear canal and external ear normal       Nose: Nose normal       Comments: Patient has a face mask on     Mouth/Throat:      Mouth: Mucous membranes are moist       Comments: Patient has a face mask on  Eyes:      General: No scleral icterus  Right eye: No discharge  Left eye: No discharge  Extraocular Movements: Extraocular movements intact  Conjunctiva/sclera: Conjunctivae normal    Cardiovascular:      Rate and Rhythm: Normal rate and regular rhythm  Pulses: Normal pulses  Heart sounds: Murmur (Left sternal border) heard  Pulmonary:      Effort: Pulmonary effort is normal  No respiratory distress  Breath sounds: Normal breath sounds  Abdominal:      General: Bowel sounds are normal       Palpations: Abdomen is soft  Tenderness: There is no abdominal tenderness  Musculoskeletal:         General: Normal range of motion  Cervical back: Normal range of motion and neck supple  No muscular tenderness  Right lower leg: Edema ( has trace pedal edema ) present  Left lower leg: Edema (  Has trace pedal edema ) present  Skin:     General: Skin is warm  Findings: No rash  Neurological:      General: No focal deficit present  Mental Status: He is alert and oriented to person, place, and time  Motor: No weakness  Coordination: Coordination normal    Psychiatric:         Mood and Affect: Mood normal          Behavior: Behavior normal          I spent 30 minutes with the patient today    More than 50% time spent for reviewing of external notes, reviewing of the results of diagnostics test, management of care, patient education and ordering of test

## 2022-08-19 NOTE — ASSESSMENT & PLAN NOTE
Stable on torsemide 20 mg b i d   Had a cardiac workup  negative for DVT  TSH normal   Chest x-ray no active disease  Possible dependent/venous insufficiency  Cannot tolerate elastic stockings  Advised to continue present medication  Keep legs elevated

## 2022-08-19 NOTE — ASSESSMENT & PLAN NOTE
Patient has been followed by [facility],THE oncologist   Recently had a bone marrow study

## 2022-08-19 NOTE — ASSESSMENT & PLAN NOTE
Patient has been followed by [facility],THE oncologist   He received 2 units of platelet transfusion per patient last platelet count 72 K

## 2022-08-19 NOTE — PATIENT INSTRUCTIONS
Patient was advised to continue present medications  discussed with the patient medications and laboratory data in detail  Follow-up with me in 3 months or as advised  If any blood test was ordered please do 1 week prior to next appointment unless advise to get earlier  If you have any questions please call the office [phone]   Type 2 Diabetes Management for Adults   AMBULATORY CARE:   Type 2 diabetes  is a disease that affects how your body uses glucose (sugar)  Either your body cannot make enough insulin, or it cannot use the insulin correctly  It is important to keep diabetes controlled to prevent damage to your heart, blood vessels, and other organs  Have someone call your local emergency number (911 in the [address] [place] [address],[address]) if:   · You cannot be woken  · You have signs of diabetic ketoacidosis:     ? confusion, fatigue    ? vomiting    ? rapid heartbeat    ? fruity smelling breath    ? extreme thirst    ? dry mouth and skin    · You have any of the following signs of a heart attack:      ? Squeezing, pressure, or pain in your chest    ? You may  also have any of the following:     § Discomfort or pain in your back, neck, jaw, stomach, or arm    § Shortness of breath    § Nausea or vomiting    § Lightheadedness or a sudden cold sweat    · You have any of the following signs of a stroke:      ? Numbness or drooping on one side of your face     ? Weakness in an arm or leg    ? Confusion or difficulty speaking    ? Dizziness, a severe headache, or vision loss    Call your doctor or diabetes care team if:   · You have a sore or wound that will not heal     · You have a change in the amount you urinate  · Your blood sugar levels are higher than your target goals  · You often have lower blood sugar levels than your target goals  · Your skin is red, dry, warm, or swollen  · You have trouble coping with diabetes, or you feel anxious or depressed      · You have questions or concerns about your condition or care     What you need to know about high blood sugar levels:  High blood sugar levels may not cause any symptoms  You may feel more thirsty or urinate more often than usual  Over time, high blood sugar levels can damage your nerves, blood vessels, tissues, and organs  The following can increase your blood sugar levels:  · Large meals or large amounts of carbohydrates at one time    · Less physical activity    · Stress    · Illness    · A lower dose of medicine or insulin, or a late dose    What you need to know about low blood sugar levels: You can prevent symptoms such as shakiness, dizziness, irritability, or confusion by preventing your blood sugar levels from going too low  · Treat a low blood sugar level right away  ? Drink 4 ounces of juice or have 1 tube of glucose gel  ? Check your blood sugar level again 10 to 15 minutes later  ? When the level goes back to normal, eat a meal or snack to prevent another decrease  · Keep glucose gel, raisins, or hard candy with you at all times to treat a low blood sugar level  · Your blood sugar level can get too low if you take diabetes medicine or insulin and do not eat enough food  · If you use insulin, check your blood sugar level before you exercise  ? If your blood sugar level is below 100 mg/dL, eat 4 crackers or 2 ounces of raisins, or drink 4 ounces of juice  ? Check your level every 30 minutes if you exercise more than 1 hour  ? You may need a snack during or after exercise  What you can do to manage your blood sugar levels:   · Check your blood sugar levels as directed and as needed  Several items are available to use to check your levels  You may need to check by testing a drop of blood in a glucose monitor  You may instead be given a continuous glucose monitoring (CGM) device  The device is worn at all times  The CGM checks your blood sugar level every 5 minutes   It sends results to an electronic device such as a smart phone  A CGM can be used with or without an insulin pump  Talk with your provider to find out which method is best for you  The goal for blood sugar levels before meals  is between 80 and 130 mg/dL and 2 hours after eating  is lower than 180 mg/dL  · Make healthy food choices  Work with a dietitian to develop a meal plan that works for you and your schedule  A dietitian can help you learn how to eat the right amount of carbohydrates during your meals and snacks  Carbohydrates can raise your blood sugar level if you eat too many at one time  Examples of foods that contain carbohydrates are breads, cereals, rice, pasta, and sweets  · Get regular physical activity  Physical activity can help you get to your target blood sugar level goal and manage your weight  Get at least 150 minutes of moderate to vigorous aerobic physical activity each week  Do not miss more than 2 days in a row  Do not sit longer than 30 minutes at a time  Your healthcare provider can help you create an activity plan  The plan can include the best activities for you and can help you build your strength and endurance  · Maintain a healthy weight  Ask your healthcare provider what a healthy weight is for you  Ask him or her to help you create a safe weight loss plan if you are overweight  · Take your diabetes medicine or insulin as directed  You may need diabetes medicine, insulin, or both to help control your blood sugar levels  Your healthcare provider will teach you how and when to take your diabetes medicine or insulin  You will also be taught about side effects oral diabetes medicine can cause  Insulin may be injected, or given through a pump or pen  You and your care team will discuss which method is best for you  ? An insulin pump  is an implanted device that gives your insulin 24 hours a day  An insulin pump prevents the need for multiple insulin injections in a day  ?  An insulin pen  is a device prefilled with the right amount of insulin  ? You and your family members will be taught how to draw up and give insulin  if this is the best method for you  Your education team will also teach you how to dispose of needles and syringes  ? You will learn how much insulin you need  and when to give it  You will be taught when not to give insulin  You will also be taught what to do if your blood sugar level drops too low  This may happen if you take insulin and do not eat the right amount of carbohydrates  Other things you can do to manage type 2 diabetes:   · Wear medical alert identification  Wear medical alert jewelry or carry a card that says you have diabetes  Ask your provider where to get these items  · Do not smoke  Nicotine and other chemicals in cigarettes and cigars can cause lung and blood vessel damage  It also makes it more difficult to manage your diabetes  Ask your provider for information if you currently smoke and need help to quit  Do not use e-cigarettes or smokeless tobacco in place of cigarettes or to help you quit  They still contain nicotine  · Check your feet each day for cuts, scratches, calluses, or other wounds  Look for redness and swelling, and feel for warmth  Wear shoes that fit well  Check your shoes for rocks or other objects that can hurt your feet  Do not walk barefoot or wear shoes without socks  Wear cotton socks to help keep your feet dry  · Ask about vaccines you may need  You have a higher risk for serious illness if you get the flu, pneumonia, COVID-19, or hepatitis  Ask your provider if you should get vaccines to prevent these or other diseases, and when to get the vaccines  · Talk to your care team if you become stressed about diabetes care  Sometimes being able to fit diabetes care into your life can cause increased stress  The stress can cause you not to take care of yourself properly   Your care team can help by offering tips about self-care  Your care team may suggest you talk to a mental health provider  The provider can listen and offer help with self-care issues  Follow up with your doctor or diabetes care team as directed: You may need to have blood tests done before your follow-up visit  The test results will show if changes need to be made in your treatment or self-care  Write down your questions so you remember to ask them during your visits  Talk to your provider if you cannot afford your medicine  © Copyright [employer] 2022 Information is for End User's use only and may not be sold, redistributed or otherwise used for commercial purposes  All illustrations and images included in CareNotes® are the copyrighted property of A D A M , Inc  or [facility] Penelope Solorio   The above information is an  only  It is not intended as medical advice for individual conditions or treatments  Talk to your doctor, nurse or pharmacist before following any medical regimen to see if it is safe and effective for you

## 2022-08-19 NOTE — ASSESSMENT & PLAN NOTE
As mentioned above patient has been followed by [facility],THE oncologist   Recently had a bone marrow study  Will be going for IV iron infusion as well as vitamin B12 injection  He is going for EGD and colonoscopy October 15, 2022 to rule out any GI loss  Although denies any blood in the stool dark-colored stool

## 2022-08-24 ENCOUNTER — HOSPITAL ENCOUNTER (OUTPATIENT)
Dept: INFUSION CENTER | Facility: CLINIC | Age: 47
Discharge: HOME | End: 2022-08-24
Payer: COMMERCIAL

## 2022-08-24 DIAGNOSIS — D50.0 IRON DEFICIENCY ANEMIA DUE TO CHRONIC BLOOD LOSS: Primary | ICD-10-CM

## 2022-08-24 DIAGNOSIS — E53.8 B12 DEFICIENCY: ICD-10-CM

## 2022-08-24 PROCEDURE — 96365 THER/PROPH/DIAG IV INF INIT: CPT

## 2022-08-24 PROCEDURE — 96372 THER/PROPH/DIAG INJ SC/IM: CPT

## 2022-08-24 RX ORDER — CYANOCOBALAMIN 1000 UG/ML
1000 INJECTION, SOLUTION INTRAMUSCULAR; SUBCUTANEOUS ONCE
Status: CANCELLED | OUTPATIENT
Start: 2022-08-29

## 2022-08-24 RX ORDER — SODIUM CHLORIDE 9 MG/ML
20 INJECTION, SOLUTION INTRAVENOUS ONCE
Status: COMPLETED | OUTPATIENT
Start: 2022-08-24 | End: 2022-08-24

## 2022-08-24 RX ORDER — CYANOCOBALAMIN 1000 UG/ML
1000 INJECTION, SOLUTION INTRAMUSCULAR; SUBCUTANEOUS ONCE
Status: COMPLETED | OUTPATIENT
Start: 2022-08-24 | End: 2022-08-24

## 2022-08-24 RX ORDER — SODIUM CHLORIDE 9 MG/ML
20 INJECTION, SOLUTION INTRAVENOUS ONCE
Status: CANCELLED | OUTPATIENT
Start: 2022-08-26

## 2022-08-24 RX ADMIN — CYANOCOBALAMIN 1000 MCG: 1000 INJECTION, SOLUTION INTRAMUSCULAR at 15:43

## 2022-08-24 RX ADMIN — SODIUM CHLORIDE 20 ML/HR: 0.9 INJECTION, SOLUTION INTRAVENOUS at 14:40

## 2022-08-24 RX ADMIN — SODIUM CHLORIDE 200 MG: 9 INJECTION, SOLUTION INTRAVENOUS at 14:41

## 2022-08-24 NOTE — PROGRESS NOTES
Pt presents for venofer infusion and B-12 injection offering no complaints  Pt tolerated treatment without incident  PIV removed    AVS declined, next appointment reviewed

## 2022-08-26 ENCOUNTER — HOSPITAL ENCOUNTER (OUTPATIENT)
Dept: INFUSION CENTER | Facility: CLINIC | Age: 47
End: 2022-08-26
Payer: COMMERCIAL

## 2022-08-26 VITALS
SYSTOLIC BLOOD PRESSURE: 126 MMHG | HEART RATE: 81 BPM | TEMPERATURE: 98.5 F | RESPIRATION RATE: 18 BRPM | DIASTOLIC BLOOD PRESSURE: 59 MMHG

## 2022-08-26 DIAGNOSIS — D50.0 IRON DEFICIENCY ANEMIA DUE TO CHRONIC BLOOD LOSS: Primary | ICD-10-CM

## 2022-08-26 DIAGNOSIS — E53.8 B12 DEFICIENCY: ICD-10-CM

## 2022-08-26 PROCEDURE — 96365 THER/PROPH/DIAG IV INF INIT: CPT

## 2022-08-26 RX ORDER — SODIUM CHLORIDE 9 MG/ML
20 INJECTION, SOLUTION INTRAVENOUS ONCE
Status: CANCELLED | OUTPATIENT
Start: 2022-08-29

## 2022-08-26 RX ORDER — SODIUM CHLORIDE 9 MG/ML
20 INJECTION, SOLUTION INTRAVENOUS ONCE
Status: COMPLETED | OUTPATIENT
Start: 2022-08-26 | End: 2022-08-26

## 2022-08-26 RX ADMIN — SODIUM CHLORIDE 20 ML/HR: 9 INJECTION, SOLUTION INTRAVENOUS at 14:46

## 2022-08-26 RX ADMIN — IRON SUCROSE 200 MG: 20 INJECTION, SOLUTION INTRAVENOUS at 14:46

## 2022-08-29 ENCOUNTER — HOSPITAL ENCOUNTER (OUTPATIENT)
Dept: INFUSION CENTER | Facility: CLINIC | Age: 47
Discharge: HOME | End: 2022-08-29
Payer: COMMERCIAL

## 2022-08-29 VITALS
RESPIRATION RATE: 16 BRPM | HEART RATE: 80 BPM | SYSTOLIC BLOOD PRESSURE: 127 MMHG | DIASTOLIC BLOOD PRESSURE: 59 MMHG | TEMPERATURE: 97.9 F

## 2022-08-29 DIAGNOSIS — E53.8 B12 DEFICIENCY: ICD-10-CM

## 2022-08-29 DIAGNOSIS — I10 ESSENTIAL HYPERTENSION: ICD-10-CM

## 2022-08-29 DIAGNOSIS — D50.0 IRON DEFICIENCY ANEMIA DUE TO CHRONIC BLOOD LOSS: Primary | ICD-10-CM

## 2022-08-29 PROCEDURE — 96365 THER/PROPH/DIAG IV INF INIT: CPT

## 2022-08-29 PROCEDURE — 96372 THER/PROPH/DIAG INJ SC/IM: CPT

## 2022-08-29 RX ORDER — SODIUM CHLORIDE 9 MG/ML
20 INJECTION, SOLUTION INTRAVENOUS ONCE
Status: COMPLETED | OUTPATIENT
Start: 2022-08-29 | End: 2022-08-29

## 2022-08-29 RX ORDER — CYANOCOBALAMIN 1000 UG/ML
1000 INJECTION, SOLUTION INTRAMUSCULAR; SUBCUTANEOUS ONCE
Status: CANCELLED | OUTPATIENT
Start: 2022-09-07

## 2022-08-29 RX ORDER — SODIUM CHLORIDE 9 MG/ML
20 INJECTION, SOLUTION INTRAVENOUS ONCE
Status: CANCELLED | OUTPATIENT
Start: 2022-08-29

## 2022-08-29 RX ORDER — CYANOCOBALAMIN 1000 UG/ML
1000 INJECTION, SOLUTION INTRAMUSCULAR; SUBCUTANEOUS ONCE
Status: COMPLETED | OUTPATIENT
Start: 2022-08-29 | End: 2022-08-29

## 2022-08-29 RX ADMIN — IRON SUCROSE 200 MG: 20 INJECTION, SOLUTION INTRAVENOUS at 08:26

## 2022-08-29 RX ADMIN — CYANOCOBALAMIN 1000 MCG: 1000 INJECTION, SOLUTION INTRAMUSCULAR; SUBCUTANEOUS at 08:30

## 2022-08-29 RX ADMIN — SODIUM CHLORIDE 20 ML/HR: 0.9 INJECTION, SOLUTION INTRAVENOUS at 08:25

## 2022-08-29 NOTE — PROGRESS NOTES
Pt tolerated todays dose of iv venofer and vitamin b12 injection to left arm without incident  Peripheral iv discontinued jelco intact  discharged ambulatory deferring avs as he will be scheduling more appts at check out

## 2022-08-30 PROCEDURE — 4010F ACE/ARB THERAPY RXD/TAKEN: CPT | Performed by: INTERNAL MEDICINE

## 2022-08-30 RX ORDER — LOSARTAN POTASSIUM 25 MG/1
TABLET ORAL
Qty: 90 TABLET | Refills: 3 | Status: SHIPPED | OUTPATIENT
Start: 2022-08-30

## 2022-08-31 ENCOUNTER — HOSPITAL ENCOUNTER (OUTPATIENT)
Dept: INFUSION CENTER | Facility: CLINIC | Age: 47
Discharge: HOME | End: 2022-08-31
Payer: COMMERCIAL

## 2022-08-31 VITALS
DIASTOLIC BLOOD PRESSURE: 60 MMHG | SYSTOLIC BLOOD PRESSURE: 126 MMHG | HEART RATE: 76 BPM | RESPIRATION RATE: 18 BRPM | TEMPERATURE: 97.6 F

## 2022-08-31 DIAGNOSIS — E53.8 B12 DEFICIENCY: ICD-10-CM

## 2022-08-31 DIAGNOSIS — D50.0 IRON DEFICIENCY ANEMIA DUE TO CHRONIC BLOOD LOSS: Primary | ICD-10-CM

## 2022-08-31 PROCEDURE — 96365 THER/PROPH/DIAG IV INF INIT: CPT

## 2022-08-31 RX ORDER — SODIUM CHLORIDE 9 MG/ML
20 INJECTION, SOLUTION INTRAVENOUS ONCE
Status: CANCELLED | OUTPATIENT
Start: 2022-09-07

## 2022-08-31 RX ORDER — SODIUM CHLORIDE 9 MG/ML
20 INJECTION, SOLUTION INTRAVENOUS ONCE
Status: COMPLETED | OUTPATIENT
Start: 2022-08-31 | End: 2022-08-31

## 2022-08-31 RX ADMIN — SODIUM CHLORIDE 20 ML/HR: 9 INJECTION, SOLUTION INTRAVENOUS at 15:02

## 2022-08-31 RX ADMIN — IRON SUCROSE 200 MG: 20 INJECTION, SOLUTION INTRAVENOUS at 15:04

## 2022-08-31 NOTE — PROGRESS NOTES
Pt to clinic for venofer, offers no complaints today, tolerated infusion without complications, aware of next appointment, PIV removed, avs declined

## 2022-09-04 LAB
CHOLEST SERPL-MCNC: 132 MG/DL
CHOLEST/HDLC SERPL: 2.8 (CALC)
HBA1C MFR BLD: 7.1 % OF TOTAL HGB
HDLC SERPL-MCNC: 47 MG/DL
LDLC SERPL CALC-MCNC: 69 MG/DL (CALC)
NONHDLC SERPL-MCNC: 85 MG/DL (CALC)
TRIGL SERPL-MCNC: 78 MG/DL

## 2022-09-04 PROCEDURE — 3051F HG A1C>EQUAL 7.0%<8.0%: CPT | Performed by: INTERNAL MEDICINE

## 2022-09-07 ENCOUNTER — HOSPITAL ENCOUNTER (OUTPATIENT)
Dept: INFUSION CENTER | Facility: CLINIC | Age: 47
Discharge: HOME | End: 2022-09-07
Payer: COMMERCIAL

## 2022-09-07 VITALS
HEART RATE: 79 BPM | SYSTOLIC BLOOD PRESSURE: 118 MMHG | TEMPERATURE: 97.5 F | RESPIRATION RATE: 16 BRPM | DIASTOLIC BLOOD PRESSURE: 55 MMHG

## 2022-09-07 DIAGNOSIS — D50.0 IRON DEFICIENCY ANEMIA DUE TO CHRONIC BLOOD LOSS: Primary | ICD-10-CM

## 2022-09-07 DIAGNOSIS — E53.8 B12 DEFICIENCY: ICD-10-CM

## 2022-09-07 PROCEDURE — 96365 THER/PROPH/DIAG IV INF INIT: CPT

## 2022-09-07 PROCEDURE — 96372 THER/PROPH/DIAG INJ SC/IM: CPT

## 2022-09-07 RX ORDER — SODIUM CHLORIDE 9 MG/ML
20 INJECTION, SOLUTION INTRAVENOUS ONCE
Status: CANCELLED | OUTPATIENT
Start: 2022-09-09

## 2022-09-07 RX ORDER — SODIUM CHLORIDE 9 MG/ML
20 INJECTION, SOLUTION INTRAVENOUS ONCE
Status: COMPLETED | OUTPATIENT
Start: 2022-09-07 | End: 2022-09-07

## 2022-09-07 RX ORDER — CYANOCOBALAMIN 1000 UG/ML
1000 INJECTION, SOLUTION INTRAMUSCULAR; SUBCUTANEOUS ONCE
Status: COMPLETED | OUTPATIENT
Start: 2022-09-07 | End: 2022-09-07

## 2022-09-07 RX ORDER — CYANOCOBALAMIN 1000 UG/ML
1000 INJECTION, SOLUTION INTRAMUSCULAR; SUBCUTANEOUS ONCE
OUTPATIENT
Start: 2022-09-12

## 2022-09-07 RX ADMIN — CYANOCOBALAMIN 1000 MCG: 1000 INJECTION, SOLUTION INTRAMUSCULAR; SUBCUTANEOUS at 14:48

## 2022-09-07 RX ADMIN — SODIUM CHLORIDE 20 ML/HR: 9 INJECTION, SOLUTION INTRAVENOUS at 14:49

## 2022-09-07 RX ADMIN — SODIUM CHLORIDE 200 MG: 9 INJECTION, SOLUTION INTRAVENOUS at 14:50

## 2022-09-07 NOTE — PROGRESS NOTES
Pt presents for venofer infusion and B12 injection offering no complaints  Injection placed in left arm without complications  Pt tolerated treatment without incident  PIV removed  AVS declined, next appointment reviewed

## 2022-09-09 ENCOUNTER — HOSPITAL ENCOUNTER (OUTPATIENT)
Dept: INFUSION CENTER | Facility: CLINIC | Age: 47
End: 2022-09-09
Payer: COMMERCIAL

## 2022-09-09 VITALS
SYSTOLIC BLOOD PRESSURE: 133 MMHG | HEART RATE: 89 BPM | DIASTOLIC BLOOD PRESSURE: 61 MMHG | RESPIRATION RATE: 18 BRPM | TEMPERATURE: 98.8 F

## 2022-09-09 DIAGNOSIS — D50.0 IRON DEFICIENCY ANEMIA DUE TO CHRONIC BLOOD LOSS: Primary | ICD-10-CM

## 2022-09-09 DIAGNOSIS — E53.8 B12 DEFICIENCY: ICD-10-CM

## 2022-09-09 PROCEDURE — 96365 THER/PROPH/DIAG IV INF INIT: CPT

## 2022-09-09 RX ORDER — SODIUM CHLORIDE 9 MG/ML
20 INJECTION, SOLUTION INTRAVENOUS ONCE
OUTPATIENT
Start: 2022-09-12

## 2022-09-09 RX ORDER — SODIUM CHLORIDE 9 MG/ML
20 INJECTION, SOLUTION INTRAVENOUS ONCE
Status: COMPLETED | OUTPATIENT
Start: 2022-09-09 | End: 2022-09-09

## 2022-09-09 RX ADMIN — IRON SUCROSE 200 MG: 20 INJECTION, SOLUTION INTRAVENOUS at 08:22

## 2022-09-09 RX ADMIN — SODIUM CHLORIDE 20 ML/HR: 0.9 INJECTION, SOLUTION INTRAVENOUS at 08:22

## 2022-09-10 ENCOUNTER — APPOINTMENT (OUTPATIENT)
Dept: RADIOLOGY | Facility: HOSPITAL | Age: 47
End: 2022-09-10
Payer: COMMERCIAL

## 2022-09-10 ENCOUNTER — TELEPHONE (OUTPATIENT)
Dept: HEMATOLOGY ONCOLOGY | Facility: CLINIC | Age: 47
End: 2022-09-10

## 2022-09-10 ENCOUNTER — HOSPITAL ENCOUNTER (EMERGENCY)
Facility: HOSPITAL | Age: 47
Discharge: HOME | End: 2022-09-11
Attending: EMERGENCY MEDICINE
Payer: COMMERCIAL

## 2022-09-10 VITALS
TEMPERATURE: 99 F | HEART RATE: 74 BPM | OXYGEN SATURATION: 96 % | DIASTOLIC BLOOD PRESSURE: 64 MMHG | RESPIRATION RATE: 18 BRPM | SYSTOLIC BLOOD PRESSURE: 120 MMHG

## 2022-09-10 DIAGNOSIS — U07.1 COVID: Primary | ICD-10-CM

## 2022-09-10 LAB
2HR DELTA HS TROPONIN: -1 NG/L
ALBUMIN SERPL BCP-MCNC: 3.9 G/DL (ref 3.5–5)
ALP SERPL-CCNC: 122 U/L (ref 34–104)
ALT SERPL W P-5'-P-CCNC: 70 U/L (ref 7–52)
ANION GAP SERPL CALCULATED.3IONS-SCNC: 8 MMOL/L (ref 4–13)
AST SERPL W P-5'-P-CCNC: 122 U/L (ref 13–39)
BASOPHILS # BLD AUTO: 0 THOUSANDS/ΜL (ref 0–0.1)
BASOPHILS NFR BLD AUTO: 0 % (ref 0–1)
BILIRUB SERPL-MCNC: 2.26 MG/DL (ref 0.2–1)
BNP SERPL-MCNC: 22 PG/ML (ref 0–100)
BUN SERPL-MCNC: 18 MG/DL (ref 5–25)
CALCIUM SERPL-MCNC: 8.5 MG/DL (ref 8.4–10.2)
CARDIAC TROPONIN I PNL SERPL HS: 5 NG/L
CARDIAC TROPONIN I PNL SERPL HS: 6 NG/L
CHLORIDE SERPL-SCNC: 105 MMOL/L (ref 96–108)
CO2 SERPL-SCNC: 23 MMOL/L (ref 21–32)
CREAT SERPL-MCNC: 1.23 MG/DL (ref 0.6–1.3)
EOSINOPHIL # BLD AUTO: 0.01 THOUSAND/ΜL (ref 0–0.61)
EOSINOPHIL NFR BLD AUTO: 1 % (ref 0–6)
ERYTHROCYTE [DISTWIDTH] IN BLOOD BY AUTOMATED COUNT: 20.4 % (ref 11.6–15.1)
GFR SERPL CREATININE-BSD FRML MDRD: 69 ML/MIN/1.73SQ M
GLUCOSE SERPL-MCNC: 201 MG/DL (ref 65–140)
HCT VFR BLD AUTO: 32.9 % (ref 36.5–49.3)
HGB BLD-MCNC: 10.4 G/DL (ref 12–17)
IMM GRANULOCYTES # BLD AUTO: 0 THOUSAND/UL (ref 0–0.2)
IMM GRANULOCYTES NFR BLD AUTO: 0 % (ref 0–2)
LYMPHOCYTES # BLD AUTO: 0.49 THOUSANDS/ΜL (ref 0.6–4.47)
LYMPHOCYTES NFR BLD AUTO: 43 % (ref 14–44)
MCH RBC QN AUTO: 27.6 PG (ref 26.8–34.3)
MCHC RBC AUTO-ENTMCNC: 31.6 G/DL (ref 31.4–37.4)
MCV RBC AUTO: 87 FL (ref 82–98)
MONOCYTES # BLD AUTO: 0.21 THOUSAND/ΜL (ref 0.17–1.22)
MONOCYTES NFR BLD AUTO: 18 % (ref 4–12)
NEUTROPHILS # BLD AUTO: 0.44 THOUSANDS/ΜL (ref 1.85–7.62)
NEUTS SEG NFR BLD AUTO: 38 % (ref 43–75)
NRBC BLD AUTO-RTO: 0 /100 WBCS
PLATELET # BLD AUTO: 82 THOUSANDS/UL (ref 149–390)
PMV BLD AUTO: 10.5 FL (ref 8.9–12.7)
POTASSIUM SERPL-SCNC: 3.2 MMOL/L (ref 3.5–5.3)
PROT SERPL-MCNC: 6.8 G/DL (ref 6.4–8.4)
RBC # BLD AUTO: 3.77 MILLION/UL (ref 3.88–5.62)
SODIUM SERPL-SCNC: 136 MMOL/L (ref 135–147)
WBC # BLD AUTO: 1.15 THOUSAND/UL (ref 4.31–10.16)

## 2022-09-10 PROCEDURE — 84484 ASSAY OF TROPONIN QUANT: CPT | Performed by: EMERGENCY MEDICINE

## 2022-09-10 PROCEDURE — 93005 ELECTROCARDIOGRAM TRACING: CPT

## 2022-09-10 PROCEDURE — 99284 EMERGENCY DEPT VISIT MOD MDM: CPT | Performed by: EMERGENCY MEDICINE

## 2022-09-10 PROCEDURE — 85025 COMPLETE CBC W/AUTO DIFF WBC: CPT | Performed by: EMERGENCY MEDICINE

## 2022-09-10 PROCEDURE — 80053 COMPREHEN METABOLIC PANEL: CPT | Performed by: EMERGENCY MEDICINE

## 2022-09-10 PROCEDURE — 83880 ASSAY OF NATRIURETIC PEPTIDE: CPT | Performed by: EMERGENCY MEDICINE

## 2022-09-10 PROCEDURE — 71045 X-RAY EXAM CHEST 1 VIEW: CPT

## 2022-09-10 PROCEDURE — 36415 COLL VENOUS BLD VENIPUNCTURE: CPT

## 2022-09-10 PROCEDURE — 99285 EMERGENCY DEPT VISIT HI MDM: CPT

## 2022-09-10 NOTE — TELEPHONE ENCOUNTER
Patient tested positive for COVID today with onset of symptoms yesterday  He has fever and body aches  No SOB  He called his PCP who referred him to me because he was comfortable treating the patient due to his hematogic issues  The patient is neutopenic and is on promacta  I was going to prescribe paxlovid but it interacts with promacta, with a chance of increasing promacta levels and increasing platelet counts  bud is only for pre-exposure prophylaxis  At this point, it would be supportive care with plenty of fluids and tylenol for symptoms  If his fever would got over 102 5 or SOB would develop, he should go to the ER

## 2022-09-11 LAB
ATRIAL RATE: 83 BPM
P AXIS: 24 DEGREES
PR INTERVAL: 164 MS
QRS AXIS: -14 DEGREES
QRSD INTERVAL: 90 MS
QT INTERVAL: 382 MS
QTC INTERVAL: 448 MS
T WAVE AXIS: 12 DEGREES
VENTRICULAR RATE: 83 BPM

## 2022-09-11 PROCEDURE — 93010 ELECTROCARDIOGRAM REPORT: CPT | Performed by: INTERNAL MEDICINE

## 2022-09-11 NOTE — ED ATTENDING ATTESTATION
9/10/2022  IJami MD, saw and evaluated the patient  I have discussed the patient with the resident/non-physician practitioner and agree with the resident's/non-physician practitioner's findings, Plan of Care, and MDM as documented in the resident's/non-physician practitioner's note, except where noted  All available labs and Radiology studies were reviewed  I was present for key portions of any procedure(s) performed by the resident/non-physician practitioner and I was immediately available to provide assistance  At this point I agree with the current assessment done in the Emergency Department  I have conducted an independent evaluation of this patient a history and physical is as follows:  Shortness of breath likely secondary to known COVID infection  He is hemodynamically stable  EKG, troponin unremarkable  Patient with    Review of Systems - Negative except shortness of breath  Physical Exam  Vitals and nursing note reviewed  Constitutional:       General: He is not in acute distress  Appearance: He is well-developed  He is not diaphoretic  HENT:      Head: Normocephalic and atraumatic  Right Ear: External ear normal       Left Ear: External ear normal    Eyes:      General:         Right eye: No discharge  Left eye: No discharge  Pupils: Pupils are equal, round, and reactive to light  Neck:      Thyroid: No thyromegaly  Trachea: No tracheal deviation  Cardiovascular:      Rate and Rhythm: Normal rate and regular rhythm  Heart sounds: No murmur heard  Pulmonary:      Effort: Pulmonary effort is normal       Breath sounds: Normal breath sounds  Abdominal:      General: Bowel sounds are normal  There is no distension  Palpations: Abdomen is soft  Tenderness: There is no abdominal tenderness  Musculoskeletal:         General: No deformity  Normal range of motion  Cervical back: Normal range of motion and neck supple  Skin:     General: Skin is warm  Capillary Refill: Capillary refill takes less than 2 seconds  Neurological:      Mental Status: He is alert and oriented to person, place, and time  Cranial Nerves: No cranial nerve deficit  Motor: No abnormal muscle tone  Psychiatric:         Behavior: Behavior normal        Results Reviewed     Procedure Component Value Units Date/Time    HS Troponin I 2hr [[id number]]  (Normal) Collected: 09/10/22 2149    Lab Status: Final result Specimen: Blood from Arm, Right Updated: 09/10/22 2238     hs TnI 2hr 5 ng/L      Delta 2hr hsTnI -1 ng/L     [place] draw [[id number]] Collected: 09/10/22 2004    Lab Status: Final result Specimen: Blood from Arm, Right Updated: 09/10/22 2203    Narrative: The following orders were created for panel order [place] draw  Procedure                               Abnormality         Status                     ---------                               -----------         ------                     Savana Linn Top on WKCP[[id number]]                           Final result               Gold top on NMPB[[id number]]                                 Final result                 Please view results for these tests on the individual orders      B-Type Natriuretic Peptide(BNP), AN, CA, [place] [facility] Only [[id number]]  (Normal) Collected: 09/10/22 2003    Lab Status: Final result Specimen: Blood from Arm, Right Updated: 09/10/22 2144     BNP 22 pg/mL     CBC and differential [[id number]]  (Abnormal) Collected: 09/10/22 2003    Lab Status: Final result Specimen: Blood from Arm, Right Updated: 09/10/22 2056     WBC 1 15 Thousand/uL      RBC 3 77 Million/uL      Hemoglobin 10 4 g/dL      Hematocrit 32 9 %      MCV 87 fL      MCH 27 6 pg      MCHC 31 6 g/dL      RDW 20 4 %      MPV 10 5 fL      Platelets 82 Thousands/uL      nRBC 0 /100 WBCs      Neutrophils Relative 38 %      Immat GRANS % 0 %      Lymphocytes Relative 43 %      Monocytes Relative 18 % Eosinophils Relative 1 %      Basophils Relative 0 %      Neutrophils Absolute 0 44 Thousands/µL      Immature Grans Absolute 0 00 Thousand/uL      Lymphocytes Absolute 0 49 Thousands/µL      Monocytes Absolute 0 21 Thousand/µL      Eosinophils Absolute 0 01 Thousand/µL      Basophils Absolute 0 00 Thousands/µL     Narrative: This is an appended report  These results have been appended to a previously verified report  HS Troponin 0hr (reflex protocol) [[id number]]  (Normal) Collected: 09/10/22 2003    Lab Status: Final result Specimen: Blood from Arm, Right Updated: 09/10/22 2045     hs TnI 0hr 6 ng/L     HS Troponin I 4hr [[id number]]     Lab Status: No result Specimen: Blood     Comprehensive metabolic panel [[id number]]  (Abnormal) Collected: 09/10/22 2003    Lab Status: Final result Specimen: Blood from Arm, Right Updated: 09/10/22 2038     Sodium 136 mmol/L      Potassium 3 2 mmol/L      Chloride 105 mmol/L      CO2 23 mmol/L      ANION GAP 8 mmol/L      BUN 18 mg/dL      Creatinine 1 23 mg/dL      Glucose 201 mg/dL      Calcium 8 5 mg/dL       U/L      ALT 70 U/L      Alkaline Phosphatase 122 U/L      Total Protein 6 8 g/dL      Albumin 3 9 g/dL      Total Bilirubin 2 26 mg/dL      eGFR 69 ml/min/1 73sq m     Narrative:      [place] guidelines for Chronic Kidney Disease (CKD):     Stage 1 with normal or high GFR (GFR > 90 mL/min/1 73 square meters)    Stage 2 Mild CKD (GFR = 60-89 mL/min/1 73 square meters)    Stage 3A Moderate CKD (GFR = 45-59 mL/min/1 73 square meters)    Stage 3B Moderate CKD (GFR = 30-44 mL/min/1 73 square meters)    Stage 4 Severe CKD (GFR = 15-29 mL/min/1 73 square meters)    Stage 5 End Stage CKD (GFR <15 mL/min/1 73 square meters)  Note: GFR calculation is accurate only with a steady state creatinine        XR chest 1 view portable    (Results Pending)     Reviewed record, patient's hematologist does not want him to take paxlovid    He meets no admission criteria  He has no hypoxia  He is speaking full sentences  X-ray with no acute abnormality  Labs unremarkable, low risk by heart score, patient stable for discharge home, PCP/Oncology follow-up recommended  Return precautions discussed with patient        ED Course         Critical Care Time  Procedures

## 2022-09-11 NOTE — ED PROVIDER NOTES
History  Chief Complaint   Patient presents with    Shortness of Breath     Patient reports SOB and body aches since yesterday  Reports +covid test at home  Was told by pcp to come to ED w/ increased SOB  +dizziness/ gait issues per family  Patient denies HA, N/V/D     69-year-old male with history of pancytopenia, ITP, T2DM presents today with shortness of breath  Patient states the SOB is worsened by activity and relieved by rest   Patient tested positive at home for COVID and was advised by his hematologist to go to the ED if shortness of breath is worsening  Patient states he also feels warm, and dry cough  Denies nausea/vomiting, diarrhea/constipation, loss of taste/smell, body aches  Prior to Admission Medications   Prescriptions Last Dose Informant Patient Reported? Taking? OneTouch Verio test strip  Self No No   Sig: USE TO TEST ONCE DAILY   Promacta 75 MG TABS  Self No No   Sig: TAKE 1 TABLET BY MOUTH ONCE DAILY ON AN EMPTY STOMACH  AT LEAST 1 HOUR BEFORE OR 2 HOURS AFTER A MEAL   Vitamin D, Cholecalciferol, 50 MCG (2000 UT) CAPS  Self Yes No   Sig: Take 1 tablet by mouth in the morning   clotrimazole-betamethasone (LOTRISONE) 1-0 05 % cream  Self No No   Sig: Apply topically 2 (two) times a day   glimepiride (AMARYL) 4 mg tablet  Self No No   Sig: TAKE 1 TABLET BY MOUTH  DAILY   losartan (COZAAR) 25 mg tablet   No No   Sig: TAKE 1 TABLET BY MOUTH  DAILY   omeprazole (PriLOSEC) 40 MG capsule  Self No No   Sig: Take 1 capsule (40 mg total) by mouth daily   torsemide (DEMADEX) 20 mg tablet  Self No No   Sig: Take 1 tablet (20 mg total) by mouth in the morning and 1 tablet (20 mg total) before bedtime        Facility-Administered Medications: None       Past Medical History:   Diagnosis Date    BMI 37 0-37 9, adult 04/22/2021    BMI 38 0-38 9,adult 02/08/2022    BMI 39 0-39 9,adult 12/16/2021    Cervicalgia     Colon polyp     Concussion with loss of consciousness of 30 minutes or less 03/07/2021    Depression     Diabetes mellitus ([facility] [address] )     Diabetes mellitus out of control     Edema of both lower legs 12/16/2021    Elevated liver enzymes     Erectile dysfunction     Exposure to COVID-19 virus     Fatty liver     GERD (gastroesophageal reflux disease)     Heart murmur 01/07/2022    Hyperglycemia     Hypertension     Left cervical radiculopathy     Left elbow pain     Left foot pain     Leukopenia 03/07/2021    Pain in both feet 12/16/2021    Pancytopenia ([facility] [address] ) 12/16/2021    Skin rash 12/16/2021    Thrombocytopenia (HCC)     Venous stasis dermatitis of both lower extremities 05/13/2022    Vitamin D deficiency 01/07/2022       Past Surgical History:   Procedure Laterality Date    CHOLECYSTECTOMY  1990's    COLONOSCOPY  06/2005    COLONOSCOPY      EGD  07/10/2014    IR BIOPSY BONE MARROW  03/20/2019    IR BIOPSY BONE MARROW  7/29/2022    UPPER GASTROINTESTINAL ENDOSCOPY      WISDOM TOOTH EXTRACTION      In early to mid 19's       Family History   Problem Relation Age of Onset    Heart attack Mother     Thrombocytopenia Mother     Coronary artery disease Mother     Hypertension Mother     Testicular cancer Father     Hypertension Father     No Known Problems Brother     Hypertension Brother     No Known Problems Brother     No Known Problems Son      I have reviewed and agree with the history as documented  E-Cigarette/Vaping    E-Cigarette Use Never User      E-Cigarette/Vaping Substances    Nicotine No     THC No     CBD No     Flavoring No     Other No     Unknown No      Social History     Tobacco Use    Smoking status: Former Smoker     Types: Cigarettes    Smokeless tobacco: Former User     Quit date: 3/1/2014    Tobacco comment: Social   Last cigarette years ago     Vaping Use    Vaping Use: Never used   Substance Use Topics    Alcohol use: Yes     Comment: rarely    Drug use: Not Currently     Comment: No drug use - As per [employer] Review of Systems    Physical Exam  ED Triage Vitals [09/10/22 1933]   Temperature Pulse Respirations Blood Pressure SpO2   99 °F (37 2 °C) 84 18 151/77 97 %      Temp Source Heart Rate Source Patient Position - Orthostatic VS BP Location FiO2 (%)   Oral Monitor Sitting Right arm --      Pain Score       --             Orthostatic Vital Signs  Vitals:    09/10/22 1933 09/10/22 1945 09/10/22 2000 09/10/22 2100   BP: 151/77 136/66 126/60 126/59   Pulse: 84 82 76 76   Patient Position - Orthostatic VS: Sitting   Sitting       Physical Exam    ED Medications  Medications - No data to display    Diagnostic Studies  Results Reviewed     Procedure Component Value Units Date/Time    HS Troponin I 2hr [[id number]]  (Normal) Collected: 09/10/22 2149    Lab Status: Final result Specimen: Blood from Arm, Right Updated: 09/10/22 2238     hs TnI 2hr 5 ng/L      Delta 2hr hsTnI -1 ng/L     [place] draw [[id number]] Collected: 09/10/22 2004    Lab Status: Final result Specimen: Blood from Arm, Right Updated: 09/10/22 2203    Narrative: The following orders were created for panel order [place] draw  Procedure                               Abnormality         Status                     ---------                               -----------         ------                     Miriam Lo Top on ZLAQ[[id number]]                           Final result               Gold top on KFTX[[id number]]                                 Final result                 Please view results for these tests on the individual orders      B-Type Natriuretic Peptide(BNP), AN, CA, EA [place] Only [[id number]]  (Normal) Collected: 09/10/22 2003    Lab Status: Final result Specimen: Blood from Arm, Right Updated: 09/10/22 2144     BNP 22 pg/mL     CBC and differential [[id number]]  (Abnormal) Collected: 09/10/22 2003    Lab Status: Final result Specimen: Blood from Arm, Right Updated: 09/10/22 2056     WBC 1 15 Thousand/uL      RBC 3 77 Million/uL Hemoglobin 10 4 g/dL      Hematocrit 32 9 %      MCV 87 fL      MCH 27 6 pg      MCHC 31 6 g/dL      RDW 20 4 %      MPV 10 5 fL      Platelets 82 Thousands/uL      nRBC 0 /100 WBCs      Neutrophils Relative 38 %      Immat GRANS % 0 %      Lymphocytes Relative 43 %      Monocytes Relative 18 %      Eosinophils Relative 1 %      Basophils Relative 0 %      Neutrophils Absolute 0 44 Thousands/µL      Immature Grans Absolute 0 00 Thousand/uL      Lymphocytes Absolute 0 49 Thousands/µL      Monocytes Absolute 0 21 Thousand/µL      Eosinophils Absolute 0 01 Thousand/µL      Basophils Absolute 0 00 Thousands/µL     Narrative: This is an appended report  These results have been appended to a previously verified report      HS Troponin 0hr (reflex protocol) [[id number]]  (Normal) Collected: 09/10/22 2003    Lab Status: Final result Specimen: Blood from Arm, Right Updated: 09/10/22 2045     hs TnI 0hr 6 ng/L     HS Troponin I 4hr [[id number]]     Lab Status: No result Specimen: Blood     Comprehensive metabolic panel [[id number]]  (Abnormal) Collected: 09/10/22 2003    Lab Status: Final result Specimen: Blood from Arm, Right Updated: 09/10/22 2038     Sodium 136 mmol/L      Potassium 3 2 mmol/L      Chloride 105 mmol/L      CO2 23 mmol/L      ANION GAP 8 mmol/L      BUN 18 mg/dL      Creatinine 1 23 mg/dL      Glucose 201 mg/dL      Calcium 8 5 mg/dL       U/L      ALT 70 U/L      Alkaline Phosphatase 122 U/L      Total Protein 6 8 g/dL      Albumin 3 9 g/dL      Total Bilirubin 2 26 mg/dL      eGFR 69 ml/min/1 73sq m     Narrative:      [facility] guidelines for Chronic Kidney Disease (CKD):     Stage 1 with normal or high GFR (GFR > 90 mL/min/1 73 square meters)    Stage 2 Mild CKD (GFR = 60-89 mL/min/1 73 square meters)    Stage 3A Moderate CKD (GFR = 45-59 mL/min/1 73 square meters)    Stage 3B Moderate CKD (GFR = 30-44 mL/min/1 73 square meters)    Stage 4 Severe CKD (GFR = 15-29 mL/min/1 73 square meters)    Stage 5 End Stage CKD (GFR <15 mL/min/1 73 square meters)  Note: GFR calculation is accurate only with a steady state creatinine                 XR chest 1 view portable    (Results Pending)         Procedures  Procedures      ED Course  ED Course as of 09/10/22 2252   Sat Sep 10, 2022   2133 Comprehensive metabolic panel(!)  AST/ALT elevated from baseline at 122/70, Alk Phos elevated to 122, potassium decreased to 3 2   2134 CBC and differential(!!)  Pancytopenia at baseline   2135 hs TnI 0hr: 6   2223 BNP: 22       SBIRT 22yo+    Flowsheet Row Most Recent Value   SBIRT (23 yo +)    In order to provide better care to our patients, we are screening all of our patients for alcohol and drug use  Would it be okay to ask you these screening questions? Unable to answer at this time Filed at: 09/10/2022 1935                MDM  Number of Diagnoses or Management Options  COVID  Diagnosis management comments: Patient came in due to shortness of breath caused by COVID  Patient was worried since he has multiple comorbidities and his hematologist advised him to come to the ER if he felt his shortness breath was worsening  While in the ER his labs were all it is baseline and patient showed much improvement, and rest the workup was unremarkable  Patient will be discharged home to follow-up with his PCP and hematologist   Strict return precautions given if symptoms are worsening or not resolving         Amount and/or Complexity of Data Reviewed  Clinical lab tests: ordered and reviewed  Tests in the radiology section of CPT®: ordered and reviewed  Tests in the medicine section of CPT®: ordered and reviewed  Decide to obtain previous medical records or to obtain history from someone other than the patient: yes  Review and summarize past medical records: yes  Independent visualization of images, tracings, or specimens: yes    Risk of Complications, Morbidity, and/or Mortality  Presenting problems: high  Diagnostic procedures: moderate  Management options: moderate        Disposition  Final diagnoses:   COVID     Time reflects when diagnosis was documented in both MDM as applicable and the Disposition within this note     Time User Action Codes Description Comment    9/10/2022 10:47 PM Patricia Acevedo Add [U07 1] Elroyflory       ED Disposition     ED Disposition   Discharge    Condition   Stable    Date/Time   Sat Sep 10, 2022 10:47 PM    Comment   Pinglayla Trevor discharge to home/self care  Follow-up Information     Follow up With Specialties Details Why Contact Info Additional Information    Khalida Aceves MD Internal Medicine In 3 days As needed [facility] Emergency Department Emergency Medicine Go to  If symptoms worsen [address] Emergency Department, [address], [place], [facility]          Patient's Medications   Discharge Prescriptions    No medications on file     No discharge procedures on file  PDMP Review     None           ED Provider  Attending physically available and evaluated Placido Lopezaaron AGUIRRE managed the patient along with the ED Attending      Electronically Signed by         Maryanne Wilkinson DO  09/10/22 2200

## 2022-09-12 ENCOUNTER — TELEPHONE (OUTPATIENT)
Dept: INFUSION CENTER | Facility: CLINIC | Age: 47
End: 2022-09-12

## 2022-09-12 NOTE — TELEPHONE ENCOUNTER
Pt called reporting he had to cancel his appointment today for venofer due to having covid  Pt wishes to call back to reschedule

## 2022-10-14 ENCOUNTER — ANESTHESIA EVENT (OUTPATIENT)
Dept: ANESTHESIOLOGY | Facility: HOSPITAL | Age: 47
End: 2022-10-14

## 2022-10-14 ENCOUNTER — HOSPITAL ENCOUNTER (OUTPATIENT)
Dept: GASTROENTEROLOGY | Facility: AMBULARY SURGERY CENTER | Age: 47
Setting detail: OUTPATIENT SURGERY
End: 2022-10-14
Payer: COMMERCIAL

## 2022-10-14 ENCOUNTER — ANESTHESIA (OUTPATIENT)
Dept: ANESTHESIOLOGY | Facility: HOSPITAL | Age: 47
End: 2022-10-14

## 2022-10-14 ENCOUNTER — ANESTHESIA EVENT (OUTPATIENT)
Dept: GASTROENTEROLOGY | Facility: AMBULARY SURGERY CENTER | Age: 47
End: 2022-10-14

## 2022-10-14 ENCOUNTER — ANESTHESIA (OUTPATIENT)
Dept: GASTROENTEROLOGY | Facility: AMBULARY SURGERY CENTER | Age: 47
End: 2022-10-14

## 2022-10-14 VITALS
HEIGHT: 74 IN | OXYGEN SATURATION: 96 % | SYSTOLIC BLOOD PRESSURE: 111 MMHG | WEIGHT: 287 LBS | TEMPERATURE: 97.9 F | RESPIRATION RATE: 16 BRPM | DIASTOLIC BLOOD PRESSURE: 56 MMHG | HEART RATE: 74 BPM | BODY MASS INDEX: 36.83 KG/M2

## 2022-10-14 DIAGNOSIS — K21.9 GASTROESOPHAGEAL REFLUX DISEASE WITHOUT ESOPHAGITIS: ICD-10-CM

## 2022-10-14 DIAGNOSIS — D64.9 ANEMIA, UNSPECIFIED TYPE: ICD-10-CM

## 2022-10-14 LAB — GLUCOSE SERPL-MCNC: 157 MG/DL (ref 65–140)

## 2022-10-14 PROCEDURE — 82948 REAGENT STRIP/BLOOD GLUCOSE: CPT

## 2022-10-14 PROCEDURE — 88305 TISSUE EXAM BY PATHOLOGIST: CPT | Performed by: PATHOLOGY

## 2022-10-14 PROCEDURE — 88342 IMHCHEM/IMCYTCHM 1ST ANTB: CPT | Performed by: PATHOLOGY

## 2022-10-14 RX ORDER — LIDOCAINE HYDROCHLORIDE 10 MG/ML
INJECTION, SOLUTION EPIDURAL; INFILTRATION; INTRACAUDAL; PERINEURAL AS NEEDED
Status: DISCONTINUED | OUTPATIENT
Start: 2022-10-14 | End: 2022-10-14

## 2022-10-14 RX ORDER — PROPOFOL 10 MG/ML
INJECTION, EMULSION INTRAVENOUS AS NEEDED
Status: DISCONTINUED | OUTPATIENT
Start: 2022-10-14 | End: 2022-10-14

## 2022-10-14 RX ORDER — SODIUM CHLORIDE, SODIUM LACTATE, POTASSIUM CHLORIDE, CALCIUM CHLORIDE 600; 310; 30; 20 MG/100ML; MG/100ML; MG/100ML; MG/100ML
INJECTION, SOLUTION INTRAVENOUS CONTINUOUS PRN
Status: DISCONTINUED | OUTPATIENT
Start: 2022-10-14 | End: 2022-10-14

## 2022-10-14 RX ADMIN — PROPOFOL 50 MG: 10 INJECTION, EMULSION INTRAVENOUS at 08:37

## 2022-10-14 RX ADMIN — PROPOFOL 50 MG: 10 INJECTION, EMULSION INTRAVENOUS at 08:40

## 2022-10-14 RX ADMIN — PROPOFOL 50 MG: 10 INJECTION, EMULSION INTRAVENOUS at 08:53

## 2022-10-14 RX ADMIN — Medication 40 MG: at 08:42

## 2022-10-14 RX ADMIN — PROPOFOL 50 MG: 10 INJECTION, EMULSION INTRAVENOUS at 08:25

## 2022-10-14 RX ADMIN — SODIUM CHLORIDE, SODIUM LACTATE, POTASSIUM CHLORIDE, AND CALCIUM CHLORIDE: .6; .31; .03; .02 INJECTION, SOLUTION INTRAVENOUS at 08:50

## 2022-10-14 RX ADMIN — PROPOFOL 50 MG: 10 INJECTION, EMULSION INTRAVENOUS at 08:33

## 2022-10-14 RX ADMIN — PROPOFOL 50 MG: 10 INJECTION, EMULSION INTRAVENOUS at 08:28

## 2022-10-14 RX ADMIN — PROPOFOL 50 MG: 10 INJECTION, EMULSION INTRAVENOUS at 08:35

## 2022-10-14 RX ADMIN — LIDOCAINE HYDROCHLORIDE 50 MG: 10 INJECTION, SOLUTION EPIDURAL; INFILTRATION; INTRACAUDAL at 08:23

## 2022-10-14 RX ADMIN — PROPOFOL 150 MG: 10 INJECTION, EMULSION INTRAVENOUS at 08:23

## 2022-10-14 RX ADMIN — PROPOFOL 50 MG: 10 INJECTION, EMULSION INTRAVENOUS at 08:31

## 2022-10-14 RX ADMIN — PROPOFOL 50 MG: 10 INJECTION, EMULSION INTRAVENOUS at 08:50

## 2022-10-14 RX ADMIN — SODIUM CHLORIDE, SODIUM LACTATE, POTASSIUM CHLORIDE, AND CALCIUM CHLORIDE: .6; .31; .03; .02 INJECTION, SOLUTION INTRAVENOUS at 08:12

## 2022-10-14 RX ADMIN — PROPOFOL 50 MG: 10 INJECTION, EMULSION INTRAVENOUS at 08:47

## 2022-10-14 RX ADMIN — PROPOFOL 50 MG: 10 INJECTION, EMULSION INTRAVENOUS at 08:43

## 2022-10-14 NOTE — ANESTHESIA PREPROCEDURE EVALUATION
Procedure:  COLONOSCOPY  EGD    Relevant Problems   CARDIO   (+) Essential hypertension   (+) Heart murmur      GI/HEPATIC   (+) Fatty liver   (+) GERD (gastroesophageal reflux disease)      HEMATOLOGY   (+) Acquired thrombocytopenia (HCC)   (+) Anemia, unspecified   (+) Chronic ITP (idiopathic thrombocytopenic purpura) (HCC)   (+) Idiopathic thrombocytopenia (HCC)   (+) Iron deficiency anemia due to chronic blood loss   (+) Pancytopenia (HCC)      PULMONARY   (+) Acute upper respiratory infection      Other   (+) Chronic ITP (idiopathic thrombocytopenic purpura) (HCC)   (+) Splenomegaly        Physical Exam    Airway    Mallampati score: II  TM Distance: >3 FB  Neck ROM: full     Dental   No notable dental hx     Cardiovascular  Cardiovascular exam normal    Pulmonary  Pulmonary exam normal     Other Findings        Anesthesia Plan  ASA Score- 2     Anesthesia Type- IV sedation with anesthesia with ASA Monitors  Additional Monitors:   Airway Plan:           Plan Factors-Exercise tolerance (METS): >4 METS  Chart reviewed  EKG reviewed  Imaging results reviewed  Existing labs reviewed  Patient summary reviewed  Patient is not a current smoker  Patient not instructed to abstain from smoking on day of procedure  Patient did not smoke on day of surgery  Induction-     Postoperative Plan-     Informed Consent- Anesthetic plan and risks discussed with patient  I personally reviewed this patient with the CRNA  Discussed and agreed on the Anesthesia Plan with the CRNA  Mannie Ho

## 2022-10-14 NOTE — ANESTHESIA PREPROCEDURE EVALUATION
Procedure:  PRE-OP ONLY    Relevant Problems   CARDIO   (+) Essential hypertension   (+) Heart murmur      GI/HEPATIC   (+) Fatty liver   (+) GERD (gastroesophageal reflux disease)      HEMATOLOGY   (+) Acquired thrombocytopenia (HCC)   (+) Anemia, unspecified   (+) Chronic ITP (idiopathic thrombocytopenic purpura) (HCC)   (+) Idiopathic thrombocytopenia (HCC)   (+) Iron deficiency anemia due to chronic blood loss   (+) Pancytopenia (HCC)      PULMONARY   (+) Acute upper respiratory infection      Other   (+) Chronic ITP (idiopathic thrombocytopenic purpura) (HCC)   (+) Splenomegaly        Physical Exam    Airway    Mallampati score: II  TM Distance: >3 FB  Neck ROM: full     Dental   No notable dental hx     Cardiovascular  Cardiovascular exam normal    Pulmonary  Pulmonary exam normal     Other Findings        Anesthesia Plan  ASA Score- 2     Anesthesia Type- IV sedation with anesthesia with ASA Monitors  Additional Monitors:   Airway Plan:           Plan Factors-Exercise tolerance (METS): >4 METS  Chart reviewed  EKG reviewed  Imaging results reviewed  Existing labs reviewed  Patient summary reviewed  Patient is not a current smoker  Patient not instructed to abstain from smoking on day of procedure  Patient did not smoke on day of surgery  Induction-     Postoperative Plan-     Informed Consent- Anesthetic plan and risks discussed with patient  I personally reviewed this patient with the CRNA  Discussed and agreed on the Anesthesia Plan with the CRNA  Dara Soulier

## 2022-10-14 NOTE — ANESTHESIA POSTPROCEDURE EVALUATION
Post-Op Assessment Note    CV Status:  Stable  Pain Score: 0    Pain management: adequate     Mental Status:  Alert and awake   Hydration Status:  Euvolemic   PONV Controlled:  Controlled   Airway Patency:  Patent      Post Op Vitals Reviewed: Yes      Staff: CRNA         No complications documented      BP   111/63   Temp 97   Pulse 75   Resp 14   SpO2 98

## 2022-10-14 NOTE — ANESTHESIA PREPROCEDURE EVALUATION
Procedure:  COLONOSCOPY  EGD    Relevant Problems   CARDIO   (+) Essential hypertension   (+) Heart murmur      GI/HEPATIC   (+) Fatty liver   (+) GERD (gastroesophageal reflux disease)      HEMATOLOGY   (+) Acquired thrombocytopenia (HCC)   (+) Anemia, unspecified   (+) Chronic ITP (idiopathic thrombocytopenic purpura) (HCC)   (+) Idiopathic thrombocytopenia (HCC)   (+) Iron deficiency anemia due to chronic blood loss   (+) Pancytopenia (HCC)      PULMONARY   (+) Acute upper respiratory infection      Other   (+) Chronic ITP (idiopathic thrombocytopenic purpura) (HCC)   (+) Splenomegaly        Physical Exam    Airway    Mallampati score: II  TM Distance: >3 FB  Neck ROM: full     Dental   No notable dental hx     Cardiovascular  Cardiovascular exam normal    Pulmonary  Pulmonary exam normal     Other Findings        Anesthesia Plan  ASA Score- 2     Anesthesia Type- IV sedation with anesthesia with ASA Monitors  Additional Monitors:   Airway Plan:           Plan Factors-Exercise tolerance (METS): >4 METS  Chart reviewed  EKG reviewed  Imaging results reviewed  Existing labs reviewed  Patient summary reviewed  Patient is not a current smoker  Patient not instructed to abstain from smoking on day of procedure  Patient did not smoke on day of surgery  Induction-     Postoperative Plan-     Informed Consent- Anesthetic plan and risks discussed with patient  I personally reviewed this patient with the CRNA  Discussed and agreed on the Anesthesia Plan with the CRNA  Felicita Romero         Procedure:  COLONOSCOPY  EGD    Relevant Problems   CARDIO   (+) Essential hypertension   (+) Heart murmur      GI/HEPATIC   (+) Fatty liver   (+) GERD (gastroesophageal reflux disease)      HEMATOLOGY   (+) Acquired thrombocytopenia (HCC)   (+) Anemia, unspecified   (+) Chronic ITP (idiopathic thrombocytopenic purpura) (HCC)   (+) Idiopathic thrombocytopenia (HCC)   (+) Iron deficiency anemia due to chronic blood loss   (+) Pancytopenia ([facility] [address] )      PULMONARY   (+) Acute upper respiratory infection      Other   (+) Chronic ITP (idiopathic thrombocytopenic purpura) (HCC)   (+) Splenomegaly

## 2022-10-14 NOTE — H&P
History and Physical -  [facility]  Jaquan Barrera 49 y o  male MRN: [id number]    HPI: Jaquan Barrera is a 49y o  year old male who presents with GERD and hx of anemia  Review of Systems    Historical Information   Past Medical History:   Diagnosis Date   • BMI 37 0-37 9, adult 04/22/2021   • BMI 38 0-38 9,adult 02/08/2022   • BMI 39 0-39 9,adult 12/16/2021   • Cervicalgia    • Colon polyp    • Concussion with loss of consciousness of 30 minutes or less 03/07/2021   • Depression    • Diabetes mellitus ([facility] )    • Diabetes mellitus out of control    • Edema of both lower legs 12/16/2021   • Elevated liver enzymes    • Erectile dysfunction    • Exposure to COVID-19 virus    • Fatty liver    • GERD (gastroesophageal reflux disease)    • Heart murmur 01/07/2022   • Hyperglycemia    • Hypertension    • Left cervical radiculopathy    • Left elbow pain    • Left foot pain    • Leukopenia 03/07/2021   • Pain in both feet 12/16/2021   • Pancytopenia ([facility] ) 12/16/2021   • Skin rash 12/16/2021   • Thrombocytopenia (HCC)    • Venous stasis dermatitis of both lower extremities 05/13/2022   • Vitamin D deficiency 01/07/2022     Past Surgical History:   Procedure Laterality Date   • CHOLECYSTECTOMY  1990's   • COLONOSCOPY  06/2005   • COLONOSCOPY     • EGD  07/10/2014   • IR BIOPSY BONE MARROW  03/20/2019   • IR BIOPSY BONE MARROW  7/29/2022   • UPPER GASTROINTESTINAL ENDOSCOPY     • WISDOM TOOTH EXTRACTION      In early to mid 19's     Social History   Social History     Substance and Sexual Activity   Alcohol Use Yes    Comment: rarely     Social History     Substance and Sexual Activity   Drug Use Not Currently    Comment: No drug use - As per [employer]     Social History     Tobacco Use   Smoking Status Former Smoker   • Types: Cigarettes   Smokeless Tobacco Former User   • Quit date: 3/1/2014   Tobacco Comment    Social   Last cigarette years ago       Family History   Problem Relation Age of Onset • Heart attack Mother    • Thrombocytopenia Mother    • Coronary artery disease Mother    • Hypertension Mother    • Testicular cancer Father    • Hypertension Father    • No Known Problems Brother    • Hypertension Brother    • No Known Problems Brother    • No Known Problems Son        Meds/Allergies     (Not in a hospital admission)      Allergies   Allergen Reactions   • Aspirin Other (See Comments)     Reaction unknown to patient  • Diltiazem Other (See Comments)     Patient does not recall what happened   • Lisinopril Cough       Objective     /64   Pulse 72   Temp (!) 97 3 °F (36 3 °C) (Temporal)   Resp 18   Ht 6' 2" (1 88 m)   Wt 130 kg (287 lb)   SpO2 97%   BMI 36 85 kg/m²       PHYSICAL EXAM    Gen: NAD  CV: RRR  CHEST: Clear  ABD: soft, NT/ND  EXT: no edema  Neuro: AAO      ASSESSMENT/PLAN:  This is a 49y o  year old male here for  GERD and hx of anemia         PLAN:   Procedure: egd/colonoscopy

## 2022-10-19 DIAGNOSIS — D69.3 IDIOPATHIC THROMBOCYTOPENIA (HCC): ICD-10-CM

## 2022-10-19 RX ORDER — ELTROMBOPAG OLAMINE 75 MG/1
TABLET, FILM COATED ORAL
Qty: 30 TABLET | Refills: 3 | Status: SHIPPED | OUTPATIENT
Start: 2022-10-19

## 2022-10-25 PROCEDURE — 88342 IMHCHEM/IMCYTCHM 1ST ANTB: CPT | Performed by: PATHOLOGY

## 2022-10-25 PROCEDURE — 88305 TISSUE EXAM BY PATHOLOGIST: CPT | Performed by: PATHOLOGY

## 2022-11-03 ENCOUNTER — TELEPHONE (OUTPATIENT)
Dept: INTERNAL MEDICINE CLINIC | Facility: CLINIC | Age: 47
End: 2022-11-03

## 2022-11-03 NOTE — TELEPHONE ENCOUNTER
Patient cancelled his appt for 11/4/2022 and wants to know if it is ok to wait until December to see you? This is the next available appt

## 2022-11-03 NOTE — TELEPHONE ENCOUNTER
Left message for patient to call the office to reschedule his appt   December is ok per Dr Axel Pelayo

## 2022-11-08 LAB
CHOLEST SERPL-MCNC: 144 MG/DL
CHOLEST/HDLC SERPL: 3.6 (CALC)
HBA1C MFR BLD: 7.5 % OF TOTAL HGB
HDLC SERPL-MCNC: 40 MG/DL
LDLC SERPL CALC-MCNC: 87 MG/DL (CALC)
NONHDLC SERPL-MCNC: 104 MG/DL (CALC)
TRIGL SERPL-MCNC: 78 MG/DL

## 2022-11-09 ENCOUNTER — OFFICE VISIT (OUTPATIENT)
Dept: GASTROENTEROLOGY | Facility: AMBULARY SURGERY CENTER | Age: 47
End: 2022-11-09

## 2022-11-09 VITALS
OXYGEN SATURATION: 97 % | HEIGHT: 74 IN | HEART RATE: 79 BPM | WEIGHT: 296.4 LBS | BODY MASS INDEX: 38.04 KG/M2 | SYSTOLIC BLOOD PRESSURE: 122 MMHG | DIASTOLIC BLOOD PRESSURE: 84 MMHG

## 2022-11-09 DIAGNOSIS — R79.89 ELEVATED LFTS: ICD-10-CM

## 2022-11-09 DIAGNOSIS — K76.0 FATTY LIVER: Primary | ICD-10-CM

## 2022-11-09 DIAGNOSIS — D50.0 IRON DEFICIENCY ANEMIA DUE TO CHRONIC BLOOD LOSS: ICD-10-CM

## 2022-11-09 DIAGNOSIS — K76.6 PORTAL HYPERTENSION (HCC): ICD-10-CM

## 2022-11-09 LAB
ALBUMIN SERPL-MCNC: 3.9 G/DL (ref 3.6–5.1)
ALBUMIN/GLOB SERPL: 1.6 (CALC) (ref 1–2.5)
ALP SERPL-CCNC: 137 U/L (ref 36–130)
ALT SERPL-CCNC: 40 U/L (ref 9–46)
AST SERPL-CCNC: 53 U/L (ref 10–40)
BASOPHILS # BLD AUTO: 10 CELLS/UL (ref 0–200)
BASOPHILS NFR BLD AUTO: 0.6 %
BILIRUB SERPL-MCNC: 2.3 MG/DL (ref 0.2–1.2)
BUN SERPL-MCNC: 17 MG/DL (ref 7–25)
BUN/CREAT SERPL: ABNORMAL (CALC) (ref 6–22)
CALCIUM SERPL-MCNC: 8.8 MG/DL (ref 8.6–10.3)
CHLORIDE SERPL-SCNC: 105 MMOL/L (ref 98–110)
CO2 SERPL-SCNC: 28 MMOL/L (ref 20–32)
CREAT SERPL-MCNC: 1.11 MG/DL (ref 0.6–1.29)
EOSINOPHIL # BLD AUTO: 41 CELLS/UL (ref 15–500)
EOSINOPHIL NFR BLD AUTO: 2.4 %
ERYTHROCYTE [DISTWIDTH] IN BLOOD BY AUTOMATED COUNT: 16.7 % (ref 11–15)
FERRITIN SERPL-MCNC: 17 NG/ML (ref 38–380)
GFR/BSA.PRED SERPLBLD CYS-BASED-ARV: 82 ML/MIN/1.73M2
GLOBULIN SER CALC-MCNC: 2.5 G/DL (CALC) (ref 1.9–3.7)
GLUCOSE SERPL-MCNC: 157 MG/DL (ref 65–99)
HCT VFR BLD AUTO: 32.9 % (ref 38.5–50)
HGB BLD-MCNC: 10.9 G/DL (ref 13.2–17.1)
IRON SATN MFR SERPL: 27 % (CALC) (ref 20–48)
IRON SERPL-MCNC: 123 MCG/DL (ref 50–180)
LYMPHOCYTES # BLD AUTO: 461 CELLS/UL (ref 850–3900)
LYMPHOCYTES NFR BLD AUTO: 27.1 %
MCH RBC QN AUTO: 29.9 PG (ref 27–33)
MCHC RBC AUTO-ENTMCNC: 33.1 G/DL (ref 32–36)
MCV RBC AUTO: 90.4 FL (ref 80–100)
METHYLMALONATE SERPL-SCNC: 134 NMOL/L (ref 87–318)
MONOCYTES # BLD AUTO: 170 CELLS/UL (ref 200–950)
MONOCYTES NFR BLD AUTO: 10 %
NEUTROPHILS # BLD AUTO: 1018 CELLS/UL (ref 1500–7800)
NEUTROPHILS NFR BLD AUTO: 59.9 %
PLATELET # BLD AUTO: 78 THOUSAND/UL (ref 140–400)
PMV BLD REES-ECKER: 10.9 FL (ref 7.5–12.5)
POTASSIUM SERPL-SCNC: 3.6 MMOL/L (ref 3.5–5.3)
PROT SERPL-MCNC: 6.4 G/DL (ref 6.1–8.1)
RBC # BLD AUTO: 3.64 MILLION/UL (ref 4.2–5.8)
SERVICE CMNT-IMP: ABNORMAL
SODIUM SERPL-SCNC: 139 MMOL/L (ref 135–146)
TIBC SERPL-MCNC: 455 MCG/DL (CALC) (ref 250–425)
WBC # BLD AUTO: 1.7 THOUSAND/UL (ref 3.8–10.8)

## 2022-11-09 RX ORDER — SPIRONOLACTONE 50 MG/1
50 TABLET, FILM COATED ORAL DAILY
Qty: 30 TABLET | Refills: 3 | Status: SHIPPED | OUTPATIENT
Start: 2022-11-09

## 2022-11-09 NOTE — PROGRESS NOTES
[facility]  Jan Bojorquez 49 y o  male MRN: [id number]            Assessment & Plan:    65-year-old gentleman originally seen for iron deficiency anemia had EGD and colonoscopy was suggested esophageal varices  Noted to have fatty liver disease, splenomegaly and prior imaging studies    1  Portal hypertension:  As evidence by esophageal varices, splenomegaly  -suspect the patient has cirrhosis, most likely secondary to underlying fatty liver disease  -this would likely explain his thrombocytopenia, splenomegaly, neutropenia, lower extremity edema  -is a family history of cirrhosis and liver failure in his mother  -we will proceed with extensive serologic evaluation  -will check alpha fetoprotein  -we will check ultrasound with elastography  -discussed with the patient that we need to undergo [facility]  surveillance every 6 months  -EGD periodically  -discussed with him that he likely has underlying cirrhosis from fatty liver disease, would recommend some modest weight loss goals, low-carbohydrate diet and low-salt diet  -will start the evaluation, pending results of imaging studies and laboratory studies may refer him to our hepatology service given his young age and likely advanced liver disease  -we will start him on 50 mg of spironolactone, he is instructed to take only 1 dose of torsemide  -continue low-salt diet  -repeat labs 1 week    2  Iron deficiency anemia:  Unclear etiology, endoscopic evaluation was unremarkable, varices did not appear to have any signs of bleeding or stigmata  -may consider capsule endoscopy in the future to exclude other GI blood loss      Josselyn Fuentes was seen today for follow-up  Diagnoses and all orders for this visit:    Fatty liver    Elevated LFTs  -     Chronic Hepatitis Panel; Future  -     AFP tumor marker; Future  -     Alpha-1-antitrypsin; Future  -     ARTEMIO Screen w/ Reflex to Titer/Pattern; Future  -     Antimitochondrial antibody;  Future  -     Anti-smooth muscle antibody, IgG; Future  -     Ceruloplasmin; Future  -     IgG, IgA, IgM; Future  -     Liver/Kidney Microsomal Antibody; Future  -     Protime-INR; Future  -     TSH, 3rd generation; Future  -     US elastography; Future    Iron deficiency anemia due to chronic blood loss    Portal hypertension (HCC)  -     spironolactone (ALDACTONE) 50 mg tablet; Take 1 tablet (50 mg total) by mouth daily  -     Basic metabolic panel; Future            _____________________________________________________________        CC: Follow-up    HPI:  Navarro Mason is a 49 y o male who is here for follow-up  50-year-old gentleman, longstanding history of pancytopenia, suspected ITP, recent EGD and colonoscopy for iron deficiency anemia, found have varices  Patient reports that he is here to discuss potential underlying liver disease  Reports that he is doing relatively well, does reports some lower extremity edema  He does struggle sometimes with his diet in particular with low-salt diet but is trying to maintain low-salt and low sugar diet  Reports compliance with medications, was seen by Cardiology and started on torsemide  His weight has been stable  Denies any significant alcohol  Today he did note that his mother had  of cirrhosis and liver failure about 1 year ago was diagnosed about 2 years ago  ROS:  The remainder of the ROS was negative except for the pertinent positives mentioned in HPI        Allergies: Aspirin, Diltiazem, and Lisinopril    Medications:   Current Outpatient Medications:   •  glimepiride (AMARYL) 4 mg tablet, TAKE 1 TABLET BY MOUTH  DAILY, Disp: 90 tablet, Rfl: 3  •  losartan (COZAAR) 25 mg tablet, TAKE 1 TABLET BY MOUTH  DAILY, Disp: 90 tablet, Rfl: 3  •  omeprazole (PriLOSEC) 40 MG capsule, Take 1 capsule (40 mg total) by mouth daily, Disp: 90 capsule, Rfl: 1  •  OneTouch Verio test strip, USE TO TEST ONCE DAILY, Disp: 25 strip, Rfl: 3  •  Promacta 75 MG TABS, TAKE 1 TABLET BY MOUTH ONCE DAILY ON AN EMPTY STOMACH  AT LEAST 1 HOUR BEFORE OR 2 HOURS AFTER A MEAL, Disp: 30 tablet, Rfl: 3  •  spironolactone (ALDACTONE) 50 mg tablet, Take 1 tablet (50 mg total) by mouth daily, Disp: 30 tablet, Rfl: 3  •  Vitamin D, Cholecalciferol, 50 MCG (2000 UT) CAPS, Take 1 tablet by mouth in the morning, Disp: , Rfl:   •  clotrimazole-betamethasone (LOTRISONE) 1-0 05 % cream, Apply topically 2 (two) times a day, Disp: 45 g, Rfl: 0  •  torsemide (DEMADEX) 20 mg tablet, Take 1 tablet (20 mg total) by mouth in the morning and 1 tablet (20 mg total) before bedtime  , Disp: 180 tablet, Rfl: 3    Past Medical History:   Diagnosis Date   • BMI 37 0-37 9, adult 04/22/2021   • BMI 38 0-38 9,adult 02/08/2022   • BMI 39 0-39 9,adult 12/16/2021   • Cervicalgia    • Colon polyp    • Concussion with loss of consciousness of 30 minutes or less 03/07/2021   • Depression    • Diabetes mellitus ([facility] [address] )    • Diabetes mellitus out of control    • Edema of both lower legs 12/16/2021   • Elevated liver enzymes    • Erectile dysfunction    • Exposure to COVID-19 virus    • Fatty liver    • GERD (gastroesophageal reflux disease)    • Heart murmur 01/07/2022   • Hyperglycemia    • Hypertension    • Left cervical radiculopathy    • Left elbow pain    • Left foot pain    • Leukopenia 03/07/2021   • Pain in both feet 12/16/2021   • Pancytopenia ([facility] ) 12/16/2021   • Skin rash 12/16/2021   • Thrombocytopenia ([facility] [address] )    • Venous stasis dermatitis of both lower extremities 05/13/2022   • Vitamin D deficiency 01/07/2022       Past Surgical History:   Procedure Laterality Date   • CHOLECYSTECTOMY  1990's   • COLONOSCOPY  06/2005   • COLONOSCOPY     • EGD  07/10/2014   • IR BIOPSY BONE MARROW  03/20/2019   • IR BIOPSY BONE MARROW  7/29/2022   • UPPER GASTROINTESTINAL ENDOSCOPY     • WISDOM TOOTH EXTRACTION      In early to mid 19's       Family History   Problem Relation Age of Onset   • Heart attack Mother    • Thrombocytopenia Mother    • Coronary artery disease Mother    • Hypertension Mother    • Testicular cancer Father    • Hypertension Father    • No Known Problems Brother    • Hypertension Brother    • No Known Problems Brother    • No Known Problems Son         reports that he has quit smoking  His smoking use included cigarettes  He quit smokeless tobacco use about 8 years ago  He reports current alcohol use  He reports previous drug use        Physical Exam:    /84 (BP Location: Left arm, Patient Position: Sitting, Cuff Size: Standard)   Pulse 79   Ht 6' 2" (1 88 m)   Wt 134 kg (296 lb 6 4 oz)   SpO2 97%   BMI 38 06 kg/m²     Gen: wn/wd, NAD  HEENT: anicteric, MMM, no cervical LAD  CVS: RRR, no m/r/g  CHEST: CTA b/l  ABD: +BS, soft, NT,ND, could not appreciate splenomegaly  EXT:  1+ bilateral lower extremity edema to the calf  NEURO: aaox3  SKIN: NO rashes

## 2022-11-09 NOTE — PATIENT INSTRUCTIONS
Low-Sodium Diet   WHAT YOU NEED TO KNOW:   A low-sodium diet limits foods that are high in sodium (salt)  You will need to follow a low-sodium diet if you have high blood pressure, kidney disease, or heart failure  You may also need to follow this diet if you have a condition that is causing your body to retain (hold) extra fluid  You may need to limit the amount of sodium you eat in a day to 1,500 to 2,000 mg  Ask your healthcare provider how much sodium you can have each day  DISCHARGE INSTRUCTIONS:   How to use food labels to choose foods that are low in sodium:  Read food labels to find the amount of sodium they contain  The amount of sodium is listed in milligrams (mg)  The % Daily Value (DV) column tells you how much of your daily needs are met by 1 serving of the food for each nutrient listed  Choose foods that have less than 5% of the DV of sodium  These foods are considered low in sodium  Foods that have 20% or more of the DV of sodium are considered high in sodium  Some food labels may also list any of the following terms that tell you about the sodium content in the food:  Sodium-free:  Less than 5 mg in each serving    Very low sodium:  35 mg of sodium or less in each serving    Low sodium:  140 mg of sodium or less in each serving    Reduced sodium: At least 25% less sodium in each serving than the regular type    Light in sodium:  50% less sodium in each serving    Unsalted or no added salt:  No extra salt is added during processing (the food may still contain sodium)       Foods to avoid:  Salty foods are high in sodium   You should avoid the following:  Processed foods:      Mixes for cornbread, biscuits, cake, and pudding     Instant foods, such as potatoes, cereals, noodles, and rice     Packaged foods, such as bread stuffing, rice and pasta mixes, snack dip mixes, and macaroni and cheese     Canned foods, such as canned vegetables, soups, broths, sauces, and vegetable or tomato juice    Snack foods, such as salted chips, popcorn, pretzels, pork rinds, salted crackers, and salted nuts    Frozen foods, such as dinners, entrees, vegetables with sauces, and breaded meats    Sauerkraut, pickled vegetables, and other foods prepared in brine    Meats and cheeses:      Smoked or cured meat, such as corned beef, villavicencio, ham, hot dogs, and sausage    Canned meats or spreads, such as potted meats, sardines, anchovies, and imitation seafood    Deli or lunch meats, such as bologna, ham, turkey, and roast beef    Processed cheese, such as American cheese and cheese spreads    Condiments, sauces, and seasonings:      Salt (¼ teaspoon of salt contains 575 mg of sodium)    Seasonings made with salt, such as garlic salt, celery salt, onion salt, and seasoned salt    Regular soy sauce, barbecue sauce, teriyaki sauce, steak sauce, Worcestershire sauce, and most flavored vinegars    Canned gravy and mixes     Regular condiments, such as mustard, ketchup, and salad dressings    Pickles and olives    Meat tenderizers and monosodium glutamate (MSG)    Foods to include:  Read the food label to find the amount of sodium in each serving  Bread and cereal:  Try to choose breads with less than 80 mg of sodium per serving  Bread, roll, nubia, tortilla, or unsalted crackers  Ready-to-eat cereals with less than 5% DV of sodium (examples include shredded wheat and puffed rice)    Pasta    Vegetables and fruits:      Unsalted fresh, frozen, or canned vegetables    Fresh, frozen, or canned fruits    Fruit juice    Dairy:  One serving has about 150 mg of sodium  Milk, all types    Yogurt    Hard cheese, such as cheddar, Swiss, [employer], or [employer] and other protein foods:  Some raw meats may have added sodium       Plain meats, fish, and poultry     Egg    Other foods:      Homemade pudding    Unsalted nuts, popcorn, or pretzels    Unsalted butter or margarine    Ways to decrease sodium:   Add spices and herbs to foods instead of salt during cooking  Use salt-free seasonings to add flavor to foods  Examples include onion powder, garlic powder, basil, franks powder, paprika, and parsley  Try lemon or lime juice or vinegar to give foods a tart flavor  Use hot peppers, pepper, or cayenne pepper to add a spicy flavor  Do not keep a salt shaker at your kitchen table  This may help keep you from adding salt to food at the table  A teaspoon of salt has 2,300 mg of sodium  It may take time to get used to enjoying the natural flavor of food instead of adding salt  Talk to your healthcare provider before you use salt substitutes  Some salt substitutes have a high amount of potassium and need to be avoided if you have kidney disease  Choose low-sodium foods at restaurants  Meals from restaurants are often high in sodium  Some restaurants have nutrition information on the menu that tells you the amount of sodium in their foods  If possible, ask for your food to be prepared with less, or no salt  Shop for unsalted or low-sodium foods and snacks at the grocery store  Examples include unsalted or low-sodium broths, soups, and canned vegetables  Choose fresh or frozen vegetables instead  Choose unsalted nuts or seeds or fresh fruits or vegetables as snacks  Read food labels and choose salt-free, very low-sodium, or low-sodium foods  © Copyright [facility] 2022 Information is for End User's use only and may not be sold, redistributed or otherwise used for commercial purposes  All illustrations and images included in CareNotes® are the copyrighted property of A D A M , Inc  or Merissa Solorio   The above information is an  only  It is not intended as medical advice for individual conditions or treatments  Talk to your doctor, nurse or pharmacist before following any medical regimen to see if it is safe and effective for you

## 2022-11-09 NOTE — LETTER
November 9, 2022     Sheela Martino, [address] [address]    Patient: Raymon Branch   YOB: 1975   Date of Visit: 11/9/2022       Dear Dr Lucita Lemus: Thank you for referring Maranda Solomon to me for evaluation  Below are my notes for this consultation  If you have questions, please do not hesitate to call me  I look forward to following your patient along with you  Sincerely,        Binh Noyola MD        CC: No Recipients  Binh Noyola MD  11/9/2022 10:39 AM  Sign when Signing Visit  [address] [facility]  Raymon Branch 49 y o  male MRN: [id number]            Assessment & Plan:    50-year-old gentleman originally seen for iron deficiency anemia had EGD and colonoscopy was suggested esophageal varices  Noted to have fatty liver disease, splenomegaly and prior imaging studies    1  Portal hypertension:  As evidence by esophageal varices, splenomegaly  -suspect the patient has cirrhosis, most likely secondary to underlying fatty liver disease  -this would likely explain his thrombocytopenia, splenomegaly, neutropenia, lower extremity edema  -is a family history of cirrhosis and liver failure in his mother  -we will proceed with extensive serologic evaluation  -will check alpha fetoprotein  -we will check ultrasound with elastography  -discussed with the patient that [facility] need [facility] every 6 months  -EGD periodically  -discussed with him that he likely has underlying cirrhosis from fatty liver disease, would recommend some modest weight loss goals, low-carbohydrate diet and low-salt diet  -will start the evaluation, pending results of imaging studies and laboratory studies may refer him to our hepatology service given his young age and likely advanced liver disease  -we will start him on 50 mg of spironolactone, he is instructed to take only 1 dose of torsemide  -continue low-salt diet  -repeat labs 1 week    2   Iron deficiency anemia: Unclear etiology, endoscopic evaluation was unremarkable, varices did not appear to have any signs of bleeding or stigmata  -may consider capsule endoscopy in the future to exclude other GI blood loss      Freddy Sequeira was seen today for follow-up  Diagnoses and all orders for this visit:    Fatty liver    Elevated LFTs  -     Chronic Hepatitis Panel; Future  -     AFP tumor marker; Future  -     Alpha-1-antitrypsin; Future  -     ARTEMIO Screen w/ Reflex to Titer/Pattern; Future  -     Antimitochondrial antibody; Future  -     Anti-smooth muscle antibody, IgG; Future  -     Ceruloplasmin; Future  -     IgG, IgA, IgM; Future  -     Liver/Kidney Microsomal Antibody; Future  -     Protime-INR; Future  -     TSH, 3rd generation; Future  -     US elastography; Future    Iron deficiency anemia due to chronic blood loss    Portal hypertension (HCC)  -     spironolactone (ALDACTONE) 50 mg tablet; Take 1 tablet (50 mg total) by mouth daily  -     Basic metabolic panel; Future            _____________________________________________________________        CC: Follow-up    HPI:  Hilario Cosby is a 49 y o male who is here for follow-up  79-year-old gentleman, longstanding history of pancytopenia, suspected ITP, recent EGD and colonoscopy for iron deficiency anemia, found have varices  Patient reports that he is here to discuss potential underlying liver disease  Reports that he is doing relatively well, does reports some lower extremity edema  He does struggle sometimes with his diet in particular with low-salt diet but is trying to maintain low-salt and low sugar diet  Reports compliance with medications, was seen by Cardiology and started on torsemide  His weight has been stable  Denies any significant alcohol  Today he did note that his mother had  of cirrhosis and liver failure about 1 year ago was diagnosed about 2 years ago            ROS:  The remainder of the ROS was negative except for the pertinent positives mentioned in HPI  Allergies: Aspirin, Diltiazem, and Lisinopril    Medications:   Current Outpatient Medications:   •  glimepiride (AMARYL) 4 mg tablet, TAKE 1 TABLET BY MOUTH  DAILY, Disp: 90 tablet, Rfl: 3  •  losartan (COZAAR) 25 mg tablet, TAKE 1 TABLET BY MOUTH  DAILY, Disp: 90 tablet, Rfl: 3  •  omeprazole (PriLOSEC) 40 MG capsule, Take 1 capsule (40 mg total) by mouth daily, Disp: 90 capsule, Rfl: 1  •  OneTouch Verio test strip, USE TO TEST ONCE DAILY, Disp: 25 strip, Rfl: 3  •  Promacta 75 MG TABS, TAKE 1 TABLET BY MOUTH ONCE DAILY ON AN EMPTY STOMACH  AT LEAST 1 HOUR BEFORE OR 2 HOURS AFTER A MEAL, Disp: 30 tablet, Rfl: 3  •  spironolactone (ALDACTONE) 50 mg tablet, Take 1 tablet (50 mg total) by mouth daily, Disp: 30 tablet, Rfl: 3  •  Vitamin D, Cholecalciferol, 50 MCG (2000 UT) CAPS, Take 1 tablet by mouth in the morning, Disp: , Rfl:   •  clotrimazole-betamethasone (LOTRISONE) 1-0 05 % cream, Apply topically 2 (two) times a day, Disp: 45 g, Rfl: 0  •  torsemide (DEMADEX) 20 mg tablet, Take 1 tablet (20 mg total) by mouth in the morning and 1 tablet (20 mg total) before bedtime  , Disp: 180 tablet, Rfl: 3    Past Medical History:   Diagnosis Date   • BMI 37 0-37 9, adult 04/22/2021   • BMI 38 0-38 9,adult 02/08/2022   • BMI 39 0-39 9,adult 12/16/2021   • Cervicalgia    • Colon polyp    • Concussion with loss of consciousness of 30 minutes or less 03/07/2021   • Depression    • Diabetes mellitus ([facility] )    • Diabetes mellitus out of control    • Edema of both lower legs 12/16/2021   • Elevated liver enzymes    • Erectile dysfunction    • Exposure to COVID-19 virus    • Fatty liver    • GERD (gastroesophageal reflux disease)    • Heart murmur 01/07/2022   • Hyperglycemia    • Hypertension    • Left cervical radiculopathy    • Left elbow pain    • Left foot pain    • Leukopenia 03/07/2021   • Pain in both feet 12/16/2021   • Pancytopenia ([facility] [address] ) 12/16/2021   • Skin rash 12/16/2021   • Thrombocytopenia ([facility] [address] [address] )    • Venous stasis dermatitis of both lower extremities 05/13/2022   • Vitamin D deficiency 01/07/2022       Past Surgical History:   Procedure Laterality Date   • CHOLECYSTECTOMY  1990's   • COLONOSCOPY  06/2005   • COLONOSCOPY     • EGD  07/10/2014   • IR BIOPSY BONE MARROW  03/20/2019   • IR BIOPSY BONE MARROW  7/29/2022   • UPPER GASTROINTESTINAL ENDOSCOPY     • WISDOM TOOTH EXTRACTION      In early to mid 19's       Family History   Problem Relation Age of Onset   • Heart attack Mother    • Thrombocytopenia Mother    • Coronary artery disease Mother    • Hypertension Mother    • Testicular cancer Father    • Hypertension Father    • No Known Problems Brother    • Hypertension Brother    • No Known Problems Brother    • No Known Problems Son         reports that he has quit smoking  His smoking use included cigarettes  He quit smokeless tobacco use about 8 years ago  He reports current alcohol use  He reports previous drug use        Physical Exam:    /84 (BP Location: Left arm, Patient Position: Sitting, Cuff Size: Standard)   Pulse 79   Ht 6' 2" (1 88 m)   Wt 134 kg (296 lb 6 4 oz)   SpO2 97%   BMI 38 06 kg/m²     Gen: wn/wd, NAD  HEENT: anicteric, MMM, no cervical LAD  CVS: RRR, no m/r/g  CHEST: CTA b/l  ABD: +BS, soft, NT,ND, could not appreciate splenomegaly  EXT:  1+ bilateral lower extremity edema to the calf  NEURO: aaox3  SKIN: NO rashes

## 2022-11-21 ENCOUNTER — HOSPITAL ENCOUNTER (OUTPATIENT)
Dept: RADIOLOGY | Age: 47
Discharge: HOME | End: 2022-11-21

## 2022-11-21 DIAGNOSIS — R79.89 ELEVATED LFTS: ICD-10-CM

## 2022-11-23 ENCOUNTER — TELEPHONE (OUTPATIENT)
Dept: HEMATOLOGY ONCOLOGY | Facility: CLINIC | Age: 47
End: 2022-11-23

## 2022-11-23 NOTE — TELEPHONE ENCOUNTER
Message left for patient that his appt with Hemanth More on 12/20 has been rescheduled to 12/15 @ 8am  Advised patient to call back if this new appt does not work for him

## 2022-11-27 LAB
A1AT SERPL-MCNC: 151 MG/DL (ref 83–199)
AFP-TM SERPL-MCNC: 2.7 NG/ML
ANA SER QL IF: NEGATIVE
BUN SERPL-MCNC: 18 MG/DL (ref 7–25)
BUN/CREAT SERPL: ABNORMAL (CALC) (ref 6–22)
CALCIUM SERPL-MCNC: 9 MG/DL (ref 8.6–10.3)
CERULOPLASMIN SERPL-MCNC: 24 MG/DL (ref 18–36)
CHLORIDE SERPL-SCNC: 107 MMOL/L (ref 98–110)
CO2 SERPL-SCNC: 25 MMOL/L (ref 20–32)
CREAT SERPL-MCNC: 1.03 MG/DL (ref 0.6–1.29)
GFR/BSA.PRED SERPLBLD CYS-BASED-ARV: 90 ML/MIN/1.73M2
GLUCOSE SERPL-MCNC: 143 MG/DL (ref 65–99)
HAV IGM SERPL QL IA: NORMAL
HBV CORE IGM SERPL QL IA: NORMAL
HBV SURFACE AG SERPL QL IA: NORMAL
HCV AB S/CO SERPL IA: 0.03
HCV AB SERPL QL IA: NORMAL
IGA SERPL-MCNC: 298 MG/DL (ref 47–310)
IGG SERPL-MCNC: 1057 MG/DL (ref 600–1640)
IGM SERPL-MCNC: 43 MG/DL (ref 50–300)
INR PPP: 1.2
LKM-1 IGG SER IA-ACNC: <=20 U
MITOCHONDRIA AB SER QL IF: NEGATIVE
POTASSIUM SERPL-SCNC: 4.7 MMOL/L (ref 3.5–5.3)
PROTHROMBIN TIME: 12.3 SEC (ref 9–11.5)
SL AMB ACTIN (SMOOTH MUSCLE) ANTIBODY (IGG): <20 U
SODIUM SERPL-SCNC: 138 MMOL/L (ref 135–146)
TSH SERPL-ACNC: 3.38 MIU/L (ref 0.4–4.5)

## 2022-11-28 DIAGNOSIS — E11.9 TYPE 2 DIABETES MELLITUS WITHOUT COMPLICATIONS (HCC): ICD-10-CM

## 2022-11-28 RX ORDER — GLIMEPIRIDE 4 MG/1
4 TABLET ORAL DAILY
Qty: 90 TABLET | Refills: 0 | Status: SHIPPED | OUTPATIENT
Start: 2022-11-28 | End: 2022-12-06 | Stop reason: SDUPTHER

## 2022-11-28 NOTE — TELEPHONE ENCOUNTER
Dr Marah Larios increased dose to 6 mg he needs a new Rx send to [facility] in [address] [place] [address]

## 2022-11-29 ENCOUNTER — OFFICE VISIT (OUTPATIENT)
Dept: CARDIOLOGY CLINIC | Facility: CLINIC | Age: 47
End: 2022-11-29

## 2022-11-29 VITALS
SYSTOLIC BLOOD PRESSURE: 110 MMHG | OXYGEN SATURATION: 99 % | BODY MASS INDEX: 36.76 KG/M2 | WEIGHT: 286.4 LBS | HEIGHT: 74 IN | HEART RATE: 80 BPM | DIASTOLIC BLOOD PRESSURE: 70 MMHG

## 2022-11-29 DIAGNOSIS — R60.0 EDEMA OF BOTH LOWER LEGS: ICD-10-CM

## 2022-11-29 DIAGNOSIS — R01.1 HEART MURMUR: ICD-10-CM

## 2022-11-29 DIAGNOSIS — I10 ESSENTIAL HYPERTENSION: Primary | ICD-10-CM

## 2022-11-29 NOTE — PROGRESS NOTES
Cardiology   Allen Lucas 49 y o  male MRN: [id number]        Impression:  1  LE edema - improved on current diuretics  2  Hypertension - controlled  3  Heart murmur - no valvular abnormalities on echo  4  Hepatic failure - unclear etiology  Likely cause of edema       Recommendations:  1  Continue current medications  2  Follow up on an as needed basis  HPI: Allen Lucas is a 49y o  year old male with hypertension, DM, ITP, hepatic failure and LE edema, who presents for follow up  Echo 1/22 demonstrated normal LV systolic function, moderate left atrial enlargement with no significant valvular abnormalities  Still undergoing w/u for hepatic failure  Has cramping in leg  Diuretics switched to spironolactone and torsemide  Edema improved  No dyspnea or palpitations  Major complaint is fatigue  Review of Systems   Constitutional: Positive for fatigue  HENT: Negative  Eyes: Negative  Respiratory: Negative for chest tightness and shortness of breath  Cardiovascular: Negative for chest pain, palpitations and leg swelling  Gastrointestinal: Negative  Endocrine: Negative  Genitourinary: Negative  Musculoskeletal: Negative  Skin: Negative  Allergic/Immunologic: Negative  Neurological: Negative  Hematological: Negative  Psychiatric/Behavioral: Negative  All other systems reviewed and are negative          Past Medical History:   Diagnosis Date   • BMI 37 0-37 9, adult 04/22/2021   • BMI 38 0-38 9,adult 02/08/2022   • BMI 39 0-39 9,adult 12/16/2021   • Cervicalgia    • Colon polyp    • Concussion with loss of consciousness of 30 minutes or less 03/07/2021   • Depression    • Diabetes mellitus ([facility] [address] )    • Diabetes mellitus out of control    • Edema of both lower legs 12/16/2021   • Elevated liver enzymes    • Erectile dysfunction    • Exposure to COVID-19 virus    • Fatty liver    • GERD (gastroesophageal reflux disease)    • Heart murmur 01/07/2022   • Hyperglycemia    • Hypertension    • Left cervical radiculopathy    • Left elbow pain    • Left foot pain    • Leukopenia 03/07/2021   • Pain in both feet 12/16/2021   • Pancytopenia ([facility] [address] ) 12/16/2021   • Skin rash 12/16/2021   • Thrombocytopenia ([facility] [address] )    • Venous stasis dermatitis of both lower extremities 05/13/2022   • Vitamin D deficiency 01/07/2022     Past Surgical History:   Procedure Laterality Date   • CHOLECYSTECTOMY  1990's   • COLONOSCOPY  06/2005   • COLONOSCOPY     • EGD  07/10/2014   • IR BIOPSY BONE MARROW  03/20/2019   • IR BIOPSY BONE MARROW  7/29/2022   • UPPER GASTROINTESTINAL ENDOSCOPY     • WISDOM TOOTH EXTRACTION      In early to mid 19's     Social History     Substance and Sexual Activity   Alcohol Use Yes    Comment: rarely     Social History     Substance and Sexual Activity   Drug Use Not Currently    Comment: No drug use - As per [employer]     Social History     Tobacco Use   Smoking Status Former   • Types: Cigarettes   Smokeless Tobacco Former   • Quit date: 3/1/2014   Tobacco Comments    Social   Last cigarette years ago  Family History   Problem Relation Age of Onset   • Heart attack Mother    • Thrombocytopenia Mother    • Coronary artery disease Mother    • Hypertension Mother    • Testicular cancer Father    • Hypertension Father    • No Known Problems Brother    • Hypertension Brother    • No Known Problems Brother    • No Known Problems Son        Allergies: Allergies   Allergen Reactions   • Aspirin Other (See Comments)     Reaction unknown to patient     • Diltiazem Other (See Comments)     Patient does not recall what happened   • Lisinopril Cough       Medications:     Current Outpatient Medications:   •  clotrimazole-betamethasone (LOTRISONE) 1-0 05 % cream, Apply topically 2 (two) times a day, Disp: 45 g, Rfl: 0  •  glimepiride (AMARYL) 4 mg tablet, Take 1 tablet (4 mg total) by mouth daily, Disp: 90 tablet, Rfl: 0  •  losartan (COZAAR) 25 mg tablet, TAKE 1 TABLET BY MOUTH  DAILY, Disp: 90 tablet, Rfl: 3  •  omeprazole (PriLOSEC) 40 MG capsule, Take 1 capsule (40 mg total) by mouth daily, Disp: 90 capsule, Rfl: 1  •  OneTouch Verio test strip, USE TO TEST ONCE DAILY, Disp: 25 strip, Rfl: 3  •  Promacta 75 MG TABS, TAKE 1 TABLET BY MOUTH ONCE DAILY ON AN EMPTY STOMACH  AT LEAST 1 HOUR BEFORE OR 2 HOURS AFTER A MEAL, Disp: 30 tablet, Rfl: 3  •  spironolactone (ALDACTONE) 50 mg tablet, Take 1 tablet (50 mg total) by mouth daily, Disp: 30 tablet, Rfl: 3  •  torsemide (DEMADEX) 20 mg tablet, Take 1 tablet (20 mg total) by mouth in the morning and 1 tablet (20 mg total) before bedtime  , Disp: 180 tablet, Rfl: 3  •  Vitamin D, Cholecalciferol, 50 MCG (2000 UT) CAPS, Take 1 tablet by mouth in the morning, Disp: , Rfl:       Wt Readings from Last 3 Encounters:   11/29/22 130 kg (286 lb 6 4 oz)   11/09/22 134 kg (296 lb 6 4 oz)   10/14/22 130 kg (287 lb)     Temp Readings from Last 3 Encounters:   10/14/22 97 9 °F (36 6 °C) (Temporal)   09/10/22 99 °F (37 2 °C) (Oral)   09/09/22 98 8 °F (37 1 °C) (Temporal)     BP Readings from Last 3 Encounters:   11/29/22 110/70   11/09/22 122/84   10/14/22 111/56     Pulse Readings from Last 3 Encounters:   11/29/22 80   11/09/22 79   10/14/22 74         Physical Exam  HENT:      Head: Atraumatic  Mouth/Throat:      Mouth: Mucous membranes are moist    Eyes:      Extraocular Movements: Extraocular movements intact  Cardiovascular:      Rate and Rhythm: Normal rate and regular rhythm  Heart sounds: Normal heart sounds  Pulmonary:      Effort: Pulmonary effort is normal       Breath sounds: Normal breath sounds  Abdominal:      General: Abdomen is flat  Musculoskeletal:         General: Normal range of motion  Cervical back: Normal range of motion  Skin:     General: Skin is warm  Neurological:      General: No focal deficit present  Mental Status: He is alert and oriented to person, place, and time     Psychiatric: Mood and Affect: Mood normal          Behavior: Behavior normal            Laboratory Studies:  CMP:  Lab Results   Component Value Date     04/05/2017    K 4 7 11/21/2022     11/21/2022    CO2 25 11/21/2022    BUN 18 11/21/2022    CREATININE 1 03 11/21/2022    GLUCOSE 102 (H) 04/05/2017    AST 53 (H) 11/07/2022    ALT 40 11/07/2022    BILITOT 1 0 04/05/2017    EGFR 90 11/21/2022       Lipid Profile:   No results found for: CHOL  Lab Results   Component Value Date    HDL 40 11/07/2022     Lab Results   Component Value Date    LDLCALC 87 11/07/2022     Lab Results   Component Value Date    TRIG 78 11/07/2022       Cardiac testing:   EKG reviewed personally:   No results found for this or any previous visit  No results found for this or any previous visit  No results found for this or any previous visit  No results found for this or any previous visit

## 2022-12-06 ENCOUNTER — OFFICE VISIT (OUTPATIENT)
Dept: INTERNAL MEDICINE CLINIC | Facility: CLINIC | Age: 47
End: 2022-12-06

## 2022-12-06 VITALS
WEIGHT: 285 LBS | OXYGEN SATURATION: 98 % | SYSTOLIC BLOOD PRESSURE: 120 MMHG | BODY MASS INDEX: 36.59 KG/M2 | RESPIRATION RATE: 18 BRPM | DIASTOLIC BLOOD PRESSURE: 70 MMHG | HEART RATE: 82 BPM | TEMPERATURE: 98.7 F

## 2022-12-06 DIAGNOSIS — K21.9 GASTROESOPHAGEAL REFLUX DISEASE WITHOUT ESOPHAGITIS: ICD-10-CM

## 2022-12-06 DIAGNOSIS — K76.6 PORTAL HYPERTENSION (HCC): ICD-10-CM

## 2022-12-06 DIAGNOSIS — Z23 NEED FOR PROPHYLACTIC VACCINATION AND INOCULATION AGAINST INFLUENZA: ICD-10-CM

## 2022-12-06 DIAGNOSIS — R79.89 ELEVATED LFTS: ICD-10-CM

## 2022-12-06 DIAGNOSIS — E55.9 VITAMIN D DEFICIENCY: ICD-10-CM

## 2022-12-06 DIAGNOSIS — D61.818 PANCYTOPENIA (HCC): ICD-10-CM

## 2022-12-06 DIAGNOSIS — E11.9 TYPE 2 DIABETES MELLITUS WITHOUT COMPLICATION, WITHOUT LONG-TERM CURRENT USE OF INSULIN (HCC): Primary | ICD-10-CM

## 2022-12-06 DIAGNOSIS — R60.0 EDEMA OF BOTH LOWER LEGS: ICD-10-CM

## 2022-12-06 DIAGNOSIS — I10 ESSENTIAL HYPERTENSION: ICD-10-CM

## 2022-12-06 RX ORDER — TORSEMIDE 20 MG/1
20 TABLET ORAL DAILY
COMMUNITY

## 2022-12-06 RX ORDER — GLIMEPIRIDE 4 MG/1
4 TABLET ORAL 2 TIMES WEEKLY
Qty: 60 TABLET | Refills: 0 | Status: SHIPPED | OUTPATIENT
Start: 2022-12-08

## 2022-12-06 RX ORDER — GLIMEPIRIDE 4 MG/1
4 TABLET ORAL 2 TIMES WEEKLY
COMMUNITY
End: 2022-12-06 | Stop reason: SDUPTHER

## 2022-12-06 NOTE — PATIENT INSTRUCTIONS
Patient was advised to continue present medications  discussed with the patient medications and laboratory data in detail  Follow-up with me in 3 months or as advised  If any blood test was ordered please do 1 week prior to next appointment unless advise to get earlier  If you have any questions please call the office [phone]   Type 2 Diabetes Management for Adults   AMBULATORY CARE:   Type 2 diabetes  is a disease that affects how your body uses glucose (sugar)  Either your body cannot make enough insulin, or it cannot use the insulin correctly  It is important to keep diabetes controlled to prevent damage to your heart, blood vessels, and other organs  Have someone call your local emergency number (911 in the [address] [facility]) if:   · You cannot be woken  · You have signs of diabetic ketoacidosis:     ? confusion, fatigue    ? vomiting    ? rapid heartbeat    ? fruity smelling breath    ? extreme thirst    ? dry mouth and skin    · You have any of the following signs of a heart attack:      ? Squeezing, pressure, or pain in your chest    ? You may  also have any of the following:     § Discomfort or pain in your back, neck, jaw, stomach, or arm    § Shortness of breath    § Nausea or vomiting    § Lightheadedness or a sudden cold sweat    · You have any of the following signs of a stroke:      ? Numbness or drooping on one side of your face     ? Weakness in an arm or leg    ? Confusion or difficulty speaking    ? Dizziness, a severe headache, or vision loss    Call your doctor or diabetes care team if:   · You have a sore or wound that will not heal     · You have a change in the amount you urinate  · Your blood sugar levels are higher than your target goals  · You often have lower blood sugar levels than your target goals  · Your skin is red, dry, warm, or swollen  · You have trouble coping with diabetes, or you feel anxious or depressed      · You have questions or concerns about your condition or care     What you need to know about high blood sugar levels:  High blood sugar levels may not cause any symptoms  You may feel more thirsty or urinate more often than usual  Over time, high blood sugar levels can damage your nerves, blood vessels, tissues, and organs  The following can increase your blood sugar levels:  · Large meals or large amounts of carbohydrates at one time    · Less physical activity    · Stress    · Illness    · A lower dose of medicine or insulin, or a late dose    What you need to know about low blood sugar levels: You can prevent symptoms such as shakiness, dizziness, irritability, or confusion by preventing your blood sugar levels from going too low  · Treat a low blood sugar level right away  ? Drink 4 ounces of juice or have 1 tube of glucose gel  ? Check your blood sugar level again 10 to 15 minutes later  ? When the level goes back to normal, eat a meal or snack to prevent another decrease  · Keep glucose gel, raisins, or hard candy with you at all times to treat a low blood sugar level  · Your blood sugar level can get too low if you take diabetes medicine or insulin and do not eat enough food  · If you use insulin, check your blood sugar level before you exercise  ? If your blood sugar level is below 100 mg/dL, eat 4 crackers or 2 ounces of raisins, or drink 4 ounces of juice  ? Check your level every 30 minutes if you exercise more than 1 hour  ? You may need a snack during or after exercise  What you can do to manage your blood sugar levels:   · Check your blood sugar levels as directed and as needed  Several items are available to use to check your levels  You may need to check by testing a drop of blood in a glucose monitor  You may instead be given a continuous glucose monitoring (CGM) device  The device is worn at all times  The CGM checks your blood sugar level every 5 minutes   It sends results to an electronic device such as a smart phone  A CGM can be used with or without an insulin pump  Talk with your provider to find out which method is best for you  The goal for blood sugar levels before meals  is between 80 and 130 mg/dL and 2 hours after eating  is lower than 180 mg/dL  · Make healthy food choices  Work with a dietitian to develop a meal plan that works for you and your schedule  A dietitian can help you learn how to eat the right amount of carbohydrates during your meals and snacks  Carbohydrates can raise your blood sugar level if you eat too many at one time  Examples of foods that contain carbohydrates are breads, cereals, rice, pasta, and sweets  · Get regular physical activity  Physical activity can help you get to your target blood sugar level goal and manage your weight  Get at least 150 minutes of moderate to vigorous aerobic physical activity each week  Do not miss more than 2 days in a row  Do not sit longer than 30 minutes at a time  Your healthcare provider can help you create an activity plan  The plan can include the best activities for you and can help you build your strength and endurance  · Maintain a healthy weight  Ask your healthcare provider what a healthy weight is for you  Ask him or her to help you create a safe weight loss plan if you are overweight  · Take your diabetes medicine or insulin as directed  You may need diabetes medicine, insulin, or both to help control your blood sugar levels  Your healthcare provider will teach you how and when to take your diabetes medicine or insulin  You will also be taught about side effects oral diabetes medicine can cause  Insulin may be injected, or given through a pump or pen  You and your care team will discuss which method is best for you  ? An insulin pump  is an implanted device that gives your insulin 24 hours a day  An insulin pump prevents the need for multiple insulin injections in a day  ?  An insulin pen  is a device prefilled with the right amount of insulin  ? You and your family members will be taught how to draw up and give insulin  if this is the best method for you  Your education team will also teach you how to dispose of needles and syringes  ? You will learn how much insulin you need  and when to give it  You will be taught when not to give insulin  You will also be taught what to do if your blood sugar level drops too low  This may happen if you take insulin and do not eat the right amount of carbohydrates  Other things you can do to manage type 2 diabetes:   · Wear medical alert identification  Wear medical alert jewelry or carry a card that says you have diabetes  Ask your provider where to get these items  · Do not smoke  Nicotine and other chemicals in cigarettes and cigars can cause lung and blood vessel damage  It also makes it more difficult to manage your diabetes  Ask your provider for information if you currently smoke and need help to quit  Do not use e-cigarettes or smokeless tobacco in place of cigarettes or to help you quit  They still contain nicotine  · Check your feet each day for cuts, scratches, calluses, or other wounds  Look for redness and swelling, and feel for warmth  Wear shoes that fit well  Check your shoes for rocks or other objects that can hurt your feet  Do not walk barefoot or wear shoes without socks  Wear cotton socks to help keep your feet dry  · Ask about vaccines you may need  You have a higher risk for serious illness if you get the flu, pneumonia, COVID-19, or hepatitis  Ask your provider if you should get vaccines to prevent these or other diseases, and when to get the vaccines  · Talk to your care team if you become stressed about diabetes care  Sometimes being able to fit diabetes care into your life can cause increased stress  The stress can cause you not to take care of yourself properly   Your care team can help by offering tips about self-care  Your care team may suggest you talk to a mental health provider  The provider can listen and offer help with self-care issues  Follow up with your doctor or diabetes care team as directed: You may need to have blood tests done before your follow-up visit  The test results will show if changes need to be made in your treatment or self-care  Write down your questions so you remember to ask them during your visits  Talk to your provider if you cannot afford your medicine  © Copyright [employer] 2022 Information is for End User's use only and may not be sold, redistributed or otherwise used for commercial purposes  All illustrations and images included in CareNotes® are the copyrighted property of [employer]  or [facility] Penelope Solorio   The above information is an  only  It is not intended as medical advice for individual conditions or treatments  Talk to your doctor, nurse or pharmacist before following any medical regimen to see if it is safe and effective for you

## 2022-12-06 NOTE — PROGRESS NOTES
Assessment/Plan:    1  Type 2 diabetes mellitus without complication, without long-term current use of insulin (Formerly [facility])  Assessment & Plan:    Lab Results   Component Value Date    HGBA1C 7 5 (H) 11/07/2022   Is A1c increased to 7 5  Will increase the dose of his glimepiride from 2 mg to 4 mg every afternoon continue 4 mg every morning as well  Check blood sugar ampm alternate day  If blood sugar less than 80  Continue 1800-calorie ADA diet follow sugar hemoglobin A1c  Orders:  -     glimepiride (AMARYL) 4 mg tablet; Take 1 tablet (4 mg total) by mouth 2 (two) times a week  -     Basic metabolic panel; Future  -     Hemoglobin A1C; Future    2  Essential hypertension  Assessment & Plan:  Blood pressure well controlled  Advised to continue present medication  Advised for low-salt diet  Orders:  -     Basic metabolic panel; Future    3  Portal hypertension ([facility] )  Assessment & Plan:  Patient was seen by GI specialist   Had EGD colonoscopy  Periesophageal varices  Ongoing further work-up for possible cirrhosis of liver  Advised to follow-up with GI       4  Gastroesophageal reflux disease without esophagitis  Assessment & Plan:  His symptoms are well controlled on omeprazole  Has been watching diet very closely  5  Pancytopenia ([facility] [address] )  Assessment & Plan:  Patient has been followed by him at oncologist       6  BMI 36 0-36 9,adult  Assessment & Plan:  Patient  was advised to decrease portion size  Advised to decrease carb, sugar, cholesterol intake  Advised to exercise 3-5 times per week  Advised to lose weight  7  Vitamin D deficiency  Assessment & Plan:  Vitamin D deficiency resolved after being on vitamin D supplement  Last vitamin D level 58  Advised to continue vitamin D daily  8  Need for prophylactic vaccination and inoculation against influenza  -     influenza vaccine, quadrivalent, recombinant, PF, 0 5 mL, for patients 18 yr+ ([id number])    9   Edema of both lower legs  Assessment & Plan:  Almost resolved after being on torsemide 20 mg daily plus spironolactone 50 mg daily  Electrolyte potassium normal     Orders:  -     Basic metabolic panel; Future    10  Elevated LFTs  Assessment & Plan:  Patient has been seen by GI specialist undergoing work-up for possible cirrhosis of liver  Advised to follow-up with GI  Patient denies any alcohol abuse or any over-the-counter pain medication abuse  Subjective: Patient presents for follow-up  Patient ID: Monserrat Albright is a 49 y o  male  HPI   43-year-old white male patient presents to follow-up his medical problems  He denies any chest pain, shortness of breath, pain in abdomen  Cough, fever, chills nausea vomiting diarrhea  Underwent EGD colonoscopy  Found to have esophageal varices and portal hypertension  Undergoing work-up for possible cirrhosis of liver    Has been followed by GI specialist, [facility] oncologist     The following portions of the patient's history were reviewed and updated as appropriate:     Past Medical History:  He has a past medical history of BMI 36 0-36 9,adult (12/6/2022), BMI 37 0-37 9, adult (04/22/2021), BMI 38 0-38 9,adult (02/08/2022), BMI 39 0-39 9,adult (12/16/2021), Cervicalgia, Colon polyp, Concussion with loss of consciousness of 30 minutes or less (03/07/2021), Depression, Diabetes mellitus ([address] ), Diabetes mellitus out of control, Edema of both lower legs (12/16/2021), Elevated liver enzymes, Erectile dysfunction, Exposure to COVID-19 virus, Fatty liver, GERD (gastroesophageal reflux disease), Heart murmur (01/07/2022), Hyperglycemia, Hypertension, Left cervical radiculopathy, Left elbow pain, Left foot pain, Leukopenia (03/07/2021), Pain in both feet (12/16/2021), Pancytopenia ([address] ) (12/16/2021), Skin rash (12/16/2021), Thrombocytopenia ([address] ), Venous stasis dermatitis of both lower extremities (05/13/2022), and Vitamin D deficiency (01/07/2022)  ,  _______________________________________________________________________  Past Surgical History:   has a past surgical history that includes Cholecystectomy (1990's); [place] tooth extraction; IR biopsy bone marrow (03/20/2019); EGD (07/10/2014); Colonoscopy (06/2005); Colonoscopy; Upper gastrointestinal endoscopy; and IR biopsy bone marrow (7/29/2022)  ,  _______________________________________________________________________  Family History:  family history includes Coronary artery disease in his mother; Heart attack in his mother; Hypertension in his brother, father, and mother; No Known Problems in his brother, brother, and son; Testicular cancer in his father; Thrombocytopenia in his mother ,  _______________________________________________________________________  Social History:   reports that he has quit smoking  His smoking use included cigarettes  He quit smokeless tobacco use about 8 years ago  He reports current alcohol use  He reports that he does not currently use drugs  ,  _______________________________________________________________________  Allergies:  is allergic to aspirin, diltiazem, and lisinopril     _______________________________________________________________________  Current Outpatient Medications   Medication Sig Dispense Refill   • clotrimazole-betamethasone (LOTRISONE) 1-0 05 % cream Apply topically 2 (two) times a day 45 g 0   • [START ON 12/8/2022] glimepiride (AMARYL) 4 mg tablet Take 1 tablet (4 mg total) by mouth 2 (two) times a week 60 tablet 0   • losartan (COZAAR) 25 mg tablet TAKE 1 TABLET BY MOUTH  DAILY 90 tablet 3   • omeprazole (PriLOSEC) 40 MG capsule Take 1 capsule (40 mg total) by mouth daily 90 capsule 1   • OneTouch Verio test strip USE TO TEST ONCE DAILY 25 strip 3   • Promacta 75 MG TABS TAKE 1 TABLET BY MOUTH ONCE DAILY ON AN EMPTY STOMACH  AT LEAST 1 HOUR BEFORE OR 2 HOURS AFTER A MEAL 30 tablet 3   • spironolactone (ALDACTONE) 50 mg tablet Take 1 tablet (50 mg total) by mouth daily 30 tablet 3   • torsemide (DEMADEX) 20 mg tablet Take 20 mg by mouth daily     • Vitamin D, Cholecalciferol, 50 MCG (2000 UT) CAPS Take 1 tablet by mouth in the morning       No current facility-administered medications for this visit      _______________________________________________________________________  Review of Systems   Constitutional: Negative for chills and fever  HENT: Negative for congestion, ear pain, hearing loss, nosebleeds, sinus pain, sore throat and trouble swallowing  Eyes: Negative for discharge, redness and visual disturbance  Respiratory: Negative for cough, chest tightness and shortness of breath  Cardiovascular: Negative for chest pain and palpitations  Gastrointestinal: Negative for abdominal pain, blood in stool, constipation, diarrhea, nausea and vomiting  Genitourinary: Negative for dysuria, flank pain, frequency and hematuria  Musculoskeletal: Negative for arthralgias, myalgias and neck pain  Skin: Negative for color change and rash  Neurological: Negative for dizziness, speech difficulty, weakness and headaches  Hematological: Does not bruise/bleed easily  Psychiatric/Behavioral: Negative for agitation and behavioral problems  Objective:  Vitals:    12/06/22 1552   BP: 120/70   BP Location: Right arm   Patient Position: Sitting   Cuff Size: Adult   Pulse: 82   Resp: 18   Temp: 98 7 °F (37 1 °C)   TempSrc: Tympanic   SpO2: 98%   Weight: 129 kg (285 lb)     Body mass index is 36 59 kg/m²  Physical Exam  Vitals and nursing note reviewed  Constitutional:       General: He is not in acute distress  Appearance: He is obese  HENT:      Head: Normocephalic and atraumatic  Right Ear: Ear canal and external ear normal       Left Ear: Ear canal and external ear normal       Nose: Nose normal       Mouth/Throat:      Mouth: Mucous membranes are moist    Eyes:      General: No scleral icterus          Right eye: No discharge  Left eye: No discharge  Extraocular Movements: Extraocular movements intact  Conjunctiva/sclera: Conjunctivae normal    Cardiovascular:      Rate and Rhythm: Normal rate and regular rhythm  Pulses: Normal pulses  Heart sounds: No murmur heard  Pulmonary:      Effort: Pulmonary effort is normal  No respiratory distress  Breath sounds: Normal breath sounds  Abdominal:      General: Bowel sounds are normal       Palpations: Abdomen is soft  Tenderness: There is no abdominal tenderness  Musculoskeletal:         General: Normal range of motion  Cervical back: Normal range of motion and neck supple  No muscular tenderness  Right lower leg: Edema ( Trace pedal edema) present  Left lower leg: Edema ( Trace pedal edema) present  Skin:     General: Skin is warm  Findings: No rash  Neurological:      General: No focal deficit present  Mental Status: He is alert and oriented to person, place, and time  Motor: No weakness  Coordination: Coordination normal       Gait: Gait normal    Psychiatric:         Mood and Affect: Mood normal          Behavior: Behavior normal          I spent 30 minutes with the patient today    More than 50% time spent for reviewing of external notes, reviewing of the results of diagnostics test, management of care, patient education and ordering of test

## 2022-12-07 NOTE — ASSESSMENT & PLAN NOTE
Vitamin D deficiency resolved after being on vitamin D supplement  Last vitamin D level 58  Advised to continue vitamin D daily

## 2022-12-07 NOTE — ASSESSMENT & PLAN NOTE
Patient was seen by GI specialist   Had EGD colonoscopy  Periesophageal varices  Ongoing further work-up for possible cirrhosis of liver    Advised to follow-up with GI

## 2022-12-07 NOTE — ASSESSMENT & PLAN NOTE
Lab Results   Component Value Date    HGBA1C 7 5 (H) 11/07/2022   Is A1c increased to 7 5  Will increase the dose of his glimepiride from 2 mg to 4 mg every afternoon continue 4 mg every morning as well  Check blood sugar ampm alternate day  If blood sugar less than 80  Continue 1800-calorie ADA diet follow sugar hemoglobin A1c

## 2022-12-07 NOTE — ASSESSMENT & PLAN NOTE
Almost resolved after being on torsemide 20 mg daily plus spironolactone 50 mg daily    Electrolyte potassium normal

## 2022-12-07 NOTE — ASSESSMENT & PLAN NOTE
Patient has been seen by GI specialist undergoing work-up for possible cirrhosis of liver  Advised to follow-up with GI  Patient denies any alcohol abuse or any over-the-counter pain medication abuse

## 2022-12-12 DIAGNOSIS — K21.9 GASTROESOPHAGEAL REFLUX DISEASE WITHOUT ESOPHAGITIS: ICD-10-CM

## 2022-12-12 RX ORDER — OMEPRAZOLE 40 MG/1
CAPSULE, DELAYED RELEASE ORAL
Qty: 90 CAPSULE | Refills: 3 | Status: SHIPPED | OUTPATIENT
Start: 2022-12-12

## 2022-12-12 NOTE — PROGRESS NOTES
Hematology/Oncology Outpatient Follow- up Note  Robertoyumiko Cabrera 1975, [id number]  12/15/2022        Chief Complaint   Patient presents with   • Follow-up       HPI:  Giancarlo Quintanilla is a 48 yo male who was referred to hematology for low platelet count, seen initially in 2016  [place] also has a low WBC and h/o splenomegaly  Work up included a bone marrow biopsy which was normal and PET CT scan to evaluate spleen this was negative for uptake and r/o splenic lymphoma       He has been treated with high dose steroids and Rituxan  Currently maintained on Promacta for ITP    Repeat BMBx was done on 22 for worsening cytopenias showed a normocellular marrow with megakaryocytic hyperplasia with no evidence of leukemia or lymphoma decreased iron stores no reticulin fibrosis no mutational abnormalities noted by NGS  According to report these findings are compatible with ITP or platelet destruction     Previous Hematologic/ Oncologic History:    Workup  The patient has failed high-dose steroids, he was treated with Decadron  Rituxan x 4 doses   IV Venofer  B 12    Current Hematologic/ Oncologic Treatment:    Promacta     ECO - Asymptomatic    Interval History:   The patient presents for routine follow up  He was last seen by Dr Burton Rivera on 8/10/22  He was recommended IV Venofer  Competed 6/10 infusions  Last received Venofer on 22  He was unable to complete infusions secondary to coming down with Covid  He is now following with GI  He underwent EGD and colonoscopy on 10/14/2022  EGD demonstrated portal hypertensive gastropathy along with small to medium sized esophageal varices without bleeding  Ultrasound completed on 2022 is consistent with cirrhosis  No evidence of cancer  Hepatitis panel was nonreactive    Most recent blood work completed on 12/3 was reviewed  CBC with differential is greatly improved  WBC 2, Hgb 12, MCV 93, platelets 87   ANC 1 2, Absolute lymphocytes 522, Absolute monocytes 180  AST 61, ALT 54, T Bili 2 4    He denies any abdominal pain  No evidence of jaundice  Denies any constitutional symptoms  No recent fever/chills/infections  Denies any abnormal bleeding/bruising  Test Results:    Imaging: US elastography    Result Date: 11/25/2022  Narrative: ELASTOGRAPHY, LIVER ULTRASOUND INDICATION:   [id number]: Other specified abnormal findings of blood chemistry  COMPARISON:  None TECHNIQUE: Targeted ultrasound of the liver was performed with a curvilinear transducer on a Ge.tt utilizing 2D SWE  A total of 7 shear wave Elastography samples were obtained, due to inability to obtain 10 satisfactory measurements  FINDINGS:  Shear Wave Elastography sampling was performed to evaluate stiffness changes within the liver associated with fibrosis  The resulting E median is 14 18 kPa  The corresponding Metavir score is F4 (cirrhosis), >11 87 kPa   >1 98 m/s  The IQR/median value is 12 4 %  (IQR of less than 30% is considered a satisfactory data set)  Note: that the stage of liver fibrosis may be overestimated by clinical conditions unrelated to fibrosis, including but not limited to, nonfasting, hepatic inflammation, vascular congestion, biliary obstruction, and infiltrative liver disease  Furthermore, in some patients with NAFLD, the cut-off values for compensated advanced chronic liver disease may be lower (7-9 kPa)  In causes other than viral hepatitis and nonalcoholic fatty liver disease, the cut-off values are not well established  When comparing measurements across time, a clinically significant change should be considered when the delta change is greater than 10%  In all these conditions, however, liver stiffness values within the normal range exclude significant liver fibrosis   Ultrasound-guided attenuation parameter (UGAP) Liver Steatosis Grading Attenuation coefficient:  0 77 dB/cm/MHz Liver steatosis grading:  S3 ( > 67% steatosis) IQR/Med:  5 5 % (Less than or equal to 30% is a satisfactory data set ) Reference White paper for GE ultrasound-guided attenuation parameter https://www [email or web address]  [email or web address]  pdf?la=en-au     Impression: Metavir score: F4, Cirrhosis, noting that only 7 satisfactory measurements were able to be obtained  As a result, this may be unreliable  According to the updated SRU consensus statement, a liver stiffness of 14 18 kPa, rules in compensated advanced chronic liver disease (cACLD)  Limitations as above  https://pubs  rsna org/doi/10 1148/radiol  [id number] Liver steatosis grading:  S3 ( > 67% steatosis) Workstation performed: [id number]       Labs:   Lab Results   Component Value Date    WBC 1 7 (L) 11/07/2022    HGB 10 9 (L) 11/07/2022    HCT 32 9 (L) 11/07/2022    MCV 90 4 11/07/2022    PLT 78 (L) 11/07/2022     Lab Results   Component Value Date     04/05/2017    K 4 7 11/21/2022     11/21/2022    CO2 25 11/21/2022    BUN 18 11/21/2022    CREATININE 1 03 11/21/2022    GLUCOSE 102 (H) 04/05/2017    GLUF 116 (H) 05/04/2021    CALCIUM 9 0 11/21/2022    CORRECTEDCA 9 5 05/04/2021    AST 53 (H) 11/07/2022    ALT 40 11/07/2022    ALKPHOS 137 (H) 11/07/2022    PROT 6 4 04/05/2017    BILITOT 1 0 04/05/2017    EGFR 90 11/21/2022           Review of Systems   All other systems reviewed and are negative          Active Problems:   Patient Active Problem List   Diagnosis   • Acquired thrombocytopenia ([facility] [address] )   • Elevated LFTs   • Fatty liver   • GERD (gastroesophageal reflux disease)   • Tick bite   • Acute upper respiratory infection   • Diabetes ([facility] [address] )   • Idiopathic thrombocytopenia (HCC)   • Elevated MCV   • Family history of thrombocytopenia   • Splenomegaly   • Chronic ITP (idiopathic thrombocytopenic purpura) (HCC)   • Leukopenia   • Concussion with loss of consciousness of 30 minutes or less   • BMI 37 0-37 9, adult   • Edema of both lower legs   • Pancytopenia (HCC)   • BMI 39 0-39 9,adult   • Pain in both feet • Essential hypertension   • Hypocalcemia   • Heart murmur   • Vitamin D deficiency   • BMI 38 0-38 9,adult   • Venous stasis dermatitis of both lower extremities   • Anemia, unspecified   • Iron deficiency anemia due to chronic blood loss   • B12 deficiency   • Portal hypertension (HCC)   • BMI 36 0-36 9,adult       Past Medical History:   Past Medical History:   Diagnosis Date   • BMI 36 0-36 9,adult 12/6/2022   • BMI 37 0-37 9, adult 04/22/2021   • BMI 38 0-38 9,adult 02/08/2022   • BMI 39 0-39 9,adult 12/16/2021   • Cervicalgia    • Colon polyp    • Concussion with loss of consciousness of 30 minutes or less 03/07/2021   • Depression    • Diabetes mellitus ([address] )    • Diabetes mellitus out of control    • Edema of both lower legs 12/16/2021   • Elevated liver enzymes    • Erectile dysfunction    • Exposure to COVID-19 virus    • Fatty liver    • GERD (gastroesophageal reflux disease)    • Heart murmur 01/07/2022   • Hyperglycemia    • Hypertension    • Left cervical radiculopathy    • Left elbow pain    • Left foot pain    • Leukopenia 03/07/2021   • Pain in both feet 12/16/2021   • Pancytopenia ([address] ) 12/16/2021   • Skin rash 12/16/2021   • Thrombocytopenia ([address] )    • Venous stasis dermatitis of both lower extremities 05/13/2022   • Vitamin D deficiency 01/07/2022       Surgical History:   Past Surgical History:   Procedure Laterality Date   • CHOLECYSTECTOMY  1990's   • COLONOSCOPY  06/2005   • COLONOSCOPY     • EGD  07/10/2014   • IR BIOPSY BONE MARROW  03/20/2019   • IR BIOPSY BONE MARROW  7/29/2022   • UPPER GASTROINTESTINAL ENDOSCOPY     • WISDOM TOOTH EXTRACTION      In early to mid 19's       Family History:    Family History   Problem Relation Age of Onset   • Heart attack Mother    • Thrombocytopenia Mother    • Coronary artery disease Mother    • Hypertension Mother    • Testicular cancer Father    • Hypertension Father    • No Known Problems Brother    • Hypertension Brother    • No Known Problems Brother    • No Known Problems Son        Cancer-related family history includes Testicular cancer in his father  Social History:   Social History     Socioeconomic History   • Marital status: /Civil Union     Spouse name: Not on file   • Number of children: Not on file   • Years of education: Not on file   • Highest education level: Not on file   Occupational History   • Occupation:    Tobacco Use   • Smoking status: Former     Types: Cigarettes   • Smokeless tobacco: Former     Quit date: 3/1/2014   • Tobacco comments:     Social   Last cigarette years ago     Vaping Use   • Vaping Use: Never used   Substance and Sexual Activity   • Alcohol use: Yes     Comment: rarely   • Drug use: Not Currently     Comment: No drug use - As per [employer]   • Sexual activity: Not Currently     Comment: Resides with wife and kids   Other Topics Concern   • Not on file   Social History Narrative    Tobacco use: Never smoker    Annual eye exam: Sees ophth q yr    Annual dental checkup: He goes to dentist     - As per [employer]     Social Determinants of Health     Financial Resource Strain: Not on file   Food Insecurity: Not on file   Transportation Needs: Not on file   Physical Activity: Not on file   Stress: Not on file   Social Connections: Not on file   Intimate Partner Violence: Not on file   Housing Stability: Not on file       Current Medications:   Current Outpatient Medications   Medication Sig Dispense Refill   • clotrimazole-betamethasone (LOTRISONE) 1-0 05 % cream Apply topically 2 (two) times a day 45 g 0   • glimepiride (AMARYL) 4 mg tablet Take 1 tablet (4 mg total) by mouth 2 (two) times a week 60 tablet 0   • losartan (COZAAR) 25 mg tablet TAKE 1 TABLET BY MOUTH  DAILY 90 tablet 3   • omeprazole (PriLOSEC) 40 MG capsule TAKE 1 CAPSULE BY MOUTH  DAILY 90 capsule 3   • OneTouch Verio test strip USE TO TEST ONCE DAILY 25 strip 3   • Promacta 75 MG TABS TAKE 1 TABLET BY MOUTH ONCE DAILY ON AN EMPTY STOMACH  AT LEAST 1 HOUR BEFORE OR 2 HOURS AFTER A MEAL 30 tablet 3   • spironolactone (ALDACTONE) 50 mg tablet Take 1 tablet (50 mg total) by mouth daily 30 tablet 3   • torsemide (DEMADEX) 20 mg tablet Take 20 mg by mouth daily     • Vitamin D, Cholecalciferol, 50 MCG (2000 UT) CAPS Take 1 tablet by mouth in the morning       No current facility-administered medications for this visit  Allergies: Allergies   Allergen Reactions   • Aspirin Other (See Comments)     Reaction unknown to patient  • Diltiazem Other (See Comments)     Patient does not recall what happened   • Lisinopril Cough       Physical Exam:  /78 (BP Location: Right arm, Patient Position: Sitting, Cuff Size: Large)   Pulse 84   Temp 97 9 °F (36 6 °C) (Tympanic)   Resp 16   Ht 6' 2" (1 88 m)   Wt 129 kg (284 lb)   SpO2 97%   BMI 36 46 kg/m²   Body surface area is 2 52 meters squared  Wt Readings from Last 3 Encounters:   12/15/22 129 kg (284 lb)   12/06/22 129 kg (285 lb)   11/29/22 130 kg (286 lb 6 4 oz)           Physical Exam  Constitutional:       General: He is not in acute distress  Appearance: He is well-developed  He is not diaphoretic  HENT:      Head: Normocephalic and atraumatic  Mouth/Throat:      Pharynx: No oropharyngeal exudate  Eyes:      General: No scleral icterus  Pupils: Pupils are equal, round, and reactive to light  Cardiovascular:      Rate and Rhythm: Normal rate and regular rhythm  Heart sounds: No murmur heard  Pulmonary:      Effort: Pulmonary effort is normal  No respiratory distress  Breath sounds: Normal breath sounds  Abdominal:      General: Bowel sounds are normal  There is no distension  Palpations: Abdomen is soft  There is no mass  Tenderness: There is no abdominal tenderness  Musculoskeletal:         General: Normal range of motion  Cervical back: Normal range of motion and neck supple     Lymphadenopathy:      Cervical: No cervical adenopathy  Skin:     General: Skin is warm and dry  Neurological:      General: No focal deficit present  Mental Status: He is alert and oriented to person, place, and time  Psychiatric:         Behavior: Behavior normal          Thought Content: Thought content normal          Judgment: Judgment normal          Assessment / Plan:    1  Iron deficiency anemia due to chronic blood loss    2  B12 deficiency    3  Elevated LFTs    4  Portal hypertension ([facility] [address] )    5  Cirrhosis of liver without ascites, unspecified hepatic cirrhosis type ([facility] [address] )    6  Chronic ITP (idiopathic thrombocytopenic purpura) (HCC)    7  Pancytopenia (HCC)      The patient is a 50 yo male who was referred to hematology for low platelet count, seen initially in 2016  [place] Hed[place] also has a low WBC and h/o splenomegaly  Work up included a bone marrow biopsy which was normal and PET CT scan to evaluate spleen this was negative for uptake and r/o splenic lymphoma       He has been treated with high dose steroids and Rituxan  Currently maintained on Promacta for ITP    Repeat BMBx was done on 7/9/22 for worsening cytopenias showed a normocellular marrow with megakaryocytic hyperplasia with no evidence of leukemia or lymphoma decreased iron stores no reticulin fibrosis no mutational abnormalities noted by NGS  According to report these findings are compatible with ITP or platelet destruction   His marrow also demonstrated evidence of iron deficiency  He was recommended treatment with 10 doses of IV Venofer and 5 doses of B12  He was only able to complete 6 out of 10 doses of Venofer  Patient is now following with GI for findings of cirrhosis  He has elevated LFTs and hyperbilirubinemia  This is also probably contributing to his cytopenias  I have placed a referral to hepatology  Most recent CBC with differential is greatly improved  However he remains pancytopenic    I will order 4 more doses of IV Venofer to complete original recommendation for total of 10  He will stay on his Promacta    He will return for a follow-up visit in 2 months with repeat blood work  I will repeat his iron studies  He will continue to have CBC and CMP every 4 weeks  Patient was in agreement with this plan of care  He is instructed to call at anytime with questions or concerns      Goals and Barriers:  Current Goal:  Prolong Survival from ITP, pancytopenia, cirrhosis  Barriers: None  Patient's Capacity to Self Care:  Patient able to self care  Portions of the record may have been created with voice recognition software  Occasional wrong word or "sound a like" substitutions may have occurred due to the inherent limitations of voice recognition software  Read the chart carefully and recognize, using context, where substitutions have occurred

## 2022-12-15 ENCOUNTER — OFFICE VISIT (OUTPATIENT)
Dept: HEMATOLOGY ONCOLOGY | Facility: CLINIC | Age: 47
End: 2022-12-15

## 2022-12-15 ENCOUNTER — TELEPHONE (OUTPATIENT)
Dept: HEMATOLOGY ONCOLOGY | Facility: CLINIC | Age: 47
End: 2022-12-15

## 2022-12-15 VITALS
OXYGEN SATURATION: 97 % | RESPIRATION RATE: 16 BRPM | SYSTOLIC BLOOD PRESSURE: 130 MMHG | HEART RATE: 84 BPM | DIASTOLIC BLOOD PRESSURE: 78 MMHG | WEIGHT: 284 LBS | BODY MASS INDEX: 36.45 KG/M2 | TEMPERATURE: 97.9 F | HEIGHT: 74 IN

## 2022-12-15 DIAGNOSIS — E53.8 B12 DEFICIENCY: ICD-10-CM

## 2022-12-15 DIAGNOSIS — K76.6 PORTAL HYPERTENSION (HCC): ICD-10-CM

## 2022-12-15 DIAGNOSIS — D69.3 CHRONIC ITP (IDIOPATHIC THROMBOCYTOPENIC PURPURA) (HCC): ICD-10-CM

## 2022-12-15 DIAGNOSIS — K74.60 CIRRHOSIS OF LIVER WITHOUT ASCITES, UNSPECIFIED HEPATIC CIRRHOSIS TYPE (HCC): ICD-10-CM

## 2022-12-15 DIAGNOSIS — D50.0 IRON DEFICIENCY ANEMIA DUE TO CHRONIC BLOOD LOSS: Primary | ICD-10-CM

## 2022-12-15 DIAGNOSIS — D61.818 PANCYTOPENIA (HCC): ICD-10-CM

## 2022-12-15 DIAGNOSIS — R79.89 ELEVATED LFTS: ICD-10-CM

## 2022-12-15 RX ORDER — SODIUM CHLORIDE 9 MG/ML
20 INJECTION, SOLUTION INTRAVENOUS ONCE
Status: CANCELLED | OUTPATIENT
Start: 2022-12-19

## 2022-12-15 RX ORDER — SODIUM CHLORIDE 9 MG/ML
20 INJECTION, SOLUTION INTRAVENOUS ONCE
Status: CANCELLED | OUTPATIENT
Start: 2022-12-20

## 2022-12-15 NOTE — TELEPHONE ENCOUNTER
Follow-up disposition: Return in about 2 months (around 2/15/2023) for bloodwork  Check out comments: Set up with Hepatology / Scheduled for 1/12/23  IV Venofer 2 x week x 4 doses     Please schedule treatment and f/u appt  For this patient

## 2022-12-15 NOTE — TELEPHONE ENCOUNTER
Another message left for patient to call back to go over scheduling preferences  My TEAMS number was left for call back

## 2022-12-16 NOTE — TELEPHONE ENCOUNTER
Called patient and attempted to get preferences but patient refused per patient " please have the  contact me directly due to having a difficult schedule to work with "       Please call and schedule patient for IV  iron  Thanks!

## 2022-12-20 ENCOUNTER — HOSPITAL ENCOUNTER (OUTPATIENT)
Dept: INFUSION CENTER | Facility: CLINIC | Age: 47
Discharge: HOME | End: 2022-12-20

## 2022-12-20 VITALS
RESPIRATION RATE: 18 BRPM | TEMPERATURE: 97.4 F | DIASTOLIC BLOOD PRESSURE: 65 MMHG | SYSTOLIC BLOOD PRESSURE: 128 MMHG | HEART RATE: 77 BPM

## 2022-12-20 DIAGNOSIS — D50.0 IRON DEFICIENCY ANEMIA DUE TO CHRONIC BLOOD LOSS: Primary | ICD-10-CM

## 2022-12-20 DIAGNOSIS — E53.8 B12 DEFICIENCY: ICD-10-CM

## 2022-12-20 RX ORDER — SODIUM CHLORIDE 9 MG/ML
20 INJECTION, SOLUTION INTRAVENOUS ONCE
Status: CANCELLED | OUTPATIENT
Start: 2022-12-23

## 2022-12-20 RX ORDER — SODIUM CHLORIDE 9 MG/ML
20 INJECTION, SOLUTION INTRAVENOUS ONCE
Status: COMPLETED | OUTPATIENT
Start: 2022-12-20 | End: 2022-12-20

## 2022-12-20 RX ADMIN — SODIUM CHLORIDE 20 ML/HR: 9 INJECTION, SOLUTION INTRAVENOUS at 15:54

## 2022-12-20 RX ADMIN — IRON SUCROSE 200 MG: 20 INJECTION, SOLUTION INTRAVENOUS at 15:56

## 2022-12-23 ENCOUNTER — HOSPITAL ENCOUNTER (OUTPATIENT)
Dept: INFUSION CENTER | Facility: CLINIC | Age: 47
End: 2022-12-23

## 2022-12-23 VITALS
SYSTOLIC BLOOD PRESSURE: 128 MMHG | HEART RATE: 75 BPM | DIASTOLIC BLOOD PRESSURE: 59 MMHG | TEMPERATURE: 97 F | RESPIRATION RATE: 16 BRPM

## 2022-12-23 DIAGNOSIS — D50.0 IRON DEFICIENCY ANEMIA DUE TO CHRONIC BLOOD LOSS: Primary | ICD-10-CM

## 2022-12-23 DIAGNOSIS — E53.8 B12 DEFICIENCY: ICD-10-CM

## 2022-12-23 RX ORDER — SODIUM CHLORIDE 9 MG/ML
20 INJECTION, SOLUTION INTRAVENOUS ONCE
Status: CANCELLED | OUTPATIENT
Start: 2022-12-27

## 2022-12-23 RX ORDER — SODIUM CHLORIDE 9 MG/ML
20 INJECTION, SOLUTION INTRAVENOUS ONCE
Status: COMPLETED | OUTPATIENT
Start: 2022-12-23 | End: 2022-12-23

## 2022-12-23 RX ADMIN — SODIUM CHLORIDE 20 ML/HR: 9 INJECTION, SOLUTION INTRAVENOUS at 12:47

## 2022-12-23 RX ADMIN — IRON SUCROSE 200 MG: 20 INJECTION, SOLUTION INTRAVENOUS at 12:47

## 2022-12-23 NOTE — PROGRESS NOTES
Patient reports to unit offering no complaints today  Patient receiving Venofer, tolerated infusion well without any adverse events  Patient aware of next appointment, PIV removed  AVS declined

## 2022-12-27 ENCOUNTER — HOSPITAL ENCOUNTER (OUTPATIENT)
Dept: INFUSION CENTER | Facility: CLINIC | Age: 47
Discharge: HOME | End: 2022-12-27

## 2022-12-27 VITALS
RESPIRATION RATE: 18 BRPM | TEMPERATURE: 98 F | SYSTOLIC BLOOD PRESSURE: 132 MMHG | DIASTOLIC BLOOD PRESSURE: 62 MMHG | HEART RATE: 76 BPM

## 2022-12-27 DIAGNOSIS — E53.8 B12 DEFICIENCY: ICD-10-CM

## 2022-12-27 DIAGNOSIS — D50.0 IRON DEFICIENCY ANEMIA DUE TO CHRONIC BLOOD LOSS: Primary | ICD-10-CM

## 2022-12-27 RX ORDER — SODIUM CHLORIDE 9 MG/ML
20 INJECTION, SOLUTION INTRAVENOUS ONCE
Status: COMPLETED | OUTPATIENT
Start: 2022-12-27 | End: 2022-12-27

## 2022-12-27 RX ORDER — SODIUM CHLORIDE 9 MG/ML
20 INJECTION, SOLUTION INTRAVENOUS ONCE
Status: CANCELLED | OUTPATIENT
Start: 2022-12-30

## 2022-12-27 RX ADMIN — SODIUM CHLORIDE 20 ML/HR: 0.9 INJECTION, SOLUTION INTRAVENOUS at 08:23

## 2022-12-27 RX ADMIN — IRON SUCROSE 200 MG: 20 INJECTION, SOLUTION INTRAVENOUS at 08:28

## 2022-12-30 ENCOUNTER — HOSPITAL ENCOUNTER (OUTPATIENT)
Dept: INFUSION CENTER | Facility: CLINIC | Age: 47
End: 2022-12-30

## 2022-12-30 VITALS
RESPIRATION RATE: 17 BRPM | SYSTOLIC BLOOD PRESSURE: 138 MMHG | DIASTOLIC BLOOD PRESSURE: 66 MMHG | HEART RATE: 81 BPM | TEMPERATURE: 97.2 F

## 2022-12-30 DIAGNOSIS — E53.8 B12 DEFICIENCY: ICD-10-CM

## 2022-12-30 DIAGNOSIS — D50.0 IRON DEFICIENCY ANEMIA DUE TO CHRONIC BLOOD LOSS: Primary | ICD-10-CM

## 2022-12-30 RX ORDER — SODIUM CHLORIDE 9 MG/ML
20 INJECTION, SOLUTION INTRAVENOUS ONCE
Status: CANCELLED | OUTPATIENT
Start: 2022-12-30

## 2022-12-30 RX ORDER — SODIUM CHLORIDE 9 MG/ML
20 INJECTION, SOLUTION INTRAVENOUS ONCE
Status: COMPLETED | OUTPATIENT
Start: 2022-12-30 | End: 2022-12-30

## 2022-12-30 RX ADMIN — SODIUM CHLORIDE 200 MG: 9 INJECTION, SOLUTION INTRAVENOUS at 16:31

## 2022-12-30 RX ADMIN — SODIUM CHLORIDE 20 ML/HR: 9 INJECTION, SOLUTION INTRAVENOUS at 16:31

## 2022-12-30 NOTE — PROGRESS NOTES
Pt to clinic for venofer, offers no complaints today, tolerated infusion without complications, aware this is his last infusion appointment, PIV removed, avs declined

## 2023-01-08 LAB
ALBUMIN SERPL-MCNC: 4 G/DL (ref 3.6–5.1)
ALBUMIN/GLOB SERPL: 1.5 (CALC) (ref 1–2.5)
ALP SERPL-CCNC: 116 U/L (ref 36–130)
ALT SERPL-CCNC: 41 U/L (ref 9–46)
AST SERPL-CCNC: 47 U/L (ref 10–40)
BASOPHILS # BLD AUTO: 10 CELLS/UL (ref 0–200)
BASOPHILS NFR BLD AUTO: 0.5 %
BILIRUB SERPL-MCNC: 2.4 MG/DL (ref 0.2–1.2)
BUN SERPL-MCNC: 21 MG/DL (ref 7–25)
BUN/CREAT SERPL: ABNORMAL (CALC) (ref 6–22)
CALCIUM SERPL-MCNC: 9.2 MG/DL (ref 8.6–10.3)
CHLORIDE SERPL-SCNC: 107 MMOL/L (ref 98–110)
CO2 SERPL-SCNC: 24 MMOL/L (ref 20–32)
CREAT SERPL-MCNC: 0.97 MG/DL (ref 0.6–1.29)
EOSINOPHIL # BLD AUTO: 72 CELLS/UL (ref 15–500)
EOSINOPHIL NFR BLD AUTO: 3.6 %
ERYTHROCYTE [DISTWIDTH] IN BLOOD BY AUTOMATED COUNT: 16.4 % (ref 11–15)
GFR/BSA.PRED SERPLBLD CYS-BASED-ARV: 96 ML/MIN/1.73M2
GLOBULIN SER CALC-MCNC: 2.6 G/DL (CALC) (ref 1.9–3.7)
GLUCOSE SERPL-MCNC: 147 MG/DL (ref 65–99)
HCT VFR BLD AUTO: 38.5 % (ref 38.5–50)
HGB BLD-MCNC: 13.4 G/DL (ref 13.2–17.1)
LYMPHOCYTES # BLD AUTO: 606 CELLS/UL (ref 850–3900)
LYMPHOCYTES NFR BLD AUTO: 30.3 %
MCH RBC QN AUTO: 33.3 PG (ref 27–33)
MCHC RBC AUTO-ENTMCNC: 34.8 G/DL (ref 32–36)
MCV RBC AUTO: 95.5 FL (ref 80–100)
MONOCYTES # BLD AUTO: 184 CELLS/UL (ref 200–950)
MONOCYTES NFR BLD AUTO: 9.2 %
NEUTROPHILS # BLD AUTO: 1128 CELLS/UL (ref 1500–7800)
NEUTROPHILS NFR BLD AUTO: 56.4 %
PLATELET # BLD AUTO: 82 THOUSAND/UL (ref 140–400)
PMV BLD REES-ECKER: 11.4 FL (ref 7.5–12.5)
POTASSIUM SERPL-SCNC: 4.2 MMOL/L (ref 3.5–5.3)
PROT SERPL-MCNC: 6.6 G/DL (ref 6.1–8.1)
RBC # BLD AUTO: 4.03 MILLION/UL (ref 4.2–5.8)
SODIUM SERPL-SCNC: 138 MMOL/L (ref 135–146)
WBC # BLD AUTO: 2 THOUSAND/UL (ref 3.8–10.8)

## 2023-01-12 ENCOUNTER — OFFICE VISIT (OUTPATIENT)
Dept: GASTROENTEROLOGY | Facility: CLINIC | Age: 48
End: 2023-01-12

## 2023-01-12 VITALS
TEMPERATURE: 98.4 F | DIASTOLIC BLOOD PRESSURE: 68 MMHG | WEIGHT: 280 LBS | SYSTOLIC BLOOD PRESSURE: 116 MMHG | HEART RATE: 93 BPM | HEIGHT: 74 IN | BODY MASS INDEX: 35.94 KG/M2 | OXYGEN SATURATION: 98 %

## 2023-01-12 DIAGNOSIS — K76.6 PORTAL HYPERTENSION (HCC): ICD-10-CM

## 2023-01-12 DIAGNOSIS — R79.89 ELEVATED LFTS: ICD-10-CM

## 2023-01-12 DIAGNOSIS — E11.9 TYPE 2 DIABETES MELLITUS WITHOUT COMPLICATION, WITHOUT LONG-TERM CURRENT USE OF INSULIN (HCC): ICD-10-CM

## 2023-01-12 DIAGNOSIS — K76.0 NAFLD (NONALCOHOLIC FATTY LIVER DISEASE): ICD-10-CM

## 2023-01-12 DIAGNOSIS — K74.60 CIRRHOSIS OF LIVER WITHOUT ASCITES, UNSPECIFIED HEPATIC CIRRHOSIS TYPE (HCC): Primary | ICD-10-CM

## 2023-01-12 NOTE — PROGRESS NOTES
[facility] - Outpatient Consultation  Mireille Amos 49 y o  male MRN: [id number]  Encounter: [id number]    PCP:  Tracie Ross MD, [phone]  Referring Provider:  Amina Johnson [place]*, [phone]    Patient: Mireille Amos, 1975  Reason for Referral: cirrhosis     ASSESSMENT/PLAN:  49 y o  male with history of class II obesity, HTN, HLD and DM who presents for new diagnosis of cirrhosis  He has had longstanding history of pancytopenia and splenomegaly which has been extensively worked up by hematology  His liver chemistries are mildly elevated and his liver function is intact  He had multiple bone marrow biopsies which was negative for leukemia or lymphoma  He had an EGD/COY in October 2022 which showed esophageal varices and portal hypertensive gastropathy  A subsequent US elastography showed 14 1 LSM (IQR 12 4%)  He has a strongly family history of chronic liver disease and cirrhosis  We discussed natural history, prognosis, and complications of cirrhosis, including decompensation in the form of ascites, variceal bleeding, and hepatic encephalopathy as well as risk for development of HCC  The etiology of his liver disease is likely GORMAN cirrhosis given his negative serologic work-up and will send PNPLA3 testing given his family history  We discussed that weight loss of at least 5-10% of his body weight is martinez in the management in progression of disease  Given his diabetes, will start GLP1a therapy which has been shown to improve histology and fibrosis regression in patients with NAFLD  He otherwise should avoid interval weight gain, excessive EtOH consumption and hepatoxic medications  He will follow up in 3 months or sooner if needed  Thank you for the opportunity to consult in his care      1  NAFLD, will start Ozempic   - 0 25 mg SC weekly for 4 weeks  - 0 5 mg SC weekly for 4 weeks  - 1 mg SC weekly for 4 weeks  - 2 mg SC weekly   - Send A1AT phenotype and PNPLA3 genetic testing     2  Compensated cirrhosis  - No symptomatic ascites, variceal bleeding, or hepatic encephalopathy   - Needs updated MELD labs   - Consider carvedilol for prevention of decompensation at next visit     3  Esophageal variceal surveillance  His last EGD showed non-bleeding esophageal varices  - Next EGD due 2024    4  [facility]  surveillance  - Ordered MRI/MRCP for 4/2023     5  Colorectal cancer screening  - Repeat COY in 3 years for history of polyps     6  Healthcare maintenance for patients with cirrhosis  -He was instructed to take no more than 2 grams of tylenol in 24 hours and no products containing NSAIDs, benzodiazapines, and narcotics  -He was also instructed to avoid raw shellfish   -He should participate in daily exercise as to prevent loss of muscle mass  -He should abstain from all alcohol intake and was counseled on this     -He is at risk for vitamin deficiencies and metabolic bone disease  -HAV and HBV immunity will be checked and he should be vaccinated through his primary care provider if he is not immune   -Additionally, he should receive a yearly flu shot and the pneumonia vaccine through his primary care provider  I made him aware of the symptoms of hepatic decompensation: evident confusion, vomiting blood, black tarry stool, or large amounts of red blood in stool, and abdominal swelling  In addition to seeking local medical attention urgently, he will notify me if this happens before the next visit  Phyllis Pham MD  Division of Gastroenterology and Hepatology  [address] [place] [address],[address] [place] [address]    ============================================================================  CC/HPI: 49 y o  male with history of class II obesity, HTN, HLD and DM who presents for new diagnosis of cirrhosis  He has had longstanding history of pancytopenia and splenomegaly which has been extensively worked up by hematology   He had multiple bone marrow biopsies which was negative for leukemia or lymphoma  He had an EGD/COY in 2022 which showed esophageal varices and portal hypertensive gastropathy  A subsequent US elastography showed 14 1 LSM (IQR 12 4%)  He established care with GI and was started on aldactone in addition to torsemide for treatment of CHRISSY  He has been trying to lose weight with dietary modification and exercise (2 days/weekly) but has not been successful  He has tried metformin but developed hypoglycemia and has been on glimepiride for DM with last A1c 7 5%  He denies excessive EtOH use  He does report family history of cirrhosis in his mother who  from complications of cirrhosis and liver failure 1 year ago  He also has a son who has fatty liver at a young age  He denies family history of medullary thyroid cancer       ROS: Complete review of systems otherwise negative       PAST MEDICAL/SURGICAL HISTORY:  Past Medical History:   Diagnosis Date   • BMI 36 0-36 9,adult 2022   • BMI 37 0-37 9, adult 2021   • BMI 38 0-38 9,adult 2022   • BMI 39 0-39 9,adult 2021   • Cervicalgia    • Colon polyp    • Concussion with loss of consciousness of 30 minutes or less 2021   • Depression    • Diabetes mellitus ([address] )    • Diabetes mellitus out of control    • Edema of both lower legs 2021   • Elevated liver enzymes    • Erectile dysfunction    • Exposure to COVID-19 virus    • Fatty liver    • GERD (gastroesophageal reflux disease)    • Heart murmur 2022   • Hyperglycemia    • Hypertension    • Left cervical radiculopathy    • Left elbow pain    • Left foot pain    • Leukopenia 2021   • Pain in both feet 2021   • Pancytopenia ([address] ) 2021   • Skin rash 2021   • Thrombocytopenia ([address] )    • Venous stasis dermatitis of both lower extremities 2022   • Vitamin D deficiency 2022        Past Surgical History:   Procedure Laterality Date   • CHOLECYSTECTOMY     • COLONOSCOPY  2005   • COLONOSCOPY     • EGD  07/10/2014   • IR BIOPSY BONE MARROW  03/20/2019   • IR BIOPSY BONE MARROW  7/29/2022   • UPPER GASTROINTESTINAL ENDOSCOPY     • WISDOM TOOTH EXTRACTION      In early to mid 19's       FAMILY/SOCIAL HISTORY:  Family History   Problem Relation Age of Onset   • Heart attack Mother    • Thrombocytopenia Mother    • Coronary artery disease Mother    • Hypertension Mother    • Testicular cancer Father    • Hypertension Father    • No Known Problems Brother    • Hypertension Brother    • No Known Problems Brother    • No Known Problems Son        Social History     Tobacco Use   • Smoking status: Former     Types: Cigarettes   • Smokeless tobacco: Former     Quit date: 3/1/2014   • Tobacco comments:     Social   Last cigarette years ago  Vaping Use   • Vaping Use: Never used   Substance Use Topics   • Alcohol use: Not Currently   • Drug use: Not Currently     Comment: No drug use - As per [employer]       MEDICATIONS:  Current Outpatient Medications on File Prior to Visit   Medication Sig Dispense Refill   • clotrimazole-betamethasone (LOTRISONE) 1-0 05 % cream Apply topically 2 (two) times a day 45 g 0   • glimepiride (AMARYL) 4 mg tablet Take 1 tablet (4 mg total) by mouth 2 (two) times a week 60 tablet 0   • losartan (COZAAR) 25 mg tablet TAKE 1 TABLET BY MOUTH  DAILY 90 tablet 3   • omeprazole (PriLOSEC) 40 MG capsule TAKE 1 CAPSULE BY MOUTH  DAILY 90 capsule 3   • OneTouch Verio test strip USE TO TEST ONCE DAILY 25 strip 3   • Promacta 75 MG TABS TAKE 1 TABLET BY MOUTH ONCE DAILY ON AN EMPTY STOMACH  AT LEAST 1 HOUR BEFORE OR 2 HOURS AFTER A MEAL 30 tablet 3   • spironolactone (ALDACTONE) 50 mg tablet Take 1 tablet (50 mg total) by mouth daily 30 tablet 3   • torsemide (DEMADEX) 20 mg tablet Take 20 mg by mouth daily     • Vitamin D, Cholecalciferol, 50 MCG (2000 UT) CAPS Take 1 tablet by mouth in the morning       No current facility-administered medications on file prior to visit  Allergies   Allergen Reactions   • Aspirin Other (See Comments)     Reaction unknown to patient  • Diltiazem Other (See Comments)     Patient does not recall what happened   • Lisinopril Cough       PHYSICAL EXAM:  /68 (BP Location: Left arm, Patient Position: Sitting, Cuff Size: Large)   Pulse 93   Temp 98 4 °F (36 9 °C) (Tympanic)   Ht 6' 2" (1 88 m)   Wt 127 kg (280 lb)   SpO2 98%   BMI 35 95 kg/m²   GENERAL: NAD, AAO  HEENT: anicteric, OP clear, MMM  ABDOMEN: S/ND/NT, normoactive BS, no hepatomegaly, spleen not palpable  EXTREMITIES: no edema  SKIN: no rashes, no palmar erythema, no spider angiomata   NEURO: normal gait, no tremor, no asterixis     LABS/RADIOLOGY/ENDOSCOPY:  Lab Results   Component Value Date    WBC 2 0 (L) 01/07/2023    HGB 13 4 01/07/2023    HCT 38 5 01/07/2023    PLT 82 (L) 01/07/2023    GLUF 116 (H) 05/04/2021    BUN 21 01/07/2023    CREATININE 0 97 01/07/2023     04/05/2017    K 4 2 01/07/2023     01/07/2023    CO2 24 01/07/2023    PROT 6 4 04/05/2017    BILIDIR 0 27 (H) 01/17/2017    ALKPHOS 116 01/07/2023    ALT 41 01/07/2023    AST 47 (H) 01/07/2023    GLOB 2 6 01/07/2023    CALCIUM 9 2 01/07/2023    EGFR 96 01/07/2023    TRIG 78 11/07/2022    HDL 40 11/07/2022    INR 1 2 (H) 11/21/2022     EGD (7/2022)  • The duodenum appeared normal  Performed random biopsy using biopsy forceps    • Mild erythematous mucosa in the antrum; performed cold forceps biopsy  • Portal hypertensive gastropathy  • Three small and medium varices (no red juanita sign) in the esophagus     COY (7/2022)  • Three sessile polyps measuring 5-9 mm in the ascending colon; removed en bloc by cold snare and retrieved specimen  • One sessile polyp in the descending colon; removed en bloc by cold snare and retrieved specimen  • Few diverticula in the sigmoid colon  • Internal hemorrhoids  • Otherwise normal colon    US elastography (11/2022)  Metavir score: F4, Cirrhosis, noting that only 7 satisfactory measurements were able to be obtained  As a result, this may be unreliable        Computed MELD-Na score unavailable  Necessary lab results were not found in the last year  Computed MELD score unavailable  Necessary lab results were not found in the last year

## 2023-01-15 ENCOUNTER — HOSPITAL ENCOUNTER (INPATIENT)
Facility: HOSPITAL | Age: 48
LOS: 2 days | Discharge: HOME | End: 2023-01-17
Attending: EMERGENCY MEDICINE

## 2023-01-15 ENCOUNTER — NURSE TRIAGE (OUTPATIENT)
Dept: OTHER | Facility: OTHER | Age: 48
End: 2023-01-15

## 2023-01-15 ENCOUNTER — APPOINTMENT (EMERGENCY)
Dept: CT IMAGING | Facility: HOSPITAL | Age: 48
End: 2023-01-15

## 2023-01-15 ENCOUNTER — APPOINTMENT (EMERGENCY)
Dept: RADIOLOGY | Facility: HOSPITAL | Age: 48
End: 2023-01-15

## 2023-01-15 DIAGNOSIS — R10.13 EPIGASTRIC PAIN: Primary | ICD-10-CM

## 2023-01-15 DIAGNOSIS — R10.10 PAIN OF UPPER ABDOMEN: ICD-10-CM

## 2023-01-15 DIAGNOSIS — K74.60 LIVER CIRRHOSIS SECONDARY TO NASH (HCC): ICD-10-CM

## 2023-01-15 DIAGNOSIS — K75.81 LIVER CIRRHOSIS SECONDARY TO NASH (HCC): ICD-10-CM

## 2023-01-15 DIAGNOSIS — R11.2 NAUSEA AND VOMITING: ICD-10-CM

## 2023-01-15 DIAGNOSIS — R16.1 SPLENOMEGALY: ICD-10-CM

## 2023-01-15 DIAGNOSIS — K74.60 LIVER CIRRHOSIS (HCC): ICD-10-CM

## 2023-01-15 PROBLEM — R10.9 ABDOMINAL PAIN: Status: ACTIVE | Noted: 2023-01-15

## 2023-01-15 PROBLEM — R33.9 RETENTION OF URINE, UNSPECIFIED: Status: ACTIVE | Noted: 2023-01-15

## 2023-01-15 LAB
ALBUMIN SERPL BCP-MCNC: 4.3 G/DL (ref 3.5–5)
ALP SERPL-CCNC: 111 U/L (ref 34–104)
ALT SERPL W P-5'-P-CCNC: 46 U/L (ref 7–52)
ANION GAP SERPL CALCULATED.3IONS-SCNC: 8 MMOL/L (ref 4–13)
AST SERPL W P-5'-P-CCNC: 51 U/L (ref 13–39)
BACTERIA UR QL AUTO: NORMAL /HPF
BASOPHILS # BLD AUTO: 0.02 THOUSANDS/ÂΜL (ref 0–0.1)
BASOPHILS NFR BLD AUTO: 1 % (ref 0–1)
BILIRUB SERPL-MCNC: 2.76 MG/DL (ref 0.2–1)
BILIRUB UR QL STRIP: NEGATIVE
BUN SERPL-MCNC: 22 MG/DL (ref 5–25)
CALCIUM SERPL-MCNC: 9.1 MG/DL (ref 8.4–10.2)
CARDIAC TROPONIN I PNL SERPL HS: 3 NG/L
CHLORIDE SERPL-SCNC: 102 MMOL/L (ref 96–108)
CLARITY UR: CLEAR
CO2 SERPL-SCNC: 23 MMOL/L (ref 21–32)
COLOR UR: ABNORMAL
CREAT SERPL-MCNC: 1.05 MG/DL (ref 0.6–1.3)
EOSINOPHIL # BLD AUTO: 0.06 THOUSAND/ÂΜL (ref 0–0.61)
EOSINOPHIL NFR BLD AUTO: 2 % (ref 0–6)
ERYTHROCYTE [DISTWIDTH] IN BLOOD BY AUTOMATED COUNT: 16 % (ref 11.6–15.1)
GFR SERPL CREATININE-BSD FRML MDRD: 83 ML/MIN/1.73SQ M
GLUCOSE SERPL-MCNC: 110 MG/DL (ref 65–140)
GLUCOSE SERPL-MCNC: 136 MG/DL (ref 65–140)
GLUCOSE SERPL-MCNC: 264 MG/DL (ref 65–140)
GLUCOSE UR STRIP-MCNC: ABNORMAL MG/DL
HCT VFR BLD AUTO: 40.3 % (ref 36.5–49.3)
HGB BLD-MCNC: 13.8 G/DL (ref 12–17)
HGB UR QL STRIP.AUTO: ABNORMAL
IMM GRANULOCYTES # BLD AUTO: 0.01 THOUSAND/UL (ref 0–0.2)
IMM GRANULOCYTES NFR BLD AUTO: 0 % (ref 0–2)
KETONES UR STRIP-MCNC: NEGATIVE MG/DL
LEUKOCYTE ESTERASE UR QL STRIP: NEGATIVE
LIPASE SERPL-CCNC: 44 U/L (ref 11–82)
LYMPHOCYTES # BLD AUTO: 0.44 THOUSANDS/ÂΜL (ref 0.6–4.47)
LYMPHOCYTES NFR BLD AUTO: 13 % (ref 14–44)
MCH RBC QN AUTO: 32.5 PG (ref 26.8–34.3)
MCHC RBC AUTO-ENTMCNC: 34.2 G/DL (ref 31.4–37.4)
MCV RBC AUTO: 95 FL (ref 82–98)
MONOCYTES # BLD AUTO: 0.2 THOUSAND/ÂΜL (ref 0.17–1.22)
MONOCYTES NFR BLD AUTO: 6 % (ref 4–12)
NEUTROPHILS # BLD AUTO: 2.55 THOUSANDS/ÂΜL (ref 1.85–7.62)
NEUTS SEG NFR BLD AUTO: 78 % (ref 43–75)
NITRITE UR QL STRIP: NEGATIVE
NON-SQ EPI CELLS URNS QL MICRO: NORMAL /HPF
NRBC BLD AUTO-RTO: 0 /100 WBCS
PH UR STRIP.AUTO: 5 [PH]
PLATELET # BLD AUTO: 95 THOUSANDS/UL (ref 149–390)
PMV BLD AUTO: 10.7 FL (ref 8.9–12.7)
POTASSIUM SERPL-SCNC: 4 MMOL/L (ref 3.5–5.3)
PROT SERPL-MCNC: 7.1 G/DL (ref 6.4–8.4)
PROT UR STRIP-MCNC: NEGATIVE MG/DL
RBC # BLD AUTO: 4.25 MILLION/UL (ref 3.88–5.62)
RBC #/AREA URNS AUTO: NORMAL /HPF
SODIUM SERPL-SCNC: 133 MMOL/L (ref 135–147)
SP GR UR STRIP.AUTO: 1.01 (ref 1–1.03)
UROBILINOGEN UR STRIP-ACNC: <2 MG/DL
WBC # BLD AUTO: 3.28 THOUSAND/UL (ref 4.31–10.16)
WBC #/AREA URNS AUTO: NORMAL /HPF

## 2023-01-15 RX ORDER — PANTOPRAZOLE SODIUM 40 MG/1
40 TABLET, DELAYED RELEASE ORAL DAILY
Status: DISCONTINUED | OUTPATIENT
Start: 2023-01-16 | End: 2023-01-16

## 2023-01-15 RX ORDER — HYDROMORPHONE HCL/PF 1 MG/ML
0.5 SYRINGE (ML) INJECTION ONCE
Status: COMPLETED | OUTPATIENT
Start: 2023-01-15 | End: 2023-01-15

## 2023-01-15 RX ORDER — LIDOCAINE HYDROCHLORIDE 20 MG/ML
15 SOLUTION OROPHARYNGEAL ONCE
Status: DISCONTINUED | OUTPATIENT
Start: 2023-01-15 | End: 2023-01-17

## 2023-01-15 RX ORDER — FENTANYL CITRATE 50 UG/ML
75 INJECTION, SOLUTION INTRAMUSCULAR; INTRAVENOUS ONCE
Status: COMPLETED | OUTPATIENT
Start: 2023-01-15 | End: 2023-01-15

## 2023-01-15 RX ORDER — INSULIN LISPRO 100 [IU]/ML
2-12 INJECTION, SOLUTION INTRAVENOUS; SUBCUTANEOUS
Status: DISCONTINUED | OUTPATIENT
Start: 2023-01-15 | End: 2023-01-17 | Stop reason: HOSPADM

## 2023-01-15 RX ORDER — TORSEMIDE 20 MG/1
20 TABLET ORAL DAILY
Status: DISCONTINUED | OUTPATIENT
Start: 2023-01-16 | End: 2023-01-17 | Stop reason: HOSPADM

## 2023-01-15 RX ORDER — SPIRONOLACTONE 25 MG/1
50 TABLET ORAL DAILY
Status: DISCONTINUED | OUTPATIENT
Start: 2023-01-16 | End: 2023-01-17 | Stop reason: HOSPADM

## 2023-01-15 RX ORDER — NADOLOL 20 MG/1
20 TABLET ORAL DAILY
Status: DISCONTINUED | OUTPATIENT
Start: 2023-01-15 | End: 2023-01-17 | Stop reason: HOSPADM

## 2023-01-15 RX ORDER — HYDROMORPHONE HCL/PF 1 MG/ML
0.5 SYRINGE (ML) INJECTION EVERY 4 HOURS PRN
Status: DISCONTINUED | OUTPATIENT
Start: 2023-01-15 | End: 2023-01-17 | Stop reason: HOSPADM

## 2023-01-15 RX ORDER — OXYCODONE HYDROCHLORIDE 5 MG/1
5 TABLET ORAL EVERY 6 HOURS PRN
Status: DISCONTINUED | OUTPATIENT
Start: 2023-01-15 | End: 2023-01-15

## 2023-01-15 RX ORDER — SIMETHICONE 80 MG
80 TABLET,CHEWABLE ORAL ONCE
Status: DISCONTINUED | OUTPATIENT
Start: 2023-01-15 | End: 2023-01-15

## 2023-01-15 RX ORDER — ONDANSETRON 2 MG/ML
4 INJECTION INTRAMUSCULAR; INTRAVENOUS EVERY 6 HOURS PRN
Status: DISCONTINUED | OUTPATIENT
Start: 2023-01-15 | End: 2023-01-17 | Stop reason: HOSPADM

## 2023-01-15 RX ORDER — LOSARTAN POTASSIUM 25 MG/1
25 TABLET ORAL DAILY
Status: DISCONTINUED | OUTPATIENT
Start: 2023-01-16 | End: 2023-01-17 | Stop reason: HOSPADM

## 2023-01-15 RX ORDER — OXYCODONE HYDROCHLORIDE 5 MG/1
2.5 TABLET ORAL EVERY 6 HOURS PRN
Status: DISCONTINUED | OUTPATIENT
Start: 2023-01-15 | End: 2023-01-15

## 2023-01-15 RX ORDER — MAGNESIUM HYDROXIDE/ALUMINUM HYDROXICE/SIMETHICONE 120; 1200; 1200 MG/30ML; MG/30ML; MG/30ML
30 SUSPENSION ORAL ONCE
Status: DISCONTINUED | OUTPATIENT
Start: 2023-01-15 | End: 2023-01-17 | Stop reason: HOSPADM

## 2023-01-15 RX ORDER — OXYCODONE HYDROCHLORIDE 5 MG/1
2.5 TABLET ORAL EVERY 4 HOURS PRN
Status: DISCONTINUED | OUTPATIENT
Start: 2023-01-15 | End: 2023-01-17 | Stop reason: HOSPADM

## 2023-01-15 RX ORDER — OXYCODONE HYDROCHLORIDE 5 MG/1
5 TABLET ORAL EVERY 4 HOURS PRN
Status: DISCONTINUED | OUTPATIENT
Start: 2023-01-15 | End: 2023-01-17 | Stop reason: HOSPADM

## 2023-01-15 RX ADMIN — NADOLOL 20 MG: 20 TABLET ORAL at 20:10

## 2023-01-15 RX ADMIN — OXYCODONE HYDROCHLORIDE 5 MG: 5 TABLET ORAL at 20:10

## 2023-01-15 RX ADMIN — HYDROMORPHONE HYDROCHLORIDE 0.5 MG: 1 INJECTION, SOLUTION INTRAMUSCULAR; INTRAVENOUS; SUBCUTANEOUS at 13:46

## 2023-01-15 RX ADMIN — FENTANYL CITRATE 75 MCG: 50 INJECTION INTRAMUSCULAR; INTRAVENOUS at 16:27

## 2023-01-15 RX ADMIN — IOHEXOL 100 ML: 350 INJECTION, SOLUTION INTRAVENOUS at 14:49

## 2023-01-15 RX ADMIN — HYDROMORPHONE HYDROCHLORIDE 0.5 MG: 1 INJECTION, SOLUTION INTRAMUSCULAR; INTRAVENOUS; SUBCUTANEOUS at 14:11

## 2023-01-15 NOTE — ASSESSMENT & PLAN NOTE
· Right upper quadrant pain radiating towards the left that started this morning  Associated with nausea and bilious emesis  · 1 mixed bowel movement this morning(water and formed stool) denied any bleeding  · Lipase within normal limits, troponin normal, UA unremarkable  · CTA showed hepatic steatosis with severe portal hypertension and worsening splenomegaly, mild fat stranding ascending colon could be portal colopathy versus mild colitis  · Suspect patient has abdominal pain secondary to worsening hepatomegaly  Unlikely infectious colitis    Plan:  · Pain management with oxycodone, Dilaudid  · Avoid Tylenol  · Monitor off antibiotics  · GI consult juan padgett

## 2023-01-15 NOTE — TELEPHONE ENCOUNTER
Regarding: Severe abd  pain  ----- Message from Saida Quezada sent at 1/15/2023 11:50 AM EST -----  "I think I have an emergency with my    He is in liver failure and now he is having severe stomach pain and vomiting "

## 2023-01-15 NOTE — ED PROVIDER NOTES
History  Chief Complaint   Patient presents with   • Abdominal Pain     Pt reports mid upper abdominal pain that started this morning  Pt reports N/V/D  Pt currently on list for liver transplant  Rey Teague is a 49year old male with past medical history of HTN, HLD, DM, and new onset GORMAN cirrhosis presenting with a few days of worsening, nonradiating, constant, currently 9/10 epigastric and LUQ abdominal pain  He's also had episodes of watery diarrhea with last bowel movement yesterday  He denies any weight gain or abdominal bloating  He reports compliance with his medications and states that he is on diuretics  He denies alcohol/tobacco/drug use  He denies any fevers, chills, tremors, confusion, N/V, chest pain, SOB, melena, hematochezia  Patient follows up with GI Dr Guille Ba at [facility]  His last EGD in October 2022 showed esophageal varices  Patient has also been seen by Heme/Onc for cytopenias  Prior to Admission Medications   Prescriptions Last Dose Informant Patient Reported? Taking?    OneTouch Verio test strip   No No   Sig: USE TO TEST ONCE DAILY   Promacta 75 MG TABS   No No   Sig: TAKE 1 TABLET BY MOUTH ONCE DAILY ON AN EMPTY STOMACH  AT LEAST 1 HOUR BEFORE OR 2 HOURS AFTER A MEAL   Vitamin D, Cholecalciferol, 50 MCG (2000 UT) CAPS   Yes No   Sig: Take 1 tablet by mouth in the morning   clotrimazole-betamethasone (LOTRISONE) 1-0 05 % cream   No No   Sig: Apply topically 2 (two) times a day   glimepiride (AMARYL) 4 mg tablet   No No   Sig: Take 1 tablet (4 mg total) by mouth 2 (two) times a week   losartan (COZAAR) 25 mg tablet   No No   Sig: TAKE 1 TABLET BY MOUTH  DAILY   omeprazole (PriLOSEC) 40 MG capsule   No No   Sig: TAKE 1 CAPSULE BY MOUTH  DAILY   semaglutide, 0 25 or 0 5 mg/dose, (Ozempic) 2 mg/1 5 mL injection pen   No No   Sig: Inject 0 38 mL (0 5 mg total) under the skin every 7 days for 4 doses   semaglutide, 0 25 or 0 5 mg/dose, (Ozempic, 0 25 or 0 5 MG/DOSE,) 2 mg/1 5 mL injection pen   No No   Sig: Inject 0 19 mL (0 25 mg total) under the skin every 7 days for 4 doses   semaglutide, 1 mg/dose, (Ozempic) 2 mg/1 5 mL injection pen   No No   Sig: Inject 0 75 mL (1 mg total) under the skin every 7 days for 4 doses   semaglutide, 2 mg/dose, (Ozempic, 2 MG/DOSE,) 8 mg/ mL injection pen   No No   Sig: Inject 0 75 mL (2 mg total) under the skin every 7 days for 90 doses   spironolactone (ALDACTONE) 50 mg tablet   No No   Sig: Take 1 tablet (50 mg total) by mouth daily   torsemide (DEMADEX) 20 mg tablet   Yes No   Sig: Take 20 mg by mouth daily      Facility-Administered Medications: None       Past Medical History:   Diagnosis Date   • BMI 36 0-36 9,adult 12/6/2022   • BMI 37 0-37 9, adult 04/22/2021   • BMI 38 0-38 9,adult 02/08/2022   • BMI 39 0-39 9,adult 12/16/2021   • Cervicalgia    • Colon polyp    • Concussion with loss of consciousness of 30 minutes or less 03/07/2021   • Depression    • Diabetes mellitus ([facility] [address] )    • Diabetes mellitus out of control    • Edema of both lower legs 12/16/2021   • Elevated liver enzymes    • Erectile dysfunction    • Exposure to COVID-19 virus    • Fatty liver    • GERD (gastroesophageal reflux disease)    • Heart murmur 01/07/2022   • Hyperglycemia    • Hypertension    • Left cervical radiculopathy    • Left elbow pain    • Left foot pain    • Leukopenia 03/07/2021   • Pain in both feet 12/16/2021   • Pancytopenia ([address] ) 12/16/2021   • Skin rash 12/16/2021   • Thrombocytopenia ([address] )    • Venous stasis dermatitis of both lower extremities 05/13/2022   • Vitamin D deficiency 01/07/2022       Past Surgical History:   Procedure Laterality Date   • CHOLECYSTECTOMY  1990's   • COLONOSCOPY  06/2005   • COLONOSCOPY     • EGD  07/10/2014   • IR BIOPSY BONE MARROW  03/20/2019   • IR BIOPSY BONE MARROW  7/29/2022   • UPPER GASTROINTESTINAL ENDOSCOPY     • WISDOM TOOTH EXTRACTION      In early to mid 19's       Family History   Problem Relation Age of Onset   • Heart attack Mother    • Thrombocytopenia Mother    • Coronary artery disease Mother    • Hypertension Mother    • Testicular cancer Father    • Hypertension Father    • No Known Problems Brother    • Hypertension Brother    • No Known Problems Brother    • No Known Problems Son      I have reviewed and agree with the history as documented  E-Cigarette/Vaping   • E-Cigarette Use Never User      E-Cigarette/Vaping Substances   • Nicotine No    • THC No    • CBD No    • Flavoring No    • Other No    • Unknown No      Social History     Tobacco Use   • Smoking status: Former     Types: Cigarettes   • Smokeless tobacco: Former     Quit date: 3/1/2014   • Tobacco comments:     Social   Last cigarette years ago  Vaping Use   • Vaping Use: Never used   Substance Use Topics   • Alcohol use: Not Currently   • Drug use: Not Currently     Comment: No drug use - As per [employer]        Review of Systems   Constitutional: Positive for fatigue  Negative for chills, diaphoresis and fever  Eyes: Negative for visual disturbance  Respiratory: Negative for shortness of breath  Cardiovascular: Negative for chest pain, palpitations and leg swelling  Gastrointestinal: Positive for abdominal pain and diarrhea  Negative for blood in stool, nausea and vomiting  Genitourinary: Negative for dysuria  Musculoskeletal: Negative for myalgias  Skin: Negative for rash         jaundice   Neurological: Negative for dizziness, syncope, light-headedness and headaches  Psychiatric/Behavioral: Negative for confusion         Physical Exam  ED Triage Vitals   Temperature Pulse Respirations Blood Pressure SpO2   01/15/23 1251 01/15/23 1251 01/15/23 1251 01/15/23 1251 01/15/23 1251   98 °F (36 7 °C) 83 17 138/67 96 %      Temp Source Heart Rate Source Patient Position - Orthostatic VS BP Location FiO2 (%)   01/15/23 1251 01/15/23 1251 01/15/23 1251 01/15/23 1251 --   Oral Monitor Sitting Left arm       Pain Score       01/15/23 1346 9             Orthostatic Vital Signs  Vitals:    01/15/23 1500 01/15/23 1530 01/15/23 1600 01/15/23 1630   BP: 107/57 122/62 145/63 120/60   Pulse: 72 74 77 74   Patient Position - Orthostatic VS:           Physical Exam  Constitutional:       General: He is not in acute distress  Appearance: He is obese  He is ill-appearing  HENT:      Head: Normocephalic and atraumatic  Mouth/Throat:      Mouth: Mucous membranes are moist       Pharynx: Oropharynx is clear  Eyes:      General: Scleral icterus present  Cardiovascular:      Rate and Rhythm: Normal rate and regular rhythm  Heart sounds: Normal heart sounds  No murmur heard  No friction rub  No gallop  Pulmonary:      Effort: Pulmonary effort is normal  No respiratory distress  Breath sounds: Normal breath sounds  No wheezing or rales  Abdominal:      General: Bowel sounds are normal  There is distension  Palpations: Abdomen is soft  There is hepatomegaly and splenomegaly  Tenderness: There is abdominal tenderness in the right lower quadrant, epigastric area and left upper quadrant  Skin:     General: Skin is warm  Capillary Refill: Capillary refill takes less than 2 seconds  Coloration: Skin is jaundiced  Neurological:      Mental Status: He is alert and oriented to person, place, and time     Psychiatric:         Mood and Affect: Mood normal          Behavior: Behavior normal          ED Medications  Medications   Lidocaine Viscous HCl (XYLOCAINE) 2 % mucosal solution 15 mL (15 mL Swish & Swallow Not Given 1/15/23 1637)   aluminum-magnesium hydroxide-simethicone (MYLANTA) oral suspension 30 mL (30 mL Oral Not Given 1/15/23 1637)   HYDROmorphone (DILAUDID) injection 0 5 mg (0 5 mg Intravenous Given 1/15/23 1346)   HYDROmorphone (DILAUDID) injection 0 5 mg (0 5 mg Intravenous Given 1/15/23 1411)   iohexol (OMNIPAQUE) 350 MG/ML injection (SINGLE-DOSE) 100 mL (100 mL Intravenous Given 1/15/23 1449)   fentanyl citrate (PF) 100 MCG/2ML 75 mcg (75 mcg Intravenous Given 1/15/23 1627)       Diagnostic Studies  Results Reviewed     Procedure Component Value Units Date/Time    HS Troponin 0hr (reflex protocol) [[id number]]  (Normal) Collected: 01/15/23 1345    Lab Status: Final result Specimen: Blood from Arm, Right Updated: 01/15/23 1437     hs TnI 0hr 3 ng/L     Comprehensive metabolic panel [[id number]]  (Abnormal) Collected: 01/15/23 1345    Lab Status: Final result Specimen: Blood from Arm, Right Updated: 01/15/23 1434     Sodium 133 mmol/L      Potassium 4 0 mmol/L      Chloride 102 mmol/L      CO2 23 mmol/L      ANION GAP 8 mmol/L      BUN 22 mg/dL      Creatinine 1 05 mg/dL      Glucose 264 mg/dL      Calcium 9 1 mg/dL      AST 51 U/L      ALT 46 U/L      Alkaline Phosphatase 111 U/L      Total Protein 7 1 g/dL      Albumin 4 3 g/dL      Total Bilirubin 2 76 mg/dL      eGFR 83 ml/min/1 73sq m     Narrative:      [place] guidelines for Chronic Kidney Disease (CKD):   •  Stage 1 with normal or high GFR (GFR > 90 mL/min/1 73 square meters)  •  Stage 2 Mild CKD (GFR = 60-89 mL/min/1 73 square meters)  •  Stage 3A Moderate CKD (GFR = 45-59 mL/min/1 73 square meters)  •  Stage 3B Moderate CKD (GFR = 30-44 mL/min/1 73 square meters)  •  Stage 4 Severe CKD (GFR = 15-29 mL/min/1 73 square meters)  •  Stage 5 End Stage CKD (GFR <15 mL/min/1 73 square meters)  Note: GFR calculation is accurate only with a steady state creatinine    Lipase [[id number]]  (Normal) Collected: 01/15/23 1345    Lab Status: Final result Specimen: Blood from Arm, Right Updated: 01/15/23 1434     Lipase 44 u/L     Urine Microscopic [[id number]]  (Normal) Collected: 01/15/23 1345    Lab Status: Final result Specimen: Urine, Clean Catch Updated: 01/15/23 1408     RBC, UA 1-2 /hpf      WBC, UA None Seen /hpf      Epithelial Cells Occasional /hpf      Bacteria, UA None Seen /hpf     UA w Reflex to Microscopic w Reflex to Culture [[id number]]  (Abnormal) Collected: 01/15/23 1345    Lab Status: Final result Specimen: Urine, Clean Catch Updated: 01/15/23 1407     Color, UA Light Yellow     Clarity, UA Clear     Specific Gravity, UA 1 008     pH, UA 5 0     Leukocytes, UA Negative     Nitrite, UA Negative     Protein, UA Negative mg/dl      Glucose,  (3/10%) mg/dl      Ketones, UA Negative mg/dl      Urobilinogen, UA <2 0 mg/dl      Bilirubin, UA Negative     Occult Blood, UA Small    CBC and differential [[id number]]  (Abnormal) Collected: 01/15/23 1345    Lab Status: Final result Specimen: Blood from Arm, Right Updated: 01/15/23 1403     WBC 3 28 Thousand/uL      RBC 4 25 Million/uL      Hemoglobin 13 8 g/dL      Hematocrit 40 3 %      MCV 95 fL      MCH 32 5 pg      MCHC 34 2 g/dL      RDW 16 0 %      MPV 10 7 fL      Platelets 95 Thousands/uL      nRBC 0 /100 WBCs      Neutrophils Relative 78 %      Immat GRANS % 0 %      Lymphocytes Relative 13 %      Monocytes Relative 6 %      Eosinophils Relative 2 %      Basophils Relative 1 %      Neutrophils Absolute 2 55 Thousands/µL      Immature Grans Absolute 0 01 Thousand/uL      Lymphocytes Absolute 0 44 Thousands/µL      Monocytes Absolute 0 20 Thousand/µL      Eosinophils Absolute 0 06 Thousand/µL      Basophils Absolute 0 02 Thousands/µL                  CT abdomen pelvis with contrast   Final Result by Amy Mckinney MD (01/15 1600)         1  Hepatic steatosis with severe portal hypertension and marked splenomegaly progressed  The degree of portal hypertension suggests underlying cirrhosis is likely  2   Prominence of the wall of the ascending colon which mild stranding of the surrounding fat  Findings could reflect portal colopathy versus mild colitis  3   Nonobstructing left renal calculus  The study was marked in [facility] for immediate notification               Workstation performed: [id number]         XR chest 1 view portable    (Results Pending)         Procedures  ECG 12 Lead Documentation Only    Date/Time: 1/15/2023 4:50 PM  Performed by: Denise Munoz MD  Authorized by: Denise Munoz MD     ECG reviewed by me, the ED Provider: yes    Patient location:  ED  Previous ECG:     Previous ECG:  Compared to current    Similarity:  No change  Interpretation:     Interpretation: normal    Rate:     ECG rate assessment: normal    Rhythm:     Rhythm: sinus rhythm    Ectopy:     Ectopy: none    QRS:     QRS axis:  Normal  Conduction:     Conduction: normal    ST segments:     ST segments:  Normal  T waves:     T waves: normal            ED Course                             SBIRT 22yo+    Flowsheet Row Most Recent Value   SBIRT (25 yo +)    In order to provide better care to our patients, we are screening all of our patients for alcohol and drug use  Would it be okay to ask you these screening questions? No Filed at: 01/15/2023 7772                Medical Decision Making  Epigastric pain: acute illness or injury  Liver cirrhosis secondary to GORMAN [facility]): acute illness or injury  Amount and/or Complexity of Data Reviewed  Labs: ordered  Radiology: ordered  Discussion of management or test interpretation with external provider(s): Patient with history of GORMAN cirrhosis presenting with worsening epigastric/LUQ abdominal pain  Exam positive for jaundice, scleral icterus, hepatosplenomegaly, epigastric/LUQ tenderness  Lipase normal  CBC shows leukopenia, improved from September and thrombocytopenia  CMP shows mild transaminitis and elevated total bili and ALP  CXR negative  CT abdomen pelvic shows worsening splenomegaly and portal HTN, with possible colitis  The patient's pain is requiring IV dilaudid and fentanyl for control  Will admit to the hospital, SLIM made aware of admission  Risk  OTC drugs  Prescription drug management  Decision regarding hospitalization              Disposition  Final diagnoses:   Epigastric pain   Liver cirrhosis secondary to GORMAN ([facility] [address] ) Time reflects when diagnosis was documented in both MDM as applicable and the Disposition within this note     Time User Action Codes Description Comment    1/15/2023  4:41 PM [address], [address] [R10 13] Epigastric pain     1/15/2023  4:42 PM [address], [address] [K75 81,  K74 60] Liver cirrhosis secondary to GORMAN [facility])       ED Disposition     ED Disposition   Admit    Condition   Stable    Date/Time   Sun Rah 15, 2023  4:41 PM    Comment   Case was discussed with LEONA and the patient's admission status was agreed to be Admission Status: inpatient status to the service of Dr Michael Ahmadi   Follow-up Information    None         Patient's Medications   Discharge Prescriptions    No medications on file     No discharge procedures on file  PDMP Review     None           ED Provider  Attending physically available and evaluated Jyoti Woo I managed the patient along with the ED Attending      Electronically Signed by         Jonathan Gutierrez MD  01/15/23 69 Ashish Acosta MD  01/15/23 1776

## 2023-01-15 NOTE — ASSESSMENT & PLAN NOTE
· History of pancytopenia, had bone marrow biopsy that was negative for leukemia/lymphoma   · WBC currently 3 28, hemoglobin 13 8, platelets 95  · On Promacta 75 mg(not on hospital formulary patient's wife will bring from home)  · Monitor CBC

## 2023-01-15 NOTE — TELEPHONE ENCOUNTER
Reason for Disposition  • [1] SEVERE pain (e g , excruciating) AND [2] present > 1 hour    Answer Assessment - Initial Assessment Questions  1  LOCATION: "Where does it hurt?"       Abdominal pain  2  RADIATION: "Does the pain shoot anywhere else?" (e g , chest, back)      none  3  ONSET: "When did the pain begin?" (Minutes, hours or days ago)       Last night  4  SUDDEN: "Gradual or sudden onset?"      sudden  5  PATTERN "Does the pain come and go, or is it constant?"     - constant     6  SEVERITY: "How bad is the pain?"  (e g , Scale 1-10; mild, moderate, or severe)     - MILD (1-3): doesn't interfere with normal activities, abdomen soft and not tender to touch      - MODERATE (4-7): interferes with normal activities or awakens from sleep, tender to touch      - SEVERE (8-10): excruciating pain, doubled over, unable to do any normal activities        9/10  7  RECURRENT SYMPTOM: "Have you ever had this type of stomach pain before?" If Yes, ask: "When was the last time?" and "What happened that time?"       no  8  CAUSE: "What do you think is causing the stomach pain?"      In liver failure  9  RELIEVING/AGGRAVATING FACTORS: "What makes it better or worse?" (e g , movement, antacids, bowel movement)      nothing  10  OTHER SYMPTOMS: "Has there been any vomiting, diarrhea, constipation, or urine problems?"        Vomiting 2 times today      Protocols used: ABDOMINAL PAIN - MALE-ADULT-

## 2023-01-15 NOTE — H&P
[facility]  H&P- Joey Overcast 1975, 49 y o  male MRN: [id number]  Unit/Bed#: S -01 Encounter: [id number]  Primary Care Provider: Freddie Dixon MD   Date and time admitted to hospital: 1/15/2023 12:47 PM    * Abdominal pain  Assessment & Plan  · Right upper quadrant pain radiating towards the left that started this morning  Associated with nausea and bilious emesis  · 1 mixed bowel movement this morning(water and formed stool) denied any bleeding  · Lipase within normal limits, troponin normal, UA unremarkable  · CTA showed hepatic steatosis with severe portal hypertension and worsening splenomegaly, mild fat stranding ascending colon could be portal colopathy versus mild colitis  · Suspect patient has abdominal pain secondary to worsening hepatomegaly  Unlikely infectious colitis    Plan:  · Pain management with oxycodone, Dilaudid  · Avoid Tylenol  · Monitor off antibiotics  · GI consult a m  Liver cirrhosis ([address] )  Assessment & Plan  · Recently diagnosed liver cirrhosis likely Guy related, however extensive family history with liver cirrhosis  On transplant list  · First follow-up with GI 1/12  · EGD October 22 showed esophageal varices and portal hypertensive gastropathy without variceal bleeding  · MRI/MRCP scheduled for April 2023  · Currently cirrhosis compensated    No ascites  · Follow-up CMP, INR for tomorrow for meld score  · Continue losartan and furosemide  · GI consult    Nausea and vomiting  Assessment & Plan  · Bilious projectile vomiting today x2  · Zofran as needed    Essential hypertension  Assessment & Plan  · Pressure reviewed and acceptable  · Continue losartan, spironolactone and torsemide    Pancytopenia (HCC)  Assessment & Plan  · History of pancytopenia, had bone marrow biopsy that was negative for leukemia/lymphoma   · WBC currently 3 28, hemoglobin 13 8, platelets 95  · On Promacta 75 mg(not on hospital formulary patient's wife will bring from home)  · Monitor CBC      Diabetes [facility])  Assessment & Plan  Lab Results   Component Value Date    HGBA1C 7 5 (H) 11/07/2022       Recent Labs     01/15/23  1806   POCGLU 110       Blood Sugar Average: Last 72 hrs:  (P) 110     Currently taking glimepiride 4 mg daily(was supposed to take 8 mg daily however it was not refilled by PCP)  Ozempic was also ordered-pending approval  Hold oral meds while inpatient  Start sliding scale insulin for now  Hypoglycemia protocol  Diabetic diet    GERD (gastroesophageal reflux disease)  Assessment & Plan  · Protonix 40 mg daily    VTE Pharmacologic Prophylaxis: VTE Score: 2 Low Risk (Score 0-2) - Encourage Ambulation  Code Status: Level 1 - Full Code   Discussion with family: Updated  (wife) at bedside  Anticipated Length of Stay: Patient will be admitted on an inpatient basis with an anticipated length of stay of greater than 2 midnights secondary to Abdominal pain, nausea, vomiting  Chief Complaint: Abdominal pain, vomiting    History of Present Illness:  Joey Jack is a 49 y o  male with a PMH of pancytopenia and splenomegaly, liver cirrhosis secondary to GORMAN, type 2 diabetes, hypertension, GERD, lower extremity edema, who presents with abdominal pain  Patient reported he did not feel well last night, this morning he woke up with abdominal pain in the upper abdomen, try to eat breakfast and had projectile emesis, bilious shortly after  Patient's abdominal pain did not improved  In the emergency department CT abdomen pelvis showed hepatic steatosis with severe portal hypertension and Markley splenomegaly progressed, with mild fat stranding in the descending colon suspicion for portal colopathy versus mild colitis  With pain medication patient's abdominal pain improved  Patient was admitted for further management  Review of Systems:  Review of Systems   Constitutional: Positive for activity change and appetite change  Respiratory: Negative for cough and shortness of breath  Cardiovascular: Negative for chest pain  Gastrointestinal: Positive for abdominal pain, nausea and vomiting  Negative for blood in stool, constipation and diarrhea  All other systems reviewed and are negative  Past Medical and Surgical History:   Past Medical History:   Diagnosis Date   • BMI 36 0-36 9,adult 12/6/2022   • BMI 37 0-37 9, adult 04/22/2021   • BMI 38 0-38 9,adult 02/08/2022   • BMI 39 0-39 9,adult 12/16/2021   • Cervicalgia    • Colon polyp    • Concussion with loss of consciousness of 30 minutes or less 03/07/2021   • Depression    • Diabetes mellitus ([facility] [address] )    • Diabetes mellitus out of control    • Edema of both lower legs 12/16/2021   • Elevated liver enzymes    • Erectile dysfunction    • Exposure to COVID-19 virus    • Fatty liver    • GERD (gastroesophageal reflux disease)    • Heart murmur 01/07/2022   • Hyperglycemia    • Hypertension    • Left cervical radiculopathy    • Left elbow pain    • Left foot pain    • Leukopenia 03/07/2021   • Pain in both feet 12/16/2021   • Pancytopenia ([address] ) 12/16/2021   • Skin rash 12/16/2021   • Thrombocytopenia ([facility] [address] )    • Venous stasis dermatitis of both lower extremities 05/13/2022   • Vitamin D deficiency 01/07/2022       Past Surgical History:   Procedure Laterality Date   • CHOLECYSTECTOMY  1990's   • COLONOSCOPY  06/2005   • COLONOSCOPY     • EGD  07/10/2014   • IR BIOPSY BONE MARROW  03/20/2019   • IR BIOPSY BONE MARROW  7/29/2022   • UPPER GASTROINTESTINAL ENDOSCOPY     • WISDOM TOOTH EXTRACTION      In early to mid 20's       Meds/Allergies:  Prior to Admission medications    Medication Sig Start Date End Date Taking?  Authorizing Provider   glimepiride (AMARYL) 4 mg tablet Take 1 tablet (4 mg total) by mouth 2 (two) times a week 12/8/22  Yes Sandra Mcknight MD   losartan (COZAAR) 25 mg tablet TAKE 1 TABLET BY MOUTH  DAILY 8/30/22  Yes Sandra Mcknight MD omeprazole (PriLOSEC) 40 MG capsule TAKE 1 CAPSULE BY MOUTH  DAILY 12/12/22  Yes Toni Harris MD   Promacta 75 MG TABS TAKE 1 TABLET BY MOUTH ONCE DAILY ON AN EMPTY STOMACH  AT LEAST 1 HOUR BEFORE OR 2 HOURS AFTER A MEAL 10/19/22  Yes AMARJIT Ruiz   spironolactone (ALDACTONE) 50 mg tablet Take 1 tablet (50 mg total) by mouth daily 11/9/22  Yes Linette Maldonado MD   torsemide (DEMADEX) 20 mg tablet Take 20 mg by mouth daily   Yes Historical Provider, MD   Vitamin D, Cholecalciferol, 50 MCG (2000 UT) CAPS Take 1 tablet by mouth in the morning   Yes Historical Provider, MD   clotrimazole-betamethasone (Jeoffrey Costa) 1-0 05 % cream Apply topically 2 (two) times a day  Patient not taking: Reported on 1/15/2023 12/16/21   Toni Harris MD   OneTouch Verio test strip USE TO TEST ONCE DAILY 2/21/22   Toni Harris MD   semaglutide, 0 25 or 0 5 mg/dose, (Ozempic) 2 mg/1 5 mL injection pen Inject 0 38 mL (0 5 mg total) under the skin every 7 days for 4 doses  Patient not taking: Reported on 1/15/2023 1/12/23 2/3/23  Sonya Plasencia MD   semaglutide, 0 25 or 0 5 mg/dose, (Ozempic, 0 25 or 0 5 MG/DOSE,) 2 mg/1 5 mL injection pen Inject 0 19 mL (0 25 mg total) under the skin every 7 days for 4 doses  Patient not taking: Reported on 1/15/2023 1/12/23 2/3/23  Sonya Plasencia MD   semaglutide, 1 mg/dose, (Ozempic) 2 mg/1 5 mL injection pen Inject 0 75 mL (1 mg total) under the skin every 7 days for 4 doses  Patient not taking: Reported on 1/15/2023 1/12/23 2/3/23  Sonya Plasencia MD   semaglutide, 2 mg/dose, (Ozempic, 2 MG/DOSE,) 8 mg/ mL injection pen Inject 0 75 mL (2 mg total) under the skin every 7 days for 90 doses  Patient not taking: Reported on 1/15/2023 1/12/23 9/27/24  Sonya Plasencia MD     I have reviewed home medications with patient personally  Allergies: Allergies   Allergen Reactions   • Aspirin Other (See Comments)     Reaction unknown to patient     • Diltiazem Other (See Comments)     Patient does not recall what happened   • Lisinopril Cough       Social History:  Marital Status: /Civil Union   Occupation: Construction,   Patient Pre-hospital Living Situation: With spouse  Patient Pre-hospital Level of Mobility: walks  Patient Pre-hospital Diet Restrictions:   Substance Use History:   Social History     Substance and Sexual Activity   Alcohol Use Not Currently     Social History     Tobacco Use   Smoking Status Former   • Types: Cigarettes   Smokeless Tobacco Former   • Quit date: 3/1/2014   Tobacco Comments    Social   Last cigarette years ago  Social History     Substance and Sexual Activity   Drug Use Not Currently    Comment: No drug use - As per [employer]       Family History:  Family History   Problem Relation Age of Onset   • Heart attack Mother    • Thrombocytopenia Mother    • Coronary artery disease Mother    • Hypertension Mother    • Testicular cancer Father    • Hypertension Father    • No Known Problems Brother    • Hypertension Brother    • No Known Problems Brother    • No Known Problems Son        Physical Exam:     Vitals:   Blood Pressure: 116/55 (01/15/23 1700)  Pulse: 72 (01/15/23 1700)  Temperature: 98 °F (36 7 °C) (01/15/23 1700)  Temp Source: Oral (01/15/23 1700)  Respirations: 16 (01/15/23 1700)  Height: 6' 2" (188 cm) (01/15/23 1700)  Weight - Scale: 123 kg (270 lb 8 1 oz) (01/15/23 1700)  SpO2: 92 % (01/15/23 1700)    Physical Exam  Vitals reviewed  Constitutional:       General: He is not in acute distress  Appearance: He is obese  He is not diaphoretic  Eyes:      General: No scleral icterus  Right eye: No discharge  Left eye: No discharge  Cardiovascular:      Rate and Rhythm: Normal rate and regular rhythm  Heart sounds: No murmur heard  Pulmonary:      Effort: No respiratory distress  Breath sounds: Normal breath sounds  No wheezing or rales     Abdominal:      General: There is no distension  Palpations: Abdomen is soft  Tenderness: There is abdominal tenderness (Right upper quadrant, epigastrium and left upper quadrant)  There is no guarding or rebound  Musculoskeletal:      Right lower leg: No edema  Left lower leg: No edema  Skin:     General: Skin is warm  Neurological:      Mental Status: He is alert and oriented to person, place, and time  Psychiatric:         Mood and Affect: Mood normal          Behavior: Behavior normal          Thought Content: Thought content normal          Judgment: Judgment normal           Additional Data:     Lab Results:  Results from last 7 days   Lab Units 01/15/23  1345   WBC Thousand/uL 3 28*   HEMOGLOBIN g/dL 13 8   HEMATOCRIT % 40 3   PLATELETS Thousands/uL 95*   NEUTROS PCT % 78*   LYMPHS PCT % 13*   MONOS PCT % 6   EOS PCT % 2     Results from last 7 days   Lab Units 01/15/23  1345   SODIUM mmol/L 133*   POTASSIUM mmol/L 4 0   CHLORIDE mmol/L 102   CO2 mmol/L 23   BUN mg/dL 22   CREATININE mg/dL 1 05   ANION GAP mmol/L 8   CALCIUM mg/dL 9 1   ALBUMIN g/dL 4 3   TOTAL BILIRUBIN mg/dL 2 76*   ALK PHOS U/L 111*   ALT U/L 46   AST U/L 51*   GLUCOSE RANDOM mg/dL 264*     Results from last 7 days   Lab Units 01/14/23  1013   INR  1 3*     Results from last 7 days   Lab Units 01/15/23  1806   POC GLUCOSE mg/dl 110               Lines/Drains:  Invasive Devices     Peripheral Intravenous Line  Duration           Peripheral IV 01/15/23 Right Antecubital <1 day                    Imaging: Reviewed radiology reports from this admission including: abdominal/pelvic CT  CT abdomen pelvis with contrast   Final Result by Damion Sinha MD (01/15 1600)         1  Hepatic steatosis with severe portal hypertension and marked splenomegaly progressed  The degree of portal hypertension suggests underlying cirrhosis is likely  2   Prominence of the wall of the ascending colon which mild stranding of the surrounding fat    Findings could reflect portal colopathy versus mild colitis  3   Nonobstructing left renal calculus  The study was marked in [facility] for immediate notification               Workstation performed: [id number]         XR chest 1 view portable    (Results Pending)       EKG and Other Studies Reviewed on Admission:   · EKG: NSR  HR 75     ** Please Note: This note has been constructed using a voice recognition system   **

## 2023-01-15 NOTE — ASSESSMENT & PLAN NOTE
Lab Results   Component Value Date    HGBA1C 7 5 (H) 11/07/2022       Recent Labs     01/15/23  1806   POCGLU 110       Blood Sugar Average: Last 72 hrs:  (P) 110     Currently taking glimepiride 4 mg daily(was supposed to take 8 mg daily however it was not refilled by PCP)  Ozempic was also ordered-pending approval  Hold oral meds while inpatient  Start sliding scale insulin for now  Hypoglycemia protocol  Diabetic diet

## 2023-01-15 NOTE — ASSESSMENT & PLAN NOTE
· Recently diagnosed liver cirrhosis likely Wilian Velva related, however extensive family history with liver cirrhosis  On transplant list  · First follow-up with GI 1/12  · EGD October 22 showed esophageal varices and portal hypertensive gastropathy without variceal bleeding  · MRI/MRCP scheduled for April 2023  · Currently cirrhosis compensated    No ascites  · Follow-up CMP, INR for tomorrow for meld score  · Continue losartan and furosemide  · GI consult

## 2023-01-16 LAB
ALBUMIN SERPL BCP-MCNC: 3.7 G/DL (ref 3.5–5)
ALP SERPL-CCNC: 90 U/L (ref 34–104)
ALT SERPL W P-5'-P-CCNC: 38 U/L (ref 7–52)
ANION GAP SERPL CALCULATED.3IONS-SCNC: 7 MMOL/L (ref 4–13)
AST SERPL W P-5'-P-CCNC: 42 U/L (ref 13–39)
ATRIAL RATE: 72 BPM
BASOPHILS # BLD AUTO: 0.01 THOUSANDS/ÂΜL (ref 0–0.1)
BASOPHILS NFR BLD AUTO: 0 % (ref 0–1)
BILIRUB SERPL-MCNC: 1.88 MG/DL (ref 0.2–1)
BUN SERPL-MCNC: 22 MG/DL (ref 5–25)
CALCIUM SERPL-MCNC: 8.7 MG/DL (ref 8.4–10.2)
CHLORIDE SERPL-SCNC: 104 MMOL/L (ref 96–108)
CO2 SERPL-SCNC: 24 MMOL/L (ref 21–32)
CREAT SERPL-MCNC: 1.1 MG/DL (ref 0.6–1.3)
EOSINOPHIL # BLD AUTO: 0.12 THOUSAND/ÂΜL (ref 0–0.61)
EOSINOPHIL NFR BLD AUTO: 4 % (ref 0–6)
ERYTHROCYTE [DISTWIDTH] IN BLOOD BY AUTOMATED COUNT: 16.1 % (ref 11.6–15.1)
GFR SERPL CREATININE-BSD FRML MDRD: 78 ML/MIN/1.73SQ M
GLUCOSE SERPL-MCNC: 112 MG/DL (ref 65–140)
GLUCOSE SERPL-MCNC: 164 MG/DL (ref 65–140)
GLUCOSE SERPL-MCNC: 77 MG/DL (ref 65–140)
GLUCOSE SERPL-MCNC: 82 MG/DL (ref 65–140)
GLUCOSE SERPL-MCNC: 89 MG/DL (ref 65–140)
HCT VFR BLD AUTO: 36.1 % (ref 36.5–49.3)
HGB BLD-MCNC: 12.5 G/DL (ref 12–17)
IMM GRANULOCYTES # BLD AUTO: 0.01 THOUSAND/UL (ref 0–0.2)
IMM GRANULOCYTES NFR BLD AUTO: 0 % (ref 0–2)
INR PPP: 1.39 (ref 0.84–1.19)
LYMPHOCYTES # BLD AUTO: 0.96 THOUSANDS/ÂΜL (ref 0.6–4.47)
LYMPHOCYTES NFR BLD AUTO: 31 % (ref 14–44)
MCH RBC QN AUTO: 33.1 PG (ref 26.8–34.3)
MCHC RBC AUTO-ENTMCNC: 34.6 G/DL (ref 31.4–37.4)
MCV RBC AUTO: 96 FL (ref 82–98)
MONOCYTES # BLD AUTO: 0.3 THOUSAND/ÂΜL (ref 0.17–1.22)
MONOCYTES NFR BLD AUTO: 10 % (ref 4–12)
NEUTROPHILS # BLD AUTO: 1.72 THOUSANDS/ÂΜL (ref 1.85–7.62)
NEUTS SEG NFR BLD AUTO: 55 % (ref 43–75)
NRBC BLD AUTO-RTO: 0 /100 WBCS
P AXIS: 9 DEGREES
PLATELET # BLD AUTO: 92 THOUSANDS/UL (ref 149–390)
PMV BLD AUTO: 10.4 FL (ref 8.9–12.7)
POTASSIUM SERPL-SCNC: 3.8 MMOL/L (ref 3.5–5.3)
PR INTERVAL: 158 MS
PROT SERPL-MCNC: 6.2 G/DL (ref 6.4–8.4)
PROTHROMBIN TIME: 17.3 SECONDS (ref 11.6–14.5)
QRS AXIS: -27 DEGREES
QRSD INTERVAL: 88 MS
QT INTERVAL: 406 MS
QTC INTERVAL: 444 MS
RBC # BLD AUTO: 3.78 MILLION/UL (ref 3.88–5.62)
SODIUM SERPL-SCNC: 135 MMOL/L (ref 135–147)
T WAVE AXIS: -4 DEGREES
VENTRICULAR RATE: 72 BPM
WBC # BLD AUTO: 3.12 THOUSAND/UL (ref 4.31–10.16)

## 2023-01-16 RX ADMIN — OXYCODONE HYDROCHLORIDE 5 MG: 5 TABLET ORAL at 05:41

## 2023-01-16 RX ADMIN — SPIRONOLACTONE 50 MG: 25 TABLET ORAL at 08:17

## 2023-01-16 RX ADMIN — TORSEMIDE 20 MG: 20 TABLET ORAL at 08:17

## 2023-01-16 RX ADMIN — LOSARTAN POTASSIUM 25 MG: 25 TABLET, FILM COATED ORAL at 08:17

## 2023-01-16 RX ADMIN — NADOLOL 20 MG: 20 TABLET ORAL at 08:17

## 2023-01-16 RX ADMIN — PANTOPRAZOLE SODIUM 40 MG: 40 TABLET, DELAYED RELEASE ORAL at 08:17

## 2023-01-16 RX ADMIN — INSULIN LISPRO 2 UNITS: 100 INJECTION, SOLUTION INTRAVENOUS; SUBCUTANEOUS at 12:30

## 2023-01-16 RX ADMIN — ELTROMBOPAG OLAMINE 75 MG: 75 TABLET, FILM COATED ORAL at 13:53

## 2023-01-16 RX ADMIN — OXYCODONE HYDROCHLORIDE 5 MG: 5 TABLET ORAL at 17:38

## 2023-01-16 RX ADMIN — OXYCODONE HYDROCHLORIDE 5 MG: 5 TABLET ORAL at 11:46

## 2023-01-16 NOTE — PLAN OF CARE
Problem: Potential for Falls  Goal: Patient will remain free of falls  Description: INTERVENTIONS:  - Educate patient/family on patient safety including physical limitations  - Instruct patient to call for assistance with activity   - Consult OT/PT to assist with strengthening/mobility   - Keep Call bell within reach  - Keep bed low and locked with side rails adjusted as appropriate  - Keep care items and personal belongings within reach  - Initiate and maintain comfort rounds  - Make Fall Risk Sign visible to staff  - Offer Toileting every 2 Hours, in advance of need  - Apply yellow socks and bracelet for high fall risk patients  - Consider moving patient to room near nurses station  Outcome: Progressing     Problem: GASTROINTESTINAL - ADULT  Goal: Minimal or absence of nausea and/or vomiting  Description: INTERVENTIONS:  - Administer IV fluids if ordered to ensure adequate hydration  - Maintain NPO status until nausea and vomiting are resolved  - Nasogastric tube if ordered  - Administer ordered antiemetic medications as needed  - Provide nonpharmacologic comfort measures as appropriate  - Advance diet as tolerated, if ordered  - Consider nutrition services referral to assist patient with adequate nutrition and appropriate food choices  Outcome: Progressing  Goal: Maintains or returns to baseline bowel function  Description: INTERVENTIONS:  - Assess bowel function  - Encourage oral fluids to ensure adequate hydration  - Administer IV fluids if ordered to ensure adequate hydration  - Administer ordered medications as needed  - Encourage mobilization and activity  - Consider nutritional services referral to assist patient with adequate nutrition and appropriate food choices  Outcome: Progressing  Goal: Maintains adequate nutritional intake  Description: INTERVENTIONS:  - Monitor percentage of each meal consumed  - Identify factors contributing to decreased intake, treat as appropriate  - Assist with meals as needed  - Monitor I&O, weight, and lab values if indicated  - Obtain nutrition services referral as needed  Outcome: Progressing  Goal: Establish and maintain optimal ostomy function  Description: INTERVENTIONS:  - Assess bowel function  - Encourage oral fluids to ensure adequate hydration  - Administer IV fluids if ordered to ensure adequate hydration   - Administer ordered medications as needed  - Encourage mobilization and activity  - Nutrition services referral to assist patient with appropriate food choices  - Assess stoma site  - Consider wound care consult   Outcome: Progressing  Goal: Oral mucous membranes remain intact  Description: INTERVENTIONS  - Assess oral mucosa and hygiene practices  - Implement preventative oral hygiene regimen  - Implement oral medicated treatments as ordered  - Initiate Nutrition services referral as needed  Outcome: Progressing

## 2023-01-16 NOTE — ASSESSMENT & PLAN NOTE
· History of pancytopenia, had bone marrow biopsy that was negative for leukemia/lymphoma   · WBC currently 3 12, hemoglobin 12 5, platelets 92  · On Promacta 75 mg (not on hospital formulary patient's wife will bring from home)    Plan:  · Monitor CBC

## 2023-01-16 NOTE — PROGRESS NOTES
[facility]  Progress Note - Claude Manas 1975, 49 y o  male MRN: [id number]  Unit/Bed#: S -01 Encounter: [id number]  Primary Care Provider: Rosy Painter MD   Date and time admitted to hospital: 1/15/2023 12:47 PM    * Abdominal pain  Assessment & Plan  · Right upper quadrant pain radiating towards the left that started this morning  Associated with nausea and bilious emesis  · 1 mixed bowel movement this morning (water and formed stool) denied any bleeding  · Lipase within normal limits, troponin normal, UA unremarkable  · CTA showed hepatic steatosis with severe portal hypertension and worsening splenomegaly, mild fat stranding ascending colon could be portal colopathy versus mild colitis  · Suspect patient has abdominal pain secondary to worsening hepatomegaly  Unlikely infectious colitis  Plan:  · Pain management with oxycodone, Dilaudid  · Avoid Tylenol  · Monitor off antibiotics  · GI consult a m  Liver cirrhosis ([address] )  Assessment & Plan  · Recently diagnosed liver cirrhosis likely Guy related, however extensive family history with liver cirrhosis  On transplant list  · First follow-up with GI 1/12  · EGD October 22 showed esophageal varices and portal hypertensive gastropathy without variceal bleeding  · MRI/MRCP scheduled for April 2023  · Currently cirrhosis compensated    No ascites    Plan:  · GI consult - Recommendations appreciated  · Follow-up CMP, INR for meld score of 15  · Continue losartan and furosemide    Pancytopenia (HCC)  Assessment & Plan  · History of pancytopenia, had bone marrow biopsy that was negative for leukemia/lymphoma   · WBC currently 3 12, hemoglobin 12 5, platelets 92  · On Promacta 75 mg (not on hospital formulary patient's wife will bring from home)    Plan:  · Monitor CBC      Nausea and vomiting  Assessment & Plan  · Bilious projectile vomiting today x2    Plan:  · Zofran as needed    Essential hypertension  Assessment & Plan  · Pressure reviewed and acceptable    Plan:  · Continue losartan, spironolactone and torsemide    Diabetes [facility])  Assessment & Plan  Lab Results   Component Value Date    HGBA1C 7 5 (H) 2022       Recent Labs     01/15/23  1806 01/15/23  2103   POCGLU 110 136       Blood Sugar Average: Last 72 hrs:  (P) 123     Plan:  · Currently taking glimepiride 4 mg daily(was supposed to take 8 mg daily however it was not refilled by PCP)  · Ozempic was also ordered-pending approval  · Hold oral meds while inpatient  · Start sliding scale insulin for now  · Hypoglycemia protocol  · Diabetic diet    GERD (gastroesophageal reflux disease)  Assessment & Plan  · Protonix 40 mg daily      VTE Pharmacologic Prophylaxis: VTE Score: 2 Low Risk (Score 0-2) - Encourage Ambulation  Patient Centered Rounds: I performed bedside rounds with nursing staff today  Discussions with Specialists or Other Care Team Provider: GI    Education and Discussions with Family / Patient: Updated  (wife) at bedside  Current Length of Stay: 1 day(s)  Current Patient Status: Inpatient   Discharge Plan: Anticipate discharge tomorrow to home  Code Status: Level 1 - Full Code    Subjective:   Patient is a 48-year-old male who presented to the ED with severe abdominal pain along with nausea and vomiting  Today, patient states that he is feeling much better and the pain is a 2 out of 10  There were no acute events overnight  Objective:     Vitals:   Temp (24hrs), Av 1 °F (36 7 °C), Min:97 7 °F (36 5 °C), Max:98 3 °F (36 8 °C)    Temp:  [97 7 °F (36 5 °C)-98 3 °F (36 8 °C)] 98 2 °F (36 8 °C)  HR:  [54-77] 54  Resp:  [16-20] 18  BP: ()/(55-65) 114/58  SpO2:  [92 %-95 %] 94 %  Body mass index is 34 73 kg/m²  Input and Output Summary (last 24 hours):      Intake/Output Summary (Last 24 hours) at 2023 1532  Last data filed at 2023 0900  Gross per 24 hour   Intake 740 ml   Output -- Net 740 ml       Physical Exam:   Physical Exam  Vitals reviewed  Constitutional:       Appearance: Normal appearance  HENT:      Head: Normocephalic and atraumatic  Mouth/Throat:      Mouth: Mucous membranes are moist    Eyes:      Extraocular Movements: Extraocular movements intact  Conjunctiva/sclera: Conjunctivae normal       Pupils: Pupils are equal, round, and reactive to light  Cardiovascular:      Rate and Rhythm: Normal rate and regular rhythm  Heart sounds: Normal heart sounds  No murmur heard  No gallop  Pulmonary:      Effort: Pulmonary effort is normal  No respiratory distress  Breath sounds: Normal breath sounds  No wheezing  Abdominal:      General: Abdomen is flat  There is no distension  Palpations: Abdomen is soft  Tenderness: There is abdominal tenderness (RUQ and LUQ pain)  Musculoskeletal:         General: Normal range of motion  Right lower leg: No edema  Left lower leg: No edema  Skin:     General: Skin is warm  Neurological:      General: No focal deficit present  Mental Status: He is alert and oriented to person, place, and time  Mental status is at baseline  Psychiatric:         Mood and Affect: Mood normal          Behavior: Behavior normal          Thought Content:  Thought content normal          Judgment: Judgment normal        Additional Data:     Labs:  Results from last 7 days   Lab Units 01/16/23  0443   WBC Thousand/uL 3 12*   HEMOGLOBIN g/dL 12 5   HEMATOCRIT % 36 1*   PLATELETS Thousands/uL 92*   NEUTROS PCT % 55   LYMPHS PCT % 31   MONOS PCT % 10   EOS PCT % 4     Results from last 7 days   Lab Units 01/16/23  0443   SODIUM mmol/L 135   POTASSIUM mmol/L 3 8   CHLORIDE mmol/L 104   CO2 mmol/L 24   BUN mg/dL 22   CREATININE mg/dL 1 10   ANION GAP mmol/L 7   CALCIUM mg/dL 8 7   ALBUMIN g/dL 3 7   TOTAL BILIRUBIN mg/dL 1 88*   ALK PHOS U/L 90   ALT U/L 38   AST U/L 42*   GLUCOSE RANDOM mg/dL 77     Results from last 7 days   Lab Units 01/16/23  0443   INR  1 39*     Results from last 7 days   Lab Units 01/16/23  1137 01/16/23  0730 01/15/23  2103 01/15/23  1806   POC GLUCOSE mg/dl 164* 82 136 110               Lines/Drains:  Invasive Devices     Peripheral Intravenous Line  Duration           Peripheral IV 01/15/23 Right Antecubital 1 day                  Imaging: Reviewed radiology reports from this admission including: abdominal/pelvic CT    Recent Cultures (last 7 days):         Last 24 Hours Medication List:   Current Facility-Administered Medications   Medication Dose Route Frequency Provider Last Rate   • aluminum-magnesium hydroxide-simethicone  30 mL Oral Once Kesha Stack MD     • eltrombopag  75 mg Oral Daily Kelvin Conteh MD     • HYDROmorphone  0 5 mg Intravenous Q4H PRN Kelvin Conteh MD     • insulin lispro  2-12 Units Subcutaneous 4x Daily (AC & HS) Kelvin Conteh MD     • Lidocaine Viscous HCl  15 mL Swish & Swallow Once Kesha Stack MD     • losartan  25 mg Oral Daily Kelvin Conteh MD     • nadolol  20 mg Oral Daily Evangelina Nugent MD     • ondansetron  4 mg Intravenous Q6H PRN Kelvin Conteh MD     • oxyCODONE  2 5 mg Oral Q4H PRN Evangelina Nugent MD     • oxyCODONE  5 mg Oral Q4H PRN Evangelina Nugent MD     • spironolactone  50 mg Oral Daily Kelvin Conteh MD     • torsemide  20 mg Oral Daily Kelvin Conteh MD          Today, Patient Was Seen By: Bri Deal DO    **Please Note: This note may have been constructed using a voice recognition system  **

## 2023-01-16 NOTE — DISCHARGE SUMMARY
[facility]  Discharge- Kaitlynn Sukhi 1975, 49 y o  male MRN: [id number]  Unit/Bed#: S -01 Encounter: [id number]  Primary Care Provider: Dong Melton MD   Date and time admitted to hospital: 1/15/2023 12:47 PM    * Abdominal pain  Assessment & Plan  · Right upper quadrant pain radiating towards the left that started this morning  Associated with nausea and bilious emesis  · 1 mixed bowel movement this morning (water and formed stool) denied any bleeding  · Lipase within normal limits, troponin normal, UA unremarkable  · CTA showed hepatic steatosis with severe portal hypertension and worsening splenomegaly, mild fat stranding ascending colon could be portal colopathy versus mild colitis  · Suspect patient has abdominal pain secondary to worsening hepatomegaly  Unlikely infectious colitis  Plan:  · Pain management with oxycodone - small quantity given  · Nodolol 20 mg started inpatient - until able to  Carvedilol given by Dr Eliazar Boone   · GI outpatient follow-up    Liver cirrhosis [facility])  Assessment & Plan  · Recently diagnosed liver cirrhosis likely Guy related, however extensive family history with liver cirrhosis  On transplant list  · First follow-up with GI 1/12  · EGD October 22 showed esophageal varices and portal hypertensive gastropathy without variceal bleeding  · MRI/MRCP scheduled for April 2023  · Currently cirrhosis compensated    No ascites    Plan:  · Follow-up outpatient with GI - Dr Eliazar Boone    Pancytopenia [facility])  Assessment & Plan  · History of pancytopenia, had bone marrow biopsy that was negative for leukemia/lymphoma   · WBC currently 3 12, hemoglobin 12 5, platelets 92  · On Promacta 75 mg (not on hospital formulary patient's wife will bring from home)    Plan:  · Follow-up with PCP and GI      Nausea and vomiting  Assessment & Plan  · Bilious projectile vomiting today x2    Plan: Resolved     Essential hypertension  Assessment & Plan  · Pressure reviewed and acceptable    Plan:  · Continue home regimen    Diabetes [facility])  Assessment & Plan  Lab Results   Component Value Date    HGBA1C 7 5 (H) 11/07/2022       Recent Labs     01/16/23  1547 01/16/23  1956 01/17/23  0712 01/17/23  1119   POCGLU 89 112 85 199*       Blood Sugar Average: Last 72 hrs:  (P) 122 125     Plan:  · Continue home meds  · Diabetic diet    GERD (gastroesophageal reflux disease)  Assessment & Plan  · Protonix 40 mg daily      Medical Problems     Resolved Problems  Date Reviewed: 1/16/2023   None       Discharging Resident: Olga Butts DO  Discharging Attending: No att  providers found  PCP: Maggie Knowles MD  Admission Date:   Admission Orders (From admission, onward)     Ordered        01/15/23 1642  INPATIENT ADMISSION  Once                      Discharge Date: 01/17/23    Consultations During Hospital Stay:  · GI    Procedures Performed:   · None    Significant Findings / Test Results:   CT abdomen pelvis with contrast   Final Result by Vania Adame MD (01/15 1600)         1  Hepatic steatosis with severe portal hypertension and marked splenomegaly progressed  The degree of portal hypertension suggests underlying cirrhosis is likely  2   Prominence of the wall of the ascending colon which mild stranding of the surrounding fat  Findings could reflect portal colopathy versus mild colitis  3   Nonobstructing left renal calculus  The study was marked in [facility] for immediate notification               Workstation performed: [id number]         XR chest 1 view portable   Final Result by Debra Moran MD (01/16 0530)      No acute cardiopulmonary disease  Workstation performed: [id number]             Incidental Findings:   · None    Test Results Pending at Discharge (will require follow up):   · None     Outpatient Tests Requested:  · None    Complications:  None    Reason for Admission: Abdominal pain    Hospital Course:   Tarri Jone Jessica Perez is a 49 y o  male patient who originally presented to the hospital on 1/15/2023 due to severe abdominal pain along with nausea and vomiting  Patient has a past medical history of Cathye Expose with liver cirrhosis, diabetes type 2, GERD, hypertension, and obesity  Patient states that he has had the epigastric pain for 2 days along with emesis episodes the morning of admission  Patient also reported 3 episodes of loose stool since Saturday night  On imaging, patient was discovered to hepatic steatosis and severe portal hypertension and marked splenomegaly which have progressed, prominence of the wall of the ascending colon with mild stranding of the surrounding fat, and nonobstructing left renal calculus  Patient was admitted for further management  Patient pain was brought under control with the addition of beta-blocker and oxycodone  Gastroenterology was also consulted during this admission  They recommended continuing PPI and starting Nadolol 20 mg daily until patient can  carvedilol given by Dr Niles Garcia  Following were added/adjusted to the patient's regimen:  · Nadolol 20 mg daily - until able to  Carvedilol given by Dr Niles Garcia   · Tylenol as needed for mild pain and Oxycodone as needed for severe pain    Patient should follow-up with PCP and GI upon discharge  Please see above list of diagnoses and related plan for additional information  Condition at Discharge: good    Discharge Day Visit / Exam:   Subjective: Patient is a 58-year-old male who presented to the ED with severe abdominal pain along with nausea and vomiting  Today, patient states that he is feeling much better but there is still mild tenderness  There were no acute events overnight      Vitals: Blood Pressure: 115/57 (01/17/23 0707)  Pulse: 57 (01/17/23 0707)  Temperature: 98 2 °F (36 8 °C) (01/17/23 0707)  Temp Source: Oral (01/15/23 2053)  Respirations: 18 (01/17/23 0707)  Height: 6' 2" (188 cm) (01/15/23 1700)  Weight - Scale: 123 kg (270 lb 8 1 oz) (01/15/23 1700)  SpO2: 94 % (01/17/23 0707)    Exam:   Physical Exam  Vitals reviewed  Constitutional:       Appearance: Normal appearance  HENT:      Head: Normocephalic and atraumatic  Mouth/Throat:      Mouth: Mucous membranes are moist    Eyes:      Extraocular Movements: Extraocular movements intact  Conjunctiva/sclera: Conjunctivae normal       Pupils: Pupils are equal, round, and reactive to light  Cardiovascular:      Rate and Rhythm: Normal rate and regular rhythm  Heart sounds: Normal heart sounds  No murmur heard  No gallop  Pulmonary:      Effort: Pulmonary effort is normal  No respiratory distress  Breath sounds: Normal breath sounds  No wheezing  Abdominal:      General: Abdomen is flat  Bowel sounds are normal       Palpations: Abdomen is soft  Tenderness: There is abdominal tenderness (RUQ and LUQ)  Musculoskeletal:         General: Normal range of motion  Right lower leg: No edema  Left lower leg: No edema  Skin:     General: Skin is warm  Neurological:      General: No focal deficit present  Mental Status: He is alert and oriented to person, place, and time  Mental status is at baseline  Psychiatric:         Mood and Affect: Mood normal          Behavior: Behavior normal          Thought Content: Thought content normal          Judgment: Judgment normal        Discharge instructions/Information to patient and family:   See after visit summary for information provided to patient and family  Provisions for Follow-Up Care:  See after visit summary for information related to follow-up care and any pertinent home health orders  Disposition:   Home    Planned Readmission: None    Discharge Medications:  See after visit summary for reconciled discharge medications provided to patient and/or family        **Please Note: This note may have been constructed using a voice recognition system**

## 2023-01-16 NOTE — ASSESSMENT & PLAN NOTE
Lab Results   Component Value Date    HGBA1C 7 5 (H) 11/07/2022       Recent Labs     01/15/23  1806 01/15/23  2103   POCGLU 110 136       Blood Sugar Average: Last 72 hrs:  (P) 123     Plan:  · Currently taking glimepiride 4 mg daily(was supposed to take 8 mg daily however it was not refilled by PCP)  · Ozempic was also ordered-pending approval  · Hold oral meds while inpatient  · Start sliding scale insulin for now  · Hypoglycemia protocol  · Diabetic diet

## 2023-01-16 NOTE — CONSULTS
Consultation - GI   Bettey Dancer 49 y o  male MRN: [id number]  Unit/Bed#: S -01 Encounter: [id number]        Assessment:  1  Abdominal pain with vomiting  · Suspect secondary to progressive PHTG and possibly componenet of viral gastroenteritis considering associated episodes of loose stools  2  NAFLD/Compensated liver cirrhosis  · MELD score 14  3  Non bleeding EV  · Last EGD 7/2022  4  Chronic pancytopenia  · Suspected secondary to liver cirrhosis  · Receive IV venofer per hematology  Hgb is normal   · BM biopsy was benign  · On Promacta 75 MG by hematology  Plan:  1  Agree with starting nadolol  Dose can be increased in the outpatient setting  2  Continue supportive care  Advance diet as tolerated  3  Continue with daily PPI  4  Pain management as per primary team   5  Outpatient follow up with Dr Britta Mcdaniel for continued follow up on liver cirrhosis and transplant workup        Will discuss with Dr Noé Sood  Please refer to attestation for final and accurate recommendations  History of present illness:  Physician Requesting Consult: Alejandra Lucas MD  Reason for Consult / Principal Problem: abdominal pain  Hx and PE limited by:   HPI: Bettey Dancer is a 49y o  year old male with PMHx of NAFLD, compensated liver cirrhosis, T2DM, GERD,HTN and obesity who presents with 2 days complaints of epigastric/RUQ abdominal pain  He reports eating scrambelled egg that morning followed by bilious vomiting x2  Also reports 3 episodes of loose stool since Saturday night  He denies hematemesis, dark stools, fever or chills  He denies NSAIDs use  He has no further episodes of vomiting or loose stools since admission  CT abdomen showed severe portal hypertension and marked progressied splenomegaly prompting GI consult  CT abdomen 1/15  1  Hepatic steatosis with severe portal hypertension and marked splenomegaly progressed  The degree of portal hypertension suggests underlying cirrhosis is likely    2  Prominence of the wall of the ascending colon which mild stranding of the surrounding fat  Findings could reflect portal colopathy versus mild colitis  3   Nonobstructing left renal calculus  EGD 7/2022:  1  Antral gastritis  2  PHTG  3  Three columns of small to medium size esophageal varices with no stigmata of bleeding, all collapse with insufflation  4  Biopsies were benign  Colonoscopy 7/2022:  Multiple colon polyps and internal hemorrhoids  Inpatient consult to gastroenterology  Consult performed by: Mere Bowen MD  Consult ordered by: Oscar Falcon MD          Review of Systems   Constitutional: Negative for chills and fever  HENT: Negative for ear pain and sore throat  Eyes: Negative for pain and visual disturbance  Respiratory: Negative for cough and shortness of breath  Cardiovascular: Negative for chest pain and palpitations  Gastrointestinal: Positive for abdominal pain, nausea and vomiting  Loose stools    Genitourinary: Negative for dysuria and hematuria  Musculoskeletal: Negative for arthralgias and back pain  Leg cramps    Skin: Negative for color change and rash  Neurological: Negative for seizures and syncope  All other systems reviewed and are negative        Historical Information   Past Medical History:   Diagnosis Date   • BMI 36 0-36 9,adult 12/6/2022   • BMI 37 0-37 9, adult 04/22/2021   • BMI 38 0-38 9,adult 02/08/2022   • BMI 39 0-39 9,adult 12/16/2021   • Cervicalgia    • Colon polyp    • Concussion with loss of consciousness of 30 minutes or less 03/07/2021   • Depression    • Diabetes mellitus ([facility] [address] )    • Diabetes mellitus out of control    • Edema of both lower legs 12/16/2021   • Elevated liver enzymes    • Erectile dysfunction    • Exposure to COVID-19 virus    • Fatty liver    • GERD (gastroesophageal reflux disease)    • Heart murmur 01/07/2022   • Hyperglycemia    • Hypertension    • Left cervical radiculopathy    • Left elbow pain    • Left foot pain    • Leukopenia 03/07/2021   • Pain in both feet 12/16/2021   • Pancytopenia ([facility] [address] ) 12/16/2021   • Skin rash 12/16/2021   • Thrombocytopenia ([facility] [address] )    • Venous stasis dermatitis of both lower extremities 05/13/2022   • Vitamin D deficiency 01/07/2022     Past Surgical History:   Procedure Laterality Date   • CHOLECYSTECTOMY  1990's   • COLONOSCOPY  06/2005   • COLONOSCOPY     • EGD  07/10/2014   • IR BIOPSY BONE MARROW  03/20/2019   • IR BIOPSY BONE MARROW  7/29/2022   • UPPER GASTROINTESTINAL ENDOSCOPY     • WISDOM TOOTH EXTRACTION      In early to mid 19's     Social History   Social History     Substance and Sexual Activity   Alcohol Use Not Currently     Social History     Substance and Sexual Activity   Drug Use Not Currently    Comment: No drug use - As per [employer]     E-Cigarette/Vaping   • E-Cigarette Use Never User      E-Cigarette/Vaping Substances   • Nicotine No    • THC No    • CBD No    • Flavoring No    • Other No    • Unknown No      Social History     Tobacco Use   Smoking Status Former   • Types: Cigarettes   Smokeless Tobacco Former   • Quit date: 3/1/2014   Tobacco Comments    Social   Last cigarette years ago  Family History:   Family History   Problem Relation Age of Onset   • Heart attack Mother    • Thrombocytopenia Mother    • Coronary artery disease Mother    • Hypertension Mother    • Testicular cancer Father    • Hypertension Father    • No Known Problems Brother    • Hypertension Brother    • No Known Problems Brother    • No Known Problems Son        Meds/Allergies   all current active meds have been reviewed    Allergies   Allergen Reactions   • Aspirin Other (See Comments)     Reaction unknown to patient     • Diltiazem Other (See Comments)     Patient does not recall what happened   • Lisinopril Cough       Objective       Intake/Output Summary (Last 24 hours) at 1/16/2023 1016  Last data filed at 1/15/2023 1901  Gross per 24 hour   Intake 420 ml Output --   Net 420 ml       Invasive Devices:   Peripheral IV 01/15/23 Right Antecubital (Active)   Site Assessment WDL 01/15/23 1930   Dressing Type Transparent 01/15/23 1930   Line Status Flushed;Saline locked 01/15/23 1930   Dressing Status Clean;Dry; Intact 01/15/23 1930   Dressing Change Due 01/19/23 01/15/23 1930   Reason Not Rotated Not due 01/15/23 1930       Physical Exam  Vitals and nursing note reviewed  Constitutional:       General: He is not in acute distress  Appearance: He is well-developed  HENT:      Head: Normocephalic and atraumatic  Eyes:      Conjunctiva/sclera: Conjunctivae normal    Cardiovascular:      Rate and Rhythm: Normal rate and regular rhythm  Heart sounds: No murmur heard  Pulmonary:      Effort: Pulmonary effort is normal  No respiratory distress  Breath sounds: Normal breath sounds  Abdominal:      Palpations: Abdomen is soft  Tenderness: There is abdominal tenderness (epigastric and RUQ)  Comments: Hepatomegaly  Musculoskeletal:         General: No swelling  Cervical back: Neck supple  Skin:     General: Skin is warm and dry  Capillary Refill: Capillary refill takes less than 2 seconds  Neurological:      General: No focal deficit present  Mental Status: He is alert and oriented to person, place, and time  Psychiatric:         Mood and Affect: Mood normal          Lab Results: I have personally reviewed pertinent reports  Imaging Studies: I have personally reviewed pertinent reports  EKG, Pathology, and Other Studies: I have personally reviewed pertinent reports  VTE Prophylaxis: per primary     Counseling / Coordination of Care  Total floor / unit time spent today 64 minutes  Greater than 50% of total time was spent with the patient and / or family counseling and / or coordination of care

## 2023-01-16 NOTE — UTILIZATION REVIEW
Initial Clinical Review    Admission: Date/Time/Statement:   Admission Orders (From admission, onward)     Ordered        01/15/23 1642  1 Medical [facility],5[address] Floor [place]  Once                      Orders Placed This Encounter   Procedures   • INPATIENT ADMISSION     Standing Status:   Standing     Number of Occurrences:   1     Order Specific Question:   Level of Care     Answer:   Med Surg [16]     Order Specific Question:   Estimated length of stay     Answer:   More than 2 Midnights     Order Specific Question:   Certification     Answer:   I certify that inpatient services are medically necessary for this patient for a duration of greater than two midnights  See H&P and MD Progress Notes for additional information about the patient's course of treatment  ED Arrival Information     Expected   1/15/2023 12:25    Arrival   1/15/2023 12:40    Acuity   Urgent            Means of arrival   Walk-In    Escorted by   Spouse    Service   Hospitalist    Admission type   Emergency            Arrival complaint   Abdominal Pain/vomiting           Chief Complaint   Patient presents with   • Abdominal Pain     Pt reports mid upper abdominal pain that started this morning  Pt reports N/V/D  Pt currently on list for liver transplant  Initial Presentation: 49 y o  male  with a PMH of pancytopenia and splenomegaly, liver cirrhosis secondary to GORMAN, type 2 diabetes, hypertension, GERD, lower extremity edema, who presents with abdominal pain  Patient reported he did not feel well last night, this morning he woke up with abdominal pain in the upper abdomen, tried to eat breakfast and had projectile emesis, bilious shortly after  Patient's abdominal pain did not improve  In the emergency department CT abdomen pelvis showed hepatic steatosis with severe portal hypertension and splenomegaly progressed, with mild fat stranding in the descending colon suspicion for portal colopathy versus mild colitis   Plan: Inpatient admission for evaluation and treatment of abdominal pain, liver cirrhosis, nausea/vomiting, HTN, pancytopenia, DM, GERD: pain management, GI consult, CMP, CBC and INR in am, continue losartan and lasix, Zofran prn, wife to bring in pt's Promacta, hold oral antihyperglycemics, SSI and diabetic diet  Date: 1/16   Day 2:     GI consult: start nadolol, daily PPI, pain management  Internal medicine: On imaging, patient was discovered to hepatic steatosis and severe portal hypertension and marked splenomegaly which have progressed, prominence of the wall of the ascending colon with mild stranding of the surrounding fat, and nonobstructing left renal calculus  Patient was admitted for further management  Patient pain was brought under control with the addition of beta-blocker and oxycodone      ED Triage Vitals   Temperature Pulse Respirations Blood Pressure SpO2   01/15/23 1251 01/15/23 1251 01/15/23 1251 01/15/23 1251 01/15/23 1251   98 °F (36 7 °C) 83 17 138/67 96 %      Temp Source Heart Rate Source Patient Position - Orthostatic VS BP Location FiO2 (%)   01/15/23 1251 01/15/23 1251 01/15/23 1251 01/15/23 1251 --   Oral Monitor Sitting Left arm       Pain Score       01/15/23 1346       9          Wt Readings from Last 1 Encounters:   01/15/23 123 kg (270 lb 8 1 oz)     Additional Vital Signs:     Date/Time Temp Pulse Resp BP MAP (mmHg) SpO2 O2 Device   01/16/23 08:13:34 -- -- -- 113/62 79 -- --   01/16/23 07:28:42 97 7 °F (36 5 °C) -- 20 97/57 70 -- --   01/15/23 20:53:36 98 3 °F (36 8 °C) -- 16 106/55 72 -- None (Room air)   01/15/23 20:06:28 -- -- -- 122/65 84 -- --   01/15/23 1700 98 °F (36 7 °C) 72 16 116/55 77 92 % None (Room air)   01/15/23 1630 -- 74 16 120/60 86 93 % --   01/15/23 1600 -- 77 17 145/63 91 95 % --   01/15/23 1530 -- 74 -- 122/62 84 94 % --   01/15/23 1500 -- 72 -- 107/57 78 93 % --   01/15/23 1429 -- 75 17 113/58 81 94 % --   01/15/23 1400 -- -- -- -- -- -- None (Room air)     Pertinent Labs/Diagnostic Test Results:   CT abdomen pelvis with contrast   Final Result by Aiden Mtz MD (01/15 1600)         1  Hepatic steatosis with severe portal hypertension and marked splenomegaly progressed  The degree of portal hypertension suggests underlying cirrhosis is likely  2   Prominence of the wall of the ascending colon which mild stranding of the surrounding fat  Findings could reflect portal colopathy versus mild colitis  3   Nonobstructing left renal calculus  The study was marked in [facility] for immediate notification               Workstation performed: [id number]         XR chest 1 view portable   Final Result by Felicita Delgadillo MD (01/16 0530)      No acute cardiopulmonary disease  Workstation performed: [id number]           1/15 EKG:  Normal sinus rhythm  Moderate voltage criteria for LVH, may be normal variant  Possible Lateral infarct , age undetermined  Abnormal ECG  When compared with ECG of 10-SEP-2022 19:42,  No significant change was found      Results from last 7 days   Lab Units 01/16/23  0443 01/15/23  1345 01/14/23  1013   WBC Thousand/uL 3 12* 3 28*  --    WHITE BLOOD CELL COUNT  Thousand/uL  --   --  1 9*   HEMOGLOBIN g/dL 12 5 13 8  --    HEMOGLOBIN  g/dL  --   --  13 0*   HEMATOCRIT % 36 1* 40 3  --    HEMATOCRIT  %  --   --  37 2*   PLATELETS Thousands/uL 92* 95*  --    PLATELETS  Thousand/uL  --   --  74*   NEUTROS ABS Thousands/µL 1 72* 2 55  --    NEUTROS ABS   cells/uL  --   --  1,094*         Results from last 7 days   Lab Units 01/16/23  0443 01/15/23  1345 01/14/23  1013   SODIUM mmol/L 135 133* 137   POTASSIUM mmol/L 3 8 4 0 4 3   CHLORIDE mmol/L 104 102 106   CO2 mmol/L 24 23 25   ANION GAP mmol/L 7 8  --    BUN mg/dL 22 22 21   CREATININE mg/dL 1 10 1 05 0 96   EGFR ml/min/1 73sq m 78 83 98   CALCIUM mg/dL 8 7 9 1 9 0     Results from last 7 days   Lab Units 01/16/23 0443 01/15/23  1345 01/14/23  1013   AST U/L 42* 51* 52*   ALT U/L 38 46 43   ALK PHOS U/L 90 111* 109   TOTAL PROTEIN g/dL 6 2* 7 1 6 5   ALBUMIN g/dL 3 7 4 3 4 1   TOTAL BILIRUBIN mg/dL 1 88* 2 76* 2 7*     Results from last 7 days   Lab Units 01/16/23  1137 01/16/23  0730 01/15/23  2103 01/15/23  1806   POC GLUCOSE mg/dl 164* 82 136 110     Results from last 7 days   Lab Units 01/16/23  0443 01/15/23  1345 01/14/23  1013   GLUCOSE RANDOM mg/dL 77 264* 128*         Results from last 7 days   Lab Units 01/15/23  1345   HS TNI 0HR ng/L 3         Results from last 7 days   Lab Units 01/16/23  0443 01/14/23  1013   PROTIME seconds 17 3* 12 9*   INR  1 39* 1 3*         Results from last 7 days   Lab Units 01/15/23  1345   LIPASE u/L 44                 Results from last 7 days   Lab Units 01/15/23  1345   CLARITY UA  Clear   COLOR UA  Light Yellow   SPEC GRAV UA  1 008   PH UA  5 0   GLUCOSE UA mg/dl 300 (3/10%)*   KETONES UA mg/dl Negative   BLOOD UA  Small*   PROTEIN UA mg/dl Negative   NITRITE UA  Negative   BILIRUBIN UA  Negative   UROBILINOGEN UA (BE) mg/dl <2 0   LEUKOCYTES UA  Negative   WBC UA /hpf None Seen   RBC UA /hpf 1-2   BACTERIA UA /hpf None Seen   EPITHELIAL CELLS WET PREP /hpf Occasional         ED Treatment:   Medication Administration from 01/15/2023 1208 to 01/15/2023 1733       Date/Time Order Dose Route Action     01/15/2023 1346 EST HYDROmorphone (DILAUDID) injection 0 5 mg 0 5 mg Intravenous Given     01/15/2023 1411 EST HYDROmorphone (DILAUDID) injection 0 5 mg 0 5 mg Intravenous Given     01/15/2023 1449 EST iohexol (OMNIPAQUE) 350 MG/ML injection (SINGLE-DOSE) 100 mL 100 mL Intravenous Given     01/15/2023 1627 EST fentanyl citrate (PF) 100 MCG/2ML 75 mcg 75 mcg Intravenous Given        Past Medical History:   Diagnosis Date   • BMI 36 0-36 9,adult 12/6/2022   • BMI 37 0-37 9, adult 04/22/2021   • BMI 38 0-38 9,adult 02/08/2022   • BMI 39 0-39 9,adult 12/16/2021   • Cervicalgia    • Colon polyp    • Concussion with loss of consciousness of 30 minutes or less 03/07/2021   • Depression • Diabetes mellitus ([facility] )    • Diabetes mellitus out of control    • Edema of both lower legs 12/16/2021   • Elevated liver enzymes    • Erectile dysfunction    • Exposure to COVID-19 virus    • Fatty liver    • GERD (gastroesophageal reflux disease)    • Heart murmur 01/07/2022   • Hyperglycemia    • Hypertension    • Left cervical radiculopathy    • Left elbow pain    • Left foot pain    • Leukopenia 03/07/2021   • Pain in both feet 12/16/2021   • Pancytopenia ([facility] ) 12/16/2021   • Skin rash 12/16/2021   • Thrombocytopenia (HCC)    • Venous stasis dermatitis of both lower extremities 05/13/2022   • Vitamin D deficiency 01/07/2022     Present on Admission:  • GERD (gastroesophageal reflux disease)  • Diabetes ([place] [address] )  • Pancytopenia (HCC)  • Essential hypertension      Admitting Diagnosis: Epigastric pain [R10 13]  Abdominal pain [R10 9]  Liver cirrhosis secondary to GORMAN ([place] [address] ) [K75 81, K74 60]  Age/Sex: 49 y o  male  Admission Orders:  Scheduled Medications:  aluminum-magnesium hydroxide-simethicone, 30 mL, Oral, Once  eltrombopag, 75 mg, Oral, Daily  insulin lispro, 2-12 Units, Subcutaneous, 4x Daily (AC & HS)  Lidocaine Viscous HCl, 15 mL, Swish & Swallow, Once  losartan, 25 mg, Oral, Daily  nadolol, 20 mg, Oral, Daily  spironolactone, 50 mg, Oral, Daily  torsemide, 20 mg, Oral, Daily      Continuous IV Infusions:     PRN Meds:  HYDROmorphone, 0 5 mg, Intravenous, Q4H PRN  ondansetron, 4 mg, Intravenous, Q6H PRN  oxyCODONE, 2 5 mg, Oral, Q4H PRN  oxyCODONE, 5 mg, Oral, Q4H PRN x1 1/15, x2 1/16 thus far        IP CONSULT TO GASTROENTEROLOGY    Network Utilization Review Department  ATTENTION: Please call with any questions or concerns to [phone] and carefully listen to the prompts so that you are directed to the right person   All voicemails are confidential   Suzanna Veronica all requests for admission clinical reviews, approved or denied determinations and any other requests to dedicated fax number below belonging to the campus where the patient is receiving treatment   List of dedicated fax numbers for the Facilities:  [address] [place] [address] DENIALS (Administrative/Medical Necessity) [phone]   [address]  (Maternity/NICU/Pediatrics) [phone]   [address] [phone]   Genet Jimenez 77 [phone]   [address] [place] [address] [facility] [phone]   [facility]8 [facility] 134 815 [facility] [phone]

## 2023-01-16 NOTE — ASSESSMENT & PLAN NOTE
· Recently diagnosed liver cirrhosis likely Barbara Badder related, however extensive family history with liver cirrhosis  On transplant list  · First follow-up with GI 1/12  · EGD October 22 showed esophageal varices and portal hypertensive gastropathy without variceal bleeding  · MRI/MRCP scheduled for April 2023  · Currently cirrhosis compensated    No ascites    Plan:  · GI consult - Recommendations appreciated  · Follow-up CMP, INR for meld score of 15  · Continue losartan and furosemide

## 2023-01-16 NOTE — ASSESSMENT & PLAN NOTE
· Right upper quadrant pain radiating towards the left that started this morning  Associated with nausea and bilious emesis  · 1 mixed bowel movement this morning (water and formed stool) denied any bleeding  · Lipase within normal limits, troponin normal, UA unremarkable  · CTA showed hepatic steatosis with severe portal hypertension and worsening splenomegaly, mild fat stranding ascending colon could be portal colopathy versus mild colitis  · Suspect patient has abdominal pain secondary to worsening hepatomegaly  Unlikely infectious colitis  Plan:  · Pain management with oxycodone, Dilaudid  · Avoid Tylenol  · Monitor off antibiotics  · GI consult juan padgett

## 2023-01-17 ENCOUNTER — TELEPHONE (OUTPATIENT)
Dept: GASTROENTEROLOGY | Facility: CLINIC | Age: 48
End: 2023-01-17

## 2023-01-17 VITALS
HEIGHT: 74 IN | TEMPERATURE: 98.2 F | OXYGEN SATURATION: 94 % | HEART RATE: 57 BPM | WEIGHT: 270.5 LBS | SYSTOLIC BLOOD PRESSURE: 115 MMHG | RESPIRATION RATE: 18 BRPM | BODY MASS INDEX: 34.72 KG/M2 | DIASTOLIC BLOOD PRESSURE: 57 MMHG

## 2023-01-17 LAB
ALBUMIN SERPL BCP-MCNC: 3.6 G/DL (ref 3.5–5)
ALP SERPL-CCNC: 89 U/L (ref 34–104)
ALT SERPL W P-5'-P-CCNC: 37 U/L (ref 7–52)
ANION GAP SERPL CALCULATED.3IONS-SCNC: 7 MMOL/L (ref 4–13)
AST SERPL W P-5'-P-CCNC: 43 U/L (ref 13–39)
BASOPHILS # BLD AUTO: 0.01 THOUSANDS/ÂΜL (ref 0–0.1)
BASOPHILS NFR BLD AUTO: 0 % (ref 0–1)
BILIRUB SERPL-MCNC: 1.98 MG/DL (ref 0.2–1)
BUN SERPL-MCNC: 24 MG/DL (ref 5–25)
CALCIUM SERPL-MCNC: 8.5 MG/DL (ref 8.4–10.2)
CHLORIDE SERPL-SCNC: 104 MMOL/L (ref 96–108)
CO2 SERPL-SCNC: 24 MMOL/L (ref 21–32)
CREAT SERPL-MCNC: 1.13 MG/DL (ref 0.6–1.3)
EOSINOPHIL # BLD AUTO: 0.1 THOUSAND/ÂΜL (ref 0–0.61)
EOSINOPHIL NFR BLD AUTO: 4 % (ref 0–6)
ERYTHROCYTE [DISTWIDTH] IN BLOOD BY AUTOMATED COUNT: 16 % (ref 11.6–15.1)
GFR SERPL CREATININE-BSD FRML MDRD: 76 ML/MIN/1.73SQ M
GLUCOSE SERPL-MCNC: 199 MG/DL (ref 65–140)
GLUCOSE SERPL-MCNC: 82 MG/DL (ref 65–140)
GLUCOSE SERPL-MCNC: 85 MG/DL (ref 65–140)
HCT VFR BLD AUTO: 36.4 % (ref 36.5–49.3)
HGB BLD-MCNC: 12.7 G/DL (ref 12–17)
IMM GRANULOCYTES # BLD AUTO: 0 THOUSAND/UL (ref 0–0.2)
IMM GRANULOCYTES NFR BLD AUTO: 0 % (ref 0–2)
INR PPP: 1.37 (ref 0.84–1.19)
LYMPHOCYTES # BLD AUTO: 0.95 THOUSANDS/ÂΜL (ref 0.6–4.47)
LYMPHOCYTES NFR BLD AUTO: 37 % (ref 14–44)
MCH RBC QN AUTO: 33.8 PG (ref 26.8–34.3)
MCHC RBC AUTO-ENTMCNC: 34.9 G/DL (ref 31.4–37.4)
MCV RBC AUTO: 97 FL (ref 82–98)
MONOCYTES # BLD AUTO: 0.3 THOUSAND/ÂΜL (ref 0.17–1.22)
MONOCYTES NFR BLD AUTO: 12 % (ref 4–12)
NEUTROPHILS # BLD AUTO: 1.24 THOUSANDS/ÂΜL (ref 1.85–7.62)
NEUTS SEG NFR BLD AUTO: 47 % (ref 43–75)
NRBC BLD AUTO-RTO: 0 /100 WBCS
PLATELET # BLD AUTO: 93 THOUSANDS/UL (ref 149–390)
PMV BLD AUTO: 10.5 FL (ref 8.9–12.7)
POTASSIUM SERPL-SCNC: 3.6 MMOL/L (ref 3.5–5.3)
PROT SERPL-MCNC: 6.1 G/DL (ref 6.4–8.4)
PROTHROMBIN TIME: 17.1 SECONDS (ref 11.6–14.5)
RBC # BLD AUTO: 3.76 MILLION/UL (ref 3.88–5.62)
SODIUM SERPL-SCNC: 135 MMOL/L (ref 135–147)
WBC # BLD AUTO: 2.6 THOUSAND/UL (ref 4.31–10.16)

## 2023-01-17 RX ORDER — ACETAMINOPHEN 325 MG/1
650 TABLET ORAL EVERY 6 HOURS PRN
Status: DISCONTINUED | OUTPATIENT
Start: 2023-01-17 | End: 2023-01-17 | Stop reason: HOSPADM

## 2023-01-17 RX ORDER — ACETAMINOPHEN 325 MG/1
650 TABLET ORAL EVERY 6 HOURS PRN
Qty: 28 TABLET | Refills: 0 | Status: SHIPPED | OUTPATIENT
Start: 2023-01-17

## 2023-01-17 RX ORDER — PANTOPRAZOLE SODIUM 40 MG/1
40 TABLET, DELAYED RELEASE ORAL
Status: DISCONTINUED | OUTPATIENT
Start: 2023-01-17 | End: 2023-01-17 | Stop reason: HOSPADM

## 2023-01-17 RX ORDER — OXYCODONE HYDROCHLORIDE 5 MG/1
5 TABLET ORAL EVERY 6 HOURS PRN
Qty: 15 TABLET | Refills: 0 | Status: SHIPPED | OUTPATIENT
Start: 2023-01-17 | End: 2023-01-22

## 2023-01-17 RX ORDER — NADOLOL 20 MG/1
20 TABLET ORAL DAILY
Qty: 7 TABLET | Refills: 0 | Status: SHIPPED | OUTPATIENT
Start: 2023-01-18 | End: 2023-01-24

## 2023-01-17 RX ORDER — ONDANSETRON 4 MG/1
4 TABLET, FILM COATED ORAL EVERY 8 HOURS PRN
Qty: 20 TABLET | Refills: 0 | Status: SHIPPED | OUTPATIENT
Start: 2023-01-17

## 2023-01-17 RX ADMIN — SPIRONOLACTONE 50 MG: 25 TABLET ORAL at 08:56

## 2023-01-17 RX ADMIN — NADOLOL 20 MG: 20 TABLET ORAL at 08:57

## 2023-01-17 RX ADMIN — ELTROMBOPAG OLAMINE 75 MG: 75 TABLET, FILM COATED ORAL at 08:57

## 2023-01-17 RX ADMIN — PANTOPRAZOLE SODIUM 40 MG: 40 TABLET, DELAYED RELEASE ORAL at 08:56

## 2023-01-17 RX ADMIN — OXYCODONE HYDROCHLORIDE 5 MG: 5 TABLET ORAL at 09:01

## 2023-01-17 RX ADMIN — TORSEMIDE 20 MG: 20 TABLET ORAL at 08:56

## 2023-01-17 RX ADMIN — LOSARTAN POTASSIUM 25 MG: 25 TABLET, FILM COATED ORAL at 08:56

## 2023-01-17 RX ADMIN — INSULIN LISPRO 2 UNITS: 100 INJECTION, SOLUTION INTRAVENOUS; SUBCUTANEOUS at 11:57

## 2023-01-17 NOTE — ASSESSMENT & PLAN NOTE
· History of pancytopenia, had bone marrow biopsy that was negative for leukemia/lymphoma   · WBC currently 3 12, hemoglobin 12 5, platelets 92  · On Promacta 75 mg (not on hospital formulary patient's wife will bring from home)    Plan:  · Follow-up with PCP and GI

## 2023-01-17 NOTE — ASSESSMENT & PLAN NOTE
Lab Results   Component Value Date    HGBA1C 7 5 (H) 11/07/2022       Recent Labs     01/16/23  1547 01/16/23  1956 01/17/23  0712 01/17/23  1119   POCGLU 89 112 85 199*       Blood Sugar Average: Last 72 hrs:  (P) 122 125     Plan:  · Continue home meds  · Diabetic diet

## 2023-01-17 NOTE — ASSESSMENT & PLAN NOTE
· Recently diagnosed liver cirrhosis likely Trent Jose L related, however extensive family history with liver cirrhosis  On transplant list  · First follow-up with GI 1/12  · EGD October 22 showed esophageal varices and portal hypertensive gastropathy without variceal bleeding  · MRI/MRCP scheduled for April 2023  · Currently cirrhosis compensated    No ascites    Plan:  · Follow-up outpatient with GI - Dr Gattis Lundborg

## 2023-01-17 NOTE — PROGRESS NOTES
Progress Note - GI  Arpan Lobe 49 y o  male MRN: [id number]  Unit/Bed#: S -01 Encounter: [id number]    Assessment:  1  Abdominal pain and vomiting  - Vomiting resolved and he is tolerating diet well  - Abdominal pain improved significantly  - Likely 2/2 infectious gastroenteritis and component of PHTG  2  Cirrhosis/NAFLD  MELD labs are stable  Plan:  1  Continue Nadolol 20 mg daily  2  Continue PPI once daily  3  Diet as tolerated   4  Outpatient follow up with Dr June Bansal  5  [id number] Lara Avila for discharge from GI standpoint  Subjective/Objective   Chief Complaint: Abdominal pain - improved     Subjective: Reports significant improvement of his abdominal pain  No further episodes of vomiting since admission  Having regular BMs  tolerating diet  Objective:     Vitals: /57   Pulse 57   Temp 98 2 °F (36 8 °C)   Resp 18   Ht 6' 2" (1 88 m)   Wt 123 kg (270 lb 8 1 oz)   SpO2 94%   BMI 34 73 kg/m²   Vitals:    01/15/23 1700   Weight: 123 kg (270 lb 8 1 oz)     Orthostatic Blood Pressures    Flowsheet Row Most Recent Value   Blood Pressure 115/57 filed at 01/17/2023 0707   Patient Position - Orthostatic VS Lying filed at 01/15/2023 2053            Intake/Output Summary (Last 24 hours) at 1/17/2023 0848  Last data filed at 1/16/2023 0900  Gross per 24 hour   Intake 320 ml   Output --   Net 320 ml       Invasive Devices     Peripheral Intravenous Line  Duration           Peripheral IV 01/15/23 Right Antecubital 1 day                Review of Systems: Negative except above    Physical Exam:   Physical Exam  Vitals and nursing note reviewed  HENT:      Head: Normocephalic and atraumatic  Mouth/Throat:      Mouth: Mucous membranes are moist    Cardiovascular:      Rate and Rhythm: Normal rate and regular rhythm  Pulses: Normal pulses  Heart sounds: Normal heart sounds  Abdominal:      General: Bowel sounds are normal       Palpations: Abdomen is soft  Tenderness:  There is abdominal tenderness (mild epigastric and RUQ)  Comments: hepatomegaly   Musculoskeletal:      Cervical back: Normal range of motion and neck supple  Right lower leg: No edema  Left lower leg: No edema  Neurological:      General: No focal deficit present  Mental Status: He is alert and oriented to person, place, and time  Psychiatric:         Mood and Affect: Mood normal          Behavior: Behavior normal          Lab Results: I have personally reviewed pertinent lab results  Imaging: I have personally reviewed pertinent reports  VTE Mechanical Prophylaxis: sequential compression device    Counseling / Coordination of Care  Total time spent today 31 minutes  Greater than 50% of total time was spent with the patient and / or family counseling and / or coordination of care

## 2023-01-17 NOTE — TELEPHONE ENCOUNTER
PA for Ozempic has been sent to plan via [employer]  Started with the first of four  0 25 or 0 5mg - 2mg /1 5ml Injectable pen  Inject 0 19ml under the skin every 7 days for 4 doses  Dx: K74 60 Cirrhosis   K76 0 NAFLD    E11 9 Type 2 DM w/o complications  ID# [id number]  BIN: [id number]   PCN : [id number]  [id number] Key  Awaiting decision

## 2023-01-17 NOTE — DISCHARGE INSTR - AVS FIRST PAGE
Dear Kaitlynn Isbell,     It was our pleasure to care for you here at [facility]  It is our hope that we were always able to exceed the expected standards for your care during your stay  You were hospitalized due to abdominal pain and nausea/vomiting  You were cared for on the [facility] 3rd floor by Morro Durand MD under the service of Sofi Lebron MD with the [facility] who covers for your primary care physician (PCP), Dong Melton MD, while you were hospitalized  If you have any questions or concerns related to this hospitalization, you may contact us at [phone]  For follow up as well as any medication refills, we recommend that you follow up with your primary care physician  A registered nurse will reach out to you by phone within a few days after your discharge to answer any additional questions that you may have after going home  However, at this time we provide for you here, the most important instructions / recommendations at discharge:     Notable Medication Adjustments -   Nadolol 20 mg daily- until able to  Carvedilol given by Dr Eliazar Boone  Tylenol as needed for mild pain and Oxycodone as needed for severe pain  Testing Required after Discharge -   None  Important follow up information -   Please follow up with you primary care physician within 1 week of discharge   Please follow up with Dr Eliazar Boone as scheduled  Other Instructions -   None  Please review this entire after visit summary as additional general instructions including medication list, appointments, activity, diet, any pertinent wound care, and other additional recommendations from your care team that may be provided for you        Sincerely,     Amada Whitt DO

## 2023-01-17 NOTE — TELEPHONE ENCOUNTER
----- Message from Jamaal Brunner MD sent at 1/12/2023  3:20 PM EST -----  Hi! This is the katarina I'm starting Ozempic on!

## 2023-01-18 ENCOUNTER — TRANSITIONAL CARE MANAGEMENT (OUTPATIENT)
Dept: INTERNAL MEDICINE CLINIC | Facility: CLINIC | Age: 48
End: 2023-01-18

## 2023-01-18 ENCOUNTER — TELEPHONE (OUTPATIENT)
Dept: GASTROENTEROLOGY | Facility: CLINIC | Age: 48
End: 2023-01-18

## 2023-01-18 NOTE — TELEPHONE ENCOUNTER
----- Message from Alis Garza MD sent at 1/12/2023  3:20 PM EST -----  Hi! This is the katarina I'm starting Ozempic on!

## 2023-01-18 NOTE — UTILIZATION REVIEW
NOTIFICATION OF ADMISSION DISCHARGE   This is a Notification of Discharge from [address]  Please be advised that this patient has been discharge from our facility  Below you will find the admission and discharge date and time including the patient’s disposition  UTILIZATION REVIEW CONTACT:  Janelle Bailey MA  Utilization   Network Utilization Review Department  Phone: [phone] x carefully listen to the prompts  All voicemails are confidential   Email: [email or web address]     ADMISSION INFORMATION  PRESENTATION DATE: 1/15/2023 12:47 PM  OBERVATION ADMISSION DATE:   INPATIENT ADMISSION DATE: 1/15/23  4:43 PM   DISCHARGE DATE: 1/17/2023 12:31 PM   DISPOSITION:Home/Self Care    IMPORTANT INFORMATION:  Send all requests for admission clinical reviews, approved or denied determinations and any other requests to dedicated fax number below belonging to the campus where the patient is receiving treatment   List of dedicated fax numbers:  [address] DENIALS (Administrative/Medical Necessity) [phone]   [address] (Maternity/NICU/Pediatrics) [phone]   [facility] [phone]   [place] [address] [phone]   [address] [phone]   220 [facility] [phone] 90 [facility] [phone]   [facility]0 [facility] 119 [phone]   [facility]  [phone]   4052 [facility] [phone]   412 [facility] 850 [facility] [phone]

## 2023-01-21 LAB
A1AT PHENOTYP SERPL-IMP: NORMAL
ALBUMIN SERPL-MCNC: 4.1 G/DL (ref 3.6–5.1)
ALBUMIN/GLOB SERPL: 1.7 (CALC) (ref 1–2.5)
ALP SERPL-CCNC: 109 U/L (ref 36–130)
ALT SERPL-CCNC: 43 U/L (ref 9–46)
AST SERPL-CCNC: 52 U/L (ref 10–40)
BASOPHILS # BLD AUTO: 10 CELLS/UL (ref 0–200)
BASOPHILS NFR BLD AUTO: 0.5 %
BILIRUB SERPL-MCNC: 2.7 MG/DL (ref 0.2–1.2)
BUN SERPL-MCNC: 21 MG/DL (ref 7–25)
BUN/CREAT SERPL: ABNORMAL (CALC) (ref 6–22)
CALCIUM SERPL-MCNC: 9 MG/DL (ref 8.6–10.3)
CHLORIDE SERPL-SCNC: 106 MMOL/L (ref 98–110)
CO2 SERPL-SCNC: 25 MMOL/L (ref 20–32)
CREAT SERPL-MCNC: 0.96 MG/DL (ref 0.6–1.29)
EOSINOPHIL # BLD AUTO: 70 CELLS/UL (ref 15–500)
EOSINOPHIL NFR BLD AUTO: 3.7 %
ERYTHROCYTE [DISTWIDTH] IN BLOOD BY AUTOMATED COUNT: 16.5 % (ref 11–15)
GFR/BSA.PRED SERPLBLD CYS-BASED-ARV: 98 ML/MIN/1.73M2
GLOBULIN SER CALC-MCNC: 2.4 G/DL (CALC) (ref 1.9–3.7)
GLUCOSE SERPL-MCNC: 128 MG/DL (ref 65–99)
HAV AB SER QL IA: NORMAL
HBV SURFACE AB SER QL IA: NORMAL
HCT VFR BLD AUTO: 37.2 % (ref 38.5–50)
HGB BLD-MCNC: 13 G/DL (ref 13.2–17.1)
INR PPP: 1.3
LYMPHOCYTES # BLD AUTO: 587 CELLS/UL (ref 850–3900)
LYMPHOCYTES NFR BLD AUTO: 30.9 %
MCH RBC QN AUTO: 33.7 PG (ref 27–33)
MCHC RBC AUTO-ENTMCNC: 34.9 G/DL (ref 32–36)
MCV RBC AUTO: 96.4 FL (ref 80–100)
MONOCYTES # BLD AUTO: 139 CELLS/UL (ref 200–950)
MONOCYTES NFR BLD AUTO: 7.3 %
NEUTROPHILS # BLD AUTO: 1094 CELLS/UL (ref 1500–7800)
NEUTROPHILS NFR BLD AUTO: 57.6 %
PLATELET # BLD AUTO: 74 THOUSAND/UL (ref 140–400)
PMV BLD REES-ECKER: 10.9 FL (ref 7.5–12.5)
POTASSIUM SERPL-SCNC: 4.3 MMOL/L (ref 3.5–5.3)
PROT SERPL-MCNC: 6.5 G/DL (ref 6.1–8.1)
PROTHROMBIN TIME: 12.9 SEC (ref 9–11.5)
RBC # BLD AUTO: 3.86 MILLION/UL (ref 4.2–5.8)
SODIUM SERPL-SCNC: 137 MMOL/L (ref 135–146)
WBC # BLD AUTO: 1.9 THOUSAND/UL (ref 3.8–10.8)

## 2023-01-24 DIAGNOSIS — E11.9 TYPE 2 DIABETES MELLITUS WITHOUT COMPLICATION, WITHOUT LONG-TERM CURRENT USE OF INSULIN (HCC): ICD-10-CM

## 2023-01-24 RX ORDER — GLIMEPIRIDE 4 MG/1
4 TABLET ORAL 2 TIMES DAILY
Qty: 60 TABLET | Refills: 3 | Status: SHIPPED | OUTPATIENT
Start: 2023-01-24

## 2023-01-24 RX ORDER — GLIMEPIRIDE 4 MG/1
4 TABLET ORAL 2 TIMES WEEKLY
Qty: 60 TABLET | Refills: 0 | Status: SHIPPED | OUTPATIENT
Start: 2023-01-26 | End: 2023-01-24

## 2023-02-10 ENCOUNTER — OFFICE VISIT (OUTPATIENT)
Dept: GASTROENTEROLOGY | Facility: AMBULARY SURGERY CENTER | Age: 48
End: 2023-02-10

## 2023-02-10 VITALS
DIASTOLIC BLOOD PRESSURE: 64 MMHG | OXYGEN SATURATION: 97 % | SYSTOLIC BLOOD PRESSURE: 118 MMHG | HEIGHT: 74 IN | BODY MASS INDEX: 36.24 KG/M2 | WEIGHT: 282.4 LBS | HEART RATE: 74 BPM

## 2023-02-10 DIAGNOSIS — K76.6 PORTAL HYPERTENSION (HCC): ICD-10-CM

## 2023-02-10 DIAGNOSIS — K74.69 OTHER CIRRHOSIS OF LIVER (HCC): Primary | ICD-10-CM

## 2023-02-10 DIAGNOSIS — K21.9 GASTROESOPHAGEAL REFLUX DISEASE WITHOUT ESOPHAGITIS: ICD-10-CM

## 2023-02-10 RX ORDER — TORSEMIDE 20 MG/1
20 TABLET ORAL DAILY
Qty: 30 TABLET | Refills: 3 | Status: SHIPPED | OUTPATIENT
Start: 2023-02-10

## 2023-02-10 RX ORDER — OMEPRAZOLE 40 MG/1
40 CAPSULE, DELAYED RELEASE ORAL DAILY
Qty: 90 CAPSULE | Refills: 3 | Status: SHIPPED | OUTPATIENT
Start: 2023-02-10

## 2023-02-10 RX ORDER — SPIRONOLACTONE 50 MG/1
50 TABLET, FILM COATED ORAL DAILY
Qty: 30 TABLET | Refills: 3 | Status: SHIPPED | OUTPATIENT
Start: 2023-02-10

## 2023-02-10 NOTE — PROGRESS NOTES
Follow-up Note -  [facility]  Jake Brown 1975 49 y o  male         Reason: Follow-up; cirrhosis    HPI: 50-year-old male with history of diabetes, obesity, fatty liver, newly diagnosed cirrhosis as of last autumn who presents for follow-up; he was seen by Dr Joel Choudhary in November and was also referred to our hepatologist Dr Dania Salgado whom he saw last month on January 12  He is planned for an MRI in April for [facility]  surveillance, is recommended for EGD in October this year due to 3 columns of small to medium sized esophageal varices seen in October 2022 EGD  Patient says he is tolerating his current diuretics well, on torsemide 20 mg daily and spironolactone 50 mg daily, says he still has some swelling in his lower extremities but much better than before, denies any abdominal swelling  He says he was recently seen in the hospital with sharp epigastric pain which was felt to be related to acute infectious gastroenteritis or foodborne illness, has been happening episodically with less severity since discharge, about 2-3 times a week, lasting for the better part of the day  He denies any melena  He says he is tolerating omeprazole  Denies any dysphagia, any dark-colored stools, any vomiting episodes  MELD score appears to be 15 based on labs from 1/17/2023  REVIEW OF SYSTEMS:      CONSTITUTIONAL: Denies any fever, chills, or rigors  Good appetite, and no recent weight loss  HEENT: No earache or tinnitus  Denies hearing loss or visual disturbances  CARDIOVASCULAR: No chest pain or palpitations  RESPIRATORY: Denies any cough, hemoptysis, shortness of breath or dyspnea on exertion  GASTROINTESTINAL: As noted in the History of Present Illness  GENITOURINARY: No problems with urination  Denies any hematuria or dysuria  NEUROLOGIC: No dizziness or vertigo, denies headaches  MUSCULOSKELETAL: Denies any muscle or joint pain  SKIN: Denies skin rashes or itching     ENDOCRINE: Denies excessive thirst  Denies intolerance to heat or cold  PSYCHOSOCIAL: Denies depression or anxiety  Denies any recent memory loss  Past Medical History:   Diagnosis Date   • BMI 36 0-36 9,adult 12/6/2022   • BMI 37 0-37 9, adult 04/22/2021   • BMI 38 0-38 9,adult 02/08/2022   • BMI 39 0-39 9,adult 12/16/2021   • Cervicalgia    • Colon polyp    • Concussion with loss of consciousness of 30 minutes or less 03/07/2021   • Depression    • Diabetes mellitus ([facility] )    • Diabetes mellitus out of control    • Edema of both lower legs 12/16/2021   • Elevated liver enzymes    • Erectile dysfunction    • Exposure to COVID-19 virus    • Fatty liver    • GERD (gastroesophageal reflux disease)    • Heart murmur 01/07/2022   • Hyperglycemia    • Hypertension    • Left cervical radiculopathy    • Left elbow pain    • Left foot pain    • Leukopenia 03/07/2021   • Pain in both feet 12/16/2021   • Pancytopenia ([facility] ) 12/16/2021   • Skin rash 12/16/2021   • Thrombocytopenia (HCC)    • Venous stasis dermatitis of both lower extremities 05/13/2022   • Vitamin D deficiency 01/07/2022      Past Surgical History:   Procedure Laterality Date   • CHOLECYSTECTOMY  1990's   • COLONOSCOPY  06/2005   • COLONOSCOPY     • EGD  07/10/2014   • IR BIOPSY BONE MARROW  03/20/2019   • IR BIOPSY BONE MARROW  7/29/2022   • UPPER GASTROINTESTINAL ENDOSCOPY     • WISDOM TOOTH EXTRACTION      In early to mid 19's     Social History     Socioeconomic History   • Marital status: /Civil Union     Spouse name: Not on file   • Number of children: Not on file   • Years of education: Not on file   • Highest education level: Not on file   Occupational History   • Occupation:    Tobacco Use   • Smoking status: Former     Types: Cigarettes   • Smokeless tobacco: Former     Quit date: 3/1/2014   • Tobacco comments:     Social   Last cigarette years ago     Vaping Use   • Vaping Use: Never used   Substance and Sexual Activity   • Alcohol use: Not Currently   • Drug use: Not Currently     Comment: No drug use - As per [employer]   • Sexual activity: Not Currently     Comment: Resides with wife and kids   Other Topics Concern   • Not on file   Social History Narrative    Tobacco use: Never smoker    Annual eye exam: Sees ophth q yr    Annual dental checkup: He goes to dentist     - As per [employer]     Social Determinants of Health     Financial Resource Strain: Not on file   Food Insecurity: Not on file   Transportation Needs: Not on file   Physical Activity: Not on file   Stress: Not on file   Social Connections: Not on file   Intimate Partner Violence: Not on file   Housing Stability: Not on file     Family History   Problem Relation Age of Onset   • Heart attack Mother    • Thrombocytopenia Mother    • Coronary artery disease Mother    • Hypertension Mother    • Testicular cancer Father    • Hypertension Father    • No Known Problems Brother    • Hypertension Brother    • No Known Problems Brother    • No Known Problems Son      Aspirin, Diltiazem, and Lisinopril  Current Outpatient Medications   Medication Sig Dispense Refill   • acetaminophen (TYLENOL) 325 mg tablet Take 2 tablets (650 mg total) by mouth every 6 (six) hours as needed for mild pain, headaches or fever 28 tablet 0   • carvedilol (COREG) 6 25 mg tablet Take 1 tablet (6 25 mg total) by mouth 2 (two) times a day with meals 180 tablet 3   • glimepiride (AMARYL) 4 mg tablet Take 1 tablet (4 mg total) by mouth 2 (two) times a day 60 tablet 3   • losartan (COZAAR) 25 mg tablet TAKE 1 TABLET BY MOUTH  DAILY 90 tablet 3   • omeprazole (PriLOSEC) 40 MG capsule Take 1 capsule (40 mg total) by mouth daily 90 capsule 3   • ondansetron (ZOFRAN) 4 mg tablet Take 1 tablet (4 mg total) by mouth every 8 (eight) hours as needed for nausea or vomiting 20 tablet 0   • OneTouch Verio test strip USE TO TEST ONCE DAILY 25 strip 3   • Promacta 75 MG TABS TAKE 1 TABLET BY MOUTH ONCE DAILY ON AN EMPTY STOMACH  AT LEAST 1 HOUR BEFORE OR 2 HOURS AFTER A MEAL 30 tablet 3   • semaglutide, 2 mg/dose, (Ozempic, 2 MG/DOSE,) 8 mg/ mL injection pen Inject 0 75 mL (2 mg total) under the skin every 7 days for 90 doses 3 mL 20   • spironolactone (ALDACTONE) 50 mg tablet Take 1 tablet (50 mg total) by mouth daily 30 tablet 3   • torsemide (DEMADEX) 20 mg tablet Take 1 tablet (20 mg total) by mouth daily 30 tablet 3   • Vitamin D, Cholecalciferol, 50 MCG (2000 UT) CAPS Take 1 tablet by mouth in the morning       No current facility-administered medications for this visit  Blood pressure 118/64, pulse 74, height 6' 2" (1 88 m), weight 128 kg (282 lb 6 4 oz), SpO2 97 %  PHYSICAL EXAM:      General Appearance:   Alert, cooperative, no distress, appears stated age    HEENT:   Normocephalic, atraumatic, anicteric      Neck:  Supple, symmetrical, trachea midline, no adenopathy;    thyroid: no enlargement/tenderness/nodules; no carotid  bruit or JVD    Lungs:   Clear to auscultation bilaterally; no rales, rhonchi or wheezing; respirations unlabored    Heart[de-identified]   S1 and S2 normal; regular rate and rhythm; no murmur, rub, or gallop     Abdomen:   Soft, non-tender, non-distended; normal bowel sounds; no masses, no organomegaly    Extremities: No edema, erythema, wounds, rashes   Rectal:   Deferred                      Lab Results   Component Value Date    WBC 2 60 (L) 01/17/2023    HGB 12 7 01/17/2023    HCT 36 4 (L) 01/17/2023    MCV 97 01/17/2023    PLT 93 (L) 01/17/2023     Lab Results   Component Value Date    GLUCOSE 102 (H) 04/05/2017    CALCIUM 8 5 01/17/2023     04/05/2017    K 3 6 01/17/2023    CO2 24 01/17/2023     01/17/2023    BUN 24 01/17/2023    CREATININE 1 13 01/17/2023     Lab Results   Component Value Date    ALT 37 01/17/2023    AST 43 (H) 01/17/2023    ALKPHOS 89 01/17/2023    BILITOT 1 0 04/05/2017     Lab Results   Component Value Date    INR 1 37 (H) 01/17/2023    INR 1 39 (H) 01/16/2023    INR 1 3 (H) 01/14/2023    PROTIME 17 1 (H) 01/17/2023    PROTIME 17 3 (H) 01/16/2023    PROTIME 12 9 (H) 01/14/2023       XR chest 1 view portable    Result Date: 1/16/2023  Impression: No acute cardiopulmonary disease  Workstation performed: [id number]     CT abdomen pelvis with contrast    Result Date: 1/15/2023  Impression: 1  Hepatic steatosis with severe portal hypertension and marked splenomegaly progressed  The degree of portal hypertension suggests underlying cirrhosis is likely  2   Prominence of the wall of the ascending colon which mild stranding of the surrounding fat  Findings could reflect portal colopathy versus mild colitis  3   Nonobstructing left renal calculus  The study was marked in [facility] for immediate notification    Workstation performed: [id number]       ASSESSMENT & PLAN:    Liver cirrhosis ([facility] )  MELD score 15, etiology felt to be nonalcoholic fatty liver disease after extensive work-up, also reports maternal history of liver disease similarly  Complicated by portal hypertension and esophageal varices, last assessed on EGD in October 2022  No evidence of hepatic encephalopathy    Fluid overload appears to be well managed with diuretic regimen at this time     -Continue follow-up with Dr Mikey Long, he has appointment in April    -We will also see him in approximately 4 months, continue to monitor MELD labs    -He will be due for MRI/AFP testing in April    -Renewed prescriptions for omeprazole and his diuretics, we will continue at current doses    -Continue avoidance of any alcohol    -We will also be due for variceal screening this October; consider use of a nonselective beta-blocker if significant variceal burden is evidenced at the time

## 2023-02-10 NOTE — ASSESSMENT & PLAN NOTE
Hospitalist MELD score 15, etiology felt to be nonalcoholic fatty liver disease after extensive work-up, also reports maternal history of liver disease similarly  Complicated by portal hypertension and esophageal varices, last assessed on EGD in October 2022  No evidence of hepatic encephalopathy    Fluid overload appears to be well managed with diuretic regimen at this time     -Continue follow-up with Dr Gattis Lundborg, he has appointment in April    -We will also see him in approximately 4 months, continue to monitor MELD labs    -He will be due for MRI/AFP testing in April    -Renewed prescriptions for omeprazole and his diuretics, we will continue at current doses    -Continue avoidance of any alcohol    -We will also be due for variceal screening this October; consider use of a nonselective beta-blocker if significant variceal burden is evidenced at the time Hospitalist Hospitalist Hospitalist Hospitalist Pulmonology Pulmonology Pulmonology

## 2023-02-11 LAB
ALBUMIN SERPL-MCNC: 4.2 G/DL (ref 3.6–5.1)
ALBUMIN/GLOB SERPL: 1.7 (CALC) (ref 1–2.5)
ALP SERPL-CCNC: 94 U/L (ref 36–130)
ALT SERPL-CCNC: 40 U/L (ref 9–46)
AST SERPL-CCNC: 44 U/L (ref 10–40)
BASOPHILS # BLD AUTO: 21 CELLS/UL (ref 0–200)
BASOPHILS NFR BLD AUTO: 0.9 %
BILIRUB SERPL-MCNC: 3 MG/DL (ref 0.2–1.2)
BUN SERPL-MCNC: 20 MG/DL (ref 7–25)
BUN/CREAT SERPL: ABNORMAL (CALC) (ref 6–22)
CALCIUM SERPL-MCNC: 9.2 MG/DL (ref 8.6–10.3)
CHLORIDE SERPL-SCNC: 103 MMOL/L (ref 98–110)
CO2 SERPL-SCNC: 25 MMOL/L (ref 20–32)
CREAT SERPL-MCNC: 1.16 MG/DL (ref 0.6–1.29)
EOSINOPHIL # BLD AUTO: 83 CELLS/UL (ref 15–500)
EOSINOPHIL NFR BLD AUTO: 3.6 %
ERYTHROCYTE [DISTWIDTH] IN BLOOD BY AUTOMATED COUNT: 16 % (ref 11–15)
GFR/BSA.PRED SERPLBLD CYS-BASED-ARV: 78 ML/MIN/1.73M2
GLOBULIN SER CALC-MCNC: 2.5 G/DL (CALC) (ref 1.9–3.7)
GLUCOSE SERPL-MCNC: 155 MG/DL (ref 65–99)
HCT VFR BLD AUTO: 37.2 % (ref 38.5–50)
HGB BLD-MCNC: 13.5 G/DL (ref 13.2–17.1)
LYMPHOCYTES # BLD AUTO: 561 CELLS/UL (ref 850–3900)
LYMPHOCYTES NFR BLD AUTO: 24.4 %
MCH RBC QN AUTO: 35.3 PG (ref 27–33)
MCHC RBC AUTO-ENTMCNC: 36.3 G/DL (ref 32–36)
MCV RBC AUTO: 97.4 FL (ref 80–100)
MONOCYTES # BLD AUTO: 225 CELLS/UL (ref 200–950)
MONOCYTES NFR BLD AUTO: 9.8 %
NEUTROPHILS # BLD AUTO: 1410 CELLS/UL (ref 1500–7800)
NEUTROPHILS NFR BLD AUTO: 61.3 %
PLATELET # BLD AUTO: 77 THOUSAND/UL (ref 140–400)
PMV BLD REES-ECKER: 10.8 FL (ref 7.5–12.5)
POTASSIUM SERPL-SCNC: 4 MMOL/L (ref 3.5–5.3)
PROT SERPL-MCNC: 6.7 G/DL (ref 6.1–8.1)
RBC # BLD AUTO: 3.82 MILLION/UL (ref 4.2–5.8)
SL AMB PLATELET ESTIMATION: ABNORMAL
SODIUM SERPL-SCNC: 137 MMOL/L (ref 135–146)
WBC # BLD AUTO: 2.3 THOUSAND/UL (ref 3.8–10.8)

## 2023-02-15 ENCOUNTER — TELEPHONE (OUTPATIENT)
Dept: HEMATOLOGY ONCOLOGY | Facility: CLINIC | Age: 48
End: 2023-02-15

## 2023-02-15 NOTE — TELEPHONE ENCOUNTER
2/15/2023    Left the second voicemail to patient advising him to get the iron study performed before his appointment  If patient insists to coming tomorrow, I will keep him as scheduled  Or if patient will like to be rescheduled, next Tuesday should be okay  Patient can be transferred to me directly if he calls back

## 2023-02-16 ENCOUNTER — TELEPHONE (OUTPATIENT)
Dept: GASTROENTEROLOGY | Facility: CLINIC | Age: 48
End: 2023-02-16

## 2023-02-25 ENCOUNTER — PATIENT MESSAGE (OUTPATIENT)
Dept: GASTROENTEROLOGY | Facility: CLINIC | Age: 48
End: 2023-02-25

## 2023-02-26 ENCOUNTER — NURSE TRIAGE (OUTPATIENT)
Dept: OTHER | Facility: OTHER | Age: 48
End: 2023-02-26

## 2023-02-26 NOTE — TELEPHONE ENCOUNTER
pt called in stated he is positive for covid  symptoms started Friday  head pressure, cough, chest congestion  was seen in UC but advised to reach out to PCP for paxlovid due to medical hx  pt has hx of cirrhosis, DM, hypertension, anemia, leukopenia etc  patient would like to know if he can be prescribed antiviral  per protocol pt high risk  TC out to on call   Advised to reach out to GI due to hx with cirrohsis  GI advised to avoid antiviral and to treat symptoms at this time  Informed patient of recommendation  Provided care advise  Pt verbalized understanding  Reason for Disposition  • HIGH RISK for severe COVID complications (e g , weak immune system, age > 59 years, obesity with BMI > 25, pregnant, chronic lung disease or other chronic medical condition)  (Exception: Already seen by PCP and no new or worsening symptoms )    Answer Assessment - Initial Assessment Questions  What was the date of your exposure?unsure     Are you experiencing any symptoms attributed to the virus?  (Assess for SOB, cough, fever, difficulty breathing) symptoms started on Friday    Cough, head pressure , congested       HIGH RISK: Do you have any history heart or lung conditions, weakened immune system, diabetes, Asthma, CHF, HIV, COPD, Chemo, renal failure, sickle cell, etc? Diabetes , cirrhosis of liver       mucenix and robatussion and taking ozempic    Protocols used: CORONAVIRUS (COVID-19) DIAGNOSED OR SUSPECTED-ADULT-

## 2023-02-26 NOTE — TELEPHONE ENCOUNTER
Regarding: COVID POS - MEDICAL ADVISE/TREATMENT  ----- Message from Nikole Rosario sent at 2/26/2023  8:35 AM EST -----  Patient called that he tested positive for COVID and was not given any medication at urgent care for it  He wanted to see if there was any treatments available     (He is calling Dr Todd Fields office - GI)

## 2023-03-03 ENCOUNTER — TELEMEDICINE (OUTPATIENT)
Dept: INTERNAL MEDICINE CLINIC | Facility: CLINIC | Age: 48
End: 2023-03-03

## 2023-03-03 DIAGNOSIS — K74.69 OTHER CIRRHOSIS OF LIVER (HCC): ICD-10-CM

## 2023-03-03 DIAGNOSIS — I10 ESSENTIAL HYPERTENSION: ICD-10-CM

## 2023-03-03 DIAGNOSIS — U07.1 COVID-19: Primary | ICD-10-CM

## 2023-03-03 DIAGNOSIS — D72.818 OTHER DECREASED WHITE BLOOD CELL (WBC) COUNT: ICD-10-CM

## 2023-03-03 DIAGNOSIS — E11.9 TYPE 2 DIABETES MELLITUS WITHOUT COMPLICATION, WITHOUT LONG-TERM CURRENT USE OF INSULIN (HCC): ICD-10-CM

## 2023-03-03 RX ORDER — ZINC GLUCONATE 50 MG
50 TABLET ORAL DAILY
COMMUNITY

## 2023-03-03 RX ORDER — BENZONATATE 200 MG/1
200 CAPSULE ORAL EVERY 8 HOURS PRN
Qty: 20 CAPSULE | Refills: 0 | Status: SHIPPED | OUTPATIENT
Start: 2023-03-03

## 2023-03-03 NOTE — PROGRESS NOTES
Virtual Regular Visit    Verification of patient location:    Patient is located in the following state in which I hold an active license [place]      Assessment/Plan:    Problem List Items Addressed This Visit        Digestive    Liver cirrhosis ([facility] )     He has been seen and followed by GI specialist             Endocrine    Diabetes ([facility] )     Now he takes glimepiride and also started on Ozempic  Blood sugar is improving  Continue present medications and diet  Lab Results   Component Value Date    HGBA1C 7 5 (H) 11/07/2022               Cardiovascular and Mediastinum    Essential hypertension     Controlled  Continue present medication and low-salt diet            Other    Leukopenia     He has been seen and followed by hematologist          COVID-19 - Primary     Discussed with the patient that since his symptoms started on February 24, 2023 about 1 week ago and his COVID test was positive on February 25, 2023  And also has a leukopenia as well as liver disease and due to duration of symptoms medication for COVID is not appropriate and beneficial  he has been taking Mucinex DM  His oxygen saturation at home is 96%  He has been taking vitamin D and zinc also  Denies chest pain, shortness of breath, fever, chills  Discussed with the patient about going to emergency room for an evaluation and chest x-ray but at present he feels he does not want to go to emergency room as he is not feeling that sick  Discussed with the patient if symptoms get worse or does not improve  go to emergency room  Meanwhile will prescribe benzonatate cough medication  Advised to maintain hydration  He already quarantined for 5 days  Advised to wear the mask 4 more days when he goes out           Relevant Medications    benzonatate (TESSALON) 200 MG capsule            Reason for visit is   Chief Complaint   Patient presents with   • Virtual Regular Visit        Encounter provider Kathy Becker MD    Provider located at [address] [address] [phone]      Recent Visits  No visits were found meeting these conditions  Showing recent visits within past 7 days and meeting all other requirements  Today's Visits  Date Type Provider Dept   03/03/23 Telemedicine [facility]   Showing today's visits and meeting all other requirements  Future Appointments  No visits were found meeting these conditions  Showing future appointments within next 150 days and meeting all other requirements       The patient was identified by name and date of birth  Claude Manas was informed that this is a telemedicine visit and that the visit is being conducted through the [address] [facility] platform  He agrees to proceed     My office door was closed  No one else was in the room  He acknowledged consent and understanding of privacy and security of the video platform  The patient has agreed to participate and understands they can discontinue the visit at any time  Patient is aware this is a billable service  Subjective complaint of cough and COVID-19 positive  Claude Manas is a 49 y o  male    HPI   44-year-old white male patient who developed cough and fever about a week ago on February 24, 2023  He tested positive for COVID-19 infection on February 25, 2023  Fever is resolved since more than 24 hours  Still has a cough mainly dry in nature and chest congestion  Has been taking Mucinex DM as needed for cough  Also on vitamin D and zinc   Denies chest pain or shortness of breath  He has been checking his pulse ox is around 96%  Denies nausea vomiting diarrhea      Past Medical History:   Diagnosis Date   • BMI 36 0-36 9,adult 12/6/2022   • BMI 37 0-37 9, adult 04/22/2021   • BMI 38 0-38 9,adult 02/08/2022   • BMI 39 0-39 9,adult 12/16/2021   • Cervicalgia    • Colon polyp    • Concussion with loss of consciousness of 30 minutes or less 03/07/2021   • COVID-19 3/3/2023   • Depression    • Diabetes mellitus ([facility] )    • Diabetes mellitus out of control    • Edema of both lower legs 12/16/2021   • Elevated liver enzymes    • Erectile dysfunction    • Exposure to COVID-19 virus    • Fatty liver    • GERD (gastroesophageal reflux disease)    • Heart murmur 01/07/2022   • Hyperglycemia    • Hypertension    • Left cervical radiculopathy    • Left elbow pain    • Left foot pain    • Leukopenia 03/07/2021   • Pain in both feet 12/16/2021   • Pancytopenia ([facility] ) 12/16/2021   • Skin rash 12/16/2021   • Thrombocytopenia (HCC)    • Venous stasis dermatitis of both lower extremities 05/13/2022   • Vitamin D deficiency 01/07/2022       Past Surgical History:   Procedure Laterality Date   • CHOLECYSTECTOMY  1990's   • COLONOSCOPY  06/2005   • COLONOSCOPY     • EGD  07/10/2014   • IR BIOPSY BONE MARROW  03/20/2019   • IR BIOPSY BONE MARROW  7/29/2022   • UPPER GASTROINTESTINAL ENDOSCOPY     • WISDOM TOOTH EXTRACTION      In early to mid 20's       Current Outpatient Medications   Medication Sig Dispense Refill   • benzonatate (TESSALON) 200 MG capsule Take 1 capsule (200 mg total) by mouth every 8 (eight) hours as needed for cough 20 capsule 0   • dextromethorphan-guaifenesin (MUCINEX DM)  MG per 12 hr tablet Take 1 tablet by mouth every 12 (twelve) hours as needed     • zinc gluconate 50 mg tablet Take 50 mg by mouth daily     • carvedilol (COREG) 6 25 mg tablet Take 1 tablet (6 25 mg total) by mouth 2 (two) times a day with meals 180 tablet 3   • glimepiride (AMARYL) 4 mg tablet Take 1 tablet (4 mg total) by mouth 2 (two) times a day 60 tablet 3   • losartan (COZAAR) 25 mg tablet TAKE 1 TABLET BY MOUTH  DAILY 90 tablet 3   • omeprazole (PriLOSEC) 40 MG capsule Take 1 capsule (40 mg total) by mouth daily 90 capsule 3   • OneTouch Verio test strip USE TO TEST ONCE DAILY 25 strip 3   • Promacta 75 MG TABS TAKE 1 TABLET BY MOUTH ONCE DAILY ON AN EMPTY STOMACH  AT LEAST 1 HOUR BEFORE OR 2 HOURS AFTER A MEAL 30 tablet 3   • semaglutide, 2 mg/dose, (Ozempic, 2 MG/DOSE,) 8 mg/ mL injection pen Inject 0 75 mL (2 mg total) under the skin every 7 days for 90 doses 3 mL 20   • spironolactone (ALDACTONE) 50 mg tablet Take 1 tablet (50 mg total) by mouth daily 30 tablet 3   • torsemide (DEMADEX) 20 mg tablet Take 1 tablet (20 mg total) by mouth daily 30 tablet 3   • Vitamin D, Cholecalciferol, 50 MCG (2000 UT) CAPS Take 1 tablet by mouth in the morning       No current facility-administered medications for this visit  Allergies   Allergen Reactions   • Aspirin Other (See Comments)     Reaction unknown to patient  • Diltiazem Other (See Comments)     Patient does not recall what happened   • Lisinopril Cough       Review of Systems   Constitutional: Positive for fatigue  Negative for fever (He had a fever but resolved since last 24 hours)  HENT: Negative for congestion, rhinorrhea and sore throat  Respiratory: Positive for cough  Negative for shortness of breath and wheezing  Cardiovascular: Negative for chest pain and palpitations  Gastrointestinal: Negative for abdominal pain, diarrhea, nausea and vomiting  Musculoskeletal: Positive for myalgias  Negative for arthralgias  Neurological: Negative for dizziness, weakness and headaches  Psychiatric/Behavioral: Negative for agitation and behavioral problems  Video Exam    There were no vitals filed for this visit  Physical Exam  Constitutional:       General: He is not in acute distress  Appearance: He is obese  HENT:      Head: Normocephalic  Eyes:      General:         Right eye: No discharge  Left eye: No discharge  Pulmonary:      Effort: Pulmonary effort is normal  No respiratory distress  Musculoskeletal:         General: Normal range of motion  Cervical back: Normal range of motion     Neurological:      General: No focal deficit present  Mental Status: He is alert and oriented to person, place, and time     Psychiatric:         Mood and Affect: Mood normal          Behavior: Behavior normal             I spent 20 minutes directly with the patient during this visit

## 2023-03-04 NOTE — ASSESSMENT & PLAN NOTE
Now he takes glimepiride and also started on Ozempic  Blood sugar is improving  Continue present medications and diet    Lab Results   Component Value Date    HGBA1C 7 5 (H) 11/07/2022

## 2023-03-04 NOTE — ASSESSMENT & PLAN NOTE
Discussed with the patient that since his symptoms started on February 24, 2023 about 1 week ago and his COVID test was positive on February 25, 2023  And also has a leukopenia as well as liver disease and due to duration of symptoms medication for COVID is not appropriate and beneficial  he has been taking Mucinex DM  His oxygen saturation at home is 96%  He has been taking vitamin D and zinc also  Denies chest pain, shortness of breath, fever, chills  Discussed with the patient about going to emergency room for an evaluation and chest x-ray but at present he feels he does not want to go to emergency room as he is not feeling that sick  Discussed with the patient if symptoms get worse or does not improve  go to emergency room  Meanwhile will prescribe benzonatate cough medication  Advised to maintain hydration  He already quarantined for 5 days  Advised to wear the mask 4 more days when he goes out

## 2023-03-17 ENCOUNTER — OFFICE VISIT (OUTPATIENT)
Dept: INTERNAL MEDICINE CLINIC | Facility: CLINIC | Age: 48
End: 2023-03-17

## 2023-03-17 VITALS
RESPIRATION RATE: 16 BRPM | HEART RATE: 73 BPM | DIASTOLIC BLOOD PRESSURE: 70 MMHG | WEIGHT: 281 LBS | HEIGHT: 74 IN | TEMPERATURE: 98.2 F | BODY MASS INDEX: 36.06 KG/M2 | SYSTOLIC BLOOD PRESSURE: 118 MMHG | OXYGEN SATURATION: 98 %

## 2023-03-17 DIAGNOSIS — K21.9 GASTROESOPHAGEAL REFLUX DISEASE WITHOUT ESOPHAGITIS: ICD-10-CM

## 2023-03-17 DIAGNOSIS — R60.0 EDEMA OF BOTH LOWER LEGS: ICD-10-CM

## 2023-03-17 DIAGNOSIS — I10 ESSENTIAL HYPERTENSION: ICD-10-CM

## 2023-03-17 DIAGNOSIS — E11.9 TYPE 2 DIABETES MELLITUS WITHOUT COMPLICATION, WITHOUT LONG-TERM CURRENT USE OF INSULIN (HCC): ICD-10-CM

## 2023-03-17 DIAGNOSIS — Z00.00 ANNUAL PHYSICAL EXAM: Primary | ICD-10-CM

## 2023-03-17 DIAGNOSIS — K76.6 PORTAL HYPERTENSION (HCC): ICD-10-CM

## 2023-03-17 DIAGNOSIS — K74.69 OTHER CIRRHOSIS OF LIVER (HCC): ICD-10-CM

## 2023-03-17 DIAGNOSIS — E55.9 VITAMIN D DEFICIENCY: ICD-10-CM

## 2023-03-17 DIAGNOSIS — K76.0 NAFLD (NONALCOHOLIC FATTY LIVER DISEASE): ICD-10-CM

## 2023-03-17 DIAGNOSIS — D61.818 PANCYTOPENIA (HCC): ICD-10-CM

## 2023-03-17 DIAGNOSIS — U07.1 COVID-19: ICD-10-CM

## 2023-03-17 RX ORDER — GLIMEPIRIDE 4 MG/1
4 TABLET ORAL
COMMUNITY

## 2023-03-17 NOTE — ASSESSMENT & PLAN NOTE
He has been followed by the hematologist   Hemoglobin normal after he received IV iron therapy  WBC and platelet counts low

## 2023-03-17 NOTE — PROGRESS NOTES
ADULT ANNUAL [address] [facility] INTERNAL MEDICINE    NAME: Elzbieta De La Rosa  AGE: 49 y o  SEX: male  : 1975     DATE: 3/17/2023     Assessment and Plan:     Problem List Items Addressed This Visit    None      Immunizations and preventive care screenings were discussed with patient today  Appropriate education was printed on patient's after visit summary  Discussed risks and benefits of prostate cancer screening  We discussed the controversial history of PSA screening for prostate cancer in the [place] as well as the risk of over detection and over treatment of prostate cancer by way of PSA screening  The patient understands that PSA blood testing is an imperfect way to screen for prostate cancer and that elevated PSA levels in the blood may also be caused by infection, inflammation, prostatic trauma or manipulation, urological procedures, or by benign prostatic enlargement  The role of the digital rectal examination in prostate cancer screening was also discussed and I discussed with him that there is large interobserver variability in the findings of digital rectal examination  Counseling:  · Alcohol/drug use: discussed moderation in alcohol intake, the recommendations for healthy alcohol use, and avoidance of illicit drug use  No follow-ups on file  Chief Complaint:     Chief Complaint   Patient presents with   • Annual Exam      History of Present Illness:     Adult Annual Physical   Patient here for a comprehensive physical exam  The patient reports problems - had covid but better now  Diet and Physical Activity  · Diet/Nutrition: well balanced diet  · Exercise: walking  Depression Screening  PHQ-2/9 Depression Screening         General Health  · Sleep: sleeps well  · Hearing: normal - bilateral   · Vision: wears glasses  · Dental: regular dental visits          Health  · Symptoms include: none     Review of Systems:     Review of Systems      Past Medical History:     Past Medical History:   Diagnosis Date   • BMI 36 0-36 9,adult 12/6/2022   • BMI 37 0-37 9, adult 04/22/2021   • BMI 38 0-38 9,adult 02/08/2022   • BMI 39 0-39 9,adult 12/16/2021   • Cervicalgia    • Colon polyp    • Concussion with loss of consciousness of 30 minutes or less 03/07/2021   • COVID-19 3/3/2023   • Depression    • Diabetes mellitus ([facility] [address] )    • Diabetes mellitus out of control    • Edema of both lower legs 12/16/2021   • Elevated liver enzymes    • Erectile dysfunction    • Exposure to COVID-19 virus    • Fatty liver    • GERD (gastroesophageal reflux disease)    • Heart murmur 01/07/2022   • Hyperglycemia    • Hypertension    • Left cervical radiculopathy    • Left elbow pain    • Left foot pain    • Leukopenia 03/07/2021   • Pain in both feet 12/16/2021   • Pancytopenia ([facility] [address] ) 12/16/2021   • Skin rash 12/16/2021   • Thrombocytopenia (HCC)    • Venous stasis dermatitis of both lower extremities 05/13/2022   • Vitamin D deficiency 01/07/2022      Past Surgical History:     Past Surgical History:   Procedure Laterality Date   • CHOLECYSTECTOMY  1990's   • COLONOSCOPY  06/2005   • COLONOSCOPY     • EGD  07/10/2014   • IR BIOPSY BONE MARROW  03/20/2019   • IR BIOPSY BONE MARROW  7/29/2022   • UPPER GASTROINTESTINAL ENDOSCOPY     • WISDOM TOOTH EXTRACTION      In early to mid 19's      Family History:     Family History   Problem Relation Age of Onset   • Heart attack Mother    • Thrombocytopenia Mother    • Coronary artery disease Mother    • Hypertension Mother    • Testicular cancer Father    • Hypertension Father    • No Known Problems Brother    • Hypertension Brother    • No Known Problems Brother    • No Known Problems Son       Social History:     Social History     Socioeconomic History   • Marital status: /Civil Union     Spouse name: None   • Number of children: None   • Years of education: None   • Highest education level: None Occupational History   • Occupation:    Tobacco Use   • Smoking status: Former     Types: Cigarettes   • Smokeless tobacco: Former     Quit date: 3/1/2014   • Tobacco comments:     Social   Last cigarette years ago     Vaping Use   • Vaping Use: Never used   Substance and Sexual Activity   • Alcohol use: Not Currently   • Drug use: Not Currently     Comment: No drug use - As per [employer]   • Sexual activity: Not Currently     Comment: Resides with wife and kids   Other Topics Concern   • None   Social History Narrative    Tobacco use: Never smoker    Annual eye exam: Sees ophth q yr    Annual dental checkup: He goes to dentist     - As per [employer]     Social Determinants of Health     Financial Resource Strain: Not on file   Food Insecurity: Not on file   Transportation Needs: Not on file   Physical Activity: Not on file   Stress: Not on file   Social Connections: Not on file   Intimate Partner Violence: Not on file   Housing Stability: Not on file      Current Medications:     Current Outpatient Medications   Medication Sig Dispense Refill   • benzonatate (TESSALON) 200 MG capsule Take 1 capsule (200 mg total) by mouth every 8 (eight) hours as needed for cough 20 capsule 0   • carvedilol (COREG) 6 25 mg tablet Take 1 tablet (6 25 mg total) by mouth 2 (two) times a day with meals 180 tablet 3   • dextromethorphan-guaifenesin (MUCINEX DM)  MG per 12 hr tablet Take 1 tablet by mouth every 12 (twelve) hours as needed     • glimepiride (AMARYL) 4 mg tablet Take 1 tablet (4 mg total) by mouth 2 (two) times a day 60 tablet 3   • losartan (COZAAR) 25 mg tablet TAKE 1 TABLET BY MOUTH  DAILY 90 tablet 3   • omeprazole (PriLOSEC) 40 MG capsule Take 1 capsule (40 mg total) by mouth daily 90 capsule 3   • OneTouch Verio test strip USE TO TEST ONCE DAILY 25 strip 3   • Promacta 75 MG TABS TAKE 1 TABLET BY MOUTH ONCE DAILY ON AN EMPTY STOMACH  AT LEAST 1 HOUR BEFORE OR 2 HOURS AFTER A MEAL 30 tablet 3   • semaglutide, 2 mg/dose, (Ozempic, 2 MG/DOSE,) 8 mg/ mL injection pen Inject 0 75 mL (2 mg total) under the skin every 7 days for 90 doses 3 mL 20   • spironolactone (ALDACTONE) 50 mg tablet Take 1 tablet (50 mg total) by mouth daily 30 tablet 3   • torsemide (DEMADEX) 20 mg tablet Take 1 tablet (20 mg total) by mouth daily 30 tablet 3   • Vitamin D, Cholecalciferol, 50 MCG (2000 UT) CAPS Take 1 tablet by mouth in the morning     • zinc gluconate 50 mg tablet Take 50 mg by mouth daily       No current facility-administered medications for this visit  Allergies: Allergies   Allergen Reactions   • Aspirin Other (See Comments)     Reaction unknown to patient  • Diltiazem Other (See Comments)     Patient does not recall what happened   • Lisinopril Cough      Physical Exam:     /60 (BP Location: Left arm, Patient Position: Sitting, Cuff Size: Adult)   Pulse 73   Temp 98 2 °F (36 8 °C) (Tympanic)   Resp 16   Ht 6' 1" (1 854 m)   Wt 127 kg (281 lb)   SpO2 98%   BMI 37 07 kg/m²     Physical Exam  Vitals and nursing note reviewed  Constitutional:       General: He is not in acute distress  Appearance: He is well-developed  He is obese  HENT:      Head: Normocephalic and atraumatic  Right Ear: Tympanic membrane, ear canal and external ear normal  There is no impacted cerumen  Left Ear: Tympanic membrane, ear canal and external ear normal  There is no impacted cerumen  Nose: Nose normal       Mouth/Throat:      Mouth: Mucous membranes are moist    Eyes:      General: No scleral icterus  Right eye: No discharge  Left eye: No discharge  Extraocular Movements: Extraocular movements intact  Conjunctiva/sclera: Conjunctivae normal    Cardiovascular:      Rate and Rhythm: Normal rate and regular rhythm  Pulses: Normal pulses  Heart sounds: Normal heart sounds  No murmur heard    Pulmonary:      Effort: Pulmonary effort is normal  No respiratory distress  Breath sounds: Normal breath sounds  No wheezing  Abdominal:      General: Bowel sounds are normal       Palpations: Abdomen is soft  Tenderness: There is no abdominal tenderness  Musculoskeletal:         General: No swelling  Normal range of motion  Cervical back: Normal range of motion and neck supple  Right lower leg: Edema ( trace edema) present  Left lower leg: Edema ( trace edema) present  Skin:     General: Skin is warm and dry  Capillary Refill: Capillary refill takes less than 2 seconds  Neurological:      General: No focal deficit present  Mental Status: He is alert and oriented to person, place, and time  Motor: No weakness        Coordination: Coordination normal       Gait: Gait normal    Psychiatric:         Mood and Affect: Mood normal          Behavior: Behavior normal           Primo Green MA  [facility] INTERNAL MEDICINE

## 2023-03-17 NOTE — PROGRESS NOTES
Assessment/Plan:    1  Annual physical exam    2  Type 2 diabetes mellitus without complication, without long-term current use of insulin (HCC)  Assessment & Plan:    Lab Results   Component Value Date    HGBA1C 7 5 (H) 11/07/2022   He decreased glimepiride 4 mg twice daily  to once a day as on Ozempic  No hypoglycemia  We will check hemoglobin A1c  Discussed with the patient once he increase the dose of Ozempic will need to DC glimepiride  Call if blood sugar less than 80  Orders:  -     Hemoglobin A1C; Future; Expected date: 06/17/2023  -     Comprehensive metabolic panel; Future; Expected date: 06/17/2023  -     Hemoglobin A1C; Future  -     Comprehensive metabolic panel; Future    3  Essential hypertension  Assessment & Plan:  Blood pressure well controlled  Advised to continue present medication  Advised for low-salt diet  Orders:  -     Comprehensive metabolic panel; Future; Expected date: 06/17/2023    4  Portal hypertension ([facility] [address] )  Assessment & Plan:  Seen by GI specialist   On carvedilol and diuretics  Orders:  -     Comprehensive metabolic panel; Future; Expected date: 06/17/2023  -     Comprehensive metabolic panel; Future    5  Pancytopenia ([facility] [address] )  Assessment & Plan:  He has been followed by the hematologist   Hemoglobin normal after he received IV iron therapy  WBC and platelet counts low  6  Vitamin D deficiency  Assessment & Plan:  Vitamin D deficient resolved  On vitamin D supplement  Vitamin D level 58       7  Gastroesophageal reflux disease without esophagitis  Assessment & Plan:  Symptoms are well controlled on omeprazole  He recently had a EGD  Has been  followed by GI specialist   Has been watching diet  8  NAFLD (nonalcoholic fatty liver disease)  Assessment & Plan:  Has been seen by GI specialist and liver specialist     Orders:  -     Comprehensive metabolic panel; Future; Expected date: 06/17/2023  -     Comprehensive metabolic panel; Future    9   Other cirrhosis of liver [facility])  Assessment & Plan:  He was seen by GI specialist as well as liver specialist   Going for MRI  On spironolactone and furosemide  Orders:  -     Comprehensive metabolic panel; Future; Expected date: 06/17/2023  -     Comprehensive metabolic panel; Future    10  BMI 36 0-36 9,adult    11  COVID-19  Assessment & Plan:  He had a COVID-19 infection  Chest x-ray with no pneumonia  Now symptoms resolved  Last COVID-19 booster December 2022  12  Edema of both lower legs  Assessment & Plan:  Almost resolved  On furosemide and spironolactone  Has trace edema usually gets after he starts walking  Has dependent component  Advised to keep legs elevated while sitting  BMI Counseling: Body mass index is 36 08 kg/m²  The BMI is above normal  Nutrition recommendations include decreasing portion sizes, decreasing fast food intake, consuming healthier snacks, limiting drinks that contain sugar, moderation in carbohydrate intake, reducing intake of saturated and trans fat and reducing intake of cholesterol  Exercise recommendations include exercising 3-5 times per week  No pharmacotherapy was ordered  Rationale for BMI follow-up plan is due to patient being overweight or obese  Subjective: Patient presents for annual wellness examination as well as follow-up of his medical problems  Patient ID: Gregory Kunz is a 49 y o  male  HPI   43-year-old white male patient presents for annual wellness exam as well as follow-up his medical problems  He denies any chest pain, shortness of breath, pain in abdomen  Denies any nausea, vomiting, diarrhea  Denies any cough, fever, chills  He had a COVID-19 infection about 3 weeks ago  Now is feeling better    He has been followed by liver specialist, GI, hematologist     The following portions of the patient's history were reviewed and updated as appropriate:     Past Medical History:  He has a past medical history of BMI 36 0-36 9,adult (12/6/2022), BMI 37 0-37 9, adult (04/22/2021), BMI 38 0-38 9,adult (02/08/2022), BMI 39 0-39 9,adult (12/16/2021), Cervicalgia, Colon polyp, Concussion with loss of consciousness of 30 minutes or less (03/07/2021), COVID-19 (3/3/2023), Depression, Diabetes mellitus ([facility] ), Diabetes mellitus out of control, Edema of both lower legs (12/16/2021), Elevated liver enzymes, Erectile dysfunction, Exposure to COVID-19 virus, Fatty liver, GERD (gastroesophageal reflux disease), Heart murmur (01/07/2022), Hyperglycemia, Hypertension, Left cervical radiculopathy, Left elbow pain, Left foot pain, Leukopenia (03/07/2021), NAFLD (nonalcoholic fatty liver disease) (3/17/2023), Other cirrhosis of liver ([facility] ) (3/17/2023), Pain in both feet (12/16/2021), Pancytopenia ([facility] ) (12/16/2021), Skin rash (12/16/2021), Thrombocytopenia ([place] [address] ), Venous stasis dermatitis of both lower extremities (05/13/2022), and Vitamin D deficiency (01/07/2022)  ,  _______________________________________________________________________  Past Surgical History:   has a past surgical history that includes Cholecystectomy (1990's); [place] tooth extraction; IR biopsy bone marrow (03/20/2019); EGD (07/10/2014); Colonoscopy (06/2005); Colonoscopy; Upper gastrointestinal endoscopy; and IR biopsy bone marrow (7/29/2022)  ,  _______________________________________________________________________  Family History:  family history includes Coronary artery disease in his mother; Heart attack in his mother; Hypertension in his brother, father, and mother; No Known Problems in his brother, brother, and son; Testicular cancer in his father; Thrombocytopenia in his mother ,  _______________________________________________________________________  Social History:   reports that he has quit smoking  His smoking use included cigarettes  He quit smokeless tobacco use about 9 years ago  He reports that he does not currently use alcohol   He reports that he does not currently use drugs ,  _______________________________________________________________________  Allergies:  is allergic to aspirin, diltiazem, and lisinopril     _______________________________________________________________________  Current Outpatient Medications   Medication Sig Dispense Refill   • carvedilol (COREG) 6 25 mg tablet Take 1 tablet (6 25 mg total) by mouth 2 (two) times a day with meals 180 tablet 3   • glimepiride (AMARYL) 4 mg tablet Take 4 mg by mouth every morning before breakfast     • losartan (COZAAR) 25 mg tablet TAKE 1 TABLET BY MOUTH  DAILY 90 tablet 3   • omeprazole (PriLOSEC) 40 MG capsule Take 1 capsule (40 mg total) by mouth daily 90 capsule 3   • OneTouch Verio test strip USE TO TEST ONCE DAILY 25 strip 3   • Promacta 75 MG TABS TAKE 1 TABLET BY MOUTH ONCE DAILY ON AN EMPTY STOMACH  AT LEAST 1 HOUR BEFORE OR 2 HOURS AFTER A MEAL 30 tablet 3   • semaglutide, 2 mg/dose, (Ozempic, 2 MG/DOSE,) 8 mg/ mL injection pen Inject 0 75 mL (2 mg total) under the skin every 7 days for 90 doses 3 mL 20   • spironolactone (ALDACTONE) 50 mg tablet Take 1 tablet (50 mg total) by mouth daily 30 tablet 3   • torsemide (DEMADEX) 20 mg tablet Take 1 tablet (20 mg total) by mouth daily 30 tablet 3   • Vitamin D, Cholecalciferol, 50 MCG (2000 UT) CAPS Take 1 tablet by mouth in the morning     • zinc gluconate 50 mg tablet Take 50 mg by mouth daily       No current facility-administered medications for this visit      _______________________________________________________________________  Review of Systems   Constitutional: Negative for chills and fever  HENT: Negative for congestion, ear pain, hearing loss, nosebleeds, sinus pain, sore throat and trouble swallowing  Eyes: Negative for discharge, redness and visual disturbance  Respiratory: Negative for cough, chest tightness and shortness of breath  Cardiovascular: Negative for chest pain and palpitations     Gastrointestinal: Negative for abdominal pain, blood in stool, constipation, diarrhea, nausea and vomiting  Genitourinary: Negative for dysuria, flank pain, frequency and hematuria  Musculoskeletal: Negative for arthralgias, myalgias and neck pain  Skin: Negative for color change and rash  Neurological: Negative for dizziness, speech difficulty, weakness and headaches  Hematological: Does not bruise/bleed easily  Psychiatric/Behavioral: Negative for agitation and behavioral problems  Objective:  Vitals:    03/17/23 0829   BP: 118/70   BP Location: Left arm   Patient Position: Sitting   Cuff Size: Adult   Pulse: 73   Resp: 16   Temp: 98 2 °F (36 8 °C)   TempSrc: Tympanic   SpO2: 98%   Weight: 127 kg (281 lb)   Height: 6' 2" (1 88 m)     Body mass index is 36 08 kg/m²  Physical Exam  Vitals and nursing note reviewed  Constitutional:       General: He is not in acute distress  Appearance: He is obese  HENT:      Head: Normocephalic and atraumatic  Right Ear: Tympanic membrane, ear canal and external ear normal  There is no impacted cerumen  Left Ear: Tympanic membrane, ear canal and external ear normal  There is no impacted cerumen  Nose: Nose normal       Mouth/Throat:      Mouth: Mucous membranes are moist       Pharynx: Oropharynx is clear  Eyes:      General: No scleral icterus  Right eye: No discharge  Left eye: No discharge  Extraocular Movements: Extraocular movements intact  Conjunctiva/sclera: Conjunctivae normal    Cardiovascular:      Rate and Rhythm: Normal rate and regular rhythm  Pulses: Normal pulses  no weak pulses          Dorsalis pedis pulses are 2+ on the right side and 2+ on the left side  Posterior tibial pulses are 2+ on the right side and 2+ on the left side  Heart sounds: Normal heart sounds  No murmur heard  Pulmonary:      Effort: Pulmonary effort is normal  No respiratory distress  Breath sounds: Normal breath sounds   No wheezing, rhonchi or rales    Abdominal:      General: Bowel sounds are normal       Palpations: Abdomen is soft  Tenderness: There is no abdominal tenderness  There is no guarding or rebound  Musculoskeletal:         General: Normal range of motion  Cervical back: Normal range of motion and neck supple  No muscular tenderness  Right lower leg: Edema ( Trace pedal edema ) present  Left lower leg: Edema ( Has trace pedal edema ) present  Feet:      Right foot:      Skin integrity: No ulcer, skin breakdown, erythema, warmth or dry skin  Left foot:      Skin integrity: No ulcer, skin breakdown, erythema, warmth or dry skin  Skin:     General: Skin is warm  Findings: No rash  Neurological:      General: No focal deficit present  Mental Status: He is alert and oriented to person, place, and time  Motor: No weakness  Coordination: Coordination normal       Gait: Gait normal    Psychiatric:         Mood and Affect: Mood normal          Behavior: Behavior normal          Patient's shoes and socks removed  Right Foot/Ankle   Right Foot Inspection  Skin Exam: skin normal and skin intact  No dry skin, no warmth, no erythema, no maceration, no abnormal color, no pre-ulcer and no ulcer  Toe Exam: ROM and strength within normal limits  Erythema and  no right toe deformity    Sensory   Monofilament testing: intact    Vascular  The right DP pulse is 2+  The right PT pulse is 2+  Left Foot/Ankle  Left Foot Inspection  Skin Exam: skin normal and skin intact  No dry skin, no warmth, no erythema, no maceration, normal color, no pre-ulcer and no ulcer  Toe Exam: ROM and strength within normal limits  No erythema and no left toe deformity  Sensory   Monofilament testing: intact    Vascular  The left DP pulse is 2+  The left PT pulse is 2+       Assign Risk Category  No deformity present  No loss of protective sensation  No weak pulses  Risk: 0

## 2023-03-17 NOTE — ASSESSMENT & PLAN NOTE
He had a COVID-19 infection  Chest x-ray with no pneumonia  Now symptoms resolved  Last COVID-19 booster December 2022

## 2023-03-17 NOTE — ASSESSMENT & PLAN NOTE
Almost resolved  On furosemide and spironolactone  Has trace edema usually gets after he starts walking  Has dependent component  Advised to keep legs elevated while sitting

## 2023-03-17 NOTE — ASSESSMENT & PLAN NOTE
Symptoms are well controlled on omeprazole  He recently had a EGD  Has been  followed by GI specialist   Has been watching diet

## 2023-03-17 NOTE — ASSESSMENT & PLAN NOTE
Lab Results   Component Value Date    HGBA1C 7 5 (H) 11/07/2022   He decreased glimepiride 4 mg twice daily  to once a day as on Ozempic  No hypoglycemia  We will check hemoglobin A1c  Discussed with the patient once he increase the dose of Ozempic will need to DC glimepiride  Call if blood sugar less than 80

## 2023-03-17 NOTE — ASSESSMENT & PLAN NOTE
He was seen by GI specialist as well as liver specialist   Going for MRI  On spironolactone and furosemide

## 2023-03-17 NOTE — PATIENT INSTRUCTIONS
Wellness Visit for Adults   AMBULATORY CARE:   A wellness visit  is when you see your healthcare provider to get screened for health problems  Your healthcare provider will also give you advice on how to stay healthy  Write down your questions so you remember to ask them  Ask your healthcare provider how often you should have a wellness visit  What happens at a wellness visit:  Your healthcare provider will ask about your health, and your family history of health problems  This includes high blood pressure, heart disease, and cancer  He or she will ask if you have symptoms that concern you, if you smoke, and about your mood  You may also be asked about your intake of medicines, supplements, food, and alcohol  Any of the following may be done: Your weight  will be checked  Your height may also be checked so your body mass index (BMI) can be calculated  Your BMI shows if you are at a healthy weight  Your blood pressure  and heart rate will be checked  Your temperature may also be checked  Blood and urine tests  may be done  Blood tests may be done to check your cholesterol levels  Abnormal cholesterol levels increase your risk for heart disease and stroke  You may also need a blood or urine test to check for diabetes if you are at increased risk  Urine tests may be done to look for signs of an infection or kidney disease  A physical exam  includes checking your heartbeat and lungs with a stethoscope  Your healthcare provider may also check your skin to look for sun damage  Screening tests  may be recommended  A screening test is done to check for diseases that may not cause symptoms  The screening tests you may need depend on your age, gender, family history, and lifestyle habits  For example, colorectal screening may be recommended if you are 48years old or older  Screening tests you need if you are a woman:   A Pap smear  is used to screen for cervical cancer   Pap smears are usually done every 3 to 5 years depending on your age  You may need them more often if you have had abnormal Pap smear test results in the past  Ask your healthcare provider how often you should have a Pap smear  A mammogram  is an x-ray of your breasts to screen for breast cancer  Experts recommend mammograms every 2 years starting at age 48 years  You may need a mammogram at age 52 years or younger if you have an increased risk for breast cancer  Talk to your healthcare provider about when you should start having mammograms and how often you need them  Vaccines you may need:   Get an influenza vaccine  every year  The influenza vaccine protects you from the flu  Several types of viruses cause the flu  The viruses change over time, so new vaccines are made each year  Get a tetanus-diphtheria (Td) booster vaccine  every 10 years  This vaccine protects you against tetanus and diphtheria  Tetanus is a severe infection that may cause painful muscle spasms and lockjaw  Diphtheria is a severe bacterial infection that causes a thick covering in the back of your mouth and throat  Get a human papillomavirus (HPV) vaccine  if you are female and aged 23 to 32 or male 23 to 24 and never received it  This vaccine protects you from HPV infection  HPV is the most common infection spread by sexual contact  HPV may also cause vaginal, penile, and anal cancers  Get a pneumococcal vaccine  if you are aged 72 years or older  The pneumococcal vaccine is an injection given to protect you from pneumococcal disease  Pneumococcal disease is an infection caused by pneumococcal bacteria  The infection may cause pneumonia, meningitis, or an ear infection  Get a shingles vaccine  if you are 60 or older, even if you have had shingles before  The shingles vaccine is an injection to protect you from the varicella-zoster virus  This is the same virus that causes chickenpox   Shingles is a painful rash that develops in people who had chickenpox or have been exposed to the virus  How to eat healthy:  My Plate is a model for planning healthy meals  It shows the types and amounts of foods that should go on your plate  Fruits and vegetables make up about half of your plate, and grains and protein make up the other half  A serving of dairy is included on the side of your plate  The amount of calories and serving sizes you need depends on your age, gender, weight, and height  Examples of healthy foods are listed below:  Eat a variety of vegetables  such as dark green, red, and orange vegetables  You can also include canned vegetables low in sodium (salt) and frozen vegetables without added butter or sauces  Eat a variety of fresh fruits , canned fruit in 100% juice, frozen fruit, and dried fruit  Include whole grains  At least half of the grains you eat should be whole grains  Examples include whole-wheat bread, wheat pasta, brown rice, and whole-grain cereals such as oatmeal     Eat a variety of protein foods such as seafood (fish and shellfish), lean meat, and poultry without skin (turkey and chicken)  Examples of lean meats include pork leg, shoulder, or tenderloin, and beef round, sirloin, tenderloin, and extra lean ground beef  Other protein foods include eggs and egg substitutes, beans, peas, soy products, nuts, and seeds  Choose low-fat dairy products such as skim or 1% milk or low-fat yogurt, cheese, and cottage cheese  Limit unhealthy fats  such as butter, hard margarine, and shortening  Exercise:  Exercise at least 30 minutes per day on most days of the week  Some examples of exercise include walking, biking, dancing, and swimming  You can also fit in more physical activity by taking the stairs instead of the elevator or parking farther away from stores  Include muscle strengthening activities 2 days each week  Regular exercise provides many health benefits   It helps you manage your weight, and decreases your risk for type 2 diabetes, heart disease, stroke, and high blood pressure  Exercise can also help improve your mood  Ask your healthcare provider about the best exercise plan for you  General health and safety guidelines:   Do not smoke  Nicotine and other chemicals in cigarettes and cigars can cause lung damage  Ask your healthcare provider for information if you currently smoke and need help to quit  E-cigarettes or smokeless tobacco still contain nicotine  Talk to your healthcare provider before you use these products  Limit alcohol  A drink of alcohol is 12 ounces of beer, 5 ounces of wine, or 1½ ounces of liquor  Lose weight, if needed  Being overweight increases your risk of certain health conditions  These include heart disease, high blood pressure, type 2 diabetes, and certain types of cancer  Protect your skin  Do not sunbathe or use tanning beds  Use sunscreen with a SPF 15 or higher  Apply sunscreen at least 15 minutes before you go outside  Reapply sunscreen every 2 hours  Wear protective clothing, hats, and sunglasses when you are outside  Drive safely  Always wear your seatbelt  Make sure everyone in your car wears a seatbelt  A seatbelt can save your life if you are in an accident  Do not use your cell phone when you are driving  This could distract you and cause an accident  Pull over if you need to make a call or send a text message  Practice safe sex  Use latex condoms if are sexually active and have more than one partner  Your healthcare provider may recommend screening tests for sexually transmitted infections (STIs)  Wear helmets, lifejackets, and protective gear  Always wear a helmet when you ride a bike or motorcycle, go skiing, or play sports that could cause a head injury  Wear protective equipment when you play sports  Wear a lifejacket when you are on a boat or doing water sports      © Copyright [employer] 2022 Information is for End User's use only and may not be sold, redistributed or otherwise used for commercial purposes  The above information is an  only  It is not intended as medical advice for individual conditions or treatments  Talk to your doctor, nurse or pharmacist before following any medical regimen to see if it is safe and effective for you  Type 2 Diabetes Management for Adults   AMBULATORY CARE:   Type 2 diabetes  is a disease that affects how your body uses glucose (sugar)  Either your body cannot make enough insulin, or it cannot use the insulin correctly  It is important to keep diabetes controlled to prevent damage to your heart, blood vessels, and other organs  Management will help you feel well and enjoy your daily activities  Your diabetes care team providers can help you make a plan to fit diabetes care into your schedule  Your plan can change over time to fit your needs and your family's needs  Have someone call your local emergency number (911 in the [address]) if:   You cannot be woken  You have signs of diabetic ketoacidosis:     confusion, fatigue    vomiting    rapid heartbeat    fruity smelling breath    extreme thirst    dry mouth and skin    You have any of the following signs of a heart attack:      Squeezing, pressure, or pain in your chest    You may  also have any of the following:     Discomfort or pain in your back, neck, jaw, stomach, or arm    Shortness of breath    Nausea or vomiting    Lightheadedness or a sudden cold sweat    You have any of the following signs of a stroke:      Numbness or drooping on one side of your face     Weakness in an arm or leg    Confusion or difficulty speaking    Dizziness, a severe headache, or vision loss    Call your doctor or diabetes care team provider if:   You have a sore or wound that will not heal     You have a change in the amount you urinate  Your blood sugar levels are higher than your target goals  You often have lower blood sugar levels than your target goals      Your skin is red, dry, warm, or swollen  You have trouble coping with diabetes, or you feel anxious or depressed  You have questions or concerns about your condition or care  What you need to know about high blood sugar levels:  High blood sugar levels may not cause any symptoms  You may feel more thirsty or urinate more often than usual  Over time, high blood sugar levels can damage your nerves, blood vessels, tissues, and organs  The following can increase your blood sugar levels:  Large meals or large amounts of carbohydrates at one time    Less physical activity    Stress    Illness    A lower dose of diabetes medicine or insulin, or a late dose    What you need to know about low blood sugar levels:  Symptoms include feeling shaky, dizzy, irritable, or confused  You can prevent symptoms by keeping your blood sugar levels from going too low  Treat a low blood sugar level right away:      Drink 4 ounces of juice or have 1 tube of glucose gel  Check your blood sugar level again 10 to 15 minutes later  When the level goes back to normal, eat a meal or snack to prevent another decrease  Keep glucose gel, raisins, or hard candy with you at all times to treat a low blood sugar level  Your blood sugar level can get too low if you take diabetes medicine or insulin and do not eat enough food  If you use insulin, check your blood sugar level before you exercise  If your blood sugar level is below 100 mg/dL, eat 4 crackers or 2 ounces of raisins, or drink 4 ounces of juice  Check your level every 30 minutes if you exercise longer than 1 hour  You may need a snack during or after exercise  What you can do to manage your blood sugar levels:   Check your blood sugar levels as directed and as needed  Several items are available to use to check your levels  You may need to check by testing a drop of blood in a glucose monitor  You may instead be given a continuous glucose monitoring (CGM) device   The device is worn at all times  The CGM checks your blood sugar level every 5 minutes  It sends results to an electronic device such as a smart phone  A CGM can be used with or without an insulin pump  You and your diabetes care team providers will decide on the best method for you  The goal for blood sugar levels before meals  is between 80 and 130 mg/dL and 2 hours after eating  is lower than 180 mg/dL  Make healthy food choices  Work with a dietitian to develop a meal plan that works for you and your schedule  A dietitian can help you learn how to eat the right amount of carbohydrates during your meals and snacks  Carbohydrates can raise your blood sugar level if you eat too many at one time  Examples of foods that contain carbohydrates are breads, cereals, rice, pasta, and sweets  Eat high-fiber foods as directed  Fiber helps improve blood sugar levels  Fiber also lowers your risk for heart disease and other problems diabetes can cause  Examples of high-fiber foods include vegetables, whole-grain bread, and beans such as balderas beans  Your dietitian can tell you how much fiber to have each day  Get regular physical activity  Physical activity can help you get to your target blood sugar level goal and manage your weight  Get at least 150 minutes of moderate to vigorous aerobic physical activity each week  Do not miss more than 2 days in a row  Do not sit longer than 30 minutes at a time  Your healthcare provider can help you create an activity plan  The plan can include the best activities for you and can help you build your strength and endurance  Maintain a healthy weight  Ask your team what a healthy weight is for you  A healthy weight can help you control diabetes and prevent heart disease  Ask your team to help you create a weight loss plan, if needed  Weight loss can help make a difference in managing diabetes   Your team will help you set a weight-loss goal, such as 10 to 15 pounds, or 5% of your extra weight  Together you and your team can set manageable weight loss goals  Take your diabetes medicine or insulin as directed  You may need diabetes medicine, insulin, or both to help control your blood sugar levels  Your healthcare provider will teach you how and when to take your diabetes medicine or insulin  You will also be taught about side effects oral diabetes medicine can cause  Insulin may be injected or given through a pump or pen  You and your providers will decide on the best method for you: An insulin pump  is an implanted device that gives your insulin 24 hours a day  An insulin pump prevents the need for multiple insulin injections in a day  An insulin pen  is a device prefilled with the right amount of insulin  You and your family members will be taught how to draw up and give insulin  if this is the best method for you  Your providers will also teach you how to dispose of needles and syringes  You will learn how much insulin you need  and when to give it  You will be taught when not to give insulin  You will also be taught what to do if your blood sugar level drops too low  This may happen if you take insulin and do not eat the right amount of carbohydrates  More ways to manage type 2 diabetes:   Wear medical alert identification  Wear medical alert jewelry or carry a card that says you have diabetes  Ask your provider where to get these items  Do not smoke  Nicotine and other chemicals in cigarettes and cigars can cause lung and blood vessel damage  It also makes it more difficult to manage your diabetes  Ask your provider for information if you currently smoke and need help to quit  Do not use e-cigarettes or smokeless tobacco in place of cigarettes or to help you quit  They still contain nicotine  Check your feet each day for cuts, scratches, calluses, or other wounds  Look for redness and swelling, and feel for warmth  Wear shoes that fit well  Check your shoes for rocks or other objects that can hurt your feet  Do not walk barefoot or wear shoes without socks  Wear cotton socks to help keep your feet dry  Ask about vaccines you may need  You have a higher risk for serious illness if you get the flu, pneumonia, COVID-19, or hepatitis  Ask your provider if you should get vaccines to prevent these or other diseases, and when to get the vaccines  Talk to your provider if you become stressed about diabetes care  Sometimes being able to fit diabetes care into your life can cause increased stress  The stress can cause you not to take care of yourself properly  Your care team providers can help by offering tips about self-care  Your providers may suggest you talk to a mental health provider who can listen and offer help with self-care issues  Have your A1c checked as directed  Your provider may check your A1c every 3 months, or 2 times each year if your diabetes is controlled  An A1c test shows the average amount of sugar in your blood over the past 2 to 3 months  Your provider will tell you what your A1c level should be  Have screening tests as directed  Your provider may recommend screening for complications of diabetes and other conditions that may develop  Some screenings may begin right away and some may happen within the first 5 years of diagnosis:    Examples of diabetes complications  include kidney problems, high cholesterol, high blood pressure, blood vessel problems, eye problems, and sleep apnea  You may be screened for a low vitamin B level  if you take oral diabetes medicine for a long time  Women of childbearing years may be screened  for polycystic ovarian syndrome (PCOS)  Follow up with your doctor or diabetes care team providers as directed: You may need to have blood tests done before your follow-up visit   The test results will show if changes need to be made in your treatment or self-care  Talk to your provider if you cannot afford your medicine  Write down your questions so you remember to ask them during your visits  © Copyright [employer] 2022 Information is for End User's use only and may not be sold, redistributed or otherwise used for commercial purposes  The above information is an  only  It is not intended as medical advice for individual conditions or treatments  Talk to your doctor, nurse or pharmacist before following any medical regimen to see if it is safe and effective for you  Patient was advised to continue present medications  discussed with the patient medications and laboratory data in detail  Follow-up with me in 3 months or as advised  If any blood test was ordered please do 1 week prior to next appointment unless advise to get earlier    If you have any questions please call the office [phone]

## 2023-03-26 LAB
ALBUMIN SERPL-MCNC: 3.9 G/DL (ref 3.6–5.1)
ALBUMIN/GLOB SERPL: 1.5 (CALC) (ref 1–2.5)
ALP SERPL-CCNC: 101 U/L (ref 36–130)
ALT SERPL-CCNC: 43 U/L (ref 9–46)
AST SERPL-CCNC: 48 U/L (ref 10–40)
BASOPHILS # BLD AUTO: 11 CELLS/UL (ref 0–200)
BASOPHILS NFR BLD AUTO: 0.4 %
BILIRUB SERPL-MCNC: 2.5 MG/DL (ref 0.2–1.2)
BUN SERPL-MCNC: 23 MG/DL (ref 7–25)
BUN/CREAT SERPL: ABNORMAL (CALC) (ref 6–22)
CALCIUM SERPL-MCNC: 8.8 MG/DL (ref 8.6–10.3)
CHLORIDE SERPL-SCNC: 108 MMOL/L (ref 98–110)
CO2 SERPL-SCNC: 24 MMOL/L (ref 20–32)
CREAT SERPL-MCNC: 0.98 MG/DL (ref 0.6–1.29)
EOSINOPHIL # BLD AUTO: 70 CELLS/UL (ref 15–500)
EOSINOPHIL NFR BLD AUTO: 2.6 %
ERYTHROCYTE [DISTWIDTH] IN BLOOD BY AUTOMATED COUNT: 14.4 % (ref 11–15)
GFR/BSA.PRED SERPLBLD CYS-BASED-ARV: 95 ML/MIN/1.73M2
GLOBULIN SER CALC-MCNC: 2.6 G/DL (CALC) (ref 1.9–3.7)
GLUCOSE SERPL-MCNC: 121 MG/DL (ref 65–99)
HBA1C MFR BLD: 5.2 % OF TOTAL HGB
HCT VFR BLD AUTO: 36.3 % (ref 38.5–50)
HGB BLD-MCNC: 12.9 G/DL (ref 13.2–17.1)
LYMPHOCYTES # BLD AUTO: 616 CELLS/UL (ref 850–3900)
LYMPHOCYTES NFR BLD AUTO: 22.8 %
MCH RBC QN AUTO: 37.2 PG (ref 27–33)
MCHC RBC AUTO-ENTMCNC: 35.5 G/DL (ref 32–36)
MCV RBC AUTO: 104.6 FL (ref 80–100)
MONOCYTES # BLD AUTO: 208 CELLS/UL (ref 200–950)
MONOCYTES NFR BLD AUTO: 7.7 %
NEUTROPHILS # BLD AUTO: 1796 CELLS/UL (ref 1500–7800)
NEUTROPHILS NFR BLD AUTO: 66.5 %
PLATELET # BLD AUTO: 83 THOUSAND/UL (ref 140–400)
PMV BLD REES-ECKER: 10.8 FL (ref 7.5–12.5)
POTASSIUM SERPL-SCNC: 4.2 MMOL/L (ref 3.5–5.3)
PROT SERPL-MCNC: 6.5 G/DL (ref 6.1–8.1)
RBC # BLD AUTO: 3.47 MILLION/UL (ref 4.2–5.8)
SODIUM SERPL-SCNC: 137 MMOL/L (ref 135–146)
WBC # BLD AUTO: 2.7 THOUSAND/UL (ref 3.8–10.8)

## 2023-03-27 ENCOUNTER — TELEPHONE (OUTPATIENT)
Dept: GASTROENTEROLOGY | Facility: CLINIC | Age: 48
End: 2023-03-27

## 2023-03-27 DIAGNOSIS — D69.3 IDIOPATHIC THROMBOCYTOPENIA (HCC): ICD-10-CM

## 2023-03-27 RX ORDER — ELTROMBOPAG OLAMINE 75 MG/1
TABLET, FILM COATED ORAL
Qty: 30 TABLET | Refills: 3 | Status: SHIPPED | OUTPATIENT
Start: 2023-03-27

## 2023-03-27 NOTE — TELEPHONE ENCOUNTER
Patient canceled 4/4/23 appt with Dr Flores Wheeler  Reached out to patient and rescheduled 4/4/23 appointment with Dr Flores Wheeler to 4/28/23

## 2023-03-29 DIAGNOSIS — K21.9 GASTROESOPHAGEAL REFLUX DISEASE WITHOUT ESOPHAGITIS: ICD-10-CM

## 2023-03-29 RX ORDER — OMEPRAZOLE 40 MG/1
40 CAPSULE, DELAYED RELEASE ORAL DAILY
Qty: 90 CAPSULE | Refills: 3 | Status: SHIPPED | OUTPATIENT
Start: 2023-03-29

## 2023-04-25 ENCOUNTER — TELEPHONE (OUTPATIENT)
Dept: GASTROENTEROLOGY | Facility: CLINIC | Age: 48
End: 2023-04-25

## 2023-04-25 NOTE — TELEPHONE ENCOUNTER
Left VM for patient regarding MRI showing LR-3 lesion  I recommend surveillance imaging in 3 months   He has an appointment scheduled with me on 4/28

## 2023-04-28 ENCOUNTER — OFFICE VISIT (OUTPATIENT)
Dept: GASTROENTEROLOGY | Facility: CLINIC | Age: 48
End: 2023-04-28

## 2023-04-28 VITALS
TEMPERATURE: 96.8 F | HEIGHT: 74 IN | BODY MASS INDEX: 36.27 KG/M2 | WEIGHT: 282.6 LBS | SYSTOLIC BLOOD PRESSURE: 108 MMHG | DIASTOLIC BLOOD PRESSURE: 72 MMHG

## 2023-04-28 DIAGNOSIS — K76.0 NAFLD (NONALCOHOLIC FATTY LIVER DISEASE): Primary | ICD-10-CM

## 2023-04-28 DIAGNOSIS — K76.9 LIVER LESION: ICD-10-CM

## 2023-04-28 DIAGNOSIS — K74.60 CIRRHOSIS OF LIVER WITHOUT ASCITES, UNSPECIFIED HEPATIC CIRRHOSIS TYPE (HCC): ICD-10-CM

## 2023-04-28 NOTE — PROGRESS NOTES
[facility] - Outpatient Consultation  Steve Del Cid 49 y o  male MRN: [id number]  Encounter: [id number]    PCP:  Kristy Francisco MD, [phone]  Referring Provider:  No ref  provider found,     Patient: Steve Del Cid, 1975  Reason for Referral: compensated cirrhosis     ASSESSMENT/PLAN:  49 y o  male with with history of class II obesity, HTN, HLD and DM who presents for follow up  He has no acute liver specific complaints or clinical evidence of hepatic decompensation       He has had longstanding history of pancytopenia and splenomegaly which has been extensively worked up by hematology  His liver chemistries are mildly elevated and his liver function is intact  He had multiple bone marrow biopsies which was negative for leukemia or lymphoma  He had an EGD/COY in October 2022 which showed esophageal varices and portal hypertensive gastropathy  A subsequent US elastography showed 14 1 LSM (IQR 12 4%)  He has a strongly family history of chronic liver disease and cirrhosis      He was started on Ozempic but has not had significant weight loss since his last visit  He has been tolerating therapy but has developed nausea which may be related to recent uptitration  I recommended dose reduction to 1 mg weekly if he has persistent symptoms  He is otherwise up-to-date with his cirrhosis health maintenance and is due for repeat MRI abdomen for surveillance of his LR-3 lesion in July 2023  He should avoid interval weight gain, excessive EtOH consumption and hepatoxic medications  He will follow up in 3 months or sooner if needed  Thank you for the opportunity to consult in his care      1  NAFLD  - Continue Ozempic 2 mg SC weekly, discussed that he should drop to 1 mg if he has persistent nausea  - Send PNPLA3 genetic testing      2    Compensated cirrhosis, MELD-Na 16  - No symptomatic ascites, variceal bleeding, or hepatic encephalopathy      3  Esophageal variceal surveillance  His last EGD showed non-bleeding esophageal varices  - Continue carvedilol 6 25 mg BID to prevent decompensation  - No further EGDs necessary      4  LR-3 lesion  - Repeat MRI abdomen with MRCP and AFP levels in 7/2023       5  Colorectal cancer screening  - Repeat COY in 3 years for history of polyps     6  CHRISSY   - Recommend discontinuing aldactone given reports of gynecomastia  - Consider eplerenone or amiloride if he has worsening CHRISSY      7  Healthcare maintenance for patients with cirrhosis  -He was instructed to take no more than 2 grams of tylenol in 24 hours and no products containing NSAIDs, benzodiazapines, and narcotics  -He was also instructed to avoid raw shellfish   -He should participate in daily exercise as to prevent loss of muscle mass  -He should abstain from all alcohol intake and was counseled on this     -He is at risk for vitamin deficiencies and metabolic bone disease  -HAV and HBV immunity will be checked and he should be vaccinated through his primary care provider if he is not immune   -Additionally, he should receive a yearly flu shot and the pneumonia vaccine through his primary care provider      I made him aware of the symptoms of hepatic decompensation: evident confusion, vomiting blood, black tarry stool, or large amounts of red blood in stool, and abdominal swelling  In addition to seeking local medical attention urgently, he will notify me if this happens before the next visit   Yasmin Ortega MD  Division of Gastroenterology and Hepatology  [address] [place] [address],[address] [place] [address]    ============================================================================  CC/HPI: 49 y o  male with history of class II obesity, HTN, HLD and DM who presents for new diagnosis of cirrhosis      Interval events  - Started Ozempic and is up to 2 mg weekly   Has not had significant weight loss but reports nausea in the AM  - Reporting new breast tenderness and welling    - MRI abdomen showed LR-3 lesion, favoring hemangioma  Recommend 3 month follow up  Extended liver history  He has had longstanding history of pancytopenia and splenomegaly which has been extensively worked up by hematology  He had multiple bone marrow biopsies which was negative for leukemia or lymphoma  He had an EGD/COY in 2022 which showed esophageal varices and portal hypertensive gastropathy  A subsequent US elastography showed 14 1 LSM (IQR 12 4%)  He established care with GI and was started on aldactone in addition to torsemide for treatment of CHRISSY  He does report family history of cirrhosis in his mother who  from complications of cirrhosis and liver failure 1 year ago  He also has a son who has fatty liver at a young age  He denies family history of medullary thyroid cancer  ROS: Complete review of systems otherwise negative       PAST MEDICAL/SURGICAL HISTORY:  Past Medical History:   Diagnosis Date    BMI 36 0-36 9,adult 2022    BMI 37 0-37 9, adult 2021    BMI 38 0-38 9,adult 2022    BMI 39 0-39 9,adult 2021    Cervicalgia     Colon polyp     Concussion with loss of consciousness of 30 minutes or less 2021    COVID-19 3/3/2023    Depression     Diabetes mellitus ([address] )     Diabetes mellitus out of control     Edema of both lower legs 2021    Elevated liver enzymes     Erectile dysfunction     Exposure to COVID-19 virus     Fatty liver     GERD (gastroesophageal reflux disease)     Heart murmur 2022    Hyperglycemia     Hypertension     Left cervical radiculopathy     Left elbow pain     Left foot pain     Leukopenia 2021    NAFLD (nonalcoholic fatty liver disease) 3/17/2023    Other cirrhosis of liver ([address] ) 3/17/2023    Pain in both feet 2021    Pancytopenia ([address] ) 2021    Skin rash 2021    Thrombocytopenia (HCC)     Venous stasis dermatitis of both lower extremities 2022    Vitamin D deficiency 01/07/2022        Past Surgical History:   Procedure Laterality Date    CHOLECYSTECTOMY  1990's    COLONOSCOPY  06/2005    COLONOSCOPY      EGD  07/10/2014    IR BIOPSY BONE MARROW  03/20/2019    IR BIOPSY BONE MARROW  7/29/2022    UPPER GASTROINTESTINAL ENDOSCOPY      WISDOM TOOTH EXTRACTION      In early to mid 19's       FAMILY/SOCIAL HISTORY:  Family History   Problem Relation Age of Onset    Heart attack Mother     Thrombocytopenia Mother     Coronary artery disease Mother     Hypertension Mother     Testicular cancer Father     Hypertension Father     No Known Problems Brother     Hypertension Brother     No Known Problems Brother     No Known Problems Son        Social History     Tobacco Use    Smoking status: Former     Types: Cigarettes    Smokeless tobacco: Former     Quit date: 3/1/2014    Tobacco comments:     Social   Last cigarette years ago     Vaping Use    Vaping Use: Never used   Substance Use Topics    Alcohol use: Not Currently    Drug use: Not Currently     Comment: No drug use - As per [employer]       MEDICATIONS:  Current Outpatient Medications on File Prior to Visit   Medication Sig Dispense Refill    carvedilol (COREG) 6 25 mg tablet Take 1 tablet (6 25 mg total) by mouth 2 (two) times a day with meals 180 tablet 3    glimepiride (AMARYL) 4 mg tablet Take 4 mg by mouth every morning before breakfast      losartan (COZAAR) 25 mg tablet TAKE 1 TABLET BY MOUTH  DAILY 90 tablet 3    omeprazole (PriLOSEC) 40 MG capsule Take 1 capsule (40 mg total) by mouth daily 90 capsule 3    OneTouch Verio test strip USE TO TEST ONCE DAILY 25 strip 3    Promacta 75 MG TABS TAKE 1 TABLET BY MOUTH ONCE DAILY ON AN EMPTY STOMACH  AT LEAST 1 HOUR BEFORE OR 2 HOURS AFTER A MEAL 30 tablet 3    semaglutide, 2 mg/dose, (Ozempic, 2 MG/DOSE,) 8 mg/ mL injection pen Inject 0 75 mL (2 mg total) under the skin every 7 days for 90 doses 3 mL 20    spironolactone (ALDACTONE) 50 mg tablet "Take 1 tablet (50 mg total) by mouth daily 30 tablet 3    torsemide (DEMADEX) 20 mg tablet Take 1 tablet (20 mg total) by mouth daily 30 tablet 3    Vitamin D, Cholecalciferol, 50 MCG (2000 UT) CAPS Take 1 tablet by mouth in the morning      zinc gluconate 50 mg tablet Take 50 mg by mouth daily       No current facility-administered medications on file prior to visit  Allergies   Allergen Reactions    Aspirin Other (See Comments)     Reaction unknown to patient   Diltiazem Other (See Comments)     Patient does not recall what happened    Lisinopril Cough       PHYSICAL EXAM:  /72 (BP Location: Left arm, Patient Position: Sitting, Cuff Size: Adult)   Temp (!) 96 8 °F (36 °C) (Tympanic)   Ht 6' 2\" (1 88 m)   Wt 128 kg (282 lb 9 6 oz)   BMI 36 28 kg/m²   GENERAL: NAD, AAO  HEENT: anicteric, OP clear, MMM  ABDOMEN: S/ND/NT, normoactive BS, no hepatomegaly, spleen not palpable  EXTREMITIES: 1+ edema  SKIN: no rashes, no palmar erythema, no spider angiomata   NEURO: normal gait, no tremor, no asterixis     LABS/RADIOLOGY/ENDOSCOPY:  Lab Results   Component Value Date    WBC 2 7 (L) 03/25/2023    HGB 12 9 (L) 03/25/2023    HCT 36 3 (L) 03/25/2023    PLT 83 (L) 03/25/2023    GLUF 116 (H) 05/04/2021    BUN 23 03/25/2023    CREATININE 0 98 03/25/2023     04/05/2017    K 4 2 03/25/2023     03/25/2023    CO2 24 03/25/2023    PROT 6 4 04/05/2017    BILIDIR 0 27 (H) 01/17/2017    ALKPHOS 101 03/25/2023    ALT 43 03/25/2023    AST 48 (H) 03/25/2023    GLOB 2 6 03/25/2023    CALCIUM 8 8 03/25/2023    EGFR 95 03/25/2023    TRIG 78 11/07/2022    HDL 40 11/07/2022    INR 1 37 (H) 01/17/2023     AST 48, ALT 43, Tb 2 5, Alb 3 9  2 7 > 12 9 <83  A1c 5 2    EGD (10/14/22)   The duodenum appeared normal  Performed random biopsy using biopsy forceps     Mild erythematous mucosa in the antrum; performed cold forceps biopsy   Portal hypertensive gastropathy   Three small and medium varices (no red juanita " sign) in the esophagus       MRI abdomen with MRCP (4/16/2023)  Hepatic cirrhosis with a 16 mm segment 6 LI-RADS category LR 3 observation  I favor a benign hemangioma given the appearance  3-6 month follow-up is recommended      Severe splenomegaly, esophageal varices and mesenteric vascular engorgement indicate portal hypertension      The study was marked in EPIC for significant notification      MELD-Na score: 16 at 1/17/2023  4:40 AM  MELD score: 14 at 1/17/2023  4:40 AM  Calculated from:  Serum Creatinine: 1 13 mg/dL at 1/17/2023  4:40 AM  Serum Sodium: 135 mmol/L at 1/17/2023  4:40 AM  Total Bilirubin: 1 98 mg/dL at 1/17/2023  4:40 AM  INR(ratio): 1 37 at 1/17/2023  4:40 AM  Age: 49 years

## 2023-05-01 ENCOUNTER — TELEPHONE (OUTPATIENT)
Dept: GASTROENTEROLOGY | Facility: CLINIC | Age: 48
End: 2023-05-01

## 2023-05-01 DIAGNOSIS — K74.60 CIRRHOSIS OF LIVER WITHOUT ASCITES, UNSPECIFIED HEPATIC CIRRHOSIS TYPE (HCC): ICD-10-CM

## 2023-05-01 DIAGNOSIS — K76.0 NAFLD (NONALCOHOLIC FATTY LIVER DISEASE): Primary | ICD-10-CM

## 2023-05-01 NOTE — TELEPHONE ENCOUNTER
Spoke to [facility] to follow up PNPLA3 testing ordered in January (see 1/14/23 patient message encounter -lab order and specimen instructions reviewed with [employer])  Jeffrey Mejia - recommended patient have updated PNPLA3 lab slip at time of collection

## 2023-05-10 ENCOUNTER — TELEPHONE (OUTPATIENT)
Dept: INTERNAL MEDICINE CLINIC | Facility: CLINIC | Age: 48
End: 2023-05-10

## 2023-05-10 NOTE — TELEPHONE ENCOUNTER
Called patient in regards for his DM eye exam and if the pt had every gotten the exam done  Phone call went straight to voicemail, left a detailed message for a call back

## 2023-05-17 ENCOUNTER — TELEPHONE (OUTPATIENT)
Dept: GASTROENTEROLOGY | Facility: CLINIC | Age: 48
End: 2023-05-17

## 2023-05-17 LAB
AFP-TM SERPL-MCNC: 2.2 NG/ML
INR PPP: 1.3
PROTHROMBIN TIME: 13 SEC (ref 9–11.5)

## 2023-05-23 ENCOUNTER — TELEPHONE (OUTPATIENT)
Dept: HEMATOLOGY ONCOLOGY | Facility: CLINIC | Age: 48
End: 2023-05-23

## 2023-05-23 NOTE — TELEPHONE ENCOUNTER
I contacted the patient he to scheduling a follow up appointment  Patient is a former Dr Hugo Lees seen in December  Patient  also has a missed follow up appointment in February and in need of a follow up appointment ASAP  When the patient call back please schedule this patient with a physician in [facility] as soon as possible  A voicemail was left for the patient with the hopeline number to call back to schedule an appointment

## 2023-05-25 ENCOUNTER — TELEPHONE (OUTPATIENT)
Dept: HEMATOLOGY ONCOLOGY | Facility: CLINIC | Age: 48
End: 2023-05-25

## 2023-05-25 NOTE — TELEPHONE ENCOUNTER
I contacted the patient he to scheduling a follow up appointment  Patient is a former Dr Hakan Leung seen in December  Patient  also has a missed follow up appointment in February and in need of a follow up appointment ASAP  When the patient call back please schedule this patient with a physician in [facility] as soon as possible  A voicemail was left for the patient with the hopeline number to call back to schedule an appointment

## 2023-05-26 LAB
REF LAB TEST NAME: NORMAL
REF LAB TEST RESULTS: NORMAL

## 2023-05-29 DIAGNOSIS — K76.6 PORTAL HYPERTENSION (HCC): ICD-10-CM

## 2023-05-30 RX ORDER — TORSEMIDE 20 MG/1
TABLET ORAL
Qty: 60 TABLET | Refills: 11 | Status: SHIPPED | OUTPATIENT
Start: 2023-05-30

## 2023-05-31 ENCOUNTER — TELEPHONE (OUTPATIENT)
Dept: GASTROENTEROLOGY | Facility: CLINIC | Age: 48
End: 2023-05-31

## 2023-05-31 NOTE — TELEPHONE ENCOUNTER
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

## 2023-06-01 ENCOUNTER — TELEPHONE (OUTPATIENT)
Dept: HEMATOLOGY ONCOLOGY | Facility: CLINIC | Age: 48
End: 2023-06-01

## 2023-06-01 NOTE — TELEPHONE ENCOUNTER
Received message from Reanna Lunsford that pt has not resonded to outreach for [employer] refill  Per chart notes pt canceled last follow up appt and has not responded to calls to reschedule appt  I left him a voicemail offering to assist with both issues and provided my direct line for call back

## 2023-06-11 LAB
ALBUMIN SERPL-MCNC: 4 G/DL (ref 3.6–5.1)
ALBUMIN/GLOB SERPL: 1.6 (CALC) (ref 1–2.5)
ALP SERPL-CCNC: 80 U/L (ref 36–130)
ALT SERPL-CCNC: 29 U/L (ref 9–46)
AST SERPL-CCNC: 37 U/L (ref 10–40)
BILIRUB SERPL-MCNC: 3.1 MG/DL (ref 0.2–1.2)
BUN SERPL-MCNC: 20 MG/DL (ref 7–25)
BUN/CREAT SERPL: ABNORMAL (CALC) (ref 6–22)
CALCIUM SERPL-MCNC: 8.8 MG/DL (ref 8.6–10.3)
CHLORIDE SERPL-SCNC: 109 MMOL/L (ref 98–110)
CO2 SERPL-SCNC: 25 MMOL/L (ref 20–32)
CREAT SERPL-MCNC: 0.99 MG/DL (ref 0.6–1.29)
GFR/BSA.PRED SERPLBLD CYS-BASED-ARV: 94 ML/MIN/1.73M2
GLOBULIN SER CALC-MCNC: 2.5 G/DL (CALC) (ref 1.9–3.7)
GLUCOSE SERPL-MCNC: 122 MG/DL (ref 65–99)
HBA1C MFR BLD: 5.2 % OF TOTAL HGB
POTASSIUM SERPL-SCNC: 4 MMOL/L (ref 3.5–5.3)
PROT SERPL-MCNC: 6.5 G/DL (ref 6.1–8.1)
SODIUM SERPL-SCNC: 141 MMOL/L (ref 135–146)

## 2023-06-15 ENCOUNTER — TRANSCRIBE ORDERS (OUTPATIENT)
Dept: GASTROENTEROLOGY | Facility: CLINIC | Age: 48
End: 2023-06-15

## 2023-06-15 LAB
ALBUMIN SERPL-MCNC: 4 G/DL (ref 3.6–5.1)
ALBUMIN/GLOB SERPL: 1.7 (CALC) (ref 1–2.5)
ALP SERPL-CCNC: 83 U/L (ref 36–130)
ALT SERPL-CCNC: 29 U/L (ref 9–46)
AST SERPL-CCNC: 38 U/L (ref 10–40)
BASOPHILS # BLD AUTO: 10 CELLS/UL (ref 0–200)
BASOPHILS NFR BLD AUTO: 0.5 %
BILIRUB SERPL-MCNC: 3.2 MG/DL (ref 0.2–1.2)
BUN SERPL-MCNC: 20 MG/DL (ref 7–25)
BUN/CREAT SERPL: ABNORMAL (CALC) (ref 6–22)
CALCIUM SERPL-MCNC: 8.8 MG/DL (ref 8.6–10.3)
CHLORIDE SERPL-SCNC: 109 MMOL/L (ref 98–110)
CO2 SERPL-SCNC: 25 MMOL/L (ref 20–32)
CREAT SERPL-MCNC: 0.99 MG/DL (ref 0.6–1.29)
EOSINOPHIL # BLD AUTO: 78 CELLS/UL (ref 15–500)
EOSINOPHIL NFR BLD AUTO: 3.9 %
ERYTHROCYTE [DISTWIDTH] IN BLOOD BY AUTOMATED COUNT: 12.4 % (ref 11–15)
FERRITIN SERPL-MCNC: 20 NG/ML (ref 38–380)
GFR/BSA.PRED SERPLBLD CYS-BASED-ARV: 94 ML/MIN/1.73M2
GLOBULIN SER CALC-MCNC: 2.4 G/DL (CALC) (ref 1.9–3.7)
GLUCOSE SERPL-MCNC: 126 MG/DL (ref 65–99)
HCT VFR BLD AUTO: 36.3 % (ref 38.5–50)
HGB BLD-MCNC: 12.5 G/DL (ref 13.2–17.1)
IRON SATN MFR SERPL: 40 % (CALC) (ref 20–48)
IRON SERPL-MCNC: 176 MCG/DL (ref 50–180)
LYMPHOCYTES # BLD AUTO: 582 CELLS/UL (ref 850–3900)
LYMPHOCYTES NFR BLD AUTO: 29.1 %
MCH RBC QN AUTO: 35.9 PG (ref 27–33)
MCHC RBC AUTO-ENTMCNC: 34.4 G/DL (ref 32–36)
MCV RBC AUTO: 104.3 FL (ref 80–100)
METHYLMALONATE SERPL-SCNC: 112 NMOL/L (ref 87–318)
MONOCYTES # BLD AUTO: 178 CELLS/UL (ref 200–950)
MONOCYTES NFR BLD AUTO: 8.9 %
NEUTROPHILS # BLD AUTO: 1152 CELLS/UL (ref 1500–7800)
NEUTROPHILS NFR BLD AUTO: 57.6 %
PLATELET # BLD AUTO: 80 THOUSAND/UL (ref 140–400)
PMV BLD REES-ECKER: 10.1 FL (ref 7.5–12.5)
POTASSIUM SERPL-SCNC: 3.9 MMOL/L (ref 3.5–5.3)
PROT SERPL-MCNC: 6.4 G/DL (ref 6.1–8.1)
RBC # BLD AUTO: 3.48 MILLION/UL (ref 4.2–5.8)
SL AMB PLATELET ESTIMATION: ABNORMAL
SODIUM SERPL-SCNC: 139 MMOL/L (ref 135–146)
TIBC SERPL-MCNC: 437 MCG/DL (CALC) (ref 250–425)
WBC # BLD AUTO: 2 THOUSAND/UL (ref 3.8–10.8)

## 2023-06-19 DIAGNOSIS — I10 ESSENTIAL HYPERTENSION: ICD-10-CM

## 2023-06-19 RX ORDER — LOSARTAN POTASSIUM 25 MG/1
TABLET ORAL
Qty: 90 TABLET | Refills: 3 | Status: SHIPPED | OUTPATIENT
Start: 2023-06-19

## 2023-06-19 RX ORDER — LANCETS
EACH MISCELLANEOUS 2 TIMES DAILY
COMMUNITY
Start: 2023-05-14

## 2023-06-19 RX ORDER — MOXIFLOXACIN 5 MG/ML
SOLUTION/ DROPS OPHTHALMIC
COMMUNITY
Start: 2023-03-27

## 2023-06-19 RX ORDER — BLOOD-GLUCOSE METER
EACH MISCELLANEOUS
COMMUNITY
Start: 2023-03-19

## 2023-08-02 ENCOUNTER — TELEPHONE (OUTPATIENT)
Dept: HEMATOLOGY ONCOLOGY | Facility: CLINIC | Age: 48
End: 2023-08-02

## 2023-08-02 DIAGNOSIS — D69.3 IDIOPATHIC THROMBOCYTOPENIA (HCC): ICD-10-CM

## 2023-08-02 NOTE — TELEPHONE ENCOUNTER
Medication Refill Request   Who are you speaking with? self   If it is not the patient, are they listed on an active communication consent form? self   Which medication is being requested for refill? Please list medication name and dosage Promacta 75 MG TABS        How many pills does the patient have left? 1   Preferred Pharmacy / Address [address] [place] [address], [address] [place] [id number]   Phone:  [phone]  Fax:  [phone]    Who is the prescribing provider?  Almaz Lobato   Call back number [phone]   Relevant Information Need prior auth to ship refill

## 2023-08-02 NOTE — TELEPHONE ENCOUNTER
Patient Call    Who are you speaking with? self   If it is not the patient, are they listed on an active communication consent form? self   What is the reason for this call? If [facility] could call pharmacy and speak with them, mention [employer] they will release medicine on verbal, could be shipped tonight, leaving Friday morning on trip. Only 1 pill remains. Does this require a call back? yes   If a call back is required, please list best call back number [phone]   If a call back is required, advise that a message will be forwarded to their care team and someone will return their call as soon as possible. Did you relay this information to the patient?  yes

## 2023-08-02 NOTE — TELEPHONE ENCOUNTER
Called pharmacy. Medication is requiring a prior authorization via [employer]. I did email the finance team and let them know this needs to be shipped out to him as soon as possible as he is leaving for a trip on Friday and only has 1 pill left. I left a detailed message on the patient's voicemail letting him know and left my teams number in case he needs to call me back.

## 2023-08-03 ENCOUNTER — TELEPHONE (OUTPATIENT)
Dept: HEMATOLOGY ONCOLOGY | Facility: CLINIC | Age: 48
End: 2023-08-03

## 2023-08-03 NOTE — TELEPHONE ENCOUNTER
Promacta authorization approved. Not sure if this is going to be shipped to him in time. I asked him to call me back so that we can figure out how to get him promacta samples to hold him over until he gets his prescription in the mail.

## 2023-08-03 NOTE — TELEPHONE ENCOUNTER
Spoke to patient. He said he called the pharmacy last night and knows it was approved but has not been shipped yet because there is another form that was faxed to us yesterday afternoon that needs to be signed and sent back. I let him know that I would reach out to finance to check on this. Offered him samples. He said he is closest to the [facility], however, he is unsure how he will be able to pick these up. He silke try to get there today or have someone pick them up for him.

## 2023-08-03 NOTE — TELEPHONE ENCOUNTER
Update from the pharmacy, they will be reaching out to patient to set up delivery. I did let him know that samples are available to him at the [facility] office if he is able to make it there today. Patient verbalized understanding and knows to call if he needs anything.

## 2023-08-03 NOTE — TELEPHONE ENCOUNTER
Patient Call    Who are you speaking with? Patient    If it is not the patient, are they listed on an active communication consent form? N/A   What is the reason for this call? Pt says insurance need authorization for his promacta refill. He will be leaving for vacation tomorrow for 10 days   Does this require a call back? Yes   If a call back is required, please list best call back number [phone]   If a call back is required, advise that a message will be forwarded to their care team and someone will return their call as soon as possible. Did you relay this information to the patient?  Yes

## 2023-11-06 NOTE — PATIENT INSTRUCTIONS
Labs at your earliest [address] (semaglutide)  - Start with 0 25 mg subcutaneous injections weekly x 4 weeks  - Then 0 5 mg subcutaneous injections weekly x 4 weeks   - Then 1 mg subcutaneous injections weekly x 4 weeks  - Then 2 mg subcutaneous injections weekly   Contact me as you as you get your prescription to review administration instructions  3  Schedule MRI for 5/2023  4  Next EGD due 10/2024    For patients with fatty liver disease, weight loss through diet and exercise is recommended  To MAINTAIN weight you must use up at least as many calories as you take in  To LOSE weight you must use up more calories than you take in  Start by knowing how many calories you should be eating and drinking to maintain your weight  Nutrition and calorie information on food labels is typically based on a 2,000 calorie diet  You may need fewer or more calories depending on several factors including age, gender, and level of physical activity  Increase the amount and intensity of your physical activity to match the number of calories you take in  Aim for at least 150 minutes of moderate physical activity or 75 minutes of vigorous physical activity - or an equal combination of both - each week  Regular physical activity can help you maintain your weight, keep off weight that you lose and help you reach physical and cardiovascular fitness  If it's hard to schedule regular exercise sessions, try aiming for sessions of at least 10 minutes spread throughout the week  As you make daily food choices, base your eating pattern on these recommendations:   Eat a variety of fresh, frozen and canned vegetables and fruits without high-calorie sauces or added salt and sugars  Replace high-calorie foods with fruits and vegetables  Choose fiber-rich whole grains for most grain servings  Choose poultry and fish without skin and prepare them in healthy ways without added saturated and trans fat   If you Please schedule patient for appointment with me in March for HTN/HLD/PreDM.  Fasting labs to be done one week prior.   choose to eat meat, look for the leanest cuts available and prepare them in healthy and delicious ways  Eat a variety of fish at least twice a week, especially fish containing omega-3 fatty acids (for example, salmon, trout and herring)  Select fat-free (skim) and low-fat (1%) dairy products  Avoid foods containing partially hydrogenated vegetable oils to reduce trans fat in your diet  Limit saturated fat and trans fat and replace them with the better fats, monounsaturated and polyunsaturated  If you need to lower your blood cholesterol, reduce saturated fat to no more than 5 to 6 percent of total calories  For someone eating 2,000 calories a day, that's about 13 grams of saturated fat  Cut back on beverages and foods with added sugars  Choose foods with less sodium and prepare foods with little or no salt  To prevent fluid problems in the legs and abdomen aim to eat no more than 2,000 milligrams of sodium per day  If you can't meet these goals right now, even reducing sodium intake by 1,000 mg per day can benefit blood pressure  If you drink alcohol, drink in moderation  That means no more than one drink per day if you're a woman and no more than two drinks per day if you're a man  Please note that the effect of alcohol on NAFLD is unclear  Some studies show that moderate alcohol use may be beneficial, but others show no benefit and even harm  Discuss specific recommendations with your hepatologist    Studies have shown that drinking up to 4 cups of caffeinated coffee per day may reduce progression of liver disease and development of liver cancer  If you have cirrhosis of the liver avoid raw shellfish such as oysters and clams   These can carry a specific bacteria that can be very harmful in liver disease    For more information on Fatty Liver Disease and Prevention please see this video from the [facility]: [employer]     For more information on healthy eating, please visit the American Heart Association website: TicketTuesday uy  jsp     For tips on physical activity, please visit the American Heart Association website:   [employer] uk     If you are looking for additional local support, education or are ready to take action to end liver disease, contact the [address]  [facility] is the nation's leading nonprofit focusing on providing high-quality education and support services for the prevention, treatment and cure of over 100 liver diseases  You can learn more by visiting https://SPOTBY.COM/  org/Snipdatlantic/      As discussed in the office, you have a diagnosis of cirrhosis  The main complications of cirrhosis are:   1  Ascites (fluid in the belly)  2  Hepatic Encephalopathy (confusion)  3  Varices (veins in the esophagus that can bleed)  4  Hepatocellular Carcinoma (liver cancer)    Should you develop any new symptoms or have worsening symptoms please contact our office immediately  You should go to the nearest emergency room and call our office immediately if any of the following occur:  1  Temperature of 101 or above  2  Confusion  3  Vomiting blood  4  Multiple black or red stools  5  Shortness of breath  6  Severe diarrhea  7  Vomiting for more than twice in 24 hours     General Instructions:  - You will need a picture of your liver with an ultrasound or CTMRI every 6 months to screen for liver cancer  - Take no more than 2 grams of tylenol in 24 hours  - Do not use any products containing NSAIDs, benzodiazapines, and narcotics    - Avoid all avoid raw shellfish   - Try and participate in daily exercise as to prevent loss of muscle mass  - Abstain from all alcohol intake  - You should ask your primary care to ensure you have been vaccinated for pneumonia and the flu  - Call my office to discuss the risks and benefits of any new medications (prescribed or over the counter) before taking the new medication   - Call my office to discuss any surgeries (elective or emergent) since cirrhotic patients are at an increased risk for surgery in terms of infection, bleeding, and even death  - Call my office if you are ever seen in the ER or admitted to the hospital          Patient will schedule MRI

## 2023-11-30 ENCOUNTER — TELEMEDICINE (OUTPATIENT)
Age: 48
End: 2023-11-30
Payer: COMMERCIAL

## 2023-11-30 ENCOUNTER — TELEPHONE (OUTPATIENT)
Age: 48
End: 2023-11-30

## 2023-11-30 DIAGNOSIS — D50.0 IRON DEFICIENCY ANEMIA DUE TO CHRONIC BLOOD LOSS: Primary | ICD-10-CM

## 2023-11-30 DIAGNOSIS — R16.1 SPLENOMEGALY: ICD-10-CM

## 2023-11-30 DIAGNOSIS — K76.0 NAFLD (NONALCOHOLIC FATTY LIVER DISEASE): ICD-10-CM

## 2023-11-30 DIAGNOSIS — D69.3 CHRONIC ITP (IDIOPATHIC THROMBOCYTOPENIC PURPURA) (HCC): ICD-10-CM

## 2023-11-30 DIAGNOSIS — E53.8 B12 DEFICIENCY: ICD-10-CM

## 2023-11-30 DIAGNOSIS — E11.9 TYPE 2 DIABETES MELLITUS WITHOUT COMPLICATION, WITHOUT LONG-TERM CURRENT USE OF INSULIN (HCC): Primary | ICD-10-CM

## 2023-11-30 DIAGNOSIS — D61.818 PANCYTOPENIA (HCC): Primary | ICD-10-CM

## 2023-11-30 DIAGNOSIS — D69.3 IDIOPATHIC THROMBOCYTOPENIA (HCC): ICD-10-CM

## 2023-11-30 PROCEDURE — 99442 PR PHYS/QHP TELEPHONE EVALUATION 11-20 MIN: CPT | Performed by: NURSE PRACTITIONER

## 2023-11-30 RX ORDER — ELTROMBOPAG OLAMINE 75 MG/1
TABLET, FILM COATED ORAL
Qty: 30 TABLET | Refills: 3 | OUTPATIENT
Start: 2023-11-30

## 2023-11-30 RX ORDER — GLIMEPIRIDE 4 MG/1
4 TABLET ORAL DAILY
Qty: 90 TABLET | Refills: 0 | Status: SHIPPED | OUTPATIENT
Start: 2023-11-30

## 2023-11-30 NOTE — TELEPHONE ENCOUNTER
Patient has not been seen sine 12/22!! He is an [facility] patient and needs to be seen by a provider ASAP!  I am willing to do virtual in needed but he needs to be seen

## 2023-11-30 NOTE — TELEPHONE ENCOUNTER
Received refill request for patient's promacta. Per Gui Pan, he needs an ASAP appointment. He has not been seen in over a year because he was lost to follow up after Dr. Xiang Pritchett left. I did call and leave a message on his voicemail letting him know that Gui Pan has a couple openings today at [facility] and she is willing to do a virtual visit if he is unable to travel. Lab orders placed. Left my teams number so that he can call me back to get him scheduled.

## 2023-11-30 NOTE — PROGRESS NOTES
HEMATOLOGY / [address] [place] [address] FOLLOW UP NOTE    Primary Care Provider: Jocelyne Medina MD  Referring Provider:    MRN: [id number]  : 1975    Reason for Encounter:     Telemedicine consent    Patient: Damaso Burciaga  Provider: AMARJIT Gao  Provider located at [address]  [phone]    The patient was identified by name and date of birth. Damaso Burciaga was informed that this is a telemedicine visit and that the visit is being conducted through Telephone. My office door was closed. No one else was in the room. He acknowledged consent and understanding of privacy and security of the video platform. The patient has agreed to participate and understands they can discontinue the visit at any time. Patient is aware this is a billable service. Verification of patient location:     Patient is currently located in the [facility]  Patient is currently located in a state in which I am licensed        Oncology History   Tara FosterRachel Don is a 50 yo male who was referred to hematology for low platelet count, seen initially in 2016. He also has a low WBC and h/o splenomegaly. Work up included a bone marrow biopsy which was normal and PET CT scan to evaluate spleen this was negative for uptake and r/o splenic lymphoma. He has been treated with high dose steroids and Rituxan. Currently maintained on Promacta for ITP     Repeat BMBx was done on 22 for worsening cytopenias showed a normocellular marrow with megakaryocytic hyperplasia with no evidence of leukemia or lymphoma decreased iron stores no reticulin fibrosis no mutational abnormalities noted by NGS. According to report these findings are compatible with ITP or platelet destruction      Interval History: Patient is being seen virtually for follow-up.   He has been maintained on Promacta 75 mg daily. Last seen on 12/15/2022. He has been lost to follow-up. He is following with hepatology for NAFLD and is due for repeat MRI of abdomen  No recent blood work. Labs done in June 2023 demonstrated stable pancytopenia. White count 2, hemoglobin 12.5, , platelet count 80, ANC 1.1  Iron panel reflective of anemia of chronic disease/inflammation. T. bili stable 3.2    He states he has been compliant with Promacta daily. He was running out of refills and called for refill which prompted our virtual visit today. He states he is in his normal state of health. Denies any fever/chills. No abdominal pain. No bleeding or bruising. He does have some fatigue    REVIEW OF SYSTEMS:  Please note that a 14-point review of systems was performed to include Constitutional, HEENT, Respiratory, CVS, GI, , Musculoskeletal, Integumentary, Neurologic, Rheumatologic, Endocrinologic, Psychiatric, Lymphatic, and Hematologic/Oncologic systems were reviewed and are negative unless otherwise stated in HPI. Positive and negative findings pertinent to this evaluation are incorporated into the history of present illness.           PROBLEM LIST:  Patient Active Problem List   Diagnosis    Acquired thrombocytopenia (HCC)    Elevated LFTs    Fatty liver    GERD (gastroesophageal reflux disease)    Tick bite    Acute upper respiratory infection    Diabetes (HCC)    Idiopathic thrombocytopenia (HCC)    Elevated MCV    Family history of thrombocytopenia    Splenomegaly    Chronic ITP (idiopathic thrombocytopenic purpura) (HCC)    Leukopenia    Concussion with loss of consciousness of 30 minutes or less    BMI 37.0-37.9, adult    Edema of both lower legs    Pancytopenia (HCC)    BMI 39.0-39.9,adult    Pain in both feet    Essential hypertension    Hypocalcemia    Heart murmur    Vitamin D deficiency    BMI 38.0-38.9,adult    Venous stasis dermatitis of both lower extremities    Anemia, unspecified    Iron deficiency anemia due to chronic blood loss    B12 deficiency    Portal hypertension (HCC)    BMI 36.0-36.9,adult    Abdominal pain    Nausea and vomiting    Liver cirrhosis (HCC)    COVID-19    NAFLD (nonalcoholic fatty liver disease)    Other cirrhosis of liver (HCC)       Assessment / Plan:  1. Pancytopenia ([address])    2. Idiopathic thrombocytopenia (HCC)    3. Splenomegaly    4. NAFLD (nonalcoholic fatty liver disease)      Patient is a 50-year-old gentleman with a history of ITP and chronic cytopenias. He has undergone bone marrow evaluation and results have been compatible with ITP. He was also noted to have low iron stores  He has been treated with a course of IV Venofer in the past and is currently maintained on Promacta 75 mg daily. He also has history of [place] cirrhosis. Patient's cytopenias are multifactorial and secondary to history of ITP, splenomegaly, cirrhosis. Blood work done in June demonstrated stable blood counts. He is getting low on his Promacta. I did supply refill today. I did remind him of importance of regular blood work and routine follow-up with hematology. I will place orders for labs today and he is instructed to have done and I will call him with results. Patient will need transition of care appointment at [address] [place] [address] in 3 months. He will continue to follow closely with hepatology and is instructed to schedule his abdominal MRI that is overdue. He verbalized understanding and was in agreement with this plan of care. I spent 20 minutes on chart review, face to face counseling time, coordination of care and documentation.     Past Medical History:   has a past medical history of BMI 36.0-36.9,adult (12/6/2022), BMI 37.0-37.9, adult (04/22/2021), BMI 38.0-38.9,adult (02/08/2022), BMI 39.0-39.9,adult (12/16/2021), Cervicalgia, Colon polyp, Concussion with loss of consciousness of 30 minutes or less (03/07/2021), COVID-19 (3/3/2023), Depression, Diabetes mellitus ([address]), Diabetes mellitus out of control, Edema of both lower legs (12/16/2021), Elevated liver enzymes, Erectile dysfunction, Exposure to COVID-19 virus, Fatty liver, GERD (gastroesophageal reflux disease), Heart murmur (01/07/2022), Hyperglycemia, Hypertension, Left cervical radiculopathy, Left elbow pain, Left foot pain, Leukopenia (03/07/2021), NAFLD (nonalcoholic fatty liver disease) (3/17/2023), Other cirrhosis of liver ([address]) (3/17/2023), Pain in both feet (12/16/2021), Pancytopenia ([address]) (12/16/2021), Skin rash (12/16/2021), Thrombocytopenia ([address]), Venous stasis dermatitis of both lower extremities (05/13/2022), and Vitamin D deficiency (01/07/2022). PAST SURGICAL HISTORY:   has a past surgical history that includes Cholecystectomy (1990's); [place] tooth extraction; IR biopsy bone marrow (03/20/2019); EGD (07/10/2014); Colonoscopy (06/2005); Colonoscopy; Upper gastrointestinal endoscopy; and IR biopsy bone marrow (7/29/2022).     CURRENT MEDICATIONS  Current Outpatient Medications   Medication Sig Dispense Refill    eltrombopag (Promacta) 75 MG TABS Take 1 tablet by mouth once daily on an empty stomach at least 1 hour before or 2 hours after a meal. 30 tablet 3    Blood Glucose Monitoring Suppl (Contour Next One) KIT USE AS DIRECTED TO TEST TWICE DAILY      carvedilol (COREG) 6.25 mg tablet Take 1 tablet (6.25 mg total) by mouth 2 (two) times a day with meals 180 tablet 3    glimepiride (AMARYL) 4 mg tablet Take 4 mg by mouth every morning before breakfast      losartan (COZAAR) 25 mg tablet TAKE 1 TABLET BY MOUTH  DAILY 90 tablet 3    Microlet Lancets MISC 2 (two) times a day Use to test      moxifloxacin (VIGAMOX) 0.5 % ophthalmic solution INSTILL 1 DROP IN THE RIGHT EYE EVERY 1 HOUR TODAY ONLY THEN 4 TIMES DAILY FOR 1 WEEK      omeprazole (PriLOSEC) 40 MG capsule Take 1 capsule (40 mg total) by mouth daily 90 capsule 3    OneTouch Verio test strip USE TO TEST ONCE DAILY 25 strip 3    semaglutide, 2 mg/dose, (Ozempic, 2 MG/DOSE,) 8 mg/ mL injection pen Inject 0.75 mL (2 mg total) under the skin every 7 days for 90 doses 3 mL 20    torsemide (DEMADEX) 20 mg tablet TAKE 1 TABLET (20 MG TOTAL) BY MOUTH IN THE MORNING AND 1 TABLET (20 MG TOTAL) BEFORE BEDTIME. 60 tablet 11    Vitamin D, Cholecalciferol, 50 MCG (2000 UT) CAPS Take 1 tablet by mouth in the morning      zinc gluconate 50 mg tablet Take 50 mg by mouth daily       No current facility-administered medications for this visit. [unfilled]    SOCIAL HISTORY:   reports that he has quit smoking. His smoking use included cigarettes. He quit smokeless tobacco use about 9 years ago. He reports that he does not currently use alcohol. He reports that he does not currently use drugs. FAMILY HISTORY:  family history includes Coronary artery disease in his mother; Heart attack in his mother; Hypertension in his brother, father, and mother; No Known Problems in his brother, brother, and son; Testicular cancer in his father; Thrombocytopenia in his mother. ALLERGIES:  is allergic to aspirin, diltiazem, and lisinopril. Physical Exam:  Vital Signs:   Visit Vitals  Smoking Status Former     There is no height or weight on file to calculate BMI. There is no height or weight on file to calculate BSA.     Physical Exam    Labs:  Lab Results   Component Value Date    WBC 2.0 (L) 06/10/2023    HGB 12.5 (L) 06/10/2023    HCT 36.3 (L) 06/10/2023    .3 (H) 06/10/2023    PLT 80 (L) 06/10/2023     Lab Results   Component Value Date     04/05/2017    SODIUM 139 06/10/2023    K 3.9 06/10/2023     06/10/2023    CO2 25 06/10/2023    AGAP 7 01/17/2023    BUN 20 06/10/2023    CREATININE 0.99 06/10/2023    GLUC 126 (H) 06/10/2023    GLUF 116 (H) 05/04/2021    CALCIUM 8.8 06/10/2023    AST 38 06/10/2023    ALT 29 06/10/2023    ALKPHOS 83 06/10/2023    PROT 6.4 04/05/2017    TP 6.4 06/10/2023    BILITOT 1.0 04/05/2017    TBILI 3.2 (H) 06/10/2023    EGFR 94 06/10/2023

## 2023-11-30 NOTE — TELEPHONE ENCOUNTER
Appointment Schedule   Who are you speaking with? Patient   If it is not the patient, are they listed on an active communication consent form? N/A   Which provider is the appointment scheduled with? AMARJIT Vivas   At which location is the appointment scheduled for? Virtual   When is the appointment scheduled? Please list date and time 11/30/2023 @1PM   What is the reason for this appointment? Requested by provider   Did patient voice understanding of the details of this appointment? Yes   Was the no show policy reviewed with patient?  Yes

## 2023-12-01 ENCOUNTER — TELEPHONE (OUTPATIENT)
Dept: HEMATOLOGY ONCOLOGY | Facility: CLINIC | Age: 48
End: 2023-12-01

## 2023-12-01 DIAGNOSIS — K21.9 GASTROESOPHAGEAL REFLUX DISEASE WITHOUT ESOPHAGITIS: ICD-10-CM

## 2023-12-01 RX ORDER — OMEPRAZOLE 40 MG/1
40 CAPSULE, DELAYED RELEASE ORAL DAILY
Qty: 90 CAPSULE | Refills: 1 | Status: SHIPPED | OUTPATIENT
Start: 2023-12-01

## 2023-12-01 NOTE — TELEPHONE ENCOUNTER
Appointment Schedule   Who are you speaking with? Patient   If it is not the patient, are they listed on an active communication consent form? N/A   Which provider is the appointment scheduled with? Tigre Fonseca PA-C   At which location is the appointment scheduled for? [facility]   When is the appointment scheduled? Please list date and time 2/23/2024 8am Tigre Fonseca    What is the reason for this appointment? Inbasket message:Mary Wong, [facility]  Patient will require transfer of care appointment at [facility]. History of pancytopenia, ITP. Was a former patient of mathieu and Dr. Kyra Garcia. Please set up with team Guerita/Tamiko in approximately 3 months   Did patient voice understanding of the details of this appointment? Yes   Was the no show policy reviewed with patient?  Yes

## 2023-12-13 ENCOUNTER — TELEPHONE (OUTPATIENT)
Age: 48
End: 2023-12-13

## 2023-12-13 NOTE — TELEPHONE ENCOUNTER
----- Message from Reynaldo sent at 12/13/2023  9:39 AM EST -----  Recent blood work is in a stable range.   He should continue on current dose of Promacta and follow-up as planned

## 2023-12-13 NOTE — TELEPHONE ENCOUNTER
Left detailed message on patient's mobile voicemail relaying Klaudia's message below. Left my teams number in case he needs to call back.

## 2024-01-04 ENCOUNTER — TELEPHONE (OUTPATIENT)
Age: 49
End: 2024-01-04

## 2024-01-04 NOTE — TELEPHONE ENCOUNTER
Patients GI provider:  Dr. Woodruff    Number to return call: [phone]    Reason for call: Pt calling because he received a notice that his MRI was denied. He would like to know what the next step is.    Scheduled procedure/appointment date if applicable: N/A

## 2024-01-04 NOTE — TELEPHONE ENCOUNTER
Pt called stating he just spoke to a PA who advised if he could get MRI back on the schedule for tomorrow they would provide auth code. Please call pt or radiology asap with this information

## 2024-01-05 ENCOUNTER — HOSPITAL ENCOUNTER (OUTPATIENT)
Dept: MRI IMAGING | Facility: HOSPITAL | Age: 49
Discharge: HOME | End: 2024-01-05
Attending: STUDENT IN AN ORGANIZED HEALTH CARE EDUCATION/TRAINING PROGRAM
Payer: COMMERCIAL

## 2024-01-05 DIAGNOSIS — K76.0 NAFLD (NONALCOHOLIC FATTY LIVER DISEASE): ICD-10-CM

## 2024-01-05 DIAGNOSIS — K76.9 LIVER LESION: ICD-10-CM

## 2024-01-05 DIAGNOSIS — K74.60 CIRRHOSIS OF LIVER WITHOUT ASCITES, UNSPECIFIED HEPATIC CIRRHOSIS TYPE (HCC): ICD-10-CM

## 2024-01-05 PROCEDURE — A9585 GADOBUTROL INJECTION: HCPCS | Performed by: STUDENT IN AN ORGANIZED HEALTH CARE EDUCATION/TRAINING PROGRAM

## 2024-01-05 PROCEDURE — G1004 CDSM NDSC: HCPCS

## 2024-01-05 PROCEDURE — 74183 MRI ABD W/O CNTR FLWD CNTR: CPT

## 2024-01-05 RX ORDER — GADOBUTROL 604.72 MG/ML
13 INJECTION INTRAVENOUS
Status: COMPLETED | OUTPATIENT
Start: 2024-01-05 | End: 2024-01-05

## 2024-01-05 RX ADMIN — GADOBUTROL 13 ML: 604.72 INJECTION INTRAVENOUS at 08:15

## 2024-01-05 NOTE — TELEPHONE ENCOUNTER
Spoke to Flo Umaña regarding request for authorization code. Chasidy contacted [facility]AN[facility]inton team and confirmed all information was received. Patient had MRI 7:30 am today.

## 2024-01-12 ENCOUNTER — TRANSCRIBE ORDERS (OUTPATIENT)
Dept: GASTROENTEROLOGY | Facility: CLINIC | Age: 49
End: 2024-01-12

## 2024-01-15 ENCOUNTER — TRANSCRIBE ORDERS (OUTPATIENT)
Dept: GASTROENTEROLOGY | Facility: CLINIC | Age: 49
End: 2024-01-15

## 2024-01-15 DIAGNOSIS — K76.9 LIVER LESION: Primary | ICD-10-CM

## 2024-01-15 NOTE — PROGRESS NOTES
Left VM for patient regarding MRI findings showing stable LR-3 lesions. Recommend repeat MRI in 4-6 months (which has been ordered).

## 2024-01-25 DIAGNOSIS — K76.0 NAFLD (NONALCOHOLIC FATTY LIVER DISEASE): ICD-10-CM

## 2024-01-25 DIAGNOSIS — K74.60 CIRRHOSIS OF LIVER WITHOUT ASCITES, UNSPECIFIED HEPATIC CIRRHOSIS TYPE (HCC): ICD-10-CM

## 2024-01-25 DIAGNOSIS — E11.9 TYPE 2 DIABETES MELLITUS WITHOUT COMPLICATION, WITHOUT LONG-TERM CURRENT USE OF INSULIN (HCC): ICD-10-CM

## 2024-01-26 RX ORDER — SEMAGLUTIDE 2.68 MG/ML
INJECTION, SOLUTION SUBCUTANEOUS
Qty: 3 ML | Refills: 0 | Status: SHIPPED | OUTPATIENT
Start: 2024-01-26

## 2024-01-29 ENCOUNTER — OFFICE VISIT (OUTPATIENT)
Dept: INTERNAL MEDICINE CLINIC | Facility: CLINIC | Age: 49
End: 2024-01-29
Payer: COMMERCIAL

## 2024-01-29 VITALS
DIASTOLIC BLOOD PRESSURE: 70 MMHG | WEIGHT: 290 LBS | OXYGEN SATURATION: 96 % | HEART RATE: 80 BPM | RESPIRATION RATE: 20 BRPM | TEMPERATURE: 99.3 F | BODY MASS INDEX: 37.22 KG/M2 | HEIGHT: 74 IN | SYSTOLIC BLOOD PRESSURE: 128 MMHG

## 2024-01-29 DIAGNOSIS — K76.0 NAFLD (NONALCOHOLIC FATTY LIVER DISEASE): ICD-10-CM

## 2024-01-29 DIAGNOSIS — R60.0 EDEMA OF BOTH LOWER LEGS: ICD-10-CM

## 2024-01-29 DIAGNOSIS — E11.9 TYPE 2 DIABETES MELLITUS WITHOUT COMPLICATION, WITHOUT LONG-TERM CURRENT USE OF INSULIN (HCC): Primary | ICD-10-CM

## 2024-01-29 DIAGNOSIS — D61.818 PANCYTOPENIA (HCC): ICD-10-CM

## 2024-01-29 DIAGNOSIS — D69.3 IDIOPATHIC THROMBOCYTOPENIA (HCC): ICD-10-CM

## 2024-01-29 DIAGNOSIS — K21.9 GASTROESOPHAGEAL REFLUX DISEASE WITHOUT ESOPHAGITIS: ICD-10-CM

## 2024-01-29 DIAGNOSIS — I10 ESSENTIAL HYPERTENSION: ICD-10-CM

## 2024-01-29 DIAGNOSIS — E55.9 VITAMIN D DEFICIENCY: ICD-10-CM

## 2024-01-29 PROCEDURE — 3074F SYST BP LT 130 MM HG: CPT | Performed by: INTERNAL MEDICINE

## 2024-01-29 PROCEDURE — 3078F DIAST BP <80 MM HG: CPT | Performed by: INTERNAL MEDICINE

## 2024-01-29 PROCEDURE — 99213 OFFICE O/P EST LOW 20 MIN: CPT | Performed by: INTERNAL MEDICINE

## 2024-01-29 RX ORDER — MULTIVIT-MIN/IRON/FOLIC ACID/K 18-600-40
1 CAPSULE ORAL DAILY
COMMUNITY

## 2024-01-29 NOTE — PATIENT INSTRUCTIONS
Patient was advised to continue present medications. discussed with the patient medications and laboratory data in detail.  Follow-up with me in 4 months or as advised.  If any blood test was ordered please do 1 week prior to next appointment unless advise to get earlier.  If you have any questions please call the office [phone]  Type 2 Diabetes Management for Adults   AMBULATORY CARE:   Type 2 diabetes  is a disease that affects how your body uses glucose (sugar). Either your body cannot make enough insulin, or it cannot use the insulin correctly. It is important to keep diabetes controlled to prevent damage to your heart, blood vessels, and other organs. Management will help you feel well and enjoy your daily activities. Your diabetes care team providers can help you make a plan to fit diabetes care into your schedule. Your plan can change over time to fit your needs and your family's needs.       Have someone call your local emergency number (911 in the [place]) if:   You cannot be woken.    You have signs of diabetic ketoacidosis:     confusion, fatigue    vomiting    rapid heartbeat    fruity smelling breath    extreme thirst    dry mouth and skin    You have any of the following signs of a heart attack:      Squeezing, pressure, or pain in your chest    You may  also have any of the following:     Discomfort or pain in your back, neck, jaw, stomach, or arm    Shortness of breath    Nausea or vomiting    Lightheadedness or a sudden cold sweat    You have any of the following signs of a stroke:      Numbness or drooping on one side of your face     Weakness in an arm or leg    Confusion or difficulty speaking    Dizziness, a severe headache, or vision loss    Call your doctor or diabetes care team provider if:   You have a sore or wound that will not heal.    You have a change in the amount you urinate.    Your blood sugar levels are higher than your target goals.    You often have lower blood sugar levels than  your target goals.    Your skin is red, dry, warm, or swollen.    You have trouble coping with diabetes, or you feel anxious or depressed.    You have trouble following any part of your care plan, such as your meal plan.    You have questions or concerns about your condition or care.    What you need to know about high blood sugar levels:  High blood sugar levels may not cause any symptoms. You may feel more thirsty or urinate more often than usual. Over time, high blood sugar levels can damage your nerves, blood vessels, tissues, and organs. The following can increase your blood sugar levels:  Large meals or large amounts of carbohydrates at one time    Less physical activity    Stress    Illness    A lower dose of diabetes medicine or insulin, or a late dose    What you need to know about low blood sugar levels:  Symptoms include feeling shaky, dizzy, irritable, or confused. You can prevent symptoms by keeping your blood sugar levels from going too low.  Treat a low blood sugar level right away:      Drink 4 ounces of juice or have 1 tube of glucose gel.    Check your blood sugar level again 10 to 15 minutes later.    When the level goes back to normal, eat a meal or snack to prevent another decrease.       Keep glucose gel, raisins, or hard candy with you at all times to treat a low blood sugar level.     Your blood sugar level can get too low if you take diabetes medicine or insulin and do not eat enough food.     If you use insulin, check your blood sugar level before you exercise.      If your blood sugar level is below 100 mg/dL, eat 4 crackers or 2 ounces of raisins, or drink 4 ounces of juice.    Check your level every 30 minutes if you exercise longer than 1 hour.    You may need a snack during or after exercise.    What you can do to manage your blood sugar levels:   Check your blood sugar levels as directed and as needed.  Several items are available to use to check your levels. You may need to check by  testing a drop of blood in a glucose monitor. You may instead be given a continuous glucose monitoring (CGM) device. The device is worn at all times. The CGM checks your blood sugar level every 5 minutes. It sends results to an electronic device such as a smart phone. A CGM can be used with or without an insulin pump. You and your diabetes care team providers will decide on the best method for you. The goal for blood sugar levels before meals  is between 80 and 130 mg/dL and 2 hours after eating  is lower than 180 mg/dL.            Make healthy food choices.  Work with a dietitian to create a meal plan that works for you and your schedule. A dietitian can help you learn how to eat the right amount of carbohydrates (sugar and starchy foods) during your meals and snacks. Examples of carbohydrates are breads, cereals, rice, pasta, fruit, low-fat dairy, and sweets. Carbohydrates can raise your blood sugar level if you eat too many at one time.         Eat high-fiber foods as directed.  Fiber helps improve blood sugar levels. Fiber also lowers your risk for heart disease and other problems diabetes can cause. Examples of high-fiber foods include vegetables, whole-grain bread, and beans such as balderas beans. Your dietitian can tell you how much fiber to have each day.         Get regular physical activity.  Physical activity can help you get to your target blood sugar level goal and manage your weight. Get at least 150 minutes of moderate to vigorous aerobic physical activity each week. Resistance training, such as lifting weights, should be done 3 times each week. Do not miss more than 2 days of physical activity in a row. Do not sit longer than 30 minutes at a time. Your healthcare provider can help you create an activity plan. The plan can include the best activities for you and can help you build your strength and endurance.            Maintain a healthy weight.  Ask your team what a healthy weight is for you. A  healthy weight can help you control diabetes and prevent heart disease. Ask your team to help you create a weight-loss plan, if needed. Even a loss of 3% to 7% of your excess body weight can help make a difference in managing diabetes. Your team will help you set a weight-loss goal, such as 10 to 15 pounds, or 5% of your extra weight. Together you and your team can set manageable weight-loss goals.    Take your diabetes medicine or insulin as directed.  You may need diabetes medicine, insulin, or both to help control your blood sugar levels. Your healthcare provider will teach you how and when to take your diabetes medicine or insulin. You will also be taught about side effects oral diabetes medicine can cause. Insulin may be injected or given through a pump or pen. You and your providers will decide on the best method for you:    An insulin pump  is an implanted device that gives your insulin 24 hours a day. An insulin pump prevents the need for multiple insulin injections in a day.         An insulin pen  is a device prefilled with the right amount of insulin.         You and your family members will be taught how to draw up and give insulin  if this is the best method for you. Your providers will also teach you how to dispose of needles and syringes.    You will learn how much insulin you need  and when to give it. You will be taught when not to give insulin. You will also be taught what to do if your blood sugar level drops too low. This may happen if you take insulin and do not eat the right amount of carbohydrates.    More ways to manage type 2 diabetes:   Wear medical alert identification.  Wear medical alert jewelry or carry a card that says you have diabetes. Ask your provider where to get these items.         Do not smoke.  Nicotine and other chemicals in cigarettes and cigars can cause lung and blood vessel damage. It also makes it more difficult to manage your diabetes. Ask your provider for information  if you currently smoke and need help to quit. Do not use e-cigarettes or smokeless tobacco in place of cigarettes or to help you quit. They still contain nicotine.    Check your feet each day for cuts, scratches, calluses, or other wounds.  Look for redness and swelling, and feel for warmth. Wear shoes that fit well. Check your shoes for rocks or other objects that can hurt your feet. Do not walk barefoot or wear shoes without socks. Wear cotton socks to help keep your feet dry.         Ask about vaccines you may need.  You have a higher risk for serious illness if you get the flu, pneumonia, COVID-19, or hepatitis. Ask your provider if you should get vaccines to prevent these or other diseases, and when to get the vaccines.    Talk to your provider if you become stressed about diabetes care.  Sometimes being able to fit diabetes care into your life can cause increased stress. The stress can cause you not to take care of yourself properly. Your provider can help by offering tips about self-care. A mental health provider can listen and offer help with self-care issues. Other types of counseling can help you make nutrition or physical activity changes.    Have your A1c checked as directed.  Your provider may check your A1c every 3 months, or 2 times each year if your diabetes is controlled. An A1c test shows the average amount of sugar in your blood over the past 2 to 3 months. Your provider will tell you what your A1c level should be.    Have screening tests as directed.  Your provider may recommend screening for complications of diabetes and other conditions that may develop. Some screenings may begin right away and some may happen within the first 5 years of diagnosis:    Examples of diabetes complications  include kidney problems, high cholesterol, high blood pressure, blood vessel problems, eye problems, and sleep apnea.    You may be screened for a low vitamin B level  if you take oral diabetes medicine for a  long time.    You may be screened for polycystic ovarian syndrome (PCOS)  if you are of childbearing age.    Follow up with your doctor or diabetes care team providers as directed:  You may need to have blood tests done before your follow-up visit. The test results will show if changes need to be made in your treatment or self-care. Talk to your provider if you cannot afford your medicine. Write down your questions so you remember to ask them during your visits.  © Copyright [employer] 2023 Information is for End User's use only and may not be sold, redistributed or otherwise used for commercial purposes.  The above information is an  only. It is not intended as medical advice for individual conditions or treatments. Talk to your doctor, nurse or pharmacist before following any medical regimen to see if it is safe and effective for you.

## 2024-01-29 NOTE — ASSESSMENT & PLAN NOTE
Patient  was advised to decrease portion size.  Advised to decrease carb, sugar, cholesterol intake.  Advised to exercise 3-5 times per week.  Advised to lose weight.

## 2024-01-29 NOTE — PROGRESS NOTES
Assessment/Plan:    1. Type 2 diabetes mellitus without complication, without long-term current use of insulin (HCC)  -     Comprehensive metabolic panel; Future  -     Lipid panel; Future  -     Hemoglobin A1C; Future  -     Albumin / creatinine urine ratio; Future  -     Hemoglobin A1C; Future; Expected date: 04/29/2024    2. Gastroesophageal reflux disease without esophagitis    3. NAFLD (nonalcoholic fatty liver disease)  -     Comprehensive metabolic panel; Future  -     Lipid panel; Future    4. Pancytopenia (HCC)    5. Idiopathic thrombocytopenia (HCC)    6. BMI 37.0-37.9, adult  Assessment & Plan:  Patient  was advised to decrease portion size.  Advised to decrease carb, sugar, cholesterol intake.  Advised to exercise 3-5 times per week.  Advised to lose weight.      7. Edema of both lower legs  -     Comprehensive metabolic panel; Future    8. Vitamin D deficiency  -     Comprehensive metabolic panel; Future  -     Vitamin D 25 hydroxy; Future    9. Essential hypertension  -     Comprehensive metabolic panel; Future  -     Lipid panel; Future  -     UA (URINE) with reflex to Scope; Future       Last hemoglobin A1c of 5.5 June 2023.  Patient said he has not been checking his blood sugar regularly at home.  He takes glimepiride 4 mg daily as he is not able to get Ozempic regularly per patient..  He has been prescribed Ozempic by his liver specialist but not able to get regularly this medication due to short supply per patient.  Out of 6-month he took Ozempic 3 months.  Will check a hemoglobin A1c.  Discussed with the patient if unable to get Ozempic we can try Rybelsus.  He will let me know.  Advised to continue 1800-calorie ADA diet.  For GE reflux he takes omeprazole which is effective and has been watching diet.  Has been seen and followed by hepatologist for his nonalcoholic fatty liver disease.  He recently had MRI has a liver lesion will need follow-up MRI as advised by hepatologist.  He has been seen  and followed by him at oncologist for his pancytopenia and thrombocytopenia.  Edema of legs much better after being on torsemide.  Vitamin D deficiency he takes vitamin D supplement will follow vitamin D level and advise accordingly.  Last vitamin D level was 58 pressure well-controlled advised to continue present medications and low-salt diet.   he has been on Ozempic for last 6-months, took 3 months out of 6 months  due to short supply but not able to lose weight.  Patient has been seen and followed by eye doctor regularly per patient.    Patient stated he got his influenza vaccination as well as COVID vaccination.    Subjective: Patient present for follow-up.      Patient ID: Wilver Quintanilla is a 48 y.o. male.    Medication Refill  Pertinent negatives include no abdominal pain, arthralgias, chest pain, chills, congestion, coughing, fever, headaches, myalgias, nausea, neck pain, rash, sore throat, vomiting or weakness.     48-year-old white male patient presents to follow-up his medical problems.  He denies any chest pain, shortness of breath, pain in abdomen.  Denies any cough, fever, chills.  Denies nausea vomiting diarrhea.  He has been seen and followed by hepatologist, [facility] oncologist.    The following portions of the patient's history were reviewed and updated as appropriate:     Past Medical History:  He has a past medical history of BMI 36.0-36.9,adult (12/6/2022), BMI 37.0-37.9, adult (04/22/2021), BMI 38.0-38.9,adult (02/08/2022), BMI 39.0-39.9,adult (12/16/2021), Cervicalgia, Colon polyp, Concussion with loss of consciousness of 30 minutes or less (03/07/2021), COVID-19 (3/3/2023), Depression, Diabetes mellitus (HCC), Diabetes mellitus out of control, Edema of both lower legs (12/16/2021), Elevated liver enzymes, Erectile dysfunction, Exposure to COVID-19 virus, Fatty liver, GERD (gastroesophageal reflux disease), Heart murmur (01/07/2022), Hyperglycemia, Hypertension, Left cervical radiculopathy, Left  elbow pain, Left foot pain, Leukopenia (03/07/2021), NAFLD (nonalcoholic fatty liver disease) (3/17/2023), Other cirrhosis of liver (HCC) (3/17/2023), Pain in both feet (12/16/2021), Pancytopenia (HCC) (12/16/2021), Skin rash (12/16/2021), Thrombocytopenia (HCC), Venous stasis dermatitis of both lower extremities (05/13/2022), and Vitamin D deficiency (01/07/2022).,  _______________________________________________________________________  Past Surgical History:   has a past surgical history that includes Cholecystectomy (1990's); [place] tooth extraction; IR biopsy bone marrow (03/20/2019); EGD (07/10/2014); Colonoscopy (06/2005); Colonoscopy; Upper gastrointestinal endoscopy; and IR biopsy bone marrow (7/29/2022).,  _______________________________________________________________________  Family History:  family history includes Coronary artery disease in his mother; Heart attack in his mother; Hypertension in his brother, father, and mother; No Known Problems in his brother, brother, and son; Testicular cancer in his father; Thrombocytopenia in his mother.,  _______________________________________________________________________  Social History:   reports that he has quit smoking. His smoking use included cigarettes. He quit smokeless tobacco use about 9 years ago. He reports that he does not currently use alcohol. He reports that he does not currently use drugs.,  _______________________________________________________________________  Allergies:  is allergic to aspirin, diltiazem, and lisinopril..  _______________________________________________________________________  Current Outpatient Medications   Medication Sig Dispense Refill    Blood Glucose Monitoring Suppl (Contour Next One) KIT USE AS DIRECTED TO TEST TWICE DAILY      carvedilol (COREG) 6.25 mg tablet Take 1 tablet (6.25 mg total) by mouth 2 (two) times a day with meals 180 tablet 3    eltrombopag (Promacta) 75 MG TABS Take 1 tablet by mouth once daily  on an empty stomach at least 1 hour before or 2 hours after a meal. 30 tablet 3    glimepiride (AMARYL) 4 mg tablet Take 1 tablet (4 mg total) by mouth daily 90 tablet 0    losartan (COZAAR) 25 mg tablet TAKE 1 TABLET BY MOUTH  DAILY 90 tablet 3    Microlet Lancets MISC 2 (two) times a day Use to test      omeprazole (PriLOSEC) 40 MG capsule TAKE 1 CAPSULE BY MOUTH DAILY 90 capsule 1    OneTouch Verio test strip USE TO TEST ONCE DAILY 25 strip 3    Ozempic, 2 MG/DOSE, 8 MG/3ML injection pen INJECT 0.75 ML (2 MG TOTAL) UNDER THE SKIN EVERY 7 DAYS FOR 90 DOSES 3 mL 0    torsemide (DEMADEX) 20 mg tablet TAKE 1 TABLET (20 MG TOTAL) BY MOUTH IN THE MORNING AND 1 TABLET (20 MG TOTAL) BEFORE BEDTIME. 60 tablet 11    Vitamin D, Cholecalciferol, 50 MCG (2000 UT) CAPS Take 1 Units by mouth in the morning      zinc gluconate 50 mg tablet Take 50 mg by mouth daily       No current facility-administered medications for this visit.     _______________________________________________________________________  Review of Systems   Constitutional:  Negative for chills and fever.   HENT:  Negative for congestion, ear pain, hearing loss, nosebleeds, sinus pain, sore throat and trouble swallowing.    Eyes:  Negative for discharge, redness and visual disturbance.   Respiratory:  Negative for cough, chest tightness and shortness of breath.    Cardiovascular:  Negative for chest pain and palpitations.   Gastrointestinal:  Negative for abdominal pain, blood in stool, constipation, diarrhea, nausea and vomiting.   Genitourinary:  Negative for dysuria, flank pain and hematuria.   Musculoskeletal:  Negative for arthralgias, myalgias and neck pain.   Skin:  Negative for color change and rash.   Neurological:  Negative for dizziness, speech difficulty, weakness and headaches.   Hematological:  Does not bruise/bleed easily.   Psychiatric/Behavioral:  Negative for agitation and behavioral problems.            Objective:  Vitals:    01/29/24 1410  "  BP: 128/70   BP Location: Left arm   Patient Position: Sitting   Cuff Size: Large   Pulse: 80   Resp: 20   Temp: 99.3 °F (37.4 °C)   TempSrc: Temporal   SpO2: 96%   Weight: 132 kg (290 lb)   Height: 6' 2\" (1.88 m)     Body mass index is 37.23 kg/m².     Physical Exam  Vitals and nursing note reviewed.   Constitutional:       General: He is not in acute distress.     Appearance: He is obese.   HENT:      Head: Normocephalic and atraumatic.      Right Ear: Ear canal and external ear normal.      Left Ear: Ear canal and external ear normal.      Nose: Nose normal.      Mouth/Throat:      Mouth: Mucous membranes are moist.   Eyes:      General: No scleral icterus.        Right eye: No discharge.         Left eye: No discharge.      Extraocular Movements: Extraocular movements intact.      Conjunctiva/sclera: Conjunctivae normal.   Cardiovascular:      Rate and Rhythm: Normal rate and regular rhythm.      Pulses: Normal pulses.      Heart sounds: Normal heart sounds. No murmur heard.  Pulmonary:      Effort: Pulmonary effort is normal. No respiratory distress.      Breath sounds: Normal breath sounds. No wheezing, rhonchi or rales.   Abdominal:      General: Bowel sounds are normal.      Palpations: Abdomen is soft.      Tenderness: There is no abdominal tenderness.   Musculoskeletal:         General: Normal range of motion.      Cervical back: Normal range of motion and neck supple. No muscular tenderness.      Right lower leg: Edema (Has trace pedal edema.) present.      Left lower leg: Edema (Has a trace pedal edema.) present.   Skin:     General: Skin is warm.      Findings: No rash.   Neurological:      General: No focal deficit present.      Mental Status: He is alert and oriented to person, place, and time.      Motor: No weakness.      Coordination: Coordination normal.   Psychiatric:         Mood and Affect: Mood normal.         Behavior: Behavior normal.           "

## 2024-02-04 LAB
25(OH)D3 SERPL-MCNC: 35 NG/ML (ref 30–100)
ALBUMIN SERPL-MCNC: 4 G/DL (ref 3.6–5.1)
ALBUMIN/CREAT UR: 7 MCG/MG CREAT
ALBUMIN/GLOB SERPL: 1.6 (CALC) (ref 1–2.5)
ALP SERPL-CCNC: 75 U/L (ref 36–130)
ALT SERPL-CCNC: 28 U/L (ref 9–46)
APPEARANCE UR: CLEAR
AST SERPL-CCNC: 36 U/L (ref 10–40)
BACTERIA UR QL AUTO: ABNORMAL /HPF
BASOPHILS # BLD AUTO: 11 CELLS/UL (ref 0–200)
BASOPHILS NFR BLD AUTO: 0.5 %
BILIRUB SERPL-MCNC: 2.6 MG/DL (ref 0.2–1.2)
BILIRUB UR QL STRIP: NEGATIVE
BUN SERPL-MCNC: 17 MG/DL (ref 7–25)
BUN/CREAT SERPL: ABNORMAL (CALC) (ref 6–22)
CALCIUM SERPL-MCNC: 8.9 MG/DL (ref 8.6–10.3)
CHLORIDE SERPL-SCNC: 109 MMOL/L (ref 98–110)
CHOLEST SERPL-MCNC: 123 MG/DL
CHOLEST/HDLC SERPL: 3.1 (CALC)
CO2 SERPL-SCNC: 26 MMOL/L (ref 20–32)
COLOR UR: ABNORMAL
CREAT SERPL-MCNC: 0.97 MG/DL (ref 0.6–1.29)
CREAT UR-MCNC: 236 MG/DL (ref 20–320)
EOSINOPHIL # BLD AUTO: 80 CELLS/UL (ref 15–500)
EOSINOPHIL NFR BLD AUTO: 3.8 %
ERYTHROCYTE [DISTWIDTH] IN BLOOD BY AUTOMATED COUNT: 14.4 % (ref 11–15)
GFR/BSA.PRED SERPLBLD CYS-BASED-ARV: 96 ML/MIN/1.73M2
GLOBULIN SER CALC-MCNC: 2.5 G/DL (CALC) (ref 1.9–3.7)
GLUCOSE SERPL-MCNC: 108 MG/DL (ref 65–99)
GLUCOSE UR QL STRIP: NEGATIVE
HBA1C MFR BLD: 5.6 % OF TOTAL HGB
HCT VFR BLD AUTO: 34.7 % (ref 38.5–50)
HDLC SERPL-MCNC: 40 MG/DL
HGB BLD-MCNC: 12 G/DL (ref 13.2–17.1)
HGB UR QL STRIP: ABNORMAL
HYALINE CASTS #/AREA URNS LPF: ABNORMAL /LPF
INR PPP: 1.3
KETONES UR QL STRIP: NEGATIVE
LDLC SERPL CALC-MCNC: 69 MG/DL (CALC)
LEUKOCYTE ESTERASE UR QL STRIP: NEGATIVE
LYMPHOCYTES # BLD AUTO: 525 CELLS/UL (ref 850–3900)
LYMPHOCYTES NFR BLD AUTO: 25 %
MCH RBC QN AUTO: 32.6 PG (ref 27–33)
MCHC RBC AUTO-ENTMCNC: 34.6 G/DL (ref 32–36)
MCV RBC AUTO: 94.3 FL (ref 80–100)
MICROALBUMIN UR-MCNC: 1.6 MG/DL
MONOCYTES # BLD AUTO: 183 CELLS/UL (ref 200–950)
MONOCYTES NFR BLD AUTO: 8.7 %
NEUTROPHILS # BLD AUTO: 1302 CELLS/UL (ref 1500–7800)
NEUTROPHILS NFR BLD AUTO: 62 %
NITRITE UR QL STRIP: NEGATIVE
NONHDLC SERPL-MCNC: 83 MG/DL (CALC)
PH UR STRIP: 7.5 [PH] (ref 5–8)
PLATELET # BLD AUTO: 88 THOUSAND/UL (ref 140–400)
PMV BLD REES-ECKER: 11 FL (ref 7.5–12.5)
POTASSIUM SERPL-SCNC: 3.9 MMOL/L (ref 3.5–5.3)
PROT SERPL-MCNC: 6.5 G/DL (ref 6.1–8.1)
PROT UR QL STRIP: ABNORMAL
PROTHROMBIN TIME: 13.2 SEC (ref 9–11.5)
RBC # BLD AUTO: 3.68 MILLION/UL (ref 4.2–5.8)
RBC #/AREA URNS HPF: ABNORMAL /HPF
SODIUM SERPL-SCNC: 140 MMOL/L (ref 135–146)
SP GR UR STRIP: 1.02 (ref 1–1.03)
SQUAMOUS #/AREA URNS HPF: ABNORMAL /HPF
TRIGL SERPL-MCNC: 67 MG/DL
WBC # BLD AUTO: 2.1 THOUSAND/UL (ref 3.8–10.8)
WBC #/AREA URNS HPF: ABNORMAL /HPF

## 2024-02-06 DIAGNOSIS — R31.29 MICROSCOPIC HEMATURIA: Primary | ICD-10-CM

## 2024-02-09 DIAGNOSIS — K76.6 PORTAL HYPERTENSION (HCC): ICD-10-CM

## 2024-02-09 RX ORDER — CARVEDILOL 6.25 MG/1
6.25 TABLET ORAL 2 TIMES DAILY WITH MEALS
Qty: 60 TABLET | Refills: 5 | Status: SHIPPED | OUTPATIENT
Start: 2024-02-09

## 2024-02-13 DIAGNOSIS — N39.0 URINARY TRACT INFECTION WITHOUT HEMATURIA, SITE UNSPECIFIED: Primary | ICD-10-CM

## 2024-02-13 LAB
APPEARANCE UR: CLEAR
BACTERIA UR QL AUTO: ABNORMAL /HPF
BILIRUB UR QL STRIP: NEGATIVE
COLOR UR: YELLOW
GLUCOSE UR QL STRIP: ABNORMAL
HGB UR QL STRIP: ABNORMAL
HYALINE CASTS #/AREA URNS LPF: ABNORMAL /LPF
KETONES UR QL STRIP: NEGATIVE
LEUKOCYTE ESTERASE UR QL STRIP: NEGATIVE
NITRITE UR QL STRIP: NEGATIVE
PH UR STRIP: 6.5 [PH] (ref 5–8)
PROT UR QL STRIP: NEGATIVE
RBC #/AREA URNS HPF: ABNORMAL /HPF
SP GR UR STRIP: 1.01 (ref 1–1.03)
SQUAMOUS #/AREA URNS HPF: ABNORMAL /HPF
WBC #/AREA URNS HPF: ABNORMAL /HPF

## 2024-02-13 RX ORDER — AMOXICILLIN 500 MG/1
500 CAPSULE ORAL EVERY 8 HOURS SCHEDULED
Qty: 21 CAPSULE | Refills: 0 | Status: SHIPPED | OUTPATIENT
Start: 2024-02-13 | End: 2024-02-20

## 2024-02-14 ENCOUNTER — TELEPHONE (OUTPATIENT)
Dept: GASTROENTEROLOGY | Facility: CLINIC | Age: 49
End: 2024-02-14

## 2024-02-14 NOTE — TELEPHONE ENCOUNTER
----- Message from Mariana Villafuerte PA-C sent at 2/13/2024  9:10 AM EST -----  Clerical team, please call patient and assist him with scheduling a follow-up appointment. He has not been seen by Dr. Woodruff since April 2023.     MELD 3.0: 13 at 2/3/2024 10:12 AM  MELD-Na: 13 at 2/3/2024 10:12 AM  Calculated from:  Serum Creatinine: 0.97 mg/dL (Using min of 1 mg/dL) at 2/3/2024 10:03 AM  Serum Sodium: 140 mmol/L (Using max of 137 mmol/L) at 2/3/2024 10:03 AM  Total Bilirubin: 2.6 mg/dL at 2/3/2024 10:03 AM  Serum Albumin: 4.0 g/dL (Using max of 3.5 g/dL) at 2/3/2024 10:03 AM  INR(ratio): 1.3 at 2/3/2024 10:12 AM  Age at listing (hypothetical): 48 years  Sex: Male at 2/3/2024 10:12 AM

## 2024-02-14 NOTE — TELEPHONE ENCOUNTER
Called patient, lvm to call and schedule F/U OV with Dr. Woodruff. Also lvm for pt's wife, Adry, to call.

## 2024-02-20 ENCOUNTER — TELEPHONE (OUTPATIENT)
Dept: HEMATOLOGY ONCOLOGY | Facility: CLINIC | Age: 49
End: 2024-02-20

## 2024-02-23 ENCOUNTER — TELEPHONE (OUTPATIENT)
Dept: HEMATOLOGY ONCOLOGY | Facility: CLINIC | Age: 49
End: 2024-02-23

## 2024-02-23 ENCOUNTER — OFFICE VISIT (OUTPATIENT)
Dept: HEMATOLOGY ONCOLOGY | Facility: CLINIC | Age: 49
End: 2024-02-23
Payer: COMMERCIAL

## 2024-02-23 VITALS
HEIGHT: 74 IN | RESPIRATION RATE: 17 BRPM | SYSTOLIC BLOOD PRESSURE: 122 MMHG | OXYGEN SATURATION: 94 % | BODY MASS INDEX: 36.96 KG/M2 | WEIGHT: 288 LBS | HEART RATE: 83 BPM | DIASTOLIC BLOOD PRESSURE: 64 MMHG | TEMPERATURE: 98.5 F

## 2024-02-23 DIAGNOSIS — R16.1 SPLENOMEGALY: ICD-10-CM

## 2024-02-23 DIAGNOSIS — K74.69 OTHER CIRRHOSIS OF LIVER (HCC): Primary | ICD-10-CM

## 2024-02-23 DIAGNOSIS — E53.8 B12 DEFICIENCY: ICD-10-CM

## 2024-02-23 DIAGNOSIS — E11.9 TYPE 2 DIABETES MELLITUS WITHOUT COMPLICATION, WITHOUT LONG-TERM CURRENT USE OF INSULIN (HCC): ICD-10-CM

## 2024-02-23 DIAGNOSIS — K76.0 NAFLD (NONALCOHOLIC FATTY LIVER DISEASE): ICD-10-CM

## 2024-02-23 DIAGNOSIS — K74.60 CIRRHOSIS OF LIVER WITHOUT ASCITES, UNSPECIFIED HEPATIC CIRRHOSIS TYPE (HCC): ICD-10-CM

## 2024-02-23 DIAGNOSIS — D69.3 IDIOPATHIC THROMBOCYTOPENIA (HCC): ICD-10-CM

## 2024-02-23 DIAGNOSIS — D69.6 ACQUIRED THROMBOCYTOPENIA (HCC): ICD-10-CM

## 2024-02-23 DIAGNOSIS — D50.0 IRON DEFICIENCY ANEMIA DUE TO CHRONIC BLOOD LOSS: ICD-10-CM

## 2024-02-23 PROBLEM — D64.9 ANEMIA, UNSPECIFIED: Status: RESOLVED | Noted: 2022-07-05 | Resolved: 2024-02-23

## 2024-02-23 PROCEDURE — 99215 OFFICE O/P EST HI 40 MIN: CPT | Performed by: PHYSICIAN ASSISTANT

## 2024-02-23 RX ORDER — SODIUM CHLORIDE 9 MG/ML
20 INJECTION, SOLUTION INTRAVENOUS ONCE
OUTPATIENT
Start: 2024-02-28

## 2024-02-23 RX ORDER — SEMAGLUTIDE 2.68 MG/ML
INJECTION, SOLUTION SUBCUTANEOUS
Qty: 3 ML | Refills: 5 | Status: SHIPPED | OUTPATIENT
Start: 2024-02-23

## 2024-02-23 RX ORDER — CYANOCOBALAMIN 1000 UG/ML
1000 INJECTION, SOLUTION INTRAMUSCULAR; SUBCUTANEOUS ONCE
OUTPATIENT
Start: 2024-02-28 | End: 2024-02-28

## 2024-02-23 NOTE — PROGRESS NOTES
Hematology/Oncology Outpatient Follow- up Note  Wilver Quintanilla 48 y.o. male MRN: @ Encounter: [id number]        Date:  2/23/2024      Assessment / Plan:    Chronic thrombocytopenia.  Platelet count 110s - 130s 2016 - 2017.  80,000 range 1789-3354    Macrocytosis 105- 106 without anemia in 2017    Neutropenia.  WBC 4 range 2017 -> 2 range 2019    Splenomegaly.  Spleen 15 cm 2016 2019 Progressive anemia, worsening neutropenia thrombocytopenia, elevated AST, ALT, Alk phos    3/2019 normal BMBX    5/2019 pet/ct with no evidence of malignancy.    No improvement in his platelets with steroids or Rituxan.    8/2019 Promacta 50 mg initiated, increased to 75mg po daily 10/2019    Platelets to 80,000 range.  He was continued on Promacta.        1/6/22 abd u/s - Liver unremarkable, though 16.8cm (14.1cm 5/2016)  Surface contour is smooth. Parenchyma: Echogenicity and echotexture are within normal limits.  No evidence of suspicious mass.  Spleen enlarged further to 27cm    5/7/22 hemoglobin down to 7.2 with MCV of 82, white blood cell count 1.3, platelet count 63,000    7/2022 established care with GI.  NAFLD, compenstated cirrhosis, LR3 lesion on MRI being monitored.  16cm in segment 6 on 1/5/24 scan    8/22 - 12/2022 Venofer 200mg x 10    10/14/22 EGD and colonoscopy -colon polyps identified, 3-year recall recommended.  Portal hypertensive gastropathy noted without evidence of gastric varices.  3 columns of small to medium sized esophageal varices with no stigmata of bleeding were identified    11/7/22 ferritin 17; hgb 10.7, WBC 1.7    1/7/23 hemoglobin 13.4, MCV 95, white blood cell count 2, ANC 1100, platelets 82,000  5/2023 AFP 2.2  6/2023 ferritin 20, hemoglobin 12.5, , white blood cell count 2, ANC 1100, platelets 80,000    2/3/24 hemglobin 12, MCV 94, white blood cell count 2.1, 62% neutrophils, 25% lymphocytes, 9% monocytes, platelet count 88,000      Continue with Promacta 75 mg p.o. daily.  Given  recurrent anemia and history of iron deficiency, additional IV iron requested  Venofer 300 mg weekly x 3 along with B12 1000 mcg IM ordered.    Follow-up in 6 months with repeat labs.          HPI:  Wilver Quintanilla is a 48 year old  male seen for initial consultation by Dr Angela on 3/10/16 regarding thrombocytopenia.    History of hypertension, diabetes, fatty liver, GERD.    Works in construction.      His platelet count fluctuated between 111 and 130 for the prior 4-5 years before initial consult. As early as February 2011, he had a mildly low platelet count.     5/2016 abd u/s -spleen enlarged at 15 cm, liver 14.1 cm    4/5/2017 hemoglobin 15, , white blood cell count 4.4, platelets 123      2/22/19 labs at [facility] with WBC 3.4, Hgb 14.8, , Platelets 53,000. Glc elevated 133 fasting. GFR = 74. Albumin 4.2. Elevation of total bilirubin at 1.3, Alk phos 129, AST 68, ALT 72. Urobilinogen elevated to 2.0.  CT Abdomen to better assess liver and if cirrhosis is present.   3/29/2019 - Spleen is enlarged, measuring 20 cm in AP and craniocaudad diameter. The spleen is homogeneous without discrete mass.    Bone marrow biopsy due to decrease in platelet count-   3/22/2019 - Cytogenetic results: 46,XY[20]  - [employer]™ NGS FLT3 and [id number] Report ([facility]# [id number], evaluated by Shannan Stevens) is a follows:  * DETECTED GENOMIC ALTERATIONS: No Mutations Identified  * Tumor type: Suspected Myelodysplastic Syndrome  * Interpretation Summary: This was a NORMAL sequencing study in which no disease associated mutations or variants of unclear  significance were identified in the tested specimen. Normal sequencing results may be a consequence of testing a sample with little or  no dysplastic cells present. Clinical and pathologic correlation is required to interpret these findings.  Addendum electronically signed by Guero Rossi MD on 3/28/2019 at 1538  .  Final Diagnosis  A & B. Bone marrow, right  iliac, core needle biopsy & aspirate clot section:  - Normocellular for age (45-50% cell) marrow with adequate, normoblastic and progressive trilineage hematopoiesis, M:E = 2.7 : 1,  myeloblasts ~ 1%; megakaryocytes appear numerically adequate & morphologically unremarkable.  - Stainable macrophage storage iron is present.  - Reticulin fibrosis (cytochemical stains) is grade 0 of 3 in  Concensus system.  - Lymphocytes and plasma cells appear mature, scattered, not increased.  - No collagen fibrosis, no granulomata, no vasculitis and no necrosis.  C. Bone marrow, right iliac, aspirate slides (10): hemodilute, aspicular.  - Normoblastic & progressive trilineage hematopoiesis, with subtle dysplastic erythroid morphology in < 10% of precursors;  myelopoiesis appears left-shifted but without dysmorphic features and megakaryocytes are not found, blasts ! 1%.  - Lymphocytes & plasma cells appear mature, scattered & without morphologic atypia to suggest neoplasia.  - Iron stains reveal no ringed sideroblasts.  - Flow cytometry ([facility]#_[id number]_, evaluated by Dr. MEME Sabillon) reveals:  * NO evidence for abnormal myeloid maturation or an increased blast population, CD34(+) blasts comprise 1 to 2% of total cells  analyzed.  * NO evidence for a lymphoproliferative disorder - there is a mixed population of maturing myeloid cells, B cells and T cells. B-cells (3  to 4.5% of total) are polytypic & T-cells (3.4 to 9%% of total) show no pan T-cell antigen deletion. The CD4/CD8 ratio is 1.0:1, CD56+  NK-cells (1.6%) and CD57+ T-LGL's (3.4%) are within normal limits.  * A small population of dim CD45(+) cells that are CD19 (+), CD10 (+) compatible with hematogones, is present (1.7%)  * Viability 7AAD: 99 & 37%.  * The following antigens were evaluated & found to be expressed as described above or by appropriate cells: CD2, CD3, CD4, CD5,  CD7, CD8, CD10, CD11b, CD11c, CD13, CD14, CD16, CD19, CD20, CD22, CD23,  CD33, CD34, CD38, CD45, CD56, CD57, CD64,  , FMC-7, HLA-DR, sKappa, sLambda.    5/24/2019 - “PET CT scan ordered to evaluate the spleen for uptake for a question of a splenic lymphoma but his insurance company denied this. I had ordered Promacta for his ITP which again was denied. At this point I will give him Decadron 40 mg daily for 4 days. If he does not have a response to his platelet count we will again appeal the decision and reapply for his medication”  5/23/2019 hemoglobin 13.5, , white blood cell count 2.3, normal differential, platelets 35,000  6/11/2019- did not respond to dexamethasone.  (June 5, 2019 platelet count 37,000)  7/2019 He then got 4 doses of Rituxan.  Platelet count remained 65,000  8/30/2019 - pet/ct - 1. No evidence of hypermetabolic malignancy. Persistent splenomegaly, unchanged.    8/2019 promacta 50mg po daily approved and started  10/2019 Dose Promacta increased to 75mg  7/29/2020 platelet count 87,000  10/2020 hemoglobin 13, , white blood cell count 2.6, ANC 1.6, platelet count 88,000  11/2021 hemoglobin 10.8, MCV 92, white blood cell count 1.7, ANC 1000, platelets 70,000  Progressive anemia, worsening neutropenia thrombocytopenia    1/6/22 abd u/s - Liver unremarkable, though 16.8cm (14.1cm 5/2016)  Surface contour is smooth. Parenchyma: Echogenicity and echotexture are within normal limits.  No evidence of suspicious mass.  Spleen enlarged further to 27cm    5/7/22 hemoglobin down to 7.2 with MCV of 82, white blood cell count 1.3, platelet count 63,000    7/2022 -spoke with GI regarding reported history of fatty liver, elevated LFTs, iron deficiency anemia, GERD  7/29/22 repeat BMBX- 46, XY  A-C. Bone Marrow, Left Iliac Crest, Core, Clot and Aspirate:  - Normocellular marrow (50% cellularity) with megakaryocytic hyperplasia.  - No evidence of leukemia or lymphoma.  - Decreased iron stores.  - No reticulin fibrosis.  - No mutations identified by NGS.  Comment:  Patient's history of pancytopenia and splenomegaly is noted. The findings are compatible with immune thrombocytopenic purpura (ITP). Other etiologies    8/22 - 12/2022 Venofer 200mg x 10    10/14/22 EGD and colonoscopy -colon polyps identified, 3-year recall recommended.  Portal hypertensive gastropathy noted without evidence of gastric varices.  3 columns of small to medium sized esophageal varices with no stigmata of bleeding were identified    11/7/22 ferritin 17; hgb 10.7, WBC 1.7    1/7/23 hemoglobin 13.4, MCV 95, white blood cell count 2, ANC 1100, platelets 82,000  5/2023 AFP 2.2  6/2023 ferritin 20, hemoglobin 12.5, , white blood cell count 2, ANC 1100, platelets 80,000  Total bilirubin 2.6, glucose 108, otherwise normal LFTs      Interval History:  2/3/24 hemglobin 12, MCV 94, white blood cell count 2.1, 62% neutrophils, 25% lymphocytes, 9% monocytes, platelet count 88,000    ABX for UTI      Review of Systems   Constitutional:  Negative for appetite change, chills, diaphoresis, fatigue, fever and unexpected weight change.   HENT:   Negative for mouth sores, nosebleeds, sore throat, tinnitus and voice change.    Eyes:  Negative for eye problems.   Respiratory:  Negative for chest tightness, cough, shortness of breath and wheezing.    Cardiovascular:  Negative for chest pain, leg swelling and palpitations.   Gastrointestinal:  Negative for abdominal distention, abdominal pain, blood in stool, constipation, diarrhea, nausea, rectal pain and vomiting.   Endocrine: Negative for hot flashes.   Genitourinary: Negative.     Musculoskeletal:  Negative for gait problem and myalgias.   Skin:  Negative for itching and rash.   Neurological:  Negative for dizziness, gait problem, headaches, light-headedness and numbness.   Hematological:  Negative for adenopathy.   Psychiatric/Behavioral:  Negative for confusion and sleep disturbance. The patient is not nervous/anxious.         Test Results:        Labs:   Lab  "Results   Component Value Date    HGB 12.0 (L) 02/03/2024    HCT 34.7 (L) 02/03/2024    MCV 94.3 02/03/2024    PLT 88 (L) 02/03/2024    WBC 2.1 (L) 02/03/2024    NRBC 0 01/17/2023     Lab Results   Component Value Date     04/05/2017    K 3.9 02/03/2024     02/03/2024    CO2 26 02/03/2024    BUN 17 02/03/2024    CREATININE 0.97 02/03/2024    GLUCOSE 102 (H) 04/05/2017    GLUF 116 (H) 05/04/2021    CALCIUM 8.9 02/03/2024    CORRECTEDCA 9.5 05/04/2021    AST 36 02/03/2024    ALT 28 02/03/2024    ALKPHOS 75 02/03/2024    PROT 6.4 04/05/2017    BILITOT 1.0 04/05/2017    EGFR 96 02/03/2024           Imaging: No results found.          Allergies:   Allergies   Allergen Reactions    Aspirin Other (See Comments)     Reaction unknown to patient.    Diltiazem Other (See Comments)     Patient does not recall what happened    Lisinopril Cough     Current Medications: Reviewed  PMH/FH/SH:  Reviewed      Physical Exam:    There is no height or weight on file to calculate BSA.    Ht Readings from Last 3 Encounters:   01/29/24 6' 2\" (1.88 m)   04/28/23 6' 2\" (1.88 m)   03/17/23 6' 2\" (1.88 m)        Wt Readings from Last 3 Encounters:   01/29/24 132 kg (290 lb)   04/28/23 128 kg (282 lb 9.6 oz)   03/17/23 127 kg (281 lb)        Temp Readings from Last 3 Encounters:   01/29/24 99.3 °F (37.4 °C) (Temporal)   04/28/23 (!) 96.8 °F (36 °C) (Tympanic)   03/17/23 98.2 °F (36.8 °C) (Tympanic)        BP Readings from Last 3 Encounters:   01/29/24 128/70   04/28/23 108/72   03/17/23 118/70             Physical Exam  Vitals reviewed.   Constitutional:       General: He is not in acute distress.     Appearance: He is well-developed. He is not diaphoretic.   HENT:      Head: Normocephalic and atraumatic.   Eyes:      Conjunctiva/sclera: Conjunctivae normal.   Neck:      Trachea: No tracheal deviation.   Cardiovascular:      Rate and Rhythm: Normal rate and regular rhythm.      Heart sounds: No murmur heard.     No friction rub. No " gallop.   Pulmonary:      Effort: Pulmonary effort is normal. No respiratory distress.      Breath sounds: Normal breath sounds. No wheezing or rales.   Chest:      Chest wall: No tenderness.   Abdominal:      General: There is no distension.      Palpations: Abdomen is soft.      Tenderness: There is no abdominal tenderness.   Musculoskeletal:      Cervical back: Normal range of motion and neck supple.   Lymphadenopathy:      Cervical: No cervical adenopathy.   Skin:     General: Skin is warm and dry.      Coloration: Skin is not pale.      Findings: No erythema.   Neurological:      Mental Status: He is alert and oriented to person, place, and time.   Psychiatric:         Behavior: Behavior normal.         Thought Content: Thought content normal.         Judgment: Judgment normal.         ECOG:       Emergency Contacts:    Extended Emergency Contact Information  Primary Emergency Contact: Adry Quintanilla  Address: [address] [place] [phone] [place]  Relation: Spouse

## 2024-02-25 DIAGNOSIS — E11.9 TYPE 2 DIABETES MELLITUS WITHOUT COMPLICATION, WITHOUT LONG-TERM CURRENT USE OF INSULIN (HCC): ICD-10-CM

## 2024-02-25 RX ORDER — GLIMEPIRIDE 4 MG/1
4 TABLET ORAL DAILY
Qty: 30 TABLET | Refills: 2 | Status: SHIPPED | OUTPATIENT
Start: 2024-02-25

## 2024-03-07 ENCOUNTER — PATIENT MESSAGE (OUTPATIENT)
Dept: INTERNAL MEDICINE CLINIC | Facility: CLINIC | Age: 49
End: 2024-03-07

## 2024-03-10 DIAGNOSIS — D69.3 IDIOPATHIC THROMBOCYTOPENIA (HCC): ICD-10-CM

## 2024-03-12 ENCOUNTER — HOSPITAL ENCOUNTER (OUTPATIENT)
Dept: INFUSION CENTER | Facility: CLINIC | Age: 49
Discharge: HOME | End: 2024-03-12
Payer: COMMERCIAL

## 2024-03-12 VITALS
SYSTOLIC BLOOD PRESSURE: 125 MMHG | OXYGEN SATURATION: 94 % | RESPIRATION RATE: 18 BRPM | TEMPERATURE: 97.3 F | HEART RATE: 81 BPM | DIASTOLIC BLOOD PRESSURE: 67 MMHG

## 2024-03-12 DIAGNOSIS — E53.8 B12 DEFICIENCY: Primary | ICD-10-CM

## 2024-03-12 DIAGNOSIS — D50.0 IRON DEFICIENCY ANEMIA DUE TO CHRONIC BLOOD LOSS: ICD-10-CM

## 2024-03-12 PROCEDURE — 96372 THER/PROPH/DIAG INJ SC/IM: CPT

## 2024-03-12 PROCEDURE — 96365 THER/PROPH/DIAG IV INF INIT: CPT

## 2024-03-12 RX ORDER — SODIUM CHLORIDE 9 MG/ML
20 INJECTION, SOLUTION INTRAVENOUS ONCE
Status: COMPLETED | OUTPATIENT
Start: 2024-03-12 | End: 2024-03-12

## 2024-03-12 RX ORDER — SODIUM CHLORIDE 9 MG/ML
20 INJECTION, SOLUTION INTRAVENOUS ONCE
Status: CANCELLED | OUTPATIENT
Start: 2024-03-19

## 2024-03-12 RX ORDER — CYANOCOBALAMIN 1000 UG/ML
1000 INJECTION, SOLUTION INTRAMUSCULAR; SUBCUTANEOUS ONCE
Status: CANCELLED | OUTPATIENT
Start: 2024-03-19 | End: 2024-03-19

## 2024-03-12 RX ORDER — CYANOCOBALAMIN 1000 UG/ML
1000 INJECTION, SOLUTION INTRAMUSCULAR; SUBCUTANEOUS ONCE
Status: COMPLETED | OUTPATIENT
Start: 2024-03-12 | End: 2024-03-12

## 2024-03-12 RX ADMIN — CYANOCOBALAMIN 1000 MCG: 1000 INJECTION, SOLUTION INTRAMUSCULAR at 07:48

## 2024-03-12 RX ADMIN — IRON SUCROSE 300 MG: 20 INJECTION, SOLUTION INTRAVENOUS at 07:48

## 2024-03-12 RX ADMIN — SODIUM CHLORIDE 20 ML/HR: 0.9 INJECTION, SOLUTION INTRAVENOUS at 07:48

## 2024-03-12 NOTE — PROGRESS NOTES
Patient here for venofer, offers no complaints. Tolerated infusion without incident. Next appointment confirmed for 3/19 at 0830, declined AVS.

## 2024-03-19 ENCOUNTER — HOSPITAL ENCOUNTER (OUTPATIENT)
Dept: INFUSION CENTER | Facility: CLINIC | Age: 49
Discharge: HOME | End: 2024-03-19
Payer: COMMERCIAL

## 2024-03-19 VITALS
HEART RATE: 76 BPM | RESPIRATION RATE: 18 BRPM | TEMPERATURE: 96.8 F | SYSTOLIC BLOOD PRESSURE: 137 MMHG | DIASTOLIC BLOOD PRESSURE: 73 MMHG

## 2024-03-19 DIAGNOSIS — E53.8 B12 DEFICIENCY: Primary | ICD-10-CM

## 2024-03-19 DIAGNOSIS — D50.0 IRON DEFICIENCY ANEMIA DUE TO CHRONIC BLOOD LOSS: ICD-10-CM

## 2024-03-19 PROCEDURE — 96372 THER/PROPH/DIAG INJ SC/IM: CPT

## 2024-03-19 PROCEDURE — 96365 THER/PROPH/DIAG IV INF INIT: CPT

## 2024-03-19 RX ORDER — SODIUM CHLORIDE 9 MG/ML
20 INJECTION, SOLUTION INTRAVENOUS ONCE
Status: COMPLETED | OUTPATIENT
Start: 2024-03-19 | End: 2024-03-19

## 2024-03-19 RX ORDER — CYANOCOBALAMIN 1000 UG/ML
1000 INJECTION, SOLUTION INTRAMUSCULAR; SUBCUTANEOUS ONCE
Status: COMPLETED | OUTPATIENT
Start: 2024-03-19 | End: 2024-03-19

## 2024-03-19 RX ORDER — SODIUM CHLORIDE 9 MG/ML
20 INJECTION, SOLUTION INTRAVENOUS ONCE
Status: CANCELLED | OUTPATIENT
Start: 2024-03-29

## 2024-03-19 RX ORDER — CYANOCOBALAMIN 1000 UG/ML
1000 INJECTION, SOLUTION INTRAMUSCULAR; SUBCUTANEOUS ONCE
Status: CANCELLED | OUTPATIENT
Start: 2024-03-29 | End: 2024-03-26

## 2024-03-19 RX ADMIN — SODIUM CHLORIDE 20 ML/HR: 0.9 INJECTION, SOLUTION INTRAVENOUS at 08:43

## 2024-03-19 RX ADMIN — CYANOCOBALAMIN 1000 MCG: 1000 INJECTION, SOLUTION INTRAMUSCULAR at 08:43

## 2024-03-19 RX ADMIN — IRON SUCROSE 300 MG: 20 INJECTION, SOLUTION INTRAVENOUS at 08:43

## 2024-03-19 NOTE — PROGRESS NOTES
Patient here for venofer and B12, offers no complaints. Tolerated infusion without incident. Next appointment confirmed for 3/29 at 0830, declined AVS.

## 2024-03-29 ENCOUNTER — HOSPITAL ENCOUNTER (OUTPATIENT)
Dept: INFUSION CENTER | Facility: CLINIC | Age: 49
End: 2024-03-29
Payer: COMMERCIAL

## 2024-03-29 VITALS
HEART RATE: 69 BPM | OXYGEN SATURATION: 96 % | DIASTOLIC BLOOD PRESSURE: 75 MMHG | SYSTOLIC BLOOD PRESSURE: 122 MMHG | TEMPERATURE: 97.6 F | RESPIRATION RATE: 18 BRPM

## 2024-03-29 DIAGNOSIS — E53.8 B12 DEFICIENCY: Primary | ICD-10-CM

## 2024-03-29 DIAGNOSIS — D50.0 IRON DEFICIENCY ANEMIA DUE TO CHRONIC BLOOD LOSS: ICD-10-CM

## 2024-03-29 PROCEDURE — 96372 THER/PROPH/DIAG INJ SC/IM: CPT

## 2024-03-29 PROCEDURE — 96365 THER/PROPH/DIAG IV INF INIT: CPT

## 2024-03-29 RX ORDER — SODIUM CHLORIDE 9 MG/ML
20 INJECTION, SOLUTION INTRAVENOUS ONCE
Status: COMPLETED | OUTPATIENT
Start: 2024-03-29 | End: 2024-03-29

## 2024-03-29 RX ORDER — CYANOCOBALAMIN 1000 UG/ML
1000 INJECTION, SOLUTION INTRAMUSCULAR; SUBCUTANEOUS ONCE
Status: CANCELLED | OUTPATIENT
Start: 2024-04-02 | End: 2024-04-02

## 2024-03-29 RX ORDER — CYANOCOBALAMIN 1000 UG/ML
1000 INJECTION, SOLUTION INTRAMUSCULAR; SUBCUTANEOUS ONCE
Status: COMPLETED | OUTPATIENT
Start: 2024-03-29 | End: 2024-03-29

## 2024-03-29 RX ORDER — SODIUM CHLORIDE 9 MG/ML
20 INJECTION, SOLUTION INTRAVENOUS ONCE
Status: CANCELLED | OUTPATIENT
Start: 2024-04-02

## 2024-03-29 RX ADMIN — CYANOCOBALAMIN 1000 MCG: 1000 INJECTION, SOLUTION INTRAMUSCULAR at 08:27

## 2024-03-29 RX ADMIN — SODIUM CHLORIDE 20 ML/HR: 0.9 INJECTION, SOLUTION INTRAVENOUS at 08:27

## 2024-03-29 RX ADMIN — IRON SUCROSE 300 MG: 20 INJECTION, SOLUTION INTRAVENOUS at 08:27

## 2024-03-29 NOTE — PATIENT INSTRUCTIONS
March 2024 Sunday Monday Tuesday Wednesday Thursday Friday Saturday                            1     2                3     4     5     6     7     8     9                10     11     12    INF THERAPY PLAN   7:30 AM   (150 min.)   AN INF BED 1   [facility] 13     14     15     16         Cycle 1, Treatment 1       17     18     19    INF THERAPY PLAN   8:30 AM   (150 min.)   AN INF CHAIR 16   [facility] 20     21     22     23         Cycle 1, Treatment 2       24     25     26     27     28     29    INF THERAPY PLAN   8:00 AM   (150 min.)   AN INF CHAIR 2   [facility] 30            Cycle 1, Treatment 3    31                                                     Treatment Details         3/12/2024 - Cycle 1, Treatment 1      Supportive Care: APPT 17, ONCBCN NURSE [id number], sodium chloride, iron sucrose (VENOFER), MONITOR BLAINE, ONCBCN NURSE COMMUNICATION7    3/19/2024 - Cycle 1, Treatment 2      Supportive Care: APPT 17, ONCBCN NURSE [id number], sodium chloride, iron sucrose (VENOFER), MONITOR BLAINE, ONCBCN NURSE COMMUNICATION7    3/29/2024 - Cycle 1, Treatment 3      Supportive Care: APPT 17, ONCBCN NURSE [id number], sodium chloride, iron sucrose (VENOFER), MONITOR BLAINE, ONCBCN NURSE COMMUNICATION7        April 2024 Sunday Monday Tuesday Wednesday Thursday Friday Saturday        1     2     3     4     5     6                7     8     9     10     11     12     13                14     15     16     17     18     19     20                21     22     23    MRI ABDOMEN WWO CON MRCP   6:45 AM   (45 min.)   AM MRI 1   [facility] MRI 24     25     26     27                28     29     30

## 2024-03-29 NOTE — PROGRESS NOTES
Patient tolerated treatment without incident and was discharged post by Enrique RAMOS RN, no c/o offered, further dosing at physician's discretion.  Patient has f/u 8/30/24.

## 2024-03-29 NOTE — PROGRESS NOTES
Patient here for final dosings of venofer and b12, he is well, no changes or c/o to report. Venofer infusing as ordered, b12 admin right deltoid, bandaid applied and CDI.  Patient tolerated well.

## 2024-04-11 ENCOUNTER — TELEPHONE (OUTPATIENT)
Age: 49
End: 2024-04-11

## 2024-04-11 NOTE — TELEPHONE ENCOUNTER
Called and left a detailed message relaying Klaudia's note. Left my teams number in case he needs to call back.

## 2024-04-11 NOTE — TELEPHONE ENCOUNTER
----- Message from AMARJIT Gonzalez sent at 4/11/2024  3:45 PM EDT -----  I reviewed recent blood work.  His counts are stable.  He should continue on Promacta current dose and I can provide refill if needed

## 2024-04-15 ENCOUNTER — TELEPHONE (OUTPATIENT)
Age: 49
End: 2024-04-15

## 2024-04-15 DIAGNOSIS — L23.7 ALLERGIC CONTACT DERMATITIS DUE TO PLANTS, EXCEPT FOOD: Primary | ICD-10-CM

## 2024-04-15 RX ORDER — METHYLPREDNISOLONE 4 MG/1
TABLET ORAL
Qty: 21 EACH | Refills: 0 | Status: SHIPPED | OUTPATIENT
Start: 2024-04-15

## 2024-04-15 NOTE — TELEPHONE ENCOUNTER
Pt called to he has poison ivy on both arms, back and stomach. He is unable to make an appt today. Can he get something prescribed? He uses [facility]. Please advise.

## 2024-04-23 ENCOUNTER — HOSPITAL ENCOUNTER (OUTPATIENT)
Dept: MRI IMAGING | Facility: HOSPITAL | Age: 49
Discharge: HOME | End: 2024-04-23
Attending: STUDENT IN AN ORGANIZED HEALTH CARE EDUCATION/TRAINING PROGRAM
Payer: COMMERCIAL

## 2024-04-23 DIAGNOSIS — K76.9 LIVER LESION: ICD-10-CM

## 2024-04-23 PROCEDURE — 74183 MRI ABD W/O CNTR FLWD CNTR: CPT

## 2024-04-23 PROCEDURE — G1004 CDSM NDSC: HCPCS

## 2024-04-23 PROCEDURE — A9585 GADOBUTROL INJECTION: HCPCS | Performed by: STUDENT IN AN ORGANIZED HEALTH CARE EDUCATION/TRAINING PROGRAM

## 2024-04-23 RX ORDER — GADOBUTROL 604.72 MG/ML
13 INJECTION INTRAVENOUS
Status: COMPLETED | OUTPATIENT
Start: 2024-04-23 | End: 2024-04-23

## 2024-04-23 RX ADMIN — GADOBUTROL 13 ML: 604.72 INJECTION INTRAVENOUS at 07:29

## 2024-04-30 ENCOUNTER — TRANSCRIBE ORDERS (OUTPATIENT)
Dept: GASTROENTEROLOGY | Facility: CLINIC | Age: 49
End: 2024-04-30

## 2024-05-27 DIAGNOSIS — E11.9 TYPE 2 DIABETES MELLITUS WITHOUT COMPLICATION, WITHOUT LONG-TERM CURRENT USE OF INSULIN (HCC): ICD-10-CM

## 2024-05-28 RX ORDER — GLIMEPIRIDE 4 MG/1
4 TABLET ORAL DAILY
Qty: 30 TABLET | Refills: 5 | Status: SHIPPED | OUTPATIENT
Start: 2024-05-28

## 2024-06-03 ENCOUNTER — OFFICE VISIT (OUTPATIENT)
Dept: INTERNAL MEDICINE CLINIC | Facility: CLINIC | Age: 49
End: 2024-06-03
Payer: COMMERCIAL

## 2024-06-03 VITALS
OXYGEN SATURATION: 96 % | HEIGHT: 74 IN | HEART RATE: 80 BPM | DIASTOLIC BLOOD PRESSURE: 70 MMHG | TEMPERATURE: 98.3 F | RESPIRATION RATE: 18 BRPM | WEIGHT: 286 LBS | BODY MASS INDEX: 36.7 KG/M2 | SYSTOLIC BLOOD PRESSURE: 110 MMHG

## 2024-06-03 DIAGNOSIS — K21.9 GASTROESOPHAGEAL REFLUX DISEASE WITHOUT ESOPHAGITIS: ICD-10-CM

## 2024-06-03 DIAGNOSIS — Z00.00 ANNUAL PHYSICAL EXAM: Primary | ICD-10-CM

## 2024-06-03 DIAGNOSIS — D69.3 CHRONIC ITP (IDIOPATHIC THROMBOCYTOPENIC PURPURA) (HCC): ICD-10-CM

## 2024-06-03 DIAGNOSIS — K76.0 NAFLD (NONALCOHOLIC FATTY LIVER DISEASE): ICD-10-CM

## 2024-06-03 DIAGNOSIS — K76.6 PORTAL HYPERTENSION (HCC): ICD-10-CM

## 2024-06-03 DIAGNOSIS — Z12.5 SCREENING PSA (PROSTATE SPECIFIC ANTIGEN): ICD-10-CM

## 2024-06-03 DIAGNOSIS — D61.818 PANCYTOPENIA (HCC): ICD-10-CM

## 2024-06-03 DIAGNOSIS — E11.9 TYPE 2 DIABETES MELLITUS WITHOUT COMPLICATION, WITHOUT LONG-TERM CURRENT USE OF INSULIN (HCC): ICD-10-CM

## 2024-06-03 DIAGNOSIS — E55.9 VITAMIN D DEFICIENCY: ICD-10-CM

## 2024-06-03 DIAGNOSIS — I10 ESSENTIAL HYPERTENSION: ICD-10-CM

## 2024-06-03 PROCEDURE — 99213 OFFICE O/P EST LOW 20 MIN: CPT | Performed by: INTERNAL MEDICINE

## 2024-06-03 PROCEDURE — 99396 PREV VISIT EST AGE 40-64: CPT | Performed by: INTERNAL MEDICINE

## 2024-06-03 NOTE — PATIENT INSTRUCTIONS
Patient was advised to continue present medications. discussed with the patient medications and laboratory data in detail.  Follow-up with me in 3 months or as advised.  If any blood test was ordered please do 1 week prior to next appointment unless advise to get earlier.  If you have any questions please call the office [phone]  Type 2 Diabetes Management for Adults   AMBULATORY CARE:   Type 2 diabetes  is a disease that affects how your body uses glucose (sugar). Either your body cannot make enough insulin, or it cannot use the insulin correctly. It is important to keep diabetes controlled to prevent damage to your heart, blood vessels, and other organs. Management will help you feel well and enjoy your daily activities. Your diabetes care team providers can help you make a plan to fit diabetes care into your schedule. Your plan can change over time to fit your needs and your family's needs.       Have someone call your local emergency number (911 in the [place]) if:   You cannot be woken.    You have signs of diabetic ketoacidosis:     confusion, fatigue    vomiting    rapid heartbeat    fruity smelling breath    extreme thirst    dry mouth and skin    You have any of the following signs of a heart attack:      Squeezing, pressure, or pain in your chest    You may  also have any of the following:     Discomfort or pain in your back, neck, jaw, stomach, or arm    Shortness of breath    Nausea or vomiting    Lightheadedness or a sudden cold sweat    You have any of the following signs of a stroke:      Numbness or drooping on one side of your face     Weakness in an arm or leg    Confusion or difficulty speaking    Dizziness, a severe headache, or vision loss    Call your doctor or diabetes care team provider if:   You have a sore or wound that will not heal.    You have a change in the amount you urinate.    Your blood sugar levels are higher than your target goals.    You often have lower blood sugar levels than  your target goals.    Your skin is red, dry, warm, or swollen.    You have trouble coping with diabetes, or you feel anxious or depressed.    You have trouble following any part of your care plan, such as your meal plan.    You have questions or concerns about your condition or care.    What you need to know about high blood sugar levels:  High blood sugar levels may not cause any symptoms. You may feel more thirsty or urinate more often than usual. Over time, high blood sugar levels can damage your nerves, blood vessels, tissues, and organs. The following can increase your blood sugar levels:  Large meals or large amounts of carbohydrates at one time    Less physical activity    Stress    Illness    A lower dose of diabetes medicine or insulin, or a late dose    What you need to know about low blood sugar levels:  Symptoms include feeling shaky, dizzy, irritable, or confused. You can prevent symptoms by keeping your blood sugar levels from going too low.  Treat a low blood sugar level right away:      Drink 4 ounces of juice or have 1 tube of glucose gel.    Check your blood sugar level again 10 to 15 minutes later.    When the level goes back to normal, eat a meal or snack to prevent another decrease.       Keep glucose gel, raisins, or hard candy with you at all times to treat a low blood sugar level.     Your blood sugar level can get too low if you take diabetes medicine or insulin and do not eat enough food.     If you use insulin, check your blood sugar level before you exercise.      If your blood sugar level is below 100 mg/dL, eat 4 crackers or 2 ounces of raisins, or drink 4 ounces of juice.    Check your level every 30 minutes if you exercise longer than 1 hour.    You may need a snack during or after exercise.    What you can do to manage your blood sugar levels:   Check your blood sugar levels as directed and as needed.  Several items are available to use to check your levels. You may need to check by  testing a drop of blood in a glucose monitor. You may instead be given a continuous glucose monitoring (CGM) device. The device is worn at all times. The CGM checks your blood sugar level every 5 minutes. It sends results to an electronic device such as a smart phone. A CGM can be used with or without an insulin pump. You and your diabetes care team providers will decide on the best method for you. The goal for blood sugar levels before meals  is between 80 and 130 mg/dL and 2 hours after eating  is lower than 180 mg/dL.            Make healthy food choices.  Work with a dietitian to create a meal plan that works for you and your schedule. A dietitian can help you learn how to eat the right amount of carbohydrates (sugar and starchy foods) during your meals and snacks. Examples of carbohydrates are breads, cereals, rice, pasta, fruit, low-fat dairy, and sweets. Carbohydrates can raise your blood sugar level if you eat too many at one time.         Eat high-fiber foods as directed.  Fiber helps improve blood sugar levels. Fiber also lowers your risk for heart disease and other problems diabetes can cause. Examples of high-fiber foods include vegetables, whole-grain bread, and beans such as balderas beans. Your dietitian can tell you how much fiber to have each day.         Get regular physical activity.  Physical activity can help you get to your target blood sugar level goal and manage your weight. Get at least 150 minutes of moderate to vigorous aerobic physical activity each week. Resistance training, such as lifting weights, should be done 3 times each week. Do not miss more than 2 days of physical activity in a row. Do not sit longer than 30 minutes at a time. Your healthcare provider can help you create an activity plan. The plan can include the best activities for you and can help you build your strength and endurance.            Maintain a healthy weight.  Ask your team what a healthy weight is for you. A  healthy weight can help you control diabetes and prevent heart disease. Ask your team to help you create a weight-loss plan, if needed. Even a loss of 3% to 7% of your excess body weight can help make a difference in managing diabetes. Your team will help you set a weight-loss goal, such as 10 to 15 pounds, or 5% of your extra weight. Together you and your team can set manageable weight-loss goals.    Take your diabetes medicine or insulin as directed.  You may need diabetes medicine, insulin, or both to help control your blood sugar levels. Your healthcare provider will teach you how and when to take your diabetes medicine or insulin. You will also be taught about side effects oral diabetes medicine can cause. Insulin may be injected or given through a pump or pen. You and your providers will decide on the best method for you:    An insulin pump  is an implanted device that gives your insulin 24 hours a day. An insulin pump prevents the need for multiple insulin injections in a day.         An insulin pen  is a device prefilled with the right amount of insulin.         You and your family members will be taught how to draw up and give insulin  if this is the best method for you. Your providers will also teach you how to dispose of needles and syringes.    You will learn how much insulin you need  and when to give it. You will be taught when not to give insulin. You will also be taught what to do if your blood sugar level drops too low. This may happen if you take insulin and do not eat the right amount of carbohydrates.    More ways to manage type 2 diabetes:   Wear medical alert identification.  Wear medical alert jewelry or carry a card that says you have diabetes. Ask your provider where to get these items.         Do not smoke.  Nicotine and other chemicals in cigarettes and cigars can cause lung and blood vessel damage. It also makes it more difficult to manage your diabetes. Ask your provider for information  if you currently smoke and need help to quit. Do not use e-cigarettes or smokeless tobacco in place of cigarettes or to help you quit. They still contain nicotine.    Check your feet each day for cuts, scratches, calluses, or other wounds.  Look for redness and swelling, and feel for warmth. Wear shoes that fit well. Check your shoes for rocks or other objects that can hurt your feet. Do not walk barefoot or wear shoes without socks. Wear cotton socks to help keep your feet dry.         Ask about vaccines you may need.  You have a higher risk for serious illness if you get the flu, pneumonia, COVID-19, or hepatitis. Ask your provider if you should get vaccines to prevent these or other diseases, and when to get the vaccines.    Talk to your provider if you become stressed about diabetes care.  Sometimes being able to fit diabetes care into your life can cause increased stress. The stress can cause you not to take care of yourself properly. Your provider can help by offering tips about self-care. A mental health provider can listen and offer help with self-care issues. Other types of counseling can help you make nutrition or physical activity changes.    Have your A1c checked as directed.  Your provider may check your A1c every 3 months, or 2 times each year if your diabetes is controlled. An A1c test shows the average amount of sugar in your blood over the past 2 to 3 months. Your provider will tell you what your A1c level should be.    Have screening tests as directed.  Your provider may recommend screening for complications of diabetes and other conditions that may develop. Some screenings may begin right away and some may happen within the first 5 years of diagnosis:    Examples of diabetes complications  include kidney problems, high cholesterol, high blood pressure, blood vessel problems, eye problems, and sleep apnea.    You may be screened for a low vitamin B level  if you take oral diabetes medicine for a  long time.    You may be screened for polycystic ovarian syndrome (PCOS)  if you are of childbearing age.    Follow up with your doctor or diabetes care team providers as directed:  You may need to have blood tests done before your follow-up visit. The test results will show if changes need to be made in your treatment or self-care. Talk to your provider if you cannot afford your medicine. Write down your questions so you remember to ask them during your visits.  © Copyright [employer] 2023 Information is for End User's use only and may not be sold, redistributed or otherwise used for commercial purposes.  The above information is an  only. It is not intended as medical advice for individual conditions or treatments. Talk to your doctor, nurse or pharmacist before following any medical regimen to see if it is safe and effective for you.

## 2024-06-03 NOTE — PROGRESS NOTES
Adult Annual Physical  Name: Wilver Quintanilla      : 1975      MRN: [id number]  Encounter Provider: Tyrell Olvera MD  Encounter Date: 6/3/2024   Encounter department: [facility] INTERNAL MEDICINE    Assessment & Plan   1. Annual physical exam  2. Type 2 diabetes mellitus without complication, without long-term current use of insulin (HCC)  -     Hemoglobin A1C; Future  -     Basic metabolic panel; Future  -     Basic metabolic panel  -     Hemoglobin A1C; Future; Expected date: 2024  3. Essential hypertension  -     Basic metabolic panel; Future  -     Basic metabolic panel  4. Portal hypertension (HCC)  5. Gastroesophageal reflux disease without esophagitis  6. NAFLD (nonalcoholic fatty liver disease)  7. Chronic ITP (idiopathic thrombocytopenic purpura) (HCC)  8. Pancytopenia (HCC)  9. Vitamin D deficiency  10. BMI 36.0-36.9,adult  Assessment & Plan:  Patient  was advised to decrease portion size.  Advised to decrease carb, sugar, cholesterol intake.  Advised to exercise 3-5 times per week.  Advised to lose weight.  11. Screening PSA (prostate specific antigen)  -     PSA, Total Screen; Future  -     PSA, Total Screen  Discussion/summary/plan:.    Patient states symptom his blood sugar goes around 80s and he gets low blood sugar symptoms particularly when he works outside in the yard on the weekends.  So advised to discontinue glimepiride.  Continue Ozempic as prescribed.  Call if still blood sugar less than 80.  Continue 1800-calorie ADA diet.  Will follow hemoglobin A1c.  Blood pressure well-controlled advised to continue present medications and low-salt diet.  Patient has been seen and followed by GI specialist for his cirrhosis as well as portal hypertensive and.  He has been seen and followed by hematologist for pancytopenia gets.  Take IV and iron infusion hemoglobin 12.8.  Vitamin D deficiency is on vitamin D level 25 advised to continue same dose of vitamin D  supplement.  For GE reflux he needs to take omeprazole daily to control his symptoms.  Has been watching diet.  Advised to see an eye doctor  Immunizations and preventive care screenings were discussed with patient today. Appropriate education was printed on patient's after visit summary.        Counseling:  Alcohol/drug use: discussed moderation in alcohol intake, the recommendations for healthy alcohol use, and avoidance of illicit drug use.  Dental Health: discussed importance of regular tooth brushing, flossing, and dental visits.  Exercise: the importance of regular exercise/physical activity was discussed. Recommend exercise 3-5 times per week for at least 30 minutes.          History of Present Illness     Adult Annual Physical  Patient presents for annual wellness exam/follow-up his medical problems.  Review of Systems   Constitutional:  Negative for chills and fever.   HENT:  Negative for congestion, ear pain, hearing loss, nosebleeds, sinus pain, sore throat and trouble swallowing.    Eyes:  Negative for discharge, redness and visual disturbance.   Respiratory:  Negative for cough, chest tightness and shortness of breath.    Cardiovascular:  Negative for chest pain and palpitations.   Gastrointestinal:  Negative for abdominal pain, blood in stool, constipation, diarrhea, nausea and vomiting.   Genitourinary:  Negative for dysuria, flank pain, frequency and hematuria.   Musculoskeletal:  Negative for arthralgias, myalgias and neck pain.   Skin:  Negative for color change and rash.   Neurological:  Negative for dizziness, speech difficulty, weakness and headaches.   Hematological:  Does not bruise/bleed easily.   Psychiatric/Behavioral:  Negative for agitation and behavioral problems.        Pertinent Medical History         Medical History Reviewed by provider this encounter:  Tobacco  Allergies  Meds  Problems  Med Hx  Surg Hx  Fam Hx       Current Outpatient Medications on File Prior to Visit  "  Medication Sig Dispense Refill   • Blood Glucose Monitoring Suppl (Contour Next One) KIT USE AS DIRECTED TO TEST TWICE DAILY     • carvedilol (COREG) 6.25 mg tablet TAKE 1 TABLET BY MOUTH TWICE A DAY WITH MEALS 60 tablet 5   • eltrombopag (Promacta) 75 MG TABS TAKE 1 TABLET BY MOUTH ONCE  DAILY ON AN EMPTY STOMACH AT  LEAST 1 HOUR BEFORE OR 2 HOURS  AFTER A MEAL 30 tablet 3   • glimepiride (AMARYL) 4 mg tablet TAKE 1 TABLET BY MOUTH EVERY DAY 30 tablet 5   • losartan (COZAAR) 25 mg tablet TAKE 1 TABLET BY MOUTH  DAILY 90 tablet 3   • Microlet Lancets MISC 2 (two) times a day Use to test     • omeprazole (PriLOSEC) 40 MG capsule TAKE 1 CAPSULE BY MOUTH DAILY 90 capsule 1   • OneTouch Verio test strip USE TO TEST ONCE DAILY 25 strip 3   • Ozempic, 2 MG/DOSE, 8 MG/3ML injection pen INJECT 2 MG UNDER THE SKIN EVERY 7 DAYS FOR 90 DOSES 3 mL 5   • torsemide (DEMADEX) 20 mg tablet TAKE 1 TABLET (20 MG TOTAL) BY MOUTH IN THE MORNING AND 1 TABLET (20 MG TOTAL) BEFORE BEDTIME. 60 tablet 11   • Vitamin D, Cholecalciferol, 50 MCG (2000 UT) CAPS Take 1 Units by mouth in the morning     • zinc gluconate 50 mg tablet Take 50 mg by mouth daily     • methylPREDNISolone 4 MG tablet therapy pack Use as directed on package (Patient not taking: Reported on 6/3/2024) 21 each 0     No current facility-administered medications on file prior to visit.        Objective     /70 (BP Location: Left arm, Patient Position: Sitting, Cuff Size: Large)   Pulse 80   Temp 98.3 °F (36.8 °C) (Temporal)   Resp 18   Ht 6' 2\" (1.88 m)   Wt 130 kg (286 lb)   SpO2 96%   BMI 36.72 kg/m²     Physical Exam  Vitals and nursing note reviewed.   Constitutional:       General: He is not in acute distress.     Appearance: He is well-developed. He is obese.   HENT:      Head: Normocephalic and atraumatic.      Right Ear: Tympanic membrane, ear canal and external ear normal. There is no impacted cerumen.      Left Ear: Tympanic membrane, ear canal and " external ear normal. There is no impacted cerumen.      Nose: Nose normal.      Mouth/Throat:      Mouth: Mucous membranes are moist.      Pharynx: Oropharynx is clear. No oropharyngeal exudate or posterior oropharyngeal erythema.   Eyes:      General: No scleral icterus.        Right eye: No discharge.         Left eye: No discharge.      Extraocular Movements: Extraocular movements intact.      Conjunctiva/sclera: Conjunctivae normal.      Pupils: Pupils are equal, round, and reactive to light.   Cardiovascular:      Rate and Rhythm: Normal rate and regular rhythm.      Pulses: Normal pulses.      Heart sounds: Normal heart sounds.   Pulmonary:      Effort: Pulmonary effort is normal. No respiratory distress.      Breath sounds: Normal breath sounds. No wheezing, rhonchi or rales.   Abdominal:      General: Bowel sounds are normal.      Palpations: Abdomen is soft.      Tenderness: There is no abdominal tenderness.   Musculoskeletal:         General: No swelling. Normal range of motion.      Cervical back: Normal range of motion and neck supple.      Right lower leg: No edema.      Left lower leg: No edema.   Skin:     General: Skin is warm and dry.      Capillary Refill: Capillary refill takes less than 2 seconds.      Findings: No rash.   Neurological:      General: No focal deficit present.      Mental Status: He is alert and oriented to person, place, and time.      Motor: No weakness.   Psychiatric:         Mood and Affect: Mood normal.         Behavior: Behavior normal.         Administrative Statements   I have spent a total time of  minutes on 06/03/24 In caring for this patient including Diagnostic results, Risks and benefits of tx options, Instructions for management, Importance of tx compliance, Risk factor reductions, Counseling / Coordination of care, Reviewing / ordering tests, medicine, procedures  , and Obtaining or reviewing history  .

## 2024-06-06 DIAGNOSIS — I10 ESSENTIAL HYPERTENSION: ICD-10-CM

## 2024-06-06 RX ORDER — LOSARTAN POTASSIUM 25 MG/1
TABLET ORAL
Qty: 90 TABLET | Refills: 1 | Status: SHIPPED | OUTPATIENT
Start: 2024-06-06

## 2024-06-14 DIAGNOSIS — K76.6 PORTAL HYPERTENSION (HCC): ICD-10-CM

## 2024-06-14 NOTE — TELEPHONE ENCOUNTER
Refill must be reviewed and completed by the office or provider. The refill is unable to be approved or denied by the medication management team.      Last seen 11.2022 - Please review to see if the refill is appropriate.

## 2024-06-17 DIAGNOSIS — K21.9 GASTROESOPHAGEAL REFLUX DISEASE WITHOUT ESOPHAGITIS: ICD-10-CM

## 2024-06-18 RX ORDER — OMEPRAZOLE 40 MG/1
40 CAPSULE, DELAYED RELEASE ORAL DAILY
Qty: 90 CAPSULE | Refills: 3 | Status: SHIPPED | OUTPATIENT
Start: 2024-06-18

## 2024-06-25 RX ORDER — TORSEMIDE 20 MG/1
TABLET ORAL
Qty: 60 TABLET | Refills: 11 | Status: SHIPPED | OUTPATIENT
Start: 2024-06-25

## 2024-07-18 LAB
HBA1C MFR BLD: 5.7 % OF TOTAL HGB
INR PPP: 1.3
PROTHROMBIN TIME: 13.5 SEC (ref 9–11.5)

## 2024-07-21 LAB
ALBUMIN SERPL-MCNC: 3.9 G/DL (ref 3.6–5.1)
ALBUMIN/GLOB SERPL: 1.9 (CALC) (ref 1–2.5)
ALP SERPL-CCNC: 106 U/L (ref 36–130)
ALT SERPL-CCNC: 33 U/L (ref 9–46)
AST SERPL-CCNC: 37 U/L (ref 10–40)
BASOPHILS # BLD AUTO: 11 CELLS/UL (ref 0–200)
BASOPHILS NFR BLD AUTO: 0.5 %
BILIRUB SERPL-MCNC: 3.3 MG/DL (ref 0.2–1.2)
BUN SERPL-MCNC: 20 MG/DL (ref 7–25)
BUN/CREAT SERPL: ABNORMAL (CALC) (ref 6–22)
CALCIUM SERPL-MCNC: 8.7 MG/DL (ref 8.6–10.3)
CHLORIDE SERPL-SCNC: 109 MMOL/L (ref 98–110)
CO2 SERPL-SCNC: 22 MMOL/L (ref 20–32)
CREAT SERPL-MCNC: 0.99 MG/DL (ref 0.6–1.29)
EOSINOPHIL # BLD AUTO: 132 CELLS/UL (ref 15–500)
EOSINOPHIL NFR BLD AUTO: 6.3 %
ERYTHROCYTE [DISTWIDTH] IN BLOOD BY AUTOMATED COUNT: 13.1 % (ref 11–15)
FERRITIN SERPL-MCNC: 28 NG/ML (ref 38–380)
GFR/BSA.PRED SERPLBLD CYS-BASED-ARV: 93 ML/MIN/1.73M2
GLOBULIN SER CALC-MCNC: 2.1 G/DL (CALC) (ref 1.9–3.7)
GLUCOSE SERPL-MCNC: 175 MG/DL (ref 65–99)
HCT VFR BLD AUTO: 35.1 % (ref 38.5–50)
HGB BLD-MCNC: 12.4 G/DL (ref 13.2–17.1)
IRON SATN MFR SERPL: 48 % (CALC) (ref 20–48)
IRON SERPL-MCNC: 192 MCG/DL (ref 50–180)
LYMPHOCYTES # BLD AUTO: 456 CELLS/UL (ref 850–3900)
LYMPHOCYTES NFR BLD AUTO: 21.7 %
MCH RBC QN AUTO: 37 PG (ref 27–33)
MCHC RBC AUTO-ENTMCNC: 35.3 G/DL (ref 32–36)
MCV RBC AUTO: 104.8 FL (ref 80–100)
METHYLMALONATE SERPL-SCNC: 116 NMOL/L (ref 55–335)
MONOCYTES # BLD AUTO: 172 CELLS/UL (ref 200–950)
MONOCYTES NFR BLD AUTO: 8.2 %
NEUTROPHILS # BLD AUTO: 1329 CELLS/UL (ref 1500–7800)
NEUTROPHILS NFR BLD AUTO: 63.3 %
PLATELET # BLD AUTO: 82 THOUSAND/UL (ref 140–400)
PMV BLD REES-ECKER: 10.7 FL (ref 7.5–12.5)
POTASSIUM SERPL-SCNC: 3.9 MMOL/L (ref 3.5–5.3)
PROT SERPL-MCNC: 6 G/DL (ref 6.1–8.1)
RBC # BLD AUTO: 3.35 MILLION/UL (ref 4.2–5.8)
SODIUM SERPL-SCNC: 140 MMOL/L (ref 135–146)
TIBC SERPL-MCNC: 401 MCG/DL (CALC) (ref 250–425)
WBC # BLD AUTO: 2.1 THOUSAND/UL (ref 3.8–10.8)

## 2024-07-25 DIAGNOSIS — D69.3 IDIOPATHIC THROMBOCYTOPENIA (HCC): ICD-10-CM

## 2024-07-31 ENCOUNTER — OFFICE VISIT (OUTPATIENT)
Dept: GASTROENTEROLOGY | Facility: CLINIC | Age: 49
End: 2024-07-31
Payer: COMMERCIAL

## 2024-07-31 VITALS
BODY MASS INDEX: 36.6 KG/M2 | SYSTOLIC BLOOD PRESSURE: 114 MMHG | WEIGHT: 285.2 LBS | DIASTOLIC BLOOD PRESSURE: 70 MMHG | TEMPERATURE: 97.2 F | HEIGHT: 74 IN

## 2024-07-31 DIAGNOSIS — K74.60 CIRRHOSIS OF LIVER WITHOUT ASCITES, UNSPECIFIED HEPATIC CIRRHOSIS TYPE (HCC): Primary | ICD-10-CM

## 2024-07-31 DIAGNOSIS — K76.9 LIVER LESION: ICD-10-CM

## 2024-07-31 DIAGNOSIS — K76.6 PORTAL HYPERTENSION (HCC): ICD-10-CM

## 2024-07-31 DIAGNOSIS — K76.0 NAFLD (NONALCOHOLIC FATTY LIVER DISEASE): ICD-10-CM

## 2024-07-31 DIAGNOSIS — D69.6 THROMBOCYTOPENIA (HCC): ICD-10-CM

## 2024-07-31 DIAGNOSIS — Z86.010 HISTORY OF COLON POLYPS: ICD-10-CM

## 2024-07-31 DIAGNOSIS — I85.00 ESOPHAGEAL VARICES WITHOUT BLEEDING, UNSPECIFIED ESOPHAGEAL VARICES TYPE (HCC): ICD-10-CM

## 2024-07-31 PROCEDURE — 3074F SYST BP LT 130 MM HG: CPT | Performed by: STUDENT IN AN ORGANIZED HEALTH CARE EDUCATION/TRAINING PROGRAM

## 2024-07-31 PROCEDURE — 3078F DIAST BP <80 MM HG: CPT | Performed by: STUDENT IN AN ORGANIZED HEALTH CARE EDUCATION/TRAINING PROGRAM

## 2024-07-31 PROCEDURE — 99214 OFFICE O/P EST MOD 30 MIN: CPT | Performed by: STUDENT IN AN ORGANIZED HEALTH CARE EDUCATION/TRAINING PROGRAM

## 2024-07-31 NOTE — PROGRESS NOTES
[facility] - Outpatient Consultation  Wilver Quintanilla 48 y.o. male MRN: [id number]  Encounter: [id number]    PCP:  Tyrell Olvera MD, [phone]  Referring Provider:  No ref. provider found,     Patient: Wilver Quintanilla, 1975  Reason for Referral: GORMAN cirrhosis    ASSESSMENT/PLAN:  48 y.o. male with history of class II obesity, HTN, HLD and DM who presents for follow up for GORMAN cirrhosis. He has no acute liver specific complaints or clinical evidence of hepatic decompensation.      He has had longstanding history of pancytopenia and splenomegaly which has been extensively worked up by hematology. His liver chemistries are mildly elevated and his liver function is intact. He had multiple bone marrow biopsies which was negative for leukemia or lymphoma. He had an EGD/COY in October 2022 which showed esophageal varices and portal hypertensive gastropathy. A subsequent US elastography showed 14.1 LSM (IQR 12.4%). 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      He was started on Ozempic but has not had significant weight loss since his last visit. He does have evidence of temporal wasting and sarcopenia on exam suggestive of sarcopenic obesity. Discussed the importance of increasing his protein intake especially while on Ozempic to prevent progression of his sarcopenia.     He is otherwise up-to-date with his cirrhosis health maintenance. He should avoid interval weight gain, excessive EtOH consumption and hepatoxic medications. Will also need to discuss with hematology if he should be continued on Promacta as I suspect that his thrombocytopenia is due to his liver disease. He is not planned for any upcoming procedures.    He will follow up in 6 months or sooner if needed. Thank you for the opportunity to consult in his care.     1. NAFLD  - Continue Ozempic 2 mg SC weekly     2.  Compensated cirrhosis  - MELD (3.0) 14   -  XR of left hand negative for fracture or foreign body    Erlinda Marvin PAC   No symptomatic ascites, variceal bleeding, or hepatic encephalopathy.     3. Esophageal variceal surveillance  His last EGD showed non-bleeding esophageal varices  - Continue carvedilol 6.25 mg BID to prevent decompensation  - No further EGDs necessary      4. LR-3 lesion  - Repeat MRI abdomen with MRCP and AFP levels in 10/2024     5. History of polyps  - Repeat COY in 2025 for history of polyps      6. Healthcare maintenance for patients with cirrhosis  -He was instructed to take no more than 2 grams of tylenol in 24 hours and no products containing NSAIDs, benzodiazapines, and narcotics.  -He was also instructed to avoid raw shellfish   -He should participate in daily exercise as to prevent loss of muscle mass  -He should abstain from all alcohol intake and was counseled on this.    -He is at risk for vitamin deficiencies and metabolic bone disease  -HAV and HBV immunity will be checked and he should be vaccinated through his primary care provider if he is not immune.  -Additionally, he should receive a yearly flu shot and the pneumonia vaccine through his primary care provider.     I made him aware of the symptoms of hepatic decompensation: evident confusion, vomiting blood, black tarry stool, or large amounts of red blood in stool, and abdominal swelling. In addition to seeking local medical attention urgently, he will notify me if this happens before the next visit.     Yasmin Woodruff MD  Division of Gastroenterology and Hepatology  [facility]    ============================================================================  CC/HPI: 48 y.o. male with history of class II obesity, HTN, HLD and DM who presents for follow up for GORMAN cirrhosis.     Interval events  - Has not had weight loss on Ozempic but is reporting muscle wasting and loss of strength  - MRI abdomen 4/2024 showed stable LR-3 lesion    Extended liver history  He has had longstanding history of pancytopenia and splenomegaly  which has been extensively worked up by hematology. He had multiple bone marrow biopsies which was negative for leukemia or lymphoma. He had an EGD/COY in 2022 which showed esophageal varices and portal hypertensive gastropathy. A subsequent US elastography showed 14.1 LSM (IQR 12.4%). He established care with GI and was started on aldactone in addition to torsemide for treatment of CHRISSY.     EGD in  showed non-bleeding esophageal varices for which he was started on carvedilol.      He does report family history of cirrhosis in his mother who  from complications of cirrhosis and liver failure 1 year ago. He also has a son who has fatty liver at a young age. He denies family history of medullary thyroid cancer.      He had genetic testing which was positive for PNPLA3. He was started on Ozempic 1 year ago.     ROS: Complete review of systems otherwise negative.     PAST MEDICAL/SURGICAL HISTORY:  Past Medical History:   Diagnosis Date    Annual physical exam 2024    BMI 36.0-36.9,adult 2022    BMI 37.0-37.9, adult 2021    BMI 38.0-38.9,adult 2022    BMI 39.0-39.9,adult 2021    Cervicalgia     Colon polyp     Concussion with loss of consciousness of 30 minutes or less 2021    COVID-19 2023    Depression     Diabetes mellitus (HCC)     Diabetes mellitus out of control     Edema of both lower legs 2021    Elevated liver enzymes     Erectile dysfunction     Exposure to COVID-19 virus     Fatty liver     GERD (gastroesophageal reflux disease)     Heart murmur 2022    Hyperglycemia     Hypertension     Left cervical radiculopathy     Left elbow pain     Left foot pain     Leukopenia 2021    NAFLD (nonalcoholic fatty liver disease) 2023    Other cirrhosis of liver (HCC) 2023    Pain in both feet 2021    Pancytopenia (HCC) 2021    Skin rash 2021    Thrombocytopenia (HCC)     Venous stasis dermatitis of both lower  extremities 05/13/2022    Vitamin D deficiency 01/07/2022        Past Surgical History:   Procedure Laterality Date    CHOLECYSTECTOMY  1990's    COLONOSCOPY  06/2005    COLONOSCOPY      EGD  07/10/2014    IR BIOPSY BONE MARROW  03/20/2019    IR BIOPSY BONE MARROW  7/29/2022    UPPER GASTROINTESTINAL ENDOSCOPY      WISDOM TOOTH EXTRACTION      In early to mid 20's       FAMILY/SOCIAL HISTORY:  Family History   Problem Relation Age of Onset    Heart attack Mother     Thrombocytopenia Mother     Coronary artery disease Mother     Hypertension Mother     Testicular cancer Father     Hypertension Father     No Known Problems Brother     Hypertension Brother     No Known Problems Brother     No Known Problems Son        Social History     Tobacco Use    Smoking status: Former     Types: Cigarettes    Smokeless tobacco: Former     Quit date: 3/1/2014    Tobacco comments:     Social.  Last cigarette years ago.   Vaping Use    Vaping status: Never Used   Substance Use Topics    Alcohol use: Not Currently    Drug use: Not Currently     Comment: No drug use - As per [employer]       MEDICATIONS:  Current Outpatient Medications on File Prior to Visit   Medication Sig Dispense Refill    Blood Glucose Monitoring Suppl (Contour Next One) KIT USE AS DIRECTED TO TEST TWICE DAILY      carvedilol (COREG) 6.25 mg tablet TAKE 1 TABLET BY MOUTH TWICE A DAY WITH MEALS 60 tablet 5    eltrombopag (Promacta) 75 MG TABS TAKE 1 TABLET BY MOUTH ONCE  DAILY ON AN EMPTY STOMACH AT  LEAST 1 HOUR BEFORE OR 2 HOURS  AFTER A MEAL 30 tablet 3    glimepiride (AMARYL) 4 mg tablet TAKE 1 TABLET BY MOUTH EVERY DAY 30 tablet 5    losartan (COZAAR) 25 mg tablet TAKE 1 TABLET BY MOUTH DAILY 90 tablet 1    Microlet Lancets MISC 2 (two) times a day Use to test      omeprazole (PriLOSEC) 40 MG capsule TAKE 1 CAPSULE BY MOUTH DAILY 90 capsule 3    OneTouch Verio test strip USE TO TEST ONCE DAILY 25 strip 3    Ozempic, 2 MG/DOSE, 8 MG/3ML injection pen INJECT  "2 MG UNDER THE SKIN EVERY 7 DAYS FOR 90 DOSES 3 mL 5    torsemide (DEMADEX) 20 mg tablet TAKE 1 TABLET (20 MG TOTAL) BY MOUTH IN THE MORNING AND BEFORE BEDTIME 60 tablet 11    Vitamin D, Cholecalciferol, 50 MCG (2000 UT) CAPS Take 1 Units by mouth in the morning      zinc gluconate 50 mg tablet Take 50 mg by mouth daily      methylPREDNISolone 4 MG tablet therapy pack Use as directed on package (Patient not taking: Reported on 6/3/2024) 21 each 0     No current facility-administered medications on file prior to visit.       Allergies   Allergen Reactions    Aspirin Other (See Comments)     Reaction unknown to patient.    Diltiazem Other (See Comments)     Patient does not recall what happened    Lisinopril Cough       PHYSICAL EXAM:  /70 (BP Location: Left arm, Patient Position: Sitting, Cuff Size: Adult)   Temp (!) 97.2 °F (36.2 °C) (Tympanic)   Ht 6' 2\" (1.88 m)   Wt 129 kg (285 lb 3.2 oz)   BMI 36.62 kg/m²   GENERAL: NAD, AAO  HEENT: anicteric, OP clear, MMM  ABDOMEN: S/ND/NT  EXTREMITIES: no edema  SKIN: no rashes, no palmar erythema, no spider angiomata   NEURO: normal gait, no tremor, no asterixis     LABS/RADIOLOGY/ENDOSCOPY:  Lab Results   Component Value Date    WBC 2.1 (L) 07/18/2024    HGB 12.4 (L) 07/18/2024    HCT 35.1 (L) 07/18/2024    PLT 82 (L) 07/18/2024    GLUF 116 (H) 05/04/2021    BUN 20 07/18/2024    CREATININE 0.99 07/18/2024     04/05/2017    K 3.9 07/18/2024     07/18/2024    CO2 22 07/18/2024    PROT 6.4 04/05/2017    BILIDIR 0.27 (H) 01/17/2017    ALKPHOS 106 07/18/2024    ALT 33 07/18/2024    AST 37 07/18/2024    GLOB 2.1 07/18/2024    CALCIUM 8.7 07/18/2024    EGFR 93 07/18/2024    TRIG 67 02/03/2024    HDL 40 02/03/2024    INR 1.3 (H) 07/18/2024     MRI abdomen (4/23/2024)  Unchanged 16 mm segment 6 LI- RADS LR 3 observation. Stable since 4/16/2023.   Reidentified findings of portal hypertension including severe splenomegaly measuring 24 cm in length.     EGD " (10/2022)  Normal duodenum, random biopsies taken  Antral gastritis biopsies taken  Portal hypertensive gastropathy  No evidence of gastric varices  Three columns of small to medium size esophageal varices with no stigmata of bleeding, all collapse with insufflation       MELD 3.0: 14 at 7/18/2024  6:56 AM  MELD-Na: 14 at 7/18/2024  6:56 AM  Calculated from:  Serum Creatinine: 0.99 mg/dL (Using min of 1 mg/dL) at 7/18/2024  6:52 AM  Serum Sodium: 140 mmol/L (Using max of 137 mmol/L) at 7/18/2024  6:52 AM  Total Bilirubin: 3.3 mg/dL at 7/18/2024  6:52 AM  Serum Albumin: 3.9 g/dL (Using max of 3.5 g/dL) at 7/18/2024  6:52 AM  INR(ratio): 1.3 at 7/18/2024  6:56 AM  Age at listing (hypothetical): 48 years  Sex: Male at 7/18/2024  6:56 AM

## 2024-08-02 ENCOUNTER — TRANSCRIBE ORDERS (OUTPATIENT)
Age: 49
End: 2024-08-02

## 2024-08-07 DIAGNOSIS — E11.9 TYPE 2 DIABETES MELLITUS WITHOUT COMPLICATION, WITHOUT LONG-TERM CURRENT USE OF INSULIN (HCC): ICD-10-CM

## 2024-08-07 DIAGNOSIS — K76.0 NAFLD (NONALCOHOLIC FATTY LIVER DISEASE): ICD-10-CM

## 2024-08-07 DIAGNOSIS — K74.60 CIRRHOSIS OF LIVER WITHOUT ASCITES, UNSPECIFIED HEPATIC CIRRHOSIS TYPE (HCC): ICD-10-CM

## 2024-08-30 ENCOUNTER — TELEPHONE (OUTPATIENT)
Age: 49
End: 2024-08-30

## 2024-08-30 ENCOUNTER — OFFICE VISIT (OUTPATIENT)
Dept: HEMATOLOGY ONCOLOGY | Facility: CLINIC | Age: 49
End: 2024-08-30
Payer: COMMERCIAL

## 2024-08-30 VITALS
SYSTOLIC BLOOD PRESSURE: 112 MMHG | HEIGHT: 74 IN | BODY MASS INDEX: 36.19 KG/M2 | OXYGEN SATURATION: 97 % | DIASTOLIC BLOOD PRESSURE: 68 MMHG | RESPIRATION RATE: 17 BRPM | WEIGHT: 282 LBS | HEART RATE: 76 BPM | TEMPERATURE: 97.7 F

## 2024-08-30 DIAGNOSIS — D61.818 PANCYTOPENIA (HCC): ICD-10-CM

## 2024-08-30 DIAGNOSIS — K74.69 OTHER CIRRHOSIS OF LIVER (HCC): Primary | ICD-10-CM

## 2024-08-30 PROCEDURE — 99215 OFFICE O/P EST HI 40 MIN: CPT | Performed by: PHYSICIAN ASSISTANT

## 2024-08-30 NOTE — TELEPHONE ENCOUNTER
PA for Ozempic SUBMITTED     via    [x][facility]-KEY: [id number]  [][employer] Thumb-Case ID #   []Faxed to plan   []Other website   []Phone call Case ID #     Office notes sent, clinical questions answered. Awaiting determination    Turnaround time for your insurance to make a decision on your Prior Authorization can take 7-21 business days.

## 2024-08-30 NOTE — PROGRESS NOTES
Hematology/Oncology Outpatient Follow- up Note  Wilver Quintanilla 48 y.o. male MRN: @ Encounter: [id number]        Date:  8/30/2024      Assessment / Plan:    Chronic thrombocytopenia. Platelet count 110s - 130s 2016 - 2017. Decreased to 30,000 range in 2019.    3/2019 normal BMBX  3/2019 contrast CT scan abdomen and pelvis- liver unremarkable, spleen measured 20 cm (15cm in 2016)     8/2019 pet/ct with no evidence of malignancy.   Insurance initially denied Promacta    No improvement in his platelets with steroids (Decadron 40 mg daily for 4 days) or Rituxan (6/2019).    8/2019 platelet count 46,000     8/2019 Promacta 50 mg initiated  10/2019 Promacta increased to 75mg po daily     2019- 2020 platelet count 80,000 range     2021 progressive neutropenia, ANC 0.8-1.2, platelet count into the 60s    1/6/22 abd u/s - Liver unremarkable, though 16.8cm (14.1cm 5/2016)  Surface contour is smooth. Parenchyma: Echogenicity and echotexture are within normal limits.  No evidence of suspicious mass.  Spleen enlarged further to 27cm     5/7/22 hemoglobin down to 7.2 with MCV of 82, white blood cell count 1.3, platelet count 63,000    7/2022 consult with GI.      7/2022  repeat BMBX- unremarkable except decreased iron stores    8/22 - 12/2022 Venofer 200mg x 10     10/14/22 EGD and colonoscopy -colon polyps identified, 3-year recall recommended. Portal hypertensive gastropathy noted without evidence of gastric varices. 3 columns of small to medium sized esophageal varices with no stigmata of bleeding were identified     11/7/22 ferritin 17; hgb 10.7, WBC 1.7     1/7/23 hemoglobin 13.4, MCV 95, white blood cell count 2, ANC 1100, platelets 82,000  5/2023 AFP 2.2  6/2023 ferritin 20, hemoglobin 12.5, , white blood cell count 2, ANC 1100, platelets 80,000     2/3/24 hemoglobin 12, MCV 94, white blood cell count 2.1, 62% neutrophils, 25% lymphocytes, 9% monocytes, platelet count 88,000    3/2024  Given recurrent anemia and  history of iron deficiency, Venofer 300 mg weekly x 3 along with B12 1000 mcg IM given    7/18/24 hemoglobin 12.4, , white blood cell count 2.1, platelet count 82,000, ANC 1.3, ferritin 28, iron saturation 48%, normal MMA        We did discuss continuation versus tapering back of the Promacta.  He does occasionally experience mild blood-streaked sputum in the a.m., no other bleeding.  His platelets prior to starting Promacta were in the 30-40 range.  I am in favor of continuation.  He is agreeable.  Did discuss possibility of CT scan to help distinguish between pulmonary versus GI source.  Patient and I likely believe that this is related to GI.  He defers CT scan.  He will discuss follow-up EGD with Dr. Woodruff next month.          HPI: Wilver Quintanilla is a 48 year old  male seen for initial consultation by Dr Angela on 3/10/16 regarding thrombocytopenia.     History of hypertension, diabetes, fatty liver, GERD.   Works in construction.        His platelet count fluctuated between 111 and 130 for the prior 4-5 years before initial consult. As early as February 2011, he had a mildly low platelet count.      5/2016 abd u/s -spleen enlarged at 15 cm, liver 14.1 cm     4/5/2017 hemoglobin 15, , white blood cell count 4.4, platelets 123        2/22/19 labs at [facility] with WBC 3.4, Hgb 14.8, , Platelets 53,000. Glc elevated 133 fasting. GFR = 74. Albumin 4.2. Elevation of total bilirubin at 1.3, Alk phos 129, AST 68, ALT 72. Urobilinogen elevated to 2.0.    2019 Progressive anemia, worsening neutropenia thrombocytopenia, elevated AST, ALT, Alk phos    2/22/2019 hemoglobin 14.8, , white blood cell count 3.4, platelets 53,000         3/29/2019 CT Abdomen to better assess liver and if cirrhosis is present.   - Spleen is enlarged, measuring 20 cm in AP and craniocaudad diameter. The spleen is homogeneous without discrete mass.     Bone marrow biopsy due to decrease in platelet count-   NORMAL  3/22/2019 - Cytogenetic results: 46,XY[20]  - [employer] NGS FLT3 and [employer] Report (GenPath# [id number], evaluated by Shannan Stevens) is a follows:  * DETECTED GENOMIC ALTERATIONS: No Mutations Identified  * Tumor type: Suspected Myelodysplastic Syndrome  * Interpretation Summary: This was a NORMAL sequencing study in which no disease associated mutations or variants of unclear  significance were identified in the tested specimen. Normal sequencing results may be a consequence of testing a sample with little or  no dysplastic cells present. Clinical and pathologic correlation is required to interpret these findings.  Addendum electronically signed by Guero Rossi MD on 3/28/2019 at 1538  .  Final Diagnosis  A & B. Bone marrow, right iliac, core needle biopsy & aspirate clot section:  - Normocellular for age (45-50% cell) marrow with adequate, normoblastic and progressive trilineage hematopoiesis, M:E = 2.7 : 1,  myeloblasts ~ 1%; megakaryocytes appear numerically adequate & morphologically unremarkable.  - Stainable macrophage storage iron is present.  - Reticulin fibrosis (cytochemical stains) is grade 0 of 3 in  Concensus system.  - Lymphocytes and plasma cells appear mature, scattered, not increased.  - No collagen fibrosis, no granulomata, no vasculitis and no necrosis.  C. Bone marrow, right iliac, aspirate slides (10): hemodilute, aspicular.  - Normoblastic & progressive trilineage hematopoiesis, with subtle dysplastic erythroid morphology in < 10% of precursors;  myelopoiesis appears left-shifted but without dysmorphic features and megakaryocytes are not found, blasts ! 1%.  - Lymphocytes & plasma cells appear mature, scattered & without morphologic atypia to suggest neoplasia.  - Iron stains reveal no ringed sideroblasts.  - Flow cytometry (GenPath#_[id number]_, evaluated by Dr. MEME Sabillon) reveals:  * NO evidence for abnormal myeloid maturation or an increased blast  population, CD34(+) blasts comprise 1 to 2% of total cells  analyzed.  * NO evidence for a lymphoproliferative disorder - there is a mixed population of maturing myeloid cells, B cells and T cells. B-cells (3  to 4.5% of total) are polytypic & T-cells (3.4 to 9%% of total) show no pan T-cell antigen deletion. The CD4/CD8 ratio is 1.0:1, CD56+  NK-cells (1.6%) and CD57+ T-LGL's (3.4%) are within normal limits.  * A small population of dim CD45(+) cells that are CD19 (+), CD10 (+) compatible with hematogones, is present (1.7%)  * Viability 7AAD: 99 & 37%.  * The following antigens were evaluated & found to be expressed as described above or by appropriate cells: CD2, CD3, CD4, CD5,  CD7, CD8, CD10, CD11b, CD11c, CD13, CD14, CD16, CD19, CD20, CD22, CD23, CD33, CD34, CD38, CD45, CD56, CD57, CD64,  , FMC-7, HLA-DR, sKappa, sLambda.     5/24/2019 f/u visit - “PET CT scan ordered to evaluate the spleen for uptake for a question of a splenic lymphoma but his insurance company denied this. I had ordered Promacta for his ITP which again was denied. At this point I will give him Decadron 40 mg daily for 4 days. If he does not have a response to his platelet count we will again appeal the decision and reapply for his medication”  5/23/2019 hemoglobin 13.5, , white blood cell count 2.3, normal differential, platelets 35,000  6/11/2019- did not respond to dexamethasone. (June 5, 2019 platelet count 37,000)  7/2019 He then got 4 doses of Rituxan. Platelet count remained 65,000  8/30/2019 - pet/ct - 1. No evidence of hypermetabolic malignancy. Persistent splenomegaly, unchanged.”         8/2019 pet/ct with no evidence of malignancy.     8/2019 platelet count 46,000     8/2019 Promacta 50 mg initiated,  10/2019 Promacta increased to 75mg po daily     11/2019 platelet count 78,000  7/2020 platelet count 87,000  Platelets to 80,000 range. He was continued on Promacta.       10/2020 hemoglobin 13, , white blood cell  count 2.6, ANC 1.6, platelet count 88,000  11/2021 hemoglobin 10.8, MCV 92, white blood cell count 1.7, ANC 1000, platelets 70,000    Progressive anemia, worsening neutropenia thrombocytopenia       1/6/22 abd u/s - Liver unremarkable, though 16.8cm (14.1cm 5/2016)  Surface contour is smooth. Parenchyma: Echogenicity and echotexture are within normal limits.  No evidence of suspicious mass.  Spleen enlarged further to 27cm     5/7/22 hemoglobin down to 7.2 with MCV of 82, white blood cell count 1.3, platelet count 63,000      7/2022 -“spoke with GI regarding reported history of fatty liver, elevated LFTs, iron deficiency anemia, GERD”    7/29/22 repeat BMBX- 46, XY  A-C. Bone Marrow, Left Iliac Crest, Core, Clot and Aspirate:  - Normocellular marrow (50% cellularity) with megakaryocytic hyperplasia.  - No evidence of leukemia or lymphoma.  - Decreased iron stores.  - No reticulin fibrosis.  - No mutations identified by NGS.  Comment: Patient's history of pancytopenia and splenomegaly is noted. The findings are compatible with immune thrombocytopenic purpura (ITP). Other etiologies     8/22 - 12/2022 Venofer 200mg x 10     10/14/22 EGD and colonoscopy -colon polyps identified, 3-year recall recommended. Portal hypertensive gastropathy noted without evidence of gastric varices. 3 columns of small to medium sized esophageal varices with no stigmata of bleeding were identified     11/7/22 ferritin 17; hgb 10.7, WBC 1.7     1/7/23 hemoglobin 13.4, MCV 95, white blood cell count 2, ANC 1100, platelets 82,000  5/2023 AFP 2.2  6/2023 ferritin 20, hemoglobin 12.5, , white blood cell count 2, ANC 1100, platelets 80,000  Total bilirubin 2.6, glucose 108, otherwise normal LFTs       2/3/24 hemoglobin 12, MCV 94, white blood cell count 2.1, 62% neutrophils, 25% lymphocytes, 9% monocytes, platelet count 88,000    3/2024  Given recurrent anemia and history of iron deficiency, Venofer 300 mg weekly x 3 along with B12 1000  mcg IM given               Interval History:        Review of Systems   Constitutional:  Negative for appetite change, chills, diaphoresis, fatigue, fever and unexpected weight change.   HENT:   Negative for mouth sores, nosebleeds, sore throat, tinnitus and voice change.    Eyes:  Negative for eye problems.   Respiratory:  Negative for chest tightness, cough, shortness of breath and wheezing.    Cardiovascular:  Negative for chest pain, leg swelling and palpitations.   Gastrointestinal:  Negative for abdominal distention, abdominal pain, blood in stool, constipation, diarrhea, nausea, rectal pain and vomiting.   Endocrine: Negative for hot flashes.   Genitourinary: Negative.     Musculoskeletal:  Negative for gait problem and myalgias.   Skin:  Negative for itching and rash.   Neurological:  Negative for dizziness, gait problem, headaches, light-headedness and numbness.   Hematological:  Negative for adenopathy.   Psychiatric/Behavioral:  Negative for confusion and sleep disturbance. The patient is not nervous/anxious.         Test Results:        Labs:   Lab Results   Component Value Date    HGB 12.4 (L) 07/18/2024    HCT 35.1 (L) 07/18/2024    .8 (H) 07/18/2024    PLT 82 (L) 07/18/2024    WBC 2.1 (L) 07/18/2024    NRBC 0 01/17/2023     Lab Results   Component Value Date     04/05/2017    K 3.9 07/18/2024     07/18/2024    CO2 22 07/18/2024    BUN 20 07/18/2024    CREATININE 0.99 07/18/2024    GLUCOSE 102 (H) 04/05/2017    GLUF 116 (H) 05/04/2021    CALCIUM 8.7 07/18/2024    CORRECTEDCA 9.5 05/04/2021    AST 37 07/18/2024    ALT 33 07/18/2024    ALKPHOS 106 07/18/2024    PROT 6.4 04/05/2017    BILITOT 1.0 04/05/2017    EGFR 93 07/18/2024           Imaging: No results found.          Allergies:   Allergies   Allergen Reactions    Aspirin Other (See Comments)     Reaction unknown to patient.    Diltiazem Other (See Comments)     Patient does not recall what happened    Lisinopril Cough     Current  "Medications: Reviewed  PMH/FH/SH:  Reviewed      Physical Exam:    There is no height or weight on file to calculate BSA.    Ht Readings from Last 3 Encounters:   07/31/24 6' 2\" (1.88 m)   06/03/24 6' 2\" (1.88 m)   02/23/24 6' 2\" (1.88 m)        Wt Readings from Last 3 Encounters:   07/31/24 129 kg (285 lb 3.2 oz)   06/03/24 130 kg (286 lb)   02/23/24 131 kg (288 lb)        Temp Readings from Last 3 Encounters:   07/31/24 (!) 97.2 °F (36.2 °C) (Tympanic)   06/03/24 98.3 °F (36.8 °C) (Temporal)   03/29/24 97.6 °F (36.4 °C) (Temporal)        BP Readings from Last 3 Encounters:   07/31/24 114/70   06/03/24 110/70   03/29/24 122/75             Physical Exam  Vitals reviewed.   Constitutional:       General: He is not in acute distress.     Appearance: He is well-developed. He is not diaphoretic.   HENT:      Head: Normocephalic and atraumatic.   Eyes:      Conjunctiva/sclera: Conjunctivae normal.   Neck:      Trachea: No tracheal deviation.   Cardiovascular:      Rate and Rhythm: Normal rate and regular rhythm.      Heart sounds: No murmur heard.     No friction rub. No gallop.   Pulmonary:      Effort: Pulmonary effort is normal. No respiratory distress.      Breath sounds: Normal breath sounds. No wheezing or rales.   Chest:      Chest wall: No tenderness.   Abdominal:      General: There is no distension.      Palpations: Abdomen is soft. There is hepatomegaly and splenomegaly.      Tenderness: There is no abdominal tenderness.   Musculoskeletal:      Cervical back: Normal range of motion and neck supple.   Lymphadenopathy:      Cervical: No cervical adenopathy.   Skin:     General: Skin is warm and dry.      Coloration: Skin is not pale.      Findings: No erythema.   Neurological:      Mental Status: He is alert. Mental status is at baseline.   Psychiatric:         Behavior: Behavior normal.         Thought Content: Thought content normal.               Emergency Contacts:    Extended Emergency Contact " Information  Primary Emergency Contact: Adry Quintanilla  Address: [address] [address] [place]  Home Phone: [phone]  Work Phone: [phone]  Mobile Phone: [phone]  Relation: Spouse

## 2024-08-30 NOTE — TELEPHONE ENCOUNTER
Forwarding to PA team for review.       semaglutide, 2 mg/dose, (Ozempic, 2 MG/DOSE,) 8 mg     Patient states that he went to pick this medication up at the pharmacy and it was $1,100.00. Was told by insurance that they do not cover for weight loss, however patient is taking this for diabetes which patient feels has helped and wants to continue. Pharmacy advised to gt a prior auth done

## 2024-09-06 ENCOUNTER — TELEPHONE (OUTPATIENT)
Age: 49
End: 2024-09-06

## 2024-09-06 NOTE — TELEPHONE ENCOUNTER
Reason for call:   Patient calling for prior auth    [] Refill   [x] Prior Auth  [] Other:     Office:   [] PCP/Provider -   [x] Specialty/Provider - Hem/OncMaria Fernanda    Medication:           Pharmacy: [facility]

## 2024-09-06 NOTE — TELEPHONE ENCOUNTER
Pt called to FU on the PA, I advised this HAS been approved and the script was sent in to his Pharm. Pt will call them to find out when he can .

## 2024-09-06 NOTE — TELEPHONE ENCOUNTER
Med went through as $930    PA for Ozempic 2mg/dose APPROVED     Date(s) approved 8/30/2024 - 8/30/2025    Case # [id number]    Patient advised by          [x][employer] Message  []Phone call   []LMOM  []L/M to call office as no active Communication consent on file  [x]Unable to leave detailed message as VM not approved on Communication consent       Pharmacy advised by    []Fax  [x]Phone call    Approval letter scanned into [employer] Yes

## 2024-09-06 NOTE — TELEPHONE ENCOUNTER
Patient called RE: the status of the prior auth for ozempic    Please call patient at [phone]

## 2024-10-03 ENCOUNTER — TRANSCRIBE ORDERS (OUTPATIENT)
Age: 49
End: 2024-10-03

## 2024-10-06 LAB
BUN SERPL-MCNC: 19 MG/DL (ref 7–25)
BUN/CREAT SERPL: ABNORMAL (CALC) (ref 6–22)
CALCIUM SERPL-MCNC: 8.7 MG/DL (ref 8.6–10.3)
CHLORIDE SERPL-SCNC: 108 MMOL/L (ref 98–110)
CO2 SERPL-SCNC: 25 MMOL/L (ref 20–32)
CREAT SERPL-MCNC: 1.04 MG/DL (ref 0.7–1.3)
GFR/BSA.PRED SERPLBLD CYS-BASED-ARV: 87 ML/MIN/1.73M2
GLUCOSE SERPL-MCNC: 141 MG/DL (ref 65–99)
HBA1C MFR BLD: 6.3 % OF TOTAL HGB
POTASSIUM SERPL-SCNC: 4 MMOL/L (ref 3.5–5.3)
PSA SERPL-MCNC: 0.26 NG/ML
SODIUM SERPL-SCNC: 140 MMOL/L (ref 135–146)

## 2024-10-07 ENCOUNTER — OFFICE VISIT (OUTPATIENT)
Dept: INTERNAL MEDICINE CLINIC | Facility: CLINIC | Age: 49
End: 2024-10-07
Payer: COMMERCIAL

## 2024-10-07 VITALS
SYSTOLIC BLOOD PRESSURE: 110 MMHG | HEART RATE: 72 BPM | DIASTOLIC BLOOD PRESSURE: 64 MMHG | HEIGHT: 74 IN | OXYGEN SATURATION: 94 % | WEIGHT: 285 LBS | BODY MASS INDEX: 36.57 KG/M2 | RESPIRATION RATE: 18 BRPM | TEMPERATURE: 97.7 F

## 2024-10-07 DIAGNOSIS — K74.69 OTHER CIRRHOSIS OF LIVER (HCC): ICD-10-CM

## 2024-10-07 DIAGNOSIS — R60.0 EDEMA OF BOTH LOWER LEGS: ICD-10-CM

## 2024-10-07 DIAGNOSIS — K76.0 NAFLD (NONALCOHOLIC FATTY LIVER DISEASE): ICD-10-CM

## 2024-10-07 DIAGNOSIS — I10 ESSENTIAL HYPERTENSION: Primary | ICD-10-CM

## 2024-10-07 DIAGNOSIS — Z23 NEED FOR PROPHYLACTIC VACCINATION AND INOCULATION AGAINST INFLUENZA: ICD-10-CM

## 2024-10-07 DIAGNOSIS — K21.9 GASTROESOPHAGEAL REFLUX DISEASE WITHOUT ESOPHAGITIS: ICD-10-CM

## 2024-10-07 DIAGNOSIS — E11.9 TYPE 2 DIABETES MELLITUS WITHOUT COMPLICATION, WITHOUT LONG-TERM CURRENT USE OF INSULIN (HCC): ICD-10-CM

## 2024-10-07 DIAGNOSIS — E55.9 VITAMIN D DEFICIENCY: ICD-10-CM

## 2024-10-07 DIAGNOSIS — D69.3 CHRONIC ITP (IDIOPATHIC THROMBOCYTOPENIC PURPURA) (HCC): ICD-10-CM

## 2024-10-07 PROCEDURE — 90673 RIV3 VACCINE NO PRESERV IM: CPT | Performed by: INTERNAL MEDICINE

## 2024-10-07 PROCEDURE — 99214 OFFICE O/P EST MOD 30 MIN: CPT | Performed by: INTERNAL MEDICINE

## 2024-10-07 PROCEDURE — 90471 IMMUNIZATION ADMIN: CPT | Performed by: INTERNAL MEDICINE

## 2024-10-07 NOTE — PATIENT INSTRUCTIONS
Patient was advised to continue present medications. discussed with the patient medications and laboratory data in detail.  Follow-up with me in 3 months or as advised.  If any blood test was ordered please do 1 week prior to next appointment unless advise to get earlier.  If you have any questions please call the office [phone]

## 2024-10-07 NOTE — PROGRESS NOTES
Assessment/Plan:    1. Essential hypertension  -     Basic metabolic panel; Future  -     Basic metabolic panel  2. Type 2 diabetes mellitus without complication, without long-term current use of insulin (HCC)  -     Ambulatory Referral to Ophthalmology; Future  -     semaglutide (Rybelsus) 7 MG tablet; Take 1 tablet (7 mg total) by mouth daily before breakfast  -     Basic metabolic panel; Future  -     Hemoglobin A1C; Future  -     Basic metabolic panel  -     Hemoglobin A1C; Future; Expected date: 01/07/2025  3. Gastroesophageal reflux disease without esophagitis  4. Chronic ITP (idiopathic thrombocytopenic purpura) (HCC)  5. BMI 36.0-36.9,adult  6. NAFLD (nonalcoholic fatty liver disease)  7. Other cirrhosis of liver (HCC)  -     Basic metabolic panel; Future  -     Basic metabolic panel  8. Need for prophylactic vaccination and inoculation against influenza  -     influenza vaccine, recombinant, PF, 0.5 mL IM (Flublok)  9. Vitamin D deficiency  10. Edema of both lower legs     Discussion/summary/plan:    Blood pressure well-controlled advised to continue losartan and low-salt diet.  Diabetes mellitus well-controlled hemoglobin A1c 6.3 patient states he stopped taking his Ozempic a month ago as it was not covered under his plan it was very expensive so he started taking glimepiride 4 mg daily.  Blood sugar 120s in the morning and 130s evening.  No hypoglycemia.  Discussed with the patient to try Rybelsus if financially feasible so I prescribed Rybelsus 7 mg daily and discontinue glimepiride if he can get Rybelsus.  Continue 1800-calorie ADA diet.  For GE reflux and esophageal varices he has been on omeprazole has been seen and followed by GI specialist.  For ITP and pancytopenia he has been seen and followed by hematologist on Promacta.  Last platelet count 82K.  For cirrhosis of liver and nonalcoholic fatty liver disease he has been seen and followed by hepatologist.  Vitamin D deficiency resolved on vitamin D  supplement.  Vitamin D level 35 advised to continue same dose.  Edema of legs resolved on torsemide.    Discussed with the patient about getting a updated COVID-19 vaccination which she will get next week.  He was given influenza vaccination today in the office.  He has been watching diet and trying to lose weight.    Subjective: Patient presents for follow-up.      Patient ID: Wilver Quintanilla is a 49 y.o. male.    HPI  49-year-old white male patient present to follow-up his medical problems.      The following portions of the patient's history were reviewed and updated as appropriate:     Past Medical History:  He has a past medical history of Annual physical exam (06/03/2024), BMI 36.0-36.9,adult (12/06/2022), BMI 37.0-37.9, adult (04/22/2021), BMI 38.0-38.9,adult (02/08/2022), BMI 39.0-39.9,adult (12/16/2021), Cervicalgia, Colon polyp, Concussion with loss of consciousness of 30 minutes or less (03/07/2021), COVID-19 (03/03/2023), Depression, Diabetes mellitus (HCC), Diabetes mellitus out of control, Edema of both lower legs (12/16/2021), Elevated liver enzymes, Erectile dysfunction, Exposure to COVID-19 virus, Fatty liver, GERD (gastroesophageal reflux disease), Heart murmur (01/07/2022), Hyperglycemia, Hypertension, Left cervical radiculopathy, Left elbow pain, Left foot pain, Leukopenia (03/07/2021), NAFLD (nonalcoholic fatty liver disease) (03/17/2023), Other cirrhosis of liver (HCC) (03/17/2023), Pain in both feet (12/16/2021), Pancytopenia (HCC) (12/16/2021), Skin rash (12/16/2021), Thrombocytopenia (HCC), Venous stasis dermatitis of both lower extremities (05/13/2022), and Vitamin D deficiency (01/07/2022).,  _______________________________________________________________________  Past Surgical History:   has a past surgical history that includes Cholecystectomy (1990's); Eads tooth extraction; IR biopsy bone marrow (03/20/2019); EGD (07/10/2014); Colonoscopy (06/2005); Colonoscopy; Upper gastrointestinal  endoscopy; and IR biopsy bone marrow (7/29/2022).,  _______________________________________________________________________  Family History:  family history includes Coronary artery disease in his mother; Heart attack in his mother; Hypertension in his brother, father, and mother; No Known Problems in his brother, brother, and son; Testicular cancer in his father; Thrombocytopenia in his mother.,  _______________________________________________________________________  Social History:   reports that he has quit smoking. His smoking use included cigarettes. He quit smokeless tobacco use about 10 years ago. He reports that he does not currently use alcohol. He reports that he does not currently use drugs.,  _______________________________________________________________________  Allergies:  is allergic to aspirin, diltiazem, and lisinopril..  _______________________________________________________________________  Current Outpatient Medications   Medication Sig Dispense Refill    Blood Glucose Monitoring Suppl (Contour Next One) KIT USE AS DIRECTED TO TEST TWICE DAILY      carvedilol (COREG) 6.25 mg tablet TAKE 1 TABLET BY MOUTH TWICE A DAY WITH MEALS 60 tablet 5    eltrombopag (Promacta) 75 MG TABS TAKE 1 TABLET BY MOUTH ONCE  DAILY ON AN EMPTY STOMACH AT  LEAST 1 HOUR BEFORE OR 2 HOURS  AFTER A MEAL 30 tablet 3    glimepiride (AMARYL) 4 mg tablet TAKE 1 TABLET BY MOUTH EVERY DAY 30 tablet 5    losartan (COZAAR) 25 mg tablet TAKE 1 TABLET BY MOUTH DAILY 90 tablet 1    Microlet Lancets MISC 2 (two) times a day Use to test      omeprazole (PriLOSEC) 40 MG capsule TAKE 1 CAPSULE BY MOUTH DAILY 90 capsule 3    OneTouch Verio test strip USE TO TEST ONCE DAILY 25 strip 3    semaglutide (Rybelsus) 7 MG tablet Take 1 tablet (7 mg total) by mouth daily before breakfast 30 tablet 3    torsemide (DEMADEX) 20 mg tablet TAKE 1 TABLET (20 MG TOTAL) BY MOUTH IN THE MORNING AND BEFORE BEDTIME 60 tablet 11    Vitamin D,  "Cholecalciferol, 50 MCG (2000 UT) CAPS Take 1 Units by mouth in the morning      zinc gluconate 50 mg tablet Take 50 mg by mouth daily       No current facility-administered medications for this visit.     _______________________________________________________________________  Review of Systems   Constitutional:  Negative for chills and fever.   HENT:  Negative for congestion, ear pain, hearing loss, nosebleeds, sinus pain, sore throat and trouble swallowing.    Eyes:  Negative for discharge, redness and visual disturbance.   Respiratory:  Negative for cough, chest tightness and shortness of breath.    Cardiovascular:  Negative for chest pain and palpitations.   Gastrointestinal:  Negative for abdominal pain, blood in stool, constipation, diarrhea, nausea and vomiting.   Genitourinary:  Negative for dysuria, flank pain and hematuria.   Musculoskeletal:  Negative for arthralgias, myalgias and neck pain.   Skin:  Negative for color change and rash.   Neurological:  Negative for dizziness, speech difficulty, weakness and headaches.   Hematological:  Does not bruise/bleed easily.   Psychiatric/Behavioral:  Negative for agitation and behavioral problems.          Objective:  Vitals:    10/07/24 0811   BP: 110/64   BP Location: Left arm   Patient Position: Sitting   Cuff Size: Large   Pulse: 72   Resp: 18   Temp: 97.7 °F (36.5 °C)   TempSrc: Temporal   SpO2: 94%   Weight: 129 kg (285 lb)   Height: 6' 2\" (1.88 m)     Body mass index is 36.59 kg/m².     Physical Exam  Vitals and nursing note reviewed.   Constitutional:       General: He is not in acute distress.     Appearance: He is obese.   HENT:      Head: Normocephalic and atraumatic.      Right Ear: External ear normal.      Left Ear: External ear normal.      Nose: Nose normal.      Mouth/Throat:      Mouth: Mucous membranes are moist.   Eyes:      General: No scleral icterus.        Right eye: No discharge.         Left eye: No discharge.      Extraocular Movements: " Extraocular movements intact.      Conjunctiva/sclera: Conjunctivae normal.   Cardiovascular:      Rate and Rhythm: Normal rate and regular rhythm.      Pulses: Normal pulses.      Heart sounds: Normal heart sounds. No murmur heard.  Pulmonary:      Effort: Pulmonary effort is normal. No respiratory distress.      Breath sounds: Normal breath sounds. No wheezing, rhonchi or rales.   Abdominal:      General: Bowel sounds are normal.      Palpations: Abdomen is soft.      Tenderness: There is no abdominal tenderness.   Musculoskeletal:         General: Normal range of motion.      Cervical back: Normal range of motion and neck supple. No muscular tenderness.      Right lower leg: No edema.      Left lower leg: No edema.   Skin:     General: Skin is warm.      Findings: No rash.   Neurological:      General: No focal deficit present.      Mental Status: He is alert and oriented to person, place, and time.      Motor: No weakness.      Coordination: Coordination normal.   Psychiatric:         Mood and Affect: Mood normal.         Behavior: Behavior normal.

## 2024-10-21 ENCOUNTER — HOSPITAL ENCOUNTER (OUTPATIENT)
Dept: MRI IMAGING | Facility: HOSPITAL | Age: 49
Discharge: HOME | End: 2024-10-21
Attending: STUDENT IN AN ORGANIZED HEALTH CARE EDUCATION/TRAINING PROGRAM
Payer: COMMERCIAL

## 2024-10-21 DIAGNOSIS — K76.0 NAFLD (NONALCOHOLIC FATTY LIVER DISEASE): ICD-10-CM

## 2024-10-21 DIAGNOSIS — K74.60 CIRRHOSIS OF LIVER WITHOUT ASCITES, UNSPECIFIED HEPATIC CIRRHOSIS TYPE (HCC): ICD-10-CM

## 2024-10-21 DIAGNOSIS — K76.9 LIVER LESION: ICD-10-CM

## 2024-10-21 PROCEDURE — A9585 GADOBUTROL INJECTION: HCPCS | Performed by: STUDENT IN AN ORGANIZED HEALTH CARE EDUCATION/TRAINING PROGRAM

## 2024-10-21 PROCEDURE — 74183 MRI ABD W/O CNTR FLWD CNTR: CPT

## 2024-10-21 RX ORDER — GADOBUTROL 604.72 MG/ML
12 INJECTION INTRAVENOUS
Status: COMPLETED | OUTPATIENT
Start: 2024-10-21 | End: 2024-10-21

## 2024-10-21 RX ADMIN — GADOBUTROL 12 ML: 604.72 INJECTION INTRAVENOUS at 07:25

## 2024-11-05 DIAGNOSIS — I10 ESSENTIAL HYPERTENSION: ICD-10-CM

## 2024-11-06 RX ORDER — LOSARTAN POTASSIUM 25 MG/1
25 TABLET ORAL DAILY
Qty: 90 TABLET | Refills: 1 | Status: SHIPPED | OUTPATIENT
Start: 2024-11-06

## 2024-11-24 DIAGNOSIS — K76.6 PORTAL HYPERTENSION (HCC): ICD-10-CM

## 2024-11-25 RX ORDER — CARVEDILOL 6.25 MG/1
6.25 TABLET ORAL 2 TIMES DAILY WITH MEALS
Qty: 60 TABLET | Refills: 5 | Status: SHIPPED | OUTPATIENT
Start: 2024-11-25

## 2024-12-03 DIAGNOSIS — D69.3 IDIOPATHIC THROMBOCYTOPENIA (HCC): ICD-10-CM

## 2025-03-03 ENCOUNTER — TELEPHONE (OUTPATIENT)
Dept: HEMATOLOGY ONCOLOGY | Facility: CLINIC | Age: 50
End: 2025-03-03

## 2025-03-03 NOTE — TELEPHONE ENCOUNTER
Left a voice mail reminding patient he has labs that need to be collected prior to his visit with Maria Fernanda Morrison on March 5th.

## 2025-03-23 DIAGNOSIS — E11.9 TYPE 2 DIABETES MELLITUS WITHOUT COMPLICATION, WITHOUT LONG-TERM CURRENT USE OF INSULIN (HCC): ICD-10-CM

## 2025-03-23 RX ORDER — ORAL SEMAGLUTIDE 7 MG/1
TABLET ORAL
Qty: 30 TABLET | Refills: 5 | Status: SHIPPED | OUTPATIENT
Start: 2025-03-23

## 2025-03-26 DIAGNOSIS — D69.3 IDIOPATHIC THROMBOCYTOPENIA (HCC): ICD-10-CM

## 2025-03-30 ENCOUNTER — RESULTS FOLLOW-UP (OUTPATIENT)
Dept: INTERNAL MEDICINE CLINIC | Facility: CLINIC | Age: 50
End: 2025-03-30

## 2025-03-30 LAB
ALBUMIN SERPL-MCNC: 4.2 G/DL (ref 3.6–5.1)
ALBUMIN/GLOB SERPL: 1.6 (CALC) (ref 1–2.5)
ALP SERPL-CCNC: 108 U/L (ref 35–144)
ALT SERPL-CCNC: 37 U/L (ref 9–46)
AST SERPL-CCNC: 42 U/L (ref 10–35)
BASOPHILS # BLD AUTO: 9 CELLS/UL (ref 0–200)
BASOPHILS NFR BLD AUTO: 0.4 %
BILIRUB SERPL-MCNC: 4.3 MG/DL (ref 0.2–1.2)
BUN SERPL-MCNC: 19 MG/DL (ref 7–25)
BUN/CREAT SERPL: ABNORMAL (CALC) (ref 6–22)
CALCIUM SERPL-MCNC: 9.1 MG/DL (ref 8.6–10.3)
CHLORIDE SERPL-SCNC: 106 MMOL/L (ref 98–110)
CO2 SERPL-SCNC: 27 MMOL/L (ref 20–32)
CREAT SERPL-MCNC: 0.96 MG/DL (ref 0.7–1.3)
EOSINOPHIL # BLD AUTO: 159 CELLS/UL (ref 15–500)
EOSINOPHIL NFR BLD AUTO: 6.9 %
ERYTHROCYTE [DISTWIDTH] IN BLOOD BY AUTOMATED COUNT: 14.2 % (ref 11–15)
FERRITIN SERPL-MCNC: 20 NG/ML (ref 38–380)
GFR/BSA.PRED SERPLBLD CYS-BASED-ARV: 96 ML/MIN/1.73M2
GLOBULIN SER CALC-MCNC: 2.6 G/DL (CALC) (ref 1.9–3.7)
GLUCOSE SERPL-MCNC: 193 MG/DL (ref 65–99)
HBA1C MFR BLD: 8.4 % OF TOTAL HGB
HCT VFR BLD AUTO: 39.3 % (ref 38.5–50)
HGB BLD-MCNC: 13.4 G/DL (ref 13.2–17.1)
INR PPP: 1.3
IRON SATN MFR SERPL: 42 % (CALC) (ref 20–48)
IRON SERPL-MCNC: 196 MCG/DL (ref 50–180)
LYMPHOCYTES # BLD AUTO: 488 CELLS/UL (ref 850–3900)
LYMPHOCYTES NFR BLD AUTO: 21.2 %
MCH RBC QN AUTO: 33.1 PG (ref 27–33)
MCHC RBC AUTO-ENTMCNC: 34.1 G/DL (ref 32–36)
MCV RBC AUTO: 97 FL (ref 80–100)
MONOCYTES # BLD AUTO: 150 CELLS/UL (ref 200–950)
MONOCYTES NFR BLD AUTO: 6.5 %
NEUTROPHILS # BLD AUTO: 1495 CELLS/UL (ref 1500–7800)
NEUTROPHILS NFR BLD AUTO: 65 %
PLATELET # BLD AUTO: 93 THOUSAND/UL (ref 140–400)
PMV BLD REES-ECKER: 11 FL (ref 7.5–12.5)
POTASSIUM SERPL-SCNC: 4.1 MMOL/L (ref 3.5–5.3)
PROT SERPL-MCNC: 6.8 G/DL (ref 6.1–8.1)
PROTHROMBIN TIME: 13.8 SEC (ref 9–11.5)
RBC # BLD AUTO: 4.05 MILLION/UL (ref 4.2–5.8)
SL AMB PLATELET ESTIMATION: ABNORMAL
SODIUM SERPL-SCNC: 141 MMOL/L (ref 135–146)
TIBC SERPL-MCNC: 464 MCG/DL (CALC) (ref 250–425)
VIT B12 SERPL-MCNC: 717 PG/ML (ref 200–1100)
WBC # BLD AUTO: 2.3 THOUSAND/UL (ref 3.8–10.8)

## 2025-03-31 DIAGNOSIS — I10 ESSENTIAL HYPERTENSION: ICD-10-CM

## 2025-04-01 ENCOUNTER — RESULTS FOLLOW-UP (OUTPATIENT)
Age: 50
End: 2025-04-01

## 2025-04-01 DIAGNOSIS — K76.0 NAFLD (NONALCOHOLIC FATTY LIVER DISEASE): ICD-10-CM

## 2025-04-01 DIAGNOSIS — K76.9 LIVER LESION: Primary | ICD-10-CM

## 2025-04-01 DIAGNOSIS — K74.60 CIRRHOSIS OF LIVER WITHOUT ASCITES, UNSPECIFIED HEPATIC CIRRHOSIS TYPE (HCC): ICD-10-CM

## 2025-04-02 RX ORDER — LOSARTAN POTASSIUM 25 MG/1
25 TABLET ORAL DAILY
Qty: 90 TABLET | Refills: 1 | Status: SHIPPED | OUTPATIENT
Start: 2025-04-02

## 2025-04-03 ENCOUNTER — TELEPHONE (OUTPATIENT)
Dept: HEMATOLOGY ONCOLOGY | Facility: CLINIC | Age: 50
End: 2025-04-03

## 2025-04-14 LAB
LEFT EYE DIABETIC RETINOPATHY: NORMAL
RIGHT EYE DIABETIC RETINOPATHY: NORMAL

## 2025-04-29 DIAGNOSIS — K21.9 GASTROESOPHAGEAL REFLUX DISEASE WITHOUT ESOPHAGITIS: ICD-10-CM

## 2025-04-30 RX ORDER — OMEPRAZOLE 40 MG/1
40 CAPSULE, DELAYED RELEASE ORAL DAILY
Qty: 90 CAPSULE | Refills: 1 | Status: SHIPPED | OUTPATIENT
Start: 2025-04-30

## 2025-05-03 ENCOUNTER — HOSPITAL ENCOUNTER (OUTPATIENT)
Dept: MRI IMAGING | Facility: HOSPITAL | Age: 50
Discharge: HOME | End: 2025-05-03
Attending: STUDENT IN AN ORGANIZED HEALTH CARE EDUCATION/TRAINING PROGRAM
Payer: COMMERCIAL

## 2025-05-03 DIAGNOSIS — K76.9 LIVER LESION: ICD-10-CM

## 2025-05-03 DIAGNOSIS — K74.60 CIRRHOSIS OF LIVER WITHOUT ASCITES, UNSPECIFIED HEPATIC CIRRHOSIS TYPE (HCC): ICD-10-CM

## 2025-05-03 DIAGNOSIS — K76.0 NAFLD (NONALCOHOLIC FATTY LIVER DISEASE): ICD-10-CM

## 2025-05-03 PROCEDURE — 74183 MRI ABD W/O CNTR FLWD CNTR: CPT

## 2025-05-03 PROCEDURE — A9585 GADOBUTROL INJECTION: HCPCS | Performed by: STUDENT IN AN ORGANIZED HEALTH CARE EDUCATION/TRAINING PROGRAM

## 2025-05-03 RX ORDER — GADOBUTROL 604.72 MG/ML
13 INJECTION INTRAVENOUS
Status: COMPLETED | OUTPATIENT
Start: 2025-05-03 | End: 2025-05-03

## 2025-05-03 RX ADMIN — GADOBUTROL 13 ML: 604.72 INJECTION INTRAVENOUS at 09:30

## 2025-05-08 ENCOUNTER — RESULTS FOLLOW-UP (OUTPATIENT)
Age: 50
End: 2025-05-08

## 2025-05-12 ENCOUNTER — OFFICE VISIT (OUTPATIENT)
Dept: INTERNAL MEDICINE CLINIC | Facility: CLINIC | Age: 50
End: 2025-05-12
Payer: COMMERCIAL

## 2025-05-12 VITALS
BODY MASS INDEX: 35.16 KG/M2 | RESPIRATION RATE: 18 BRPM | TEMPERATURE: 98.4 F | DIASTOLIC BLOOD PRESSURE: 70 MMHG | OXYGEN SATURATION: 96 % | HEART RATE: 80 BPM | HEIGHT: 74 IN | SYSTOLIC BLOOD PRESSURE: 112 MMHG | WEIGHT: 274 LBS

## 2025-05-12 DIAGNOSIS — I10 ESSENTIAL HYPERTENSION: ICD-10-CM

## 2025-05-12 DIAGNOSIS — E55.9 VITAMIN D DEFICIENCY: ICD-10-CM

## 2025-05-12 DIAGNOSIS — D72.818 OTHER DECREASED WHITE BLOOD CELL (WBC) COUNT: ICD-10-CM

## 2025-05-12 DIAGNOSIS — R60.0 EDEMA OF BOTH LOWER LEGS: ICD-10-CM

## 2025-05-12 DIAGNOSIS — K76.6 PORTAL HYPERTENSION (HCC): ICD-10-CM

## 2025-05-12 DIAGNOSIS — K74.69 OTHER CIRRHOSIS OF LIVER (HCC): ICD-10-CM

## 2025-05-12 DIAGNOSIS — E11.9 TYPE 2 DIABETES MELLITUS WITHOUT COMPLICATION, WITHOUT LONG-TERM CURRENT USE OF INSULIN (HCC): Primary | ICD-10-CM

## 2025-05-12 DIAGNOSIS — E53.8 B12 DEFICIENCY: ICD-10-CM

## 2025-05-12 DIAGNOSIS — D69.3 CHRONIC ITP (IDIOPATHIC THROMBOCYTOPENIC PURPURA) (HCC): ICD-10-CM

## 2025-05-12 DIAGNOSIS — K21.9 GASTROESOPHAGEAL REFLUX DISEASE WITHOUT ESOPHAGITIS: ICD-10-CM

## 2025-05-12 DIAGNOSIS — K76.0 NAFLD (NONALCOHOLIC FATTY LIVER DISEASE): ICD-10-CM

## 2025-05-12 PROCEDURE — 99214 OFFICE O/P EST MOD 30 MIN: CPT | Performed by: INTERNAL MEDICINE

## 2025-05-12 NOTE — PROGRESS NOTES
Name: Wilver Quintanilla      : 1975      MRN: [id number]  Encounter Provider: Tyrell Olvera MD  Encounter Date: 2025   Encounter department: [facility] INTERNAL MEDICINE  :  Assessment & Plan  Type 2 diabetes mellitus without complication, without long-term current use of insulin (HCC)  Diabetes mellitus uncontrolled.  Hemoglobin A1c increased to 8.4.  Advised to increase Rybelsus 7 to 40 mg once a day.  Advised to continue 1800-calorie ADA diet.  Call if blood sugar less than 80.  Lab Results   Component Value Date    HGBA1C 8.4 (H) 2025       Orders:  •  semaglutide (Rybelsus) 14 MG tablet; Take 1 tablet (14 mg total) by mouth daily before breakfast  •  Lipid panel; Future  •  Hemoglobin A1C; Future  •  UA (URINE) with reflex to Scope; Future  •  Albumin / creatinine urine ratio; Future  •  Comprehensive metabolic panel; Future  •  Hemoglobin A1C; Future    Essential hypertension  Blood pressure well-controlled advised to continue present medications and low-salt diet.  Orders:  •  Lipid panel; Future  •  UA (URINE) with reflex to Scope; Future  •  Comprehensive metabolic panel; Future    Portal hypertension (HCC)  Has been seen and followed by GI specialist as well as hepatology.       Gastroesophageal reflux disease without esophagitis  Symptoms are controlled on omeprazole has EGD 2022       Chronic ITP (idiopathic thrombocytopenic purpura) (HCC)  Platelet count has slowly has been increasing and stable.  Has been seen and followed by hematologist.         NAFLD (nonalcoholic fatty liver disease)  Stable liver enzymes AST slightly elevated to 42.  Had a recent MRI of the abdomen.  Advised to follow with hepatologist.       Other cirrhosis of liver (HCC)  Stable has been seen and followed by hepatologist.  Liver enzymes as above  Orders:  •  Comprehensive metabolic panel; Future    B12 deficiency  B12 deficiency resolved       Other decreased white blood cell  (WBC) count  Stable has been seen and followed by hematology WBC 2.3       Vitamin D deficiency  Vitamin D deficiency resolved ,last vitamin D level 35 patient stopped taking vitamin D supplement will follow level and advise accordingly.       BMI 35.0-35.9,adult  Patient  was advised to decrease portion size.  Advised to decrease carb, sugar, cholesterol intake.  Advised to exercise 3-5 times per week.  Advised to lose weight.  Orders:  •  Vitamin D 25 hydroxy; Future    Edema of both lower legs  Better has trace edema of the legs.  On torsemide.  Orders:  •  Comprehensive metabolic panel; Future           History of Present Illness   HPI  49-year-old white male patient present for follow-up his medical problems      Current Outpatient Medications:   •  Blood Glucose Monitoring Suppl (Contour Next One) KIT, USE AS DIRECTED TO TEST TWICE DAILY, Disp: , Rfl:   •  carvedilol (COREG) 6.25 mg tablet, TAKE 1 TABLET BY MOUTH TWICE A DAY WITH MEALS, Disp: 60 tablet, Rfl: 5  •  eltrombopag (Promacta) 75 MG TABS, TAKE 1 TABLET BY MOUTH ONCE  DAILY ON AN EMPTY STOMACH AT  LEAST 1 HOUR BEFORE OR 2 HOURS  AFTER A MEAL, Disp: 30 tablet, Rfl: 3  •  losartan (COZAAR) 25 mg tablet, TAKE 1 TABLET BY MOUTH DAILY, Disp: 90 tablet, Rfl: 1  •  Microlet Lancets MISC, 2 (two) times a day Use to test, Disp: , Rfl:   •  omeprazole (PriLOSEC) 40 MG capsule, TAKE 1 CAPSULE BY MOUTH DAILY, Disp: 90 capsule, Rfl: 1  •  OneTouch Verio test strip, USE TO TEST ONCE DAILY, Disp: 25 strip, Rfl: 3  •  semaglutide (Rybelsus) 14 MG tablet, Take 1 tablet (14 mg total) by mouth daily before breakfast, Disp: 30 tablet, Rfl: 5  •  torsemide (DEMADEX) 20 mg tablet, TAKE 1 TABLET (20 MG TOTAL) BY MOUTH IN THE MORNING AND BEFORE BEDTIME, Disp: 60 tablet, Rfl: 11  •  Vitamin D, Cholecalciferol, 50 MCG (2000 UT) CAPS, Take 1 Units by mouth in the morning, Disp: , Rfl:      Past Medical History:   Diagnosis Date   • Annual physical exam 06/03/2024   • BMI  35.0-35.9,adult 05/12/2025   • BMI 36.0-36.9,adult 12/06/2022   • BMI 37.0-37.9, adult 04/22/2021   • BMI 38.0-38.9,adult 02/08/2022   • BMI 39.0-39.9,adult 12/16/2021   • Cervicalgia    • Colon polyp    • Concussion with loss of consciousness of 30 minutes or less 03/07/2021   • COVID-19 03/03/2023   • Depression    • Diabetes mellitus (HCC)    • Diabetes mellitus out of control    • Edema of both lower legs 12/16/2021   • Elevated liver enzymes    • Erectile dysfunction    • Exposure to COVID-19 virus    • Fatty liver    • GERD (gastroesophageal reflux disease)    • Heart murmur 01/07/2022   • Hyperglycemia    • Hypertension    • Left cervical radiculopathy    • Left elbow pain    • Left foot pain    • Leukopenia 03/07/2021   • NAFLD (nonalcoholic fatty liver disease) 03/17/2023   • Other cirrhosis of liver (HCC) 03/17/2023   • Pain in both feet 12/16/2021   • Pancytopenia (HCC) 12/16/2021   • Skin rash 12/16/2021   • Thrombocytopenia (HCC)    • Venous stasis dermatitis of both lower extremities 05/13/2022   • Vitamin D deficiency 01/07/2022      Review of Systems   Constitutional:  Negative for chills and fever.   HENT:  Negative for congestion, ear pain, hearing loss, nosebleeds, sinus pain, sore throat and trouble swallowing.    Eyes:  Negative for discharge, redness and visual disturbance.   Respiratory:  Negative for cough, chest tightness and shortness of breath.    Cardiovascular:  Negative for chest pain and palpitations.   Gastrointestinal:  Negative for abdominal pain, blood in stool, constipation, diarrhea, nausea and vomiting.   Genitourinary:  Negative for dysuria, flank pain and hematuria.   Musculoskeletal:  Negative for arthralgias, myalgias and neck pain.   Skin:  Negative for color change and rash.   Neurological:  Negative for dizziness, speech difficulty, weakness and headaches.   Hematological:  Does not bruise/bleed easily.   Psychiatric/Behavioral:  Negative for agitation and behavioral  "problems.          Objective   /70 (BP Location: Left arm, Patient Position: Sitting, Cuff Size: Large)   Pulse 80   Temp 98.4 °F (36.9 °C) (Temporal)   Resp 18   Ht 6' 2\" (1.88 m)   Wt 124 kg (274 lb)   SpO2 96%   BMI 35.18 kg/m²      Physical Exam  Vitals and nursing note reviewed.   Constitutional:       General: He is not in acute distress.     Appearance: He is well-developed. He is obese.   HENT:      Head: Normocephalic and atraumatic.      Right Ear: External ear normal.      Left Ear: External ear normal.      Nose: Nose normal.   Eyes:      General: No scleral icterus.        Right eye: No discharge.         Left eye: No discharge.      Extraocular Movements: Extraocular movements intact.      Conjunctiva/sclera: Conjunctivae normal.   Cardiovascular:      Rate and Rhythm: Normal rate and regular rhythm.      Pulses: Normal pulses.      Heart sounds: No murmur heard.  Pulmonary:      Effort: Pulmonary effort is normal. No respiratory distress.      Breath sounds: Normal breath sounds. No wheezing, rhonchi or rales.   Abdominal:      General: Bowel sounds are normal. There is no distension.      Palpations: Abdomen is soft.      Tenderness: There is no abdominal tenderness.   Musculoskeletal:         General: No swelling. Normal range of motion.      Cervical back: Normal range of motion and neck supple.      Right lower leg: Edema (Trace pedal edema) present.      Left lower leg: Edema (Trace pedal edema) present.   Skin:     General: Skin is warm and dry.      Capillary Refill: Capillary refill takes less than 2 seconds.      Findings: No rash.   Neurological:      General: No focal deficit present.      Mental Status: He is alert and oriented to person, place, and time.   Psychiatric:         Mood and Affect: Mood normal.         Behavior: Behavior normal.         Thought Content: Thought content normal.         Judgment: Judgment normal.         "

## 2025-05-12 NOTE — ASSESSMENT & PLAN NOTE
Stable liver enzymes AST slightly elevated to 42.  Had a recent MRI of the abdomen.  Advised to follow with hepatologist.

## 2025-05-12 NOTE — ASSESSMENT & PLAN NOTE
Diabetes mellitus uncontrolled.  Hemoglobin A1c increased to 8.4.  Advised to increase Rybelsus 7 to 40 mg once a day.  Advised to continue 1800-calorie ADA diet.  Call if blood sugar less than 80.  Lab Results   Component Value Date    HGBA1C 8.4 (H) 03/29/2025       Orders:  •  semaglutide (Rybelsus) 14 MG tablet; Take 1 tablet (14 mg total) by mouth daily before breakfast  •  Lipid panel; Future  •  Hemoglobin A1C; Future  •  UA (URINE) with reflex to Scope; Future  •  Albumin / creatinine urine ratio; Future  •  Comprehensive metabolic panel; Future  •  Hemoglobin A1C; Future

## 2025-05-12 NOTE — ASSESSMENT & PLAN NOTE
Patient  was advised to decrease portion size.  Advised to decrease carb, sugar, cholesterol intake.  Advised to exercise 3-5 times per week.  Advised to lose weight.  Orders:  •  Vitamin D 25 hydroxy; Future

## 2025-05-12 NOTE — ASSESSMENT & PLAN NOTE
Platelet count has slowly has been increasing and stable.  Has been seen and followed by hematologist.

## 2025-05-12 NOTE — ASSESSMENT & PLAN NOTE
Blood pressure well-controlled advised to continue present medications and low-salt diet.  Orders:  •  Lipid panel; Future  •  UA (URINE) with reflex to Scope; Future  •  Comprehensive metabolic panel; Future

## 2025-05-12 NOTE — ASSESSMENT & PLAN NOTE
Better has trace edema of the legs.  On torsemide.  Orders:  •  Comprehensive metabolic panel; Future

## 2025-05-12 NOTE — PATIENT INSTRUCTIONS
Patient was advised to continue present medications. discussed with the patient medications and laboratory data in detail.  Follow-up with me in 3 months or as advised.  If any blood test was ordered please do 1 week prior to next appointment unless advise to get earlier.  If you have any questions please call the office [phone]  Patient Education     Type 2 diabetes   The Basics   Written by the doctors and editors at [facility]   What is type 2 diabetes? -- This is a disorder that disrupts the way the body uses sugar. It is sometimes called type 2 diabetes mellitus.  All of the cells in the body need sugar to work normally. Sugar gets into the cells with the help of a hormone called insulin. Insulin is made by the pancreas, an organ in the belly. If there is not enough insulin, or if cells in the body don't respond normally to insulin, sugar builds up in the blood. That is what happens to people with diabetes.  There are 2 different types of diabetes:   In type 1 diabetes, the pancreas makes little or no insulin.   In type 2 diabetes, the pancreas still makes some insulin, but the cells in the body stop responding normally. Eventually, the pancreas cannot make enough insulin to keep up.  Having excess body weight or obesity increases a person's risk of developing type 2 diabetes. But people without excess body weight can get diabetes, too.  What are the symptoms of type 2 diabetes? -- Type 2 diabetes usually causes no symptoms. When symptoms do happen, they include:   Needing to urinate often   Intense thirst   Blurry vision  Can diabetes lead to other health problems? -- Yes. Type 2 diabetes might not make you feel sick. But if it is not managed, it can lead to serious problems over time, such as:   Heart attacks   Strokes   Kidney disease   Vision problems (or even blindness)   Pain or loss of feeling in the hands and feet   Needing to have fingers, toes, or other body parts removed (amputated)  How do I know  "if I have type 2 diabetes? -- Your doctor or nurse can do a blood test. There are 2 tests that can be used for this. Both involve measuring the amount of sugar in your blood, called your \"blood sugar\" or \"blood glucose\":   One of the tests measures your blood sugar at the time the blood sample is taken. This test is done in the morning. You can't eat or drink anything except water for at least 8 hours before the test.   The other test shows what your average blood sugar has been for the past 2 to 3 months. This blood test is called \"hemoglobin A1C\" or just \"A1C.\" It can be checked at any time of the day, even if you have recently eaten.  How is type 2 diabetes treated? -- The goals of treatment are to manage your blood sugar and lower the risk of future problems that can happen in people with diabetes.  Treatment might include:   Lifestyle changes - This is an important part of managing diabetes. It includes eating healthy foods and getting plenty of physical activity.   Medicines - There are a few medicines that help lower blood sugar. Some people need to take pills that help the body make more insulin or that help insulin do its job. Others need insulin shots.  Depending on what medicines you take, you might need to check your blood sugar regularly at home. But not everyone with type 2 diabetes needs to do this. Your doctor or nurse will tell you if you should be checking your blood sugar, and when and how to do this.  Sometimes, people with type 2 diabetes also need medicines to help prevent problems caused by the disease. For instance, medicines used to lower blood pressure can reduce the chances of a heart attack or stroke.   General medical care - It's also important to take care of other areas of your health. This includes watching your blood pressure and cholesterol levels. You should also get certain vaccines, such as vaccines to protect against the flu and coronavirus disease 2019 (\"COVID-19\"). Some people " also need a vaccine to prevent pneumonia.  Can type 2 diabetes be prevented? -- Yes. To lower your chances of getting type 2 diabetes, the most important thing you can do is eat a healthy diet and get plenty of physical activity. This can help you lose weight if you are overweight. But eating well and being active are also good for your overall health. Even gentle activity, like walking, has benefits.  If you smoke, quitting can also lower your risk of type 2 diabetes. Quitting smoking can be difficult, but your doctor or nurse can help.  All topics are updated as new evidence becomes available and our peer review process is complete.  This topic retrieved from [employer] on: Apr 24, 2024.  Topic 13643 Version 23.0  Release: 32.3.2 - C32.113  © 2024 UpToDate, Inc. and/or its affiliates. All rights reserved.  Consumer Information Use and Disclaimer   Disclaimer: This generalized information is a limited summary of diagnosis, treatment, and/or medication information. It is not meant to be comprehensive and should be used as a tool to help the user understand and/or assess potential diagnostic and treatment options. It does NOT include all information about conditions, treatments, medications, side effects, or risks that may apply to a specific patient. It is not intended to be medical advice or a substitute for the medical advice, diagnosis, or treatment of a health care provider based on the health care provider's examination and assessment of a patient's specific and unique circumstances. Patients must speak with a health care provider for complete information about their health, medical questions, and treatment options, including any risks or benefits regarding use of medications. This information does not endorse any treatments or medications as safe, effective, or approved for treating a specific patient. UpToDate, Inc. and its affiliates disclaim any warranty or liability relating to this information or the use  thereof.The use of this information is governed by the Terms of Use, available at https://www.wolterskluwer.com/en/know/clinical-effectiveness-terms. 2024© UpToDate, Inc. and its affiliates and/or licensors. All rights reserved.  Copyright   © 2024 [employer] and/or its affiliates. All rights reserved.

## 2025-05-12 NOTE — ASSESSMENT & PLAN NOTE
Stable has been seen and followed by hepatologist.  Liver enzymes as above  Orders:  •  Comprehensive metabolic panel; Future

## 2025-06-02 ENCOUNTER — OFFICE VISIT (OUTPATIENT)
Age: 50
End: 2025-06-02
Payer: COMMERCIAL

## 2025-06-02 ENCOUNTER — PREP FOR PROCEDURE (OUTPATIENT)
Age: 50
End: 2025-06-02

## 2025-06-02 VITALS
WEIGHT: 278.6 LBS | SYSTOLIC BLOOD PRESSURE: 118 MMHG | HEART RATE: 74 BPM | HEIGHT: 74 IN | DIASTOLIC BLOOD PRESSURE: 64 MMHG | BODY MASS INDEX: 35.75 KG/M2

## 2025-06-02 DIAGNOSIS — D69.6 THROMBOCYTOPENIA (HCC): ICD-10-CM

## 2025-06-02 DIAGNOSIS — Z86.0100 HISTORY OF COLON POLYPS: Primary | ICD-10-CM

## 2025-06-02 DIAGNOSIS — R79.89 ABNORMAL LIVER FUNCTION TESTS: ICD-10-CM

## 2025-06-02 DIAGNOSIS — I85.00 ESOPHAGEAL VARICES WITHOUT BLEEDING, UNSPECIFIED ESOPHAGEAL VARICES TYPE (HCC): ICD-10-CM

## 2025-06-02 DIAGNOSIS — K74.60 CIRRHOSIS OF LIVER WITHOUT ASCITES, UNSPECIFIED HEPATIC CIRRHOSIS TYPE (HCC): ICD-10-CM

## 2025-06-02 DIAGNOSIS — B66.4: ICD-10-CM

## 2025-06-02 DIAGNOSIS — R04.2 HEMOPTYSIS: ICD-10-CM

## 2025-06-02 DIAGNOSIS — K76.0 NAFLD (NONALCOHOLIC FATTY LIVER DISEASE): Primary | ICD-10-CM

## 2025-06-02 PROCEDURE — 99214 OFFICE O/P EST MOD 30 MIN: CPT | Performed by: STUDENT IN AN ORGANIZED HEALTH CARE EDUCATION/TRAINING PROGRAM

## 2025-06-02 RX ORDER — SODIUM CHLORIDE, SODIUM LACTATE, POTASSIUM CHLORIDE, CALCIUM CHLORIDE 600; 310; 30; 20 MG/100ML; MG/100ML; MG/100ML; MG/100ML
125 INJECTION, SOLUTION INTRAVENOUS CONTINUOUS
OUTPATIENT
Start: 2025-06-02

## 2025-06-02 RX ORDER — POLYETHYLENE GLYCOL 3350, SODIUM SULFATE ANHYDROUS, SODIUM BICARBONATE, SODIUM CHLORIDE, POTASSIUM CHLORIDE 236; 22.74; 6.74; 5.86; 2.97 G/4L; G/4L; G/4L; G/4L; G/4L
4000 POWDER, FOR SOLUTION ORAL ONCE
Qty: 4000 ML | Refills: 0 | Status: SHIPPED | OUTPATIENT
Start: 2025-06-02 | End: 2025-06-02

## 2025-06-02 NOTE — PATIENT INSTRUCTIONS
"Scheduled date of EGD/colonoscopy (as of today): 6/23/25  Physician performing EGD/colonoscopy: Dr. Xiomara Michel  Location of EGD/colonoscopy:[facility]  Desired bowel prep reviewed with patient: Mary/Dulcolax  Instructions reviewed with patient by:Vivien  Clearances:N/A      Pt states he will schedule MRI Abdomen w wo Contrast & MRCP ; \"Central Scheduling\" phone number given to Pt @ OV.    "

## 2025-06-02 NOTE — PROGRESS NOTES
[facility] - Outpatient Consultation  Wilver Quintanilla 49 y.o. male MRN: [id number]  Encounter: [id number]    PCP:  Tyrell Olvera MD, [phone]  Referring Provider:  No ref. provider found,     Name: iWlver Quintanilla      : 1975      MRN: [id number]  Encounter Provider: Yasmin Woodruff MD  Encounter Date: 2025   Encounter department: [facility] [address]  :  Assessment & Plan  NAFLD (nonalcoholic fatty liver disease)  Cirrhosis of liver without ascites, unspecified hepatic cirrhosis type (HCC)  48 y.o. male with history of class II obesity, HTN, HLD and DM who presents for follow up for GORMAN cirrhosis. He has no acute liver specific complaints or clinical evidence of hepatic decompensation. He was last seen by me in 2024.      He has had longstanding history of pancytopenia and splenomegaly which has been extensively worked up by hematology. His liver chemistries are mildly elevated and his liver function is intact. He had multiple bone marrow biopsies which was negative for leukemia or lymphoma. He had an EGD/COY in 2022 which showed esophageal varices and portal hypertensive gastropathy. A subsequent US elastography showed 14.1 LSM (IQR 12.4%). 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      He was started on Ozempic and lost >10lbs but was recently stopped due to insurance issues. He was switched to Rybelsus but was noted to have recent weight gain and worsened glycemic control.    He has otherwise clinically compensated but has had worsening liver synthetic function with rising Tb. Will send direct bilirubin to exclude a hemolytic process given his hematologic history. His updated MELD (3.0) is 15.      He is otherwise up-to-date with his cirrhosis health maintenance. Will order an EGD as he is due for COY and reporting coughing of blood in the AM but suspect that this is  hemoptysis. He should avoid interval weight gain, excessive EtOH consumption and hepatoxic medications.     He will follow up in 6 months or sooner if needed. Thank you for the opportunity to consult in his care.     1. NAFLD  - Continue Rybelsus     2.  Compensated cirrhosis  - MELD (3.0) 15; repeat MELD labs with direct bilirubien   - No symptomatic ascites, variceal bleeding, or hepatic encephalopathy.     3. Esophageal variceal surveillance  His last EGD showed non-bleeding esophageal varices  - Continue carvedilol 6.25 mg BID to prevent decompensation     4. LR-2 lesion  - MRI due 11/2025 with AFP levels     5. History of polyps  - COY ordered    6. Hemoptysis  - Low suspicion for UGI source but will order EGD to further investigate      6. Healthcare maintenance for patients with cirrhosis  -He was instructed to take no more than 2 grams of tylenol in 24 hours and no products containing NSAIDs, benzodiazapines, and narcotics.  -He was also instructed to avoid raw shellfish   -He should participate in daily exercise as to prevent loss of muscle mass  -He should abstain from all alcohol intake and was counseled on this.    -He is at risk for vitamin deficiencies and metabolic bone disease  -HAV and HBV immunity will be checked and he should be vaccinated through his primary care provider if he is not immune.  -Additionally, he should receive a yearly flu shot and the pneumonia vaccine through his primary care provider.     I made him aware of the symptoms of hepatic decompensation: evident confusion, vomiting blood, black tarry stool, or large amounts of red blood in stool, and abdominal swelling. In addition to seeking local medical attention urgently, he will notify me if this happens before the next visit.     Yasmin Woodruff MD  Division of Gastroenterology and Hepatology  [facility]       Orders:    CBC and differential; Future    Comprehensive metabolic panel; Future    Protime-INR;  Future    Bilirubin, direct; Future    AFP tumor marker; Future    MRI abdomen w wo contrast and mrcp; Future        History of Present Illness   Wilver Quintanilla is a 49 y.o. male history of class II obesity, HTN, HLD and DM who presents for follow up for GORMAN cirrhosis. He was last seen by me in 2024.      Interval events  - Stopped Ozempic due to his insurance coverage. He was switched to Rybelsus but has had worsening A1c 8.4%   - Reporting fatigue   - Also reports coughing up blood in his sputum in the mornings.   - Last MRI showed stable liver lesion which was downgraded to LR-2      Extended liver history  He has had longstanding history of pancytopenia and splenomegaly which has been extensively worked up by hematology. He had multiple bone marrow biopsies which was negative for leukemia or lymphoma. He had an EGD/COY in 2022 which showed esophageal varices and portal hypertensive gastropathy. A subsequent US elastography showed 14.1 LSM (IQR 12.4%). He established care with GI and was started on aldactone in addition to torsemide for treatment of CHRISSY.      EGD in  showed non-bleeding esophageal varices for which he was started on carvedilol.      He does report family history of cirrhosis in his mother who  from complications of cirrhosis and liver failure 1 year ago. He also has a son who has fatty liver at a young age. He denies family history of medullary thyroid cancer.      He had genetic testing which was positive for PNPLA3. He was started on Ozempic 1 year ago.     HPI    Review of Systems   Constitutional:  Positive for fatigue.   HENT: Negative.     Eyes: Negative.    Respiratory: Negative.     Cardiovascular: Negative.    Gastrointestinal: Negative.    Endocrine: Negative.    Genitourinary: Negative.    Musculoskeletal: Negative.    Allergic/Immunologic: Negative.    Neurological: Negative.    Hematological: Negative.    Psychiatric/Behavioral: Negative.      A complete review  "of systems is negative other than that noted above in the HPI.      Current Medications[1]  Objective   /64   Pulse 74   Ht 6' 2\" (1.88 m)   Wt 126 kg (278 lb 9.6 oz)   BMI 35.77 kg/m²     Physical Exam  Constitutional:       Appearance: Normal appearance. He is obese.   HENT:      Head: Normocephalic and atraumatic.     Eyes:      General: No scleral icterus.     Extraocular Movements: Extraocular movements intact.      Pupils: Pupils are equal, round, and reactive to light.     Abdominal:      General: Abdomen is flat. There is no distension.     Skin:     General: Skin is warm and dry.      Coloration: Skin is not jaundiced.     Neurological:      General: No focal deficit present.      Mental Status: He is alert and oriented to person, place, and time.        Lab Results: I personally reviewed relevant lab results.     LABS/RADIOLOGY/ENDOSCOPY:  Lab Results   Component Value Date    WBC 2.3 (L) 03/29/2025    HGB 13.4 03/29/2025    HCT 39.3 03/29/2025    PLT 93 (L) 03/29/2025    GLUF 116 (H) 05/04/2021    BUN 19 03/29/2025    CREATININE 0.96 03/29/2025     04/05/2017    K 4.1 03/29/2025     03/29/2025    CO2 27 03/29/2025    PROT 6.4 04/05/2017    BILIDIR 0.27 (H) 01/17/2017    ALKPHOS 108 03/29/2025    ALT 37 03/29/2025    AST 42 (H) 03/29/2025    GLOB 2.6 03/29/2025    CALCIUM 9.1 03/29/2025    EGFR 96 03/29/2025    TRIG 67 02/03/2024    HDL 40 02/03/2024    INR 1.3 (H) 03/29/2025       MELD 3.0: 15 at 3/29/2025 10:49 AM  MELD-Na: 15 at 3/29/2025 10:49 AM  Calculated from:  Serum Creatinine: 0.96 mg/dL (Using min of 1 mg/dL) at 3/29/2025 10:49 AM  Serum Sodium: 141 mmol/L (Using max of 137 mmol/L) at 3/29/2025 10:49 AM  Total Bilirubin: 4.3 mg/dL at 3/29/2025 10:49 AM  Serum Albumin: 4.2 g/dL (Using max of 3.5 g/dL) at 3/29/2025 10:49 AM  INR(ratio): 1.3 at 3/29/2025 10:44 AM  Age at listing (hypothetical): 49 years  Sex: Male at 3/29/2025 10:49 AM        Results for orders placed during " the hospital encounter of 10/14/22    EGD    Impression  Normal duodenum, random biopsies taken  Antral gastritis biopsies taken  Portal hypertensive gastropathy  No evidence of gastric varices  Three columns of small to medium size esophageal varices with no stigmata of bleeding, all collapse with insufflation    RECOMMENDATION:  Await pathology results  Discharge home  Resume regular diet  Resume home medications  Follow-up biopsy results  Consider nonselective beta-blockers  Further evaluation for cirrhosis  Call with any abdominal pain, bleeding, fevers      Clinton Chirinos MD    MRI abdomen (5/8/2025)  Cirrhosis with stigmata of portal hypertension.     No suspicious hepatic observations. Continued stability of enhancing observation in segment 6 now for at least 2 years, therefore downgraded to LR 2.                [1]   Current Outpatient Medications   Medication Sig Dispense Refill    Blood Glucose Monitoring Suppl (Contour Next One) KIT       carvedilol (COREG) 6.25 mg tablet TAKE 1 TABLET BY MOUTH TWICE A DAY WITH MEALS 60 tablet 5    eltrombopag (Promacta) 75 MG TABS TAKE 1 TABLET BY MOUTH ONCE  DAILY ON AN EMPTY STOMACH AT  LEAST 1 HOUR BEFORE OR 2 HOURS  AFTER A MEAL 30 tablet 3    losartan (COZAAR) 25 mg tablet TAKE 1 TABLET BY MOUTH DAILY 90 tablet 1    Microlet Lancets MISC in the morning and in the evening. Use to test.      omeprazole (PriLOSEC) 40 MG capsule TAKE 1 CAPSULE BY MOUTH DAILY 90 capsule 1    OneTouch Verio test strip USE TO TEST ONCE DAILY 25 strip 3    semaglutide (Rybelsus) 14 MG tablet Take 1 tablet (14 mg total) by mouth daily before breakfast 30 tablet 5    torsemide (DEMADEX) 20 mg tablet TAKE 1 TABLET (20 MG TOTAL) BY MOUTH IN THE MORNING AND BEFORE BEDTIME 60 tablet 11    Vitamin D, Cholecalciferol, 50 MCG (2000 UT) CAPS Take 1 Units by mouth in the morning       No current facility-administered medications for this visit.

## 2025-06-02 NOTE — H&P (VIEW-ONLY)
[facility] - Outpatient Consultation  Wilver Quintanilla 49 y.o. male MRN: [id number]  Encounter: [id number]    PCP:  Tyrell Olvera MD, [phone]  Referring Provider:  No ref. provider found,     Name: Wilver Quintanilla      : 1975      MRN: [id number]  Encounter Provider: Yasmin Woodruff MD  Encounter Date: 2025   Encounter department: [facility] [address]  :  Assessment & Plan  NAFLD (nonalcoholic fatty liver disease)  Cirrhosis of liver without ascites, unspecified hepatic cirrhosis type (HCC)  48 y.o. male with history of class II obesity, HTN, HLD and DM who presents for follow up for GORMAN cirrhosis. He has no acute liver specific complaints or clinical evidence of hepatic decompensation. He was last seen by me in 2024.      He has had longstanding history of pancytopenia and splenomegaly which has been extensively worked up by hematology. His liver chemistries are mildly elevated and his liver function is intact. He had multiple bone marrow biopsies which was negative for leukemia or lymphoma. He had an EGD/COY in 2022 which showed esophageal varices and portal hypertensive gastropathy. A subsequent US elastography showed 14.1 LSM (IQR 12.4%). 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      He was started on Ozempic and lost >10lbs but was recently stopped due to insurance issues. He was switched to Rybelsus but was noted to have recent weight gain and worsened glycemic control.    He has otherwise clinically compensated but has had worsening liver synthetic function with rising Tb. Will send direct bilirubin to exclude a hemolytic process given his hematologic history. His updated MELD (3.0) is 15.      He is otherwise up-to-date with his cirrhosis health maintenance. Will order an EGD as he is due for COY and reporting coughing of blood in the AM but suspect that this is  hemoptysis. He should avoid interval weight gain, excessive EtOH consumption and hepatoxic medications.     He will follow up in 6 months or sooner if needed. Thank you for the opportunity to consult in his care.     1. NAFLD  - Continue Rybelsus     2.  Compensated cirrhosis  - MELD (3.0) 15; repeat MELD labs with direct bilirubien   - No symptomatic ascites, variceal bleeding, or hepatic encephalopathy.     3. Esophageal variceal surveillance  His last EGD showed non-bleeding esophageal varices  - Continue carvedilol 6.25 mg BID to prevent decompensation     4. LR-2 lesion  - MRI due 11/2025 with AFP levels     5. History of polyps  - COY ordered    6. Hemoptysis  - Low suspicion for UGI source but will order EGD to further investigate      6. Healthcare maintenance for patients with cirrhosis  -He was instructed to take no more than 2 grams of tylenol in 24 hours and no products containing NSAIDs, benzodiazapines, and narcotics.  -He was also instructed to avoid raw shellfish   -He should participate in daily exercise as to prevent loss of muscle mass  -He should abstain from all alcohol intake and was counseled on this.    -He is at risk for vitamin deficiencies and metabolic bone disease  -HAV and HBV immunity will be checked and he should be vaccinated through his primary care provider if he is not immune.  -Additionally, he should receive a yearly flu shot and the pneumonia vaccine through his primary care provider.     I made him aware of the symptoms of hepatic decompensation: evident confusion, vomiting blood, black tarry stool, or large amounts of red blood in stool, and abdominal swelling. In addition to seeking local medical attention urgently, he will notify me if this happens before the next visit.     Yasmin Woodruff MD  Division of Gastroenterology and Hepatology  [facility]       Orders:    CBC and differential; Future    Comprehensive metabolic panel; Future    Protime-INR;  Future    Bilirubin, direct; Future    AFP tumor marker; Future    MRI abdomen w wo contrast and mrcp; Future        History of Present Illness   Wilver Quintanilla is a 49 y.o. male history of class II obesity, HTN, HLD and DM who presents for follow up for GORMAN cirrhosis. He was last seen by me in 2024.      Interval events  - Stopped Ozempic due to his insurance coverage. He was switched to Rybelsus but has had worsening A1c 8.4%   - Reporting fatigue   - Also reports coughing up blood in his sputum in the mornings.   - Last MRI showed stable liver lesion which was downgraded to LR-2      Extended liver history  He has had longstanding history of pancytopenia and splenomegaly which has been extensively worked up by hematology. He had multiple bone marrow biopsies which was negative for leukemia or lymphoma. He had an EGD/COY in 2022 which showed esophageal varices and portal hypertensive gastropathy. A subsequent US elastography showed 14.1 LSM (IQR 12.4%). He established care with GI and was started on aldactone in addition to torsemide for treatment of CHRISSY.      EGD in  showed non-bleeding esophageal varices for which he was started on carvedilol.      He does report family history of cirrhosis in his mother who  from complications of cirrhosis and liver failure 1 year ago. He also has a son who has fatty liver at a young age. He denies family history of medullary thyroid cancer.      He had genetic testing which was positive for PNPLA3. He was started on Ozempic 1 year ago.     HPI    Review of Systems   Constitutional:  Positive for fatigue.   HENT: Negative.     Eyes: Negative.    Respiratory: Negative.     Cardiovascular: Negative.    Gastrointestinal: Negative.    Endocrine: Negative.    Genitourinary: Negative.    Musculoskeletal: Negative.    Allergic/Immunologic: Negative.    Neurological: Negative.    Hematological: Negative.    Psychiatric/Behavioral: Negative.      A complete review  "of systems is negative other than that noted above in the HPI.      Current Medications[1]  Objective   /64   Pulse 74   Ht 6' 2\" (1.88 m)   Wt 126 kg (278 lb 9.6 oz)   BMI 35.77 kg/m²     Physical Exam  Constitutional:       Appearance: Normal appearance. He is obese.   HENT:      Head: Normocephalic and atraumatic.     Eyes:      General: No scleral icterus.     Extraocular Movements: Extraocular movements intact.      Pupils: Pupils are equal, round, and reactive to light.     Abdominal:      General: Abdomen is flat. There is no distension.     Skin:     General: Skin is warm and dry.      Coloration: Skin is not jaundiced.     Neurological:      General: No focal deficit present.      Mental Status: He is alert and oriented to person, place, and time.        Lab Results: I personally reviewed relevant lab results.     LABS/RADIOLOGY/ENDOSCOPY:  Lab Results   Component Value Date    WBC 2.3 (L) 03/29/2025    HGB 13.4 03/29/2025    HCT 39.3 03/29/2025    PLT 93 (L) 03/29/2025    GLUF 116 (H) 05/04/2021    BUN 19 03/29/2025    CREATININE 0.96 03/29/2025     04/05/2017    K 4.1 03/29/2025     03/29/2025    CO2 27 03/29/2025    PROT 6.4 04/05/2017    BILIDIR 0.27 (H) 01/17/2017    ALKPHOS 108 03/29/2025    ALT 37 03/29/2025    AST 42 (H) 03/29/2025    GLOB 2.6 03/29/2025    CALCIUM 9.1 03/29/2025    EGFR 96 03/29/2025    TRIG 67 02/03/2024    HDL 40 02/03/2024    INR 1.3 (H) 03/29/2025       MELD 3.0: 15 at 3/29/2025 10:49 AM  MELD-Na: 15 at 3/29/2025 10:49 AM  Calculated from:  Serum Creatinine: 0.96 mg/dL (Using min of 1 mg/dL) at 3/29/2025 10:49 AM  Serum Sodium: 141 mmol/L (Using max of 137 mmol/L) at 3/29/2025 10:49 AM  Total Bilirubin: 4.3 mg/dL at 3/29/2025 10:49 AM  Serum Albumin: 4.2 g/dL (Using max of 3.5 g/dL) at 3/29/2025 10:49 AM  INR(ratio): 1.3 at 3/29/2025 10:44 AM  Age at listing (hypothetical): 49 years  Sex: Male at 3/29/2025 10:49 AM        Results for orders placed during " the hospital encounter of 10/14/22    EGD    Impression  Normal duodenum, random biopsies taken  Antral gastritis biopsies taken  Portal hypertensive gastropathy  No evidence of gastric varices  Three columns of small to medium size esophageal varices with no stigmata of bleeding, all collapse with insufflation    RECOMMENDATION:  Await pathology results  Discharge home  Resume regular diet  Resume home medications  Follow-up biopsy results  Consider nonselective beta-blockers  Further evaluation for cirrhosis  Call with any abdominal pain, bleeding, fevers      Clinton Chirinos MD    MRI abdomen (5/8/2025)  Cirrhosis with stigmata of portal hypertension.     No suspicious hepatic observations. Continued stability of enhancing observation in segment 6 now for at least 2 years, therefore downgraded to LR 2.                [1]   Current Outpatient Medications   Medication Sig Dispense Refill    Blood Glucose Monitoring Suppl (Contour Next One) KIT       carvedilol (COREG) 6.25 mg tablet TAKE 1 TABLET BY MOUTH TWICE A DAY WITH MEALS 60 tablet 5    eltrombopag (Promacta) 75 MG TABS TAKE 1 TABLET BY MOUTH ONCE  DAILY ON AN EMPTY STOMACH AT  LEAST 1 HOUR BEFORE OR 2 HOURS  AFTER A MEAL 30 tablet 3    losartan (COZAAR) 25 mg tablet TAKE 1 TABLET BY MOUTH DAILY 90 tablet 1    Microlet Lancets MISC in the morning and in the evening. Use to test.      omeprazole (PriLOSEC) 40 MG capsule TAKE 1 CAPSULE BY MOUTH DAILY 90 capsule 1    OneTouch Verio test strip USE TO TEST ONCE DAILY 25 strip 3    semaglutide (Rybelsus) 14 MG tablet Take 1 tablet (14 mg total) by mouth daily before breakfast 30 tablet 5    torsemide (DEMADEX) 20 mg tablet TAKE 1 TABLET (20 MG TOTAL) BY MOUTH IN THE MORNING AND BEFORE BEDTIME 60 tablet 11    Vitamin D, Cholecalciferol, 50 MCG (2000 UT) CAPS Take 1 Units by mouth in the morning       No current facility-administered medications for this visit.

## 2025-06-02 NOTE — ASSESSMENT & PLAN NOTE
48 y.o. male with history of class II obesity, HTN, HLD and DM who presents for follow up for GORMAN cirrhosis. He has no acute liver specific complaints or clinical evidence of hepatic decompensation. He was last seen by me in July 2024.      He has had longstanding history of pancytopenia and splenomegaly which has been extensively worked up by hematology. His liver chemistries are mildly elevated and his liver function is intact. He had multiple bone marrow biopsies which was negative for leukemia or lymphoma. He had an EGD/COY in October 2022 which showed esophageal varices and portal hypertensive gastropathy. A subsequent US elastography showed 14.1 LSM (IQR 12.4%). 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      He was started on Ozempic and lost >10lbs but was recently stopped due to insurance issues. He was switched to Rybelsus but was noted to have recent weight gain and worsened glycemic control.    He has otherwise clinically compensated but has had worsening liver synthetic function with rising Tb. Will send direct bilirubin to exclude a hemolytic process given his hematologic history. His updated MELD (3.0) is 15.      He is otherwise up-to-date with his cirrhosis health maintenance. Will order an EGD as he is due for COY and reporting coughing of blood in the AM but suspect that this is hemoptysis. He should avoid interval weight gain, excessive EtOH consumption and hepatoxic medications.     He will follow up in 6 months or sooner if needed. Thank you for the opportunity to consult in his care.     1. NAFLD  - Continue Rybelsus     2.  Compensated cirrhosis  - MELD (3.0) 15; repeat MELD labs with direct bilirubien   - No symptomatic ascites, variceal bleeding, or hepatic encephalopathy.     3. Esophageal variceal surveillance  His last EGD showed non-bleeding esophageal varices  - Continue carvedilol 6.25 mg BID to prevent decompensation     4.  LR-2 lesion  - MRI due 11/2025 with AFP levels     5. History of polyps  - COY ordered    6. Hemoptysis  - Low suspicion for UGI source but will order EGD to further investigate      6. Healthcare maintenance for patients with cirrhosis  -He was instructed to take no more than 2 grams of tylenol in 24 hours and no products containing NSAIDs, benzodiazapines, and narcotics.  -He was also instructed to avoid raw shellfish   -He should participate in daily exercise as to prevent loss of muscle mass  -He should abstain from all alcohol intake and was counseled on this.    -He is at risk for vitamin deficiencies and metabolic bone disease  -HAV and HBV immunity will be checked and he should be vaccinated through his primary care provider if he is not immune.  -Additionally, he should receive a yearly flu shot and the pneumonia vaccine through his primary care provider.     I made him aware of the symptoms of hepatic decompensation: evident confusion, vomiting blood, black tarry stool, or large amounts of red blood in stool, and abdominal swelling. In addition to seeking local medical attention urgently, he will notify me if this happens before the next visit.     Yasmin Woodruff MD  Division of Gastroenterology and Hepatology  [facility]       Orders:    CBC and differential; Future    Comprehensive metabolic panel; Future    Protime-INR; Future    Bilirubin, direct; Future    AFP tumor marker; Future    MRI abdomen w wo contrast and mrcp; Future

## 2025-06-02 NOTE — ASSESSMENT & PLAN NOTE
48 y.o. male with history of class II obesity, HTN, HLD and DM who presents for follow up for GORMAN cirrhosis. He has no acute liver specific complaints or clinical evidence of hepatic decompensation. He was last seen by me in July 2024.      He has had longstanding history of pancytopenia and splenomegaly which has been extensively worked up by hematology. His liver chemistries are mildly elevated and his liver function is intact. He had multiple bone marrow biopsies which was negative for leukemia or lymphoma. He had an EGD/COY in October 2022 which showed esophageal varices and portal hypertensive gastropathy. A subsequent US elastography showed 14.1 LSM (IQR 12.4%). 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      He was started on Ozempic and lost >10lbs but was recently stopped due to insurance issues. He was switched to Rybelsus but was noted to have recent weight gain and worsened glycemic control.    He has otherwise clinically compensated but has had worsening liver synthetic function with rising Tb. Will send direct bilirubin to exclude a hemolytic process given his hematologic history. His updated MELD (3.0) is 15.      He is otherwise up-to-date with his cirrhosis health maintenance. Will order an EGD as he is due for COY and reporting coughing of blood in the AM but suspect that this is hemoptysis. He should avoid interval weight gain, excessive EtOH consumption and hepatoxic medications.     He will follow up in 6 months or sooner if needed. Thank you for the opportunity to consult in his care.     1. NAFLD  - Continue Rybelsus     2.  Compensated cirrhosis  - MELD (3.0) 15; repeat MELD labs with direct bilirubien   - No symptomatic ascites, variceal bleeding, or hepatic encephalopathy.     3. Esophageal variceal surveillance  His last EGD showed non-bleeding esophageal varices  - Continue carvedilol 6.25 mg BID to prevent decompensation     4.  LR-2 lesion  - MRI due 11/2025 with AFP levels     5. History of polyps  - COY ordered    6. Hemoptysis  - Low suspicion for UGI source but will order EGD to further investigate      6. Healthcare maintenance for patients with cirrhosis  -He was instructed to take no more than 2 grams of tylenol in 24 hours and no products containing NSAIDs, benzodiazapines, and narcotics.  -He was also instructed to avoid raw shellfish   -He should participate in daily exercise as to prevent loss of muscle mass  -He should abstain from all alcohol intake and was counseled on this.    -He is at risk for vitamin deficiencies and metabolic bone disease  -HAV and HBV immunity will be checked and he should be vaccinated through his primary care provider if he is not immune.  -Additionally, he should receive a yearly flu shot and the pneumonia vaccine through his primary care provider.     I made him aware of the symptoms of hepatic decompensation: evident confusion, vomiting blood, black tarry stool, or large amounts of red blood in stool, and abdominal swelling. In addition to seeking local medical attention urgently, he will notify me if this happens before the next visit.     Yasmin Woodruff MD  Division of Gastroenterology and Hepatology  [facility]       Orders:    CBC and differential; Future    Comprehensive metabolic panel; Future    Protime-INR; Future    Bilirubin, direct; Future    AFP tumor marker; Future    MRI abdomen w wo contrast and mrcp; Future

## 2025-06-06 ENCOUNTER — DOCUMENTATION (OUTPATIENT)
Age: 50
End: 2025-06-06

## 2025-06-06 NOTE — PROGRESS NOTES
Received request for patient to get a new PA on Promacta 75mg daily. Auth has been submitted via cover my meds and this has been approved.

## 2025-06-12 ENCOUNTER — RESULTS FOLLOW-UP (OUTPATIENT)
Age: 50
End: 2025-06-12

## 2025-06-12 LAB
AFP-TM SERPL-MCNC: 2.3 NG/ML
ALBUMIN SERPL-MCNC: 4 G/DL (ref 3.6–5.1)
ALBUMIN/GLOB SERPL: 1.7 (CALC) (ref 1–2.5)
ALP SERPL-CCNC: 111 U/L (ref 35–144)
ALT SERPL-CCNC: 34 U/L (ref 9–46)
AST SERPL-CCNC: 39 U/L (ref 10–35)
BASOPHILS # BLD AUTO: 20 CELLS/UL (ref 0–200)
BASOPHILS NFR BLD AUTO: 1.1 %
BILIRUB DIRECT SERPL-MCNC: 0.7 MG/DL
BILIRUB SERPL-MCNC: 2.7 MG/DL (ref 0.2–1.2)
BUN SERPL-MCNC: 17 MG/DL (ref 7–25)
BUN/CREAT SERPL: ABNORMAL (CALC) (ref 6–22)
CALCIUM SERPL-MCNC: 8.8 MG/DL (ref 8.6–10.3)
CHLORIDE SERPL-SCNC: 108 MMOL/L (ref 98–110)
CO2 SERPL-SCNC: 26 MMOL/L (ref 20–32)
CREAT SERPL-MCNC: 0.97 MG/DL (ref 0.7–1.3)
EOSINOPHIL # BLD AUTO: 124 CELLS/UL (ref 15–500)
EOSINOPHIL NFR BLD AUTO: 6.9 %
ERYTHROCYTE [DISTWIDTH] IN BLOOD BY AUTOMATED COUNT: 15.1 % (ref 11–15)
GFR/BSA.PRED SERPLBLD CYS-BASED-ARV: 95 ML/MIN/1.73M2
GLOBULIN SER CALC-MCNC: 2.4 G/DL (CALC) (ref 1.9–3.7)
GLUCOSE SERPL-MCNC: 186 MG/DL (ref 65–99)
HCT VFR BLD AUTO: 37.1 % (ref 38.5–50)
HGB BLD-MCNC: 11.9 G/DL (ref 13.2–17.1)
INR PPP: 1.3
LYMPHOCYTES # BLD AUTO: 401 CELLS/UL (ref 850–3900)
LYMPHOCYTES NFR BLD AUTO: 22.3 %
MCH RBC QN AUTO: 32.5 PG (ref 27–33)
MCHC RBC AUTO-ENTMCNC: 32.1 G/DL (ref 32–36)
MCV RBC AUTO: 101.4 FL (ref 80–100)
MONOCYTES # BLD AUTO: 113 CELLS/UL (ref 200–950)
MONOCYTES NFR BLD AUTO: 6.3 %
NEUTROPHILS # BLD AUTO: 1141 CELLS/UL (ref 1500–7800)
NEUTROPHILS NFR BLD AUTO: 63.4 %
PLATELET # BLD AUTO: 77 THOUSAND/UL (ref 140–400)
PMV BLD REES-ECKER: 10.8 FL (ref 7.5–12.5)
POTASSIUM SERPL-SCNC: 4.3 MMOL/L (ref 3.5–5.3)
PROT SERPL-MCNC: 6.4 G/DL (ref 6.1–8.1)
PROTHROMBIN TIME: 13.7 SEC (ref 9–11.5)
RBC # BLD AUTO: 3.66 MILLION/UL (ref 4.2–5.8)
SERVICE CMNT-IMP: ABNORMAL
SODIUM SERPL-SCNC: 140 MMOL/L (ref 135–146)
WBC # BLD AUTO: 1.8 THOUSAND/UL (ref 3.8–10.8)

## 2025-06-22 ENCOUNTER — ANESTHESIA EVENT (OUTPATIENT)
Dept: ANESTHESIOLOGY | Facility: HOSPITAL | Age: 50
End: 2025-06-22

## 2025-06-22 ENCOUNTER — ANESTHESIA (OUTPATIENT)
Dept: ANESTHESIOLOGY | Facility: HOSPITAL | Age: 50
End: 2025-06-22

## 2025-06-22 RX ORDER — SODIUM CHLORIDE, SODIUM LACTATE, POTASSIUM CHLORIDE, CALCIUM CHLORIDE 600; 310; 30; 20 MG/100ML; MG/100ML; MG/100ML; MG/100ML
50 INJECTION, SOLUTION INTRAVENOUS CONTINUOUS
Status: CANCELLED | OUTPATIENT
Start: 2025-06-22

## 2025-06-22 RX ORDER — SODIUM CHLORIDE, SODIUM LACTATE, POTASSIUM CHLORIDE, CALCIUM CHLORIDE 600; 310; 30; 20 MG/100ML; MG/100ML; MG/100ML; MG/100ML
20 INJECTION, SOLUTION INTRAVENOUS CONTINUOUS
Status: CANCELLED | OUTPATIENT
Start: 2025-06-22

## 2025-06-23 ENCOUNTER — ANESTHESIA EVENT (OUTPATIENT)
Dept: GASTROENTEROLOGY | Facility: HOSPITAL | Age: 50
End: 2025-06-23
Payer: COMMERCIAL

## 2025-06-23 ENCOUNTER — HOSPITAL ENCOUNTER (OUTPATIENT)
Dept: GASTROENTEROLOGY | Facility: HOSPITAL | Age: 50
Setting detail: OUTPATIENT SURGERY
Discharge: HOME | End: 2025-06-23
Attending: STUDENT IN AN ORGANIZED HEALTH CARE EDUCATION/TRAINING PROGRAM
Payer: COMMERCIAL

## 2025-06-23 ENCOUNTER — ANESTHESIA (OUTPATIENT)
Dept: GASTROENTEROLOGY | Facility: HOSPITAL | Age: 50
End: 2025-06-23
Payer: COMMERCIAL

## 2025-06-23 VITALS
TEMPERATURE: 98.8 F | OXYGEN SATURATION: 96 % | WEIGHT: 264 LBS | HEIGHT: 74 IN | SYSTOLIC BLOOD PRESSURE: 104 MMHG | BODY MASS INDEX: 33.88 KG/M2 | HEART RATE: 68 BPM | DIASTOLIC BLOOD PRESSURE: 59 MMHG | RESPIRATION RATE: 18 BRPM

## 2025-06-23 DIAGNOSIS — Z86.0100 HISTORY OF COLON POLYPS: ICD-10-CM

## 2025-06-23 DIAGNOSIS — R04.2 HEMOPTYSIS: ICD-10-CM

## 2025-06-23 LAB
GLUCOSE SERPL-MCNC: 107 MG/DL (ref 65–140)
GLUCOSE SERPL-MCNC: 77 MG/DL (ref 65–140)

## 2025-06-23 PROCEDURE — 45385 COLONOSCOPY W/LESION REMOVAL: CPT | Performed by: INTERNAL MEDICINE

## 2025-06-23 PROCEDURE — 43239 EGD BIOPSY SINGLE/MULTIPLE: CPT | Performed by: INTERNAL MEDICINE

## 2025-06-23 PROCEDURE — 82948 REAGENT STRIP/BLOOD GLUCOSE: CPT

## 2025-06-23 PROCEDURE — 45380 COLONOSCOPY AND BIOPSY: CPT | Performed by: INTERNAL MEDICINE

## 2025-06-23 PROCEDURE — 88305 TISSUE EXAM BY PATHOLOGIST: CPT | Performed by: PATHOLOGY

## 2025-06-23 RX ORDER — SODIUM CHLORIDE, SODIUM LACTATE, POTASSIUM CHLORIDE, CALCIUM CHLORIDE 600; 310; 30; 20 MG/100ML; MG/100ML; MG/100ML; MG/100ML
125 INJECTION, SOLUTION INTRAVENOUS CONTINUOUS
Status: DISCONTINUED | OUTPATIENT
Start: 2025-06-23 | End: 2025-06-27 | Stop reason: HOSPADM

## 2025-06-23 RX ORDER — SODIUM CHLORIDE, SODIUM LACTATE, POTASSIUM CHLORIDE, CALCIUM CHLORIDE 600; 310; 30; 20 MG/100ML; MG/100ML; MG/100ML; MG/100ML
INJECTION, SOLUTION INTRAVENOUS CONTINUOUS PRN
Status: DISCONTINUED | OUTPATIENT
Start: 2025-06-23 | End: 2025-06-23

## 2025-06-23 RX ORDER — SODIUM CHLORIDE, SODIUM LACTATE, POTASSIUM CHLORIDE, CALCIUM CHLORIDE 600; 310; 30; 20 MG/100ML; MG/100ML; MG/100ML; MG/100ML
50 INJECTION, SOLUTION INTRAVENOUS CONTINUOUS
Status: DISCONTINUED | OUTPATIENT
Start: 2025-06-23 | End: 2025-06-27 | Stop reason: HOSPADM

## 2025-06-23 RX ORDER — PROPOFOL 10 MG/ML
INJECTION, EMULSION INTRAVENOUS AS NEEDED
Status: DISCONTINUED | OUTPATIENT
Start: 2025-06-23 | End: 2025-06-23

## 2025-06-23 RX ORDER — DEXTROSE MONOHYDRATE 25 G/50ML
12.5 INJECTION, SOLUTION INTRAVENOUS ONCE
Status: COMPLETED | OUTPATIENT
Start: 2025-06-23 | End: 2025-06-23

## 2025-06-23 RX ORDER — SODIUM CHLORIDE, SODIUM LACTATE, POTASSIUM CHLORIDE, CALCIUM CHLORIDE 600; 310; 30; 20 MG/100ML; MG/100ML; MG/100ML; MG/100ML
20 INJECTION, SOLUTION INTRAVENOUS CONTINUOUS
Status: DISCONTINUED | OUTPATIENT
Start: 2025-06-23 | End: 2025-06-27 | Stop reason: HOSPADM

## 2025-06-23 RX ADMIN — SODIUM CHLORIDE, SODIUM LACTATE, POTASSIUM CHLORIDE, AND CALCIUM CHLORIDE: .6; .31; .03; .02 INJECTION, SOLUTION INTRAVENOUS at 07:32

## 2025-06-23 RX ADMIN — PROPOFOL 100 MCG/KG/MIN: 10 INJECTION, EMULSION INTRAVENOUS at 07:35

## 2025-06-23 RX ADMIN — Medication 40 MG: at 07:50

## 2025-06-23 RX ADMIN — PROPOFOL 120 MG: 10 INJECTION, EMULSION INTRAVENOUS at 07:34

## 2025-06-23 RX ADMIN — DEXTROSE MONOHYDRATE 13 ML: 25 INJECTION, SOLUTION INTRAVENOUS at 07:18

## 2025-06-23 NOTE — ANESTHESIA PREPROCEDURE EVALUATION
Procedure:  PRE-OP ONLY    Relevant Problems   CARDIO   (+) Essential hypertension   (+) Heart murmur   (+) Portal hypertension (HCC)   (+) Venous stasis dermatitis of both lower extremities      GI/HEPATIC   (+) Fatty liver   (+) GERD (gastroesophageal reflux disease)   (+) Liver cirrhosis (HCC)   (+) NAFLD (nonalcoholic fatty liver disease)   (+) Other cirrhosis of liver (HCC)      HEMATOLOGY   (+) Acquired thrombocytopenia (HCC)   (+) Chronic ITP (idiopathic thrombocytopenic purpura) (HCC)   (+) Idiopathic thrombocytopenia (HCC)   (+) Iron deficiency anemia due to chronic blood loss   (+) Pancytopenia (HCC)      Other   (+) Splenomegaly             Anesthesia Plan  ASA Score- 3     Anesthesia Type- IV sedation with anesthesia with ASA Monitors.         Additional Monitors:     Airway Plan:            Plan Factors-    Chart reviewed. EKG reviewed.  Existing labs reviewed. Patient summary reviewed.                  Induction-     Postoperative Plan- .   Monitoring Plan - Monitoring plan - standard ASA monitoring      Perioperative Resuscitation Plan - Level 1 - Full Code.       Informed Consent- Anesthetic plan and risks discussed with patient.  I personally reviewed this patient with the CRNA. Discussed and agreed on the Anesthesia Plan with the CRNA..      NPO Status:  No vitals data found for the desired time range.        Lab Results   Component Value Date    HGBA1C 8.4 (H) 03/29/2025       Lab Results   Component Value Date     04/05/2017    K 4.3 06/11/2025     06/11/2025    CO2 26 06/11/2025    BUN 17 06/11/2025    CREATININE 0.97 06/11/2025    GLUCOSE 102 (H) 04/05/2017    GLUF 116 (H) 05/04/2021    CALCIUM 8.8 06/11/2025    CORRECTEDCA 9.5 05/04/2021    AST 39 (H) 06/11/2025    ALT 34 06/11/2025    ALKPHOS 111 06/11/2025    PROT 6.4 04/05/2017    BILITOT 1.0 04/05/2017    EGFR 95 06/11/2025       Lab Results   Component Value Date    WBC 1.8 (L) 06/11/2025    HGB 11.9 (L) 06/11/2025    HCT 37.1  (L) 06/11/2025    .4 (H) 06/11/2025    PLT 77 (L) 06/11/2025     Normal sinus rhythm  Moderate voltage criteria for LVH, may be normal variant  Possible Lateral infarct , age undetermined  Abnormal ECG  When compared with ECG of 10-SEP-2022 19:42,  No significant change was found  Confirmed by Carter Campbell ([id number]) on 1/16/2023 8:23:38 AM     Specimen Collected: 01/15/23 14:28     Echo 2022     Left Ventricle: Left ventricular cavity size is normal. The left ventricular ejection fraction is 65%. Systolic function is normal. Wall motion is normal. Diastolic function is normal. Wall thickness is upper normal.    Left Atrium: The atrium is moderately dilated.    Right Atrium: The atrium is mildly dilated.    Mitral Valve: There is trace regurgitation.    Pulmonary Artery: The pulmonary artery systolic pressure is upper normal.

## 2025-06-23 NOTE — ANESTHESIA POSTPROCEDURE EVALUATION
Post-Op Assessment Note    CV Status:  Stable  Pain Score: 0    Pain management: adequate       Mental Status:  Alert and awake   Hydration Status:  Euvolemic   PONV Controlled:  Controlled   Airway Patency:  Patent     Post Op Vitals Reviewed: Yes    No anethesia notable event occurred.    Staff: CRNA           Last Filed PACU Vitals:  Vitals Value Taken Time   Temp 98.8 °F (37.1 °C) 06/23/25 08:18   Pulse 68 06/23/25 08:18   BP 83/51 06/23/25 08:18   Resp 18 06/23/25 08:18   SpO2 94 % 06/23/25 08:18

## 2025-06-23 NOTE — INTERVAL H&P NOTE
H&P reviewed. After examining the patient I find no changes in the patients condition since the H&P had been written.    Vitals:    06/23/25 0702   BP: 127/64   Pulse: 70   Resp: 16   Temp: (!) 97.4 °F (36.3 °C)   SpO2: 97%

## 2025-06-23 NOTE — ANESTHESIA PREPROCEDURE EVALUATION
Procedure:  EGD  COLONOSCOPY    Relevant Problems   CARDIO   (+) Essential hypertension   (+) Heart murmur   (+) Portal hypertension (HCC)   (+) Venous stasis dermatitis of both lower extremities      GI/HEPATIC   (+) Fatty liver   (+) GERD (gastroesophageal reflux disease)   (+) Liver cirrhosis (HCC)   (+) NAFLD (nonalcoholic fatty liver disease)   (+) Other cirrhosis of liver (HCC)      HEMATOLOGY   (+) Acquired thrombocytopenia (HCC)   (+) Chronic ITP (idiopathic thrombocytopenic purpura) (HCC)   (+) Idiopathic thrombocytopenia (HCC)   (+) Iron deficiency anemia due to chronic blood loss   (+) Pancytopenia (HCC)      Other   (+) Splenomegaly        Physical Exam    Airway     Mallampati score: II  TM Distance: >3 FB  Neck ROM: full  Mouth opening: >= 4 cm      Cardiovascular  Cardiovascular exam normal    Dental   No notable dental hx     Pulmonary  Pulmonary exam normal     Neurological    He appears oriented x3.      Other Findings        Anesthesia Plan  ASA Score- 3     Anesthesia Type- IV sedation with anesthesia with ASA Monitors.         Additional Monitors:     Airway Plan:            Plan Factors-Exercise tolerance (METS): >4 METS.    Chart reviewed. EKG reviewed.  Existing labs reviewed. Patient summary reviewed.    Patient is not a current smoker. Patient not instructed to abstain from smoking on day of procedure. Patient did not smoke on day of surgery.            Induction-     Postoperative Plan- .   Monitoring Plan - Monitoring plan - standard ASA monitoring      Perioperative Resuscitation Plan - Level 1 - Full Code.       Informed Consent- Anesthetic plan and risks discussed with patient and spouse.  I personally reviewed this patient with the CRNA. Discussed and agreed on the Anesthesia Plan with the CRNA..      NPO Status:  No vitals data found for the desired time range.        Lab Results   Component Value Date    HGBA1C 8.4 (H) 03/29/2025       Lab Results   Component Value Date      04/05/2017    K 4.3 06/11/2025     06/11/2025    CO2 26 06/11/2025    BUN 17 06/11/2025    CREATININE 0.97 06/11/2025    GLUCOSE 102 (H) 04/05/2017    GLUF 116 (H) 05/04/2021    CALCIUM 8.8 06/11/2025    CORRECTEDCA 9.5 05/04/2021    AST 39 (H) 06/11/2025    ALT 34 06/11/2025    ALKPHOS 111 06/11/2025    PROT 6.4 04/05/2017    BILITOT 1.0 04/05/2017    EGFR 95 06/11/2025       Lab Results   Component Value Date    WBC 1.8 (L) 06/11/2025    HGB 11.9 (L) 06/11/2025    HCT 37.1 (L) 06/11/2025    .4 (H) 06/11/2025    PLT 77 (L) 06/11/2025     Normal sinus rhythm  Moderate voltage criteria for LVH, may be normal variant  Possible Lateral infarct , age undetermined  Abnormal ECG  When compared with ECG of 10-SEP-2022 19:42,  No significant change was found  Confirmed by Carter Campbell ([id number]) on 1/16/2023 8:23:38 AM     Specimen Collected: 01/15/23 14:28     Echo 2022     Left Ventricle: Left ventricular cavity size is normal. The left ventricular ejection fraction is 65%. Systolic function is normal. Wall motion is normal. Diastolic function is normal. Wall thickness is upper normal.    Left Atrium: The atrium is moderately dilated.    Right Atrium: The atrium is mildly dilated.    Mitral Valve: There is trace regurgitation.    Pulmonary Artery: The pulmonary artery systolic pressure is upper normal.

## 2025-06-25 DIAGNOSIS — E53.8 B12 DEFICIENCY: ICD-10-CM

## 2025-06-25 DIAGNOSIS — D61.818 PANCYTOPENIA (HCC): ICD-10-CM

## 2025-06-25 DIAGNOSIS — K74.69 OTHER CIRRHOSIS OF LIVER (HCC): Primary | ICD-10-CM

## 2025-06-26 PROCEDURE — 88305 TISSUE EXAM BY PATHOLOGIST: CPT | Performed by: PATHOLOGY

## 2025-07-01 NOTE — ASSESSMENT & PLAN NOTE
2nd to cirrhosis    2017    WBC 4.4-4.8; ANC normal 2.7  Hgb 15 range with   Platelet count 108-123    [id number]  WBC 1.9-2.5; ANC 1.2-2  Hgb 13-14  Platelet count 40- 80    3/2019 normal BMBX except decreased iron stores    2021- 6/2025    WBC 1.5- 2.2 with ANC 0.8- 1.4  Platelet count 60-80s      2021 progressive neutropenia, anemia, ANC 0.8-1.2, platelet count into the 60s  6/2021 Hgb decreased to 11 g/dL range with MCV 97     1/6/22 abd u/s - Liver unremarkable, though 16.8cm (14.1cm 5/2016)  Surface contour is smooth. Parenchyma: Echogenicity and echotexture are within normal limits.  No evidence of suspicious mass.  Spleen enlarged further to 27cm     5/7/22 hemoglobin down to 7.2 with MCV of 82, white blood cell count 1.3, platelet count 63,000     7/2022 consult with GI.     7/2022  repeat BMBX- unremarkable except decreased iron stores     8/22 - 12/2022 Venofer 200mg x 10      1/7/23 hemoglobin 13.4 (post IV iron), MCV 95, white blood cell count 2, ANC 1100, platelets 82,000     2/3/24 hemoglobin 12, MCV 94, white blood cell count 2.1, 62% neutrophils, 25% lymphocytes, 9% monocytes, platelet count 88,000  3/2024  Given recurrent anemia and history of iron deficiency, Venofer 300 mg weekly x 3 along with B12 1000 mcg IM given     7/18/24 hemoglobin 12.4, , white blood cell count 2.1, platelet count 82,000, ANC 1.3, ferritin 28, iron saturation 48%, normal MMA    6/11/25 WBC 1.8, ANC 1.1 (stable)

## 2025-07-01 NOTE — PROGRESS NOTES
Name: Wilver Quintanilla      : 1975      MRN: [id number]  Encounter Provider: Maria Fernanda Morrison PA-C  Encounter Date: 2025   Encounter department: [facility]  :  Assessment & Plan  Idiopathic thrombocytopenia (HCC)  Chronic thrombocytopenia.   Platelet count 110s - 130s  - .     2019 Decreased to 30,000 range in    3/2019 normal BMBX except decreased iron stores  3/2019 contrast CT scan abdomen and pelvis- liver unremarkable, spleen measured 20 cm (15cm in 2016)     2019 pet/ct with no evidence of malignancy.     Insurance initially denied Promacta    No improvement in his platelets with steroids (Decadron 40 mg daily for 4 days) or Rituxan (2019).     2019 platelet count 46,000     2019 Promacta 50 mg initiated  10/2019 Promacta increased to 75mg po daily      -  platelet count 80,000 range       2025 platelet count stable at 77    Past few months he has had episodes of coughing up blood.  Resolved post EGD 2025.    If he has recurrence and platelet count remains< 100,000;  discussed changing Promacta to Doptelet             Pancytopenia (HCC)  2nd to cirrhosis        WBC 4.4-4.8; ANC normal 2.7  Hgb 15 range with   Platelet count 108-123    [id number]  WBC 1.9-2.5; ANC 1.2-2  Hgb 13-14  Platelet count 40- 80    3/2019 normal BMBX except decreased iron stores    - 2025    WBC 1.5- 2.2 with ANC 0.8- 1.4  Platelet count 60-80s       progressive neutropenia, anemia, ANC 0.8-1.2, platelet count into the 60s  2021 Hgb decreased to 11 g/dL range with MCV 97     22 abd u/s - Liver unremarkable, though 16.8cm (14.1cm 2016)  Surface contour is smooth. Parenchyma: Echogenicity and echotexture are within normal limits.  No evidence of suspicious mass.  Spleen enlarged further to 27cm     22 hemoglobin down to 7.2 with MCV of 82, white blood cell count 1.3, platelet count 63,000     2022 consult with GI.      7/2022  repeat BMBX- unremarkable except decreased iron stores     8/22 - 12/2022 Venofer 200mg x 10      1/7/23 hemoglobin 13.4 (post IV iron), MCV 95, white blood cell count 2, ANC 1100, platelets 82,000     2/3/24 hemoglobin 12, MCV 94, white blood cell count 2.1, 62% neutrophils, 25% lymphocytes, 9% monocytes, platelet count 88,000  3/2024  Given recurrent anemia and history of iron deficiency, Venofer 300 mg weekly x 3 along with B12 1000 mcg IM given     7/18/24 hemoglobin 12.4, , white blood cell count 2.1, platelet count 82,000, ANC 1.3, ferritin 28, iron saturation 48%, normal MMA    6/11/25 WBC 1.8, ANC 1.1 (stable)       Other cirrhosis of liver (HCC)  He had chronic splenomegaly.  Negative Pet/CT  Serial abdominal ultrasound identified fatty liver but not  cirrhotic changes  11/22 liver elastography identified cirrhosis    Followed by GI.  5/2025 MRI of the abdomen cirrhosis with stigmata of portal hypertension.  Stability of enhancing observation in segment 6 for at least 2 years, downgraded to LR 2  Follow-up MRI with contrast and MRCP requested for December 2025 6/2025 normal AFP       History of iron deficiency  3/2019 & 7/2022 BMBX- absent iron stores      5/7/22 hemoglobin down to 7.2 with MCV of 82,   8/22 - 12/2022 Venofer 200mg x 10     10/14/22 EGD and colonoscopy -colon polyps identified, 3-year recall recommended. Portal hypertensive gastropathy noted without evidence of gastric varices. 3 columns of small to medium sized esophageal varices with no stigmata of bleeding were identified    8/22 - 12/2022 Venofer 200mg x 10     10/14/22 EGD and colonoscopy -colon polyps identified, 3-year recall recommended. Portal hypertensive gastropathy noted without evidence of gastric varices. 3 columns of small to medium sized esophageal varices with no stigmata of bleeding were identified       2/3/24 hemoglobin 12, MCV 94, white blood cell count 2.1, 62% neutrophils, 25% lymphocytes, 9%  monocytes, platelet count 88,000     3/2024  Given recurrent anemia and history of iron deficiency, Venofer 300 mg weekly x 3 along with B12 1000 mcg IM given    3/25  ferritin 20    6/2025  Hgb 11.9,      Has been coughing up blood prior to his June 2025 EGD.  Very tired. Declines chest CT to rule out pulmonary etiology.  6/2025 repeat EGD and colonoscopy.  No evidence of bleeding.  Portal gastropathy, varices noted.    Additional IV iron requested.  We discussed Monoferric.  Potential side effects of IV iron could include but may not be limited to:  change in taste, diarrhea, muscle cramps, nausea or vomiting, pain in the arms or legs, pain or burning sensation in the injection site, allergic reaction.  The patient verbalized understanding and wishes to proceed.    Monoferric 1000 mg x 1 dose requested.  Follow-up in 4 months         B12 deficiency  3/2024  B12 1000 mcg IM weekly x 3 given    3/25 B12 700    7/2025  B12 1000mcg x 1 requested.    Orders:  •  Vitamin B12; Future        Return in about 4 months (around 11/2/2025) for in person or, Virtual Day 1st or 3rd Tues of the Month or 730 AM.    History of Present Illness   No chief complaint on file.    Pertinent Medical History     07/02/25: HPI: Wilver Quintanilla is a 49 year old  male seen for initial consultation by Dr Angela on 3/10/16 regarding thrombocytopenia.     History of hypertension, diabetes, fatty liver, GERD.   Works in construction.        His platelet count fluctuated between 111 and 130 for the prior 4-5 years before initial consult. As early as February 2011, he had a mildly low platelet count.      5/2016 abd u/s -spleen enlarged at 15 cm, liver 14.1 cm     4/5/2017 hemoglobin 15, , white blood cell count 4.4, platelets 123        2/22/19 labs at [facility] with WBC 3.4, Hgb 14.8, , Platelets 53,000. Glc elevated 133 fasting. GFR = 74. Albumin 4.2. Elevation of total bilirubin at 1.3, Alk phos 129, AST 68, ALT 72.  Urobilinogen elevated to 2.0.     2019 Progressive anemia, worsening neutropenia thrombocytopenia, elevated AST, ALT, Alk phos     2/22/2019 hemoglobin 14.8, , white blood cell count 3.4, platelets 53,000           3/29/2019 CT Abdomen to better assess liver and if cirrhosis is present.   - Spleen is enlarged, measuring 20 cm in AP and craniocaudad diameter. The spleen is homogeneous without discrete mass.     Bone marrow biopsy due to decrease in platelet count-  NORMAL  3/22/2019 - Cytogenetic results: 46,XY[20]  - [employer] NGS FLT3 and UQA6Glyoogeofr Report (GenPath# [id number], evaluated by Shannan Stevens) is a follows:  * DETECTED GENOMIC ALTERATIONS: No Mutations Identified  * Tumor type: Suspected Myelodysplastic Syndrome  * Interpretation Summary: This was a NORMAL sequencing study in which no disease associated mutations or variants of unclear  significance were identified in the tested specimen. Normal sequencing results may be a consequence of testing a sample with little or  no dysplastic cells present. Clinical and pathologic correlation is required to interpret these findings.  Addendum electronically signed by Guero Rossi MD on 3/28/2019 at 1538  .  Final Diagnosis  A & B. Bone marrow, right iliac, core needle biopsy & aspirate clot section:  - Normocellular for age (45-50% cell) marrow with adequate, normoblastic and progressive trilineage hematopoiesis, M:E = 2.7 : 1,  myeloblasts ~ 1%; megakaryocytes appear numerically adequate & morphologically unremarkable.  - Stainable macrophage storage iron is present.  - Reticulin fibrosis (cytochemical stains) is grade 0 of 3 in  Concensus system.  - Lymphocytes and plasma cells appear mature, scattered, not increased.  - No collagen fibrosis, no granulomata, no vasculitis and no necrosis.  C. Bone marrow, right iliac, aspirate slides (10): hemodilute, aspicular.  - Normoblastic & progressive trilineage hematopoiesis, with subtle  dysplastic erythroid morphology in < 10% of precursors;  myelopoiesis appears left-shifted but without dysmorphic features and megakaryocytes are not found, blasts ! 1%.  - Lymphocytes & plasma cells appear mature, scattered & without morphologic atypia to suggest neoplasia.  - Iron stains reveal no ringed sideroblasts.  - Flow cytometry (GenPath#_[id number]_, evaluated by Dr. MEME Sabillon) reveals:  * NO evidence for abnormal myeloid maturation or an increased blast population, CD34(+) blasts comprise 1 to 2% of total cells  analyzed.  * NO evidence for a lymphoproliferative disorder - there is a mixed population of maturing myeloid cells, B cells and T cells. B-cells (3  to 4.5% of total) are polytypic & T-cells (3.4 to 9%% of total) show no pan T-cell antigen deletion. The CD4/CD8 ratio is 1.0:1, CD56+  NK-cells (1.6%) and CD57+ T-LGL's (3.4%) are within normal limits.  * A small population of dim CD45(+) cells that are CD19 (+), CD10 (+) compatible with hematogones, is present (1.7%)  * Viability 7AAD: 99 & 37%.  * The following antigens were evaluated & found to be expressed as described above or by appropriate cells: CD2, CD3, CD4, CD5,  CD7, CD8, CD10, CD11b, CD11c, CD13, CD14, CD16, CD19, CD20, CD22, CD23, CD33, CD34, CD38, CD45, CD56, CD57, CD64,  , FMC-7, HLA-DR, sKappa, sLambda.     5/24/2019 f/u visit - “PET CT scan ordered to evaluate the spleen for uptake for a question of a splenic lymphoma but his insurance company denied this. I had ordered Promacta for his ITP which again was denied. At this point I will give him Decadron 40 mg daily for 4 days. If he does not have a response to his platelet count we will again appeal the decision and reapply for his medication”  5/23/2019 hemoglobin 13.5, , white blood cell count 2.3, normal differential, platelets 35,000  6/11/2019- did not respond to dexamethasone. (June 5, 2019 platelet count 37,000)  7/2019 He then got 4 doses of Rituxan.  Platelet count remained 65,000  8/30/2019 - pet/ct - 1. No evidence of hypermetabolic malignancy. Persistent splenomegaly, unchanged.”           8/2019 pet/ct with no evidence of malignancy.      8/2019 platelet count 46,000     8/2019 Promacta 50 mg initiated,  10/2019 Promacta increased to 75mg po daily      11/2019 platelet count 78,000  7/2020 platelet count 87,000  Platelets to 80,000 range. He was continued on Promacta.         10/2020 hemoglobin 13, , white blood cell count 2.6, ANC 1.6, platelet count 88,000  11/2021 hemoglobin 10.8, MCV 92, white blood cell count 1.7, ANC 1000, platelets 70,000     Progressive anemia, worsening neutropenia thrombocytopenia        1/6/22 abd u/s - Liver unremarkable, though 16.8cm (14.1cm 5/2016)  Surface contour is smooth. Parenchyma: Echogenicity and echotexture are within normal limits.  No evidence of suspicious mass.  Spleen enlarged further to 27cm     5/7/22 hemoglobin down to 7.2 with MCV of 82, white blood cell count 1.3, platelet count 63,000        7/2022 -“spoke with GI regarding reported history of fatty liver, elevated LFTs, iron deficiency anemia, GERD”     7/29/22 repeat BMBX- 46, XY  A-C. Bone Marrow, Left Iliac Crest, Core, Clot and Aspirate:  - Normocellular marrow (50% cellularity) with megakaryocytic hyperplasia.  - No evidence of leukemia or lymphoma.  - Decreased iron stores.  - No reticulin fibrosis.  - No mutations identified by NGS.  Comment: Patient's history of pancytopenia and splenomegaly is noted. The findings are compatible with immune thrombocytopenic purpura (ITP). Other etiologies     8/22 - 12/2022 Venofer 200mg x 10     10/14/22 EGD and colonoscopy -colon polyps identified, 3-year recall recommended. Portal hypertensive gastropathy noted without evidence of gastric varices. 3 columns of small to medium sized esophageal varices with no stigmata of bleeding were identified     11/7/22 ferritin 17; hgb 10.7, WBC 1.7     1/7/23  hemoglobin 13.4, MCV 95, white blood cell count 2, ANC 1100, platelets 82,000  5/2023 AFP 2.2  6/2023 ferritin 20, hemoglobin 12.5, , white blood cell count 2, ANC 1100, platelets 80,000  Total bilirubin 2.6, glucose 108, otherwise normal LFTs       2/3/24 hemoglobin 12, MCV 94, white blood cell count 2.1, 62% neutrophils, 25% lymphocytes, 9% monocytes, platelet count 88,000     3/2024  Given recurrent anemia and history of iron deficiency, Venofer 300 mg weekly x 3 along with B12 1000 mcg IM given    Has been coughing up blood prior to his June 2025 EGD.  Very tired. Declines chest CT to rule out pulmonary etiology.    6/11/25 hemoglobin 11.9, WBC 1.8, platelets 77, ANC 1.1    6/23/25 EGD and colonosocpy-   5 polyps removed from the colon, diverticulosis and internal hemorrhoids noted.  3-year recall recommended    Small and medium varices in the esophagus with red juanita sign.  Mosaic portal hypertensive gastropathy in the cardia, fundus and body of the stomach.            Review of Systems        Objective   There were no vitals taken for this visit.    Physical Exam    Labs: I have reviewed the following labs:  Results for orders placed or performed during the hospital encounter of 06/23/25   Tissue Exam   Result Value Ref Range    Case Report       Surgical Pathology Report                         Case: [id number]                                  Authorizing Provider:  Xiomara Michel MD          Collected:           06/23/2025 0741              Ordering Location:     [facility]        Received:            06/23/2025 [address] [place] [address] Endoscopy                                                           Pathologist:           Evelin Nuñez MD                                                       Specimens:   A) - Stomach, gastric                                                                               B) - Large Intestine, Cecum, cecal tissue abnormality                                                C) - Polyp, Colorectal, ascending colon, cold forceps                                               D) - Polyp, Colorectal, descending colon x2, cold snare                                             E) - Polyp, Colorectal, sigmoid colon x2, cold snare                                       Final Diagnosis       A. Stomach, gastric, biopsy:  - Mild chronic inactive gastritis and reactive epithelial changes  - Changes suggestive of proton pump inhibitor effect    - No H. pylori organisms are identified on H&E staining  - Negative for intestinal metaplasia, dysplasia and malignancy      B. Large Intestine, Cecum, cecal tissue abnormality, biopsy:  - Benign colonic mucosa with no significant histopathologic abnormality  - No thickened basement membranes or intraepithelial lymphocytosis to suggest microscopic colitis (i.e. collagenous colitis or lymphocytic colitis)  - No active or chronic colitis  - No glandular dysplasia and no evidence of malignancy      C. Polyp, Colorectal, ascending colon, polypectomy:  - Fragments of tubular adenoma  - Negative for high-grade dysplasia and malignancy      D. Polyp, Colorectal, descending colon, polypectomy x 2:  - Fragments of tubular adenoma(s)  - Negative for high-grade dysplasia and malignancy      E. Polyp, Colorectal, sigmoid colon, polypectomy x 2:  - Fragments of tubular adenoma(s)  - Negative for high-grade dysplasia and malignancy         Additional Information       All reported additional testing was performed with appropriately reactive controls.  These tests were developed and their performance characteristics determined by [facility] or appropriate performing facility, though some tests may be performed on tissues which have not been validated for performance characteristics (such as staining performed on alcohol exposed cell blocks and decalcified tissues).  Results should be interpreted with caution and  "in the context of the patients’ clinical condition. These tests may not be cleared or approved by the U.S. Food and Drug Administration, though the FDA has determined that such clearance or approval is not necessary. These tests are used for clinical purposes and they should not be regarded as investigational or for research. This laboratory has been approved by CLIA 88, designated as a high-complexity laboratory and is qualified to perform these tests.    Interpretation performed at [facility], [address] [address]   .      Synoptic Checklist          COLON/RECTUM POLYP FORM - GI - C, D, E          :    Adenoma(s)      Gross Description       A. The specimen is received in formalin, labeled with the patient's name and hospital number, and is designated \" stomach\".  The specimen consists of 2 tan soft tissue fragments measuring 0.2 and 0.5 cm in greatest dimension.  Entirely submitted. Screened cassette.  B. The specimen is received in formalin, labeled with the patient's name and hospital number, and is designated \" cecal tissue abnormality\".  The specimen consists of a single tan soft tissue fragment measuring 0.5 cm in greatest dimension.  Entirely submitted. Screened cassette.  C. The specimen is received in formalin, labeled with the patient's name and hospital number, and is designated \" ascending colon polyp\".  The specimen consists of a single tan soft tissue fragment measuring 0.3 cm in greatest dimension.  Entirely submitted. Screened cassette.  D. The specimen is received in formalin, labeled with the patient's name and hospital number, and is designated \" descending colon polyp\".  The specimen consists of 2 tan soft tissue fragments measuring 0.6 and 0.7 cm in greatest dimension.  Entirely submitted. Screened cassette.  E. The specimen is received in formalin, labeled with the patient's name and hospital number, and is designated \" sigmoid colon polyp\".  The specimen consists of 2 " tan soft tissue fragments measuring 0.4 cm each in greatest dimension.  Entirely submitted. Screened cassette.    Note: The estimated total formalin fixation time based upon information provided by the submitting clinician and the standard processing schedule is under 72 hours.    -KEmeigh     Fingerstick Glucose (POCT)   Result Value Ref Range    POC Glucose 77 65 - 140 mg/dl   Fingerstick Glucose (POCT)   Result Value Ref Range    POC Glucose 107 65 - 140 mg/dl           Administrative Statements   Encounter provider Maria Fernanda Morrison PA-C    The Patient is located at Home and in the following state in which I hold an active license [place].    The patient was identified by name and date of birth. Wilver SOUMYA Quintanilla was informed that this is a telemedicine visit and that the visit is being conducted through the Epic Embedded platform. He agrees to proceed..  My office door was closed. No one else was in the room.  He acknowledged consent and understanding of privacy and security of the video platform. The patient has agreed to participate and understands they can discontinue the visit at any time.    I have spent a total time of 25 minutes in caring for this patient on the day of the visit/encounter including Diagnostic results, Risks and benefits of tx options, Instructions for management, Patient and family education, Impressions, Counseling / Coordination of care, Documenting in the medical record, Reviewing/placing orders in the medical record (including tests, medications, and/or procedures), and Obtaining or reviewing history  , not including the time spent for establishing the audio/video connection.

## 2025-07-01 NOTE — ASSESSMENT & PLAN NOTE
3/2024  B12 1000 mcg IM weekly x 3 given    3/25 B12 700    7/2025  B12 1000mcg x 1 requested.    Orders:  •  Vitamin B12; Future

## 2025-07-01 NOTE — ASSESSMENT & PLAN NOTE
Chronic thrombocytopenia.   Platelet count 110s - 130s 2016 - 2017.     2019 Decreased to 30,000 range in    3/2019 normal BMBX except decreased iron stores  3/2019 contrast CT scan abdomen and pelvis- liver unremarkable, spleen measured 20 cm (15cm in 2016)     8/2019 pet/ct with no evidence of malignancy.     Insurance initially denied Promacta    No improvement in his platelets with steroids (Decadron 40 mg daily for 4 days) or Rituxan (6/2019).     8/2019 platelet count 46,000     8/2019 Promacta 50 mg initiated  10/2019 Promacta increased to 75mg po daily      2019- 2020 platelet count 80,000 range       6/2025 platelet count stable at 77    Past few months he has had episodes of coughing up blood.  Resolved post EGD 6/2025.    If he has recurrence and platelet count remains< 100,000;  discussed changing Promacta to Doptelet

## 2025-07-01 NOTE — ASSESSMENT & PLAN NOTE
He had chronic splenomegaly.  Negative Pet/CT  Serial abdominal ultrasound identified fatty liver but not  cirrhotic changes  11/22 liver elastography identified cirrhosis    Followed by GI.  5/2025 MRI of the abdomen cirrhosis with stigmata of portal hypertension.  Stability of enhancing observation in segment 6 for at least 2 years, downgraded to LR 2  Follow-up MRI with contrast and MRCP requested for December 2025 6/2025 normal AFP

## 2025-07-02 ENCOUNTER — TELEPHONE (OUTPATIENT)
Dept: HEMATOLOGY ONCOLOGY | Facility: CLINIC | Age: 50
End: 2025-07-02

## 2025-07-02 ENCOUNTER — TELEMEDICINE (OUTPATIENT)
Dept: HEMATOLOGY ONCOLOGY | Facility: CLINIC | Age: 50
End: 2025-07-02
Payer: COMMERCIAL

## 2025-07-02 DIAGNOSIS — E53.8 B12 DEFICIENCY: ICD-10-CM

## 2025-07-02 DIAGNOSIS — Z86.39 HISTORY OF IRON DEFICIENCY: ICD-10-CM

## 2025-07-02 DIAGNOSIS — K74.69 OTHER CIRRHOSIS OF LIVER (HCC): ICD-10-CM

## 2025-07-02 DIAGNOSIS — D69.3 IDIOPATHIC THROMBOCYTOPENIA (HCC): Primary | ICD-10-CM

## 2025-07-02 DIAGNOSIS — D50.0 IRON DEFICIENCY ANEMIA DUE TO CHRONIC BLOOD LOSS: ICD-10-CM

## 2025-07-02 DIAGNOSIS — D61.818 PANCYTOPENIA (HCC): ICD-10-CM

## 2025-07-02 PROCEDURE — 98005 SYNCH AUDIO-VIDEO EST LOW 20: CPT | Performed by: PHYSICIAN ASSISTANT

## 2025-07-02 RX ORDER — SODIUM CHLORIDE 9 MG/ML
20 INJECTION, SOLUTION INTRAVENOUS ONCE
OUTPATIENT
Start: 2025-07-16

## 2025-07-02 RX ORDER — CYANOCOBALAMIN 1000 UG/ML
1000 INJECTION, SOLUTION INTRAMUSCULAR; SUBCUTANEOUS ONCE
Start: 2025-07-16 | End: 2025-07-16

## 2025-07-02 NOTE — TELEPHONE ENCOUNTER
Pt returned call. Scheduled pt for 7/16 at [facility]. Pt verbalized understanding of appt date and time.

## 2025-07-15 ENCOUNTER — TELEPHONE (OUTPATIENT)
Dept: HEMATOLOGY ONCOLOGY | Facility: CLINIC | Age: 50
End: 2025-07-15

## 2025-07-15 DIAGNOSIS — E53.8 B12 DEFICIENCY: ICD-10-CM

## 2025-07-15 DIAGNOSIS — D50.0 IRON DEFICIENCY ANEMIA DUE TO CHRONIC BLOOD LOSS: Primary | ICD-10-CM

## 2025-07-15 RX ORDER — SODIUM CHLORIDE 9 MG/ML
20 INJECTION, SOLUTION INTRAVENOUS ONCE
Status: CANCELLED | OUTPATIENT
Start: 2025-07-16

## 2025-07-16 ENCOUNTER — HOSPITAL ENCOUNTER (OUTPATIENT)
Dept: INFUSION CENTER | Facility: CLINIC | Age: 50
Discharge: HOME | End: 2025-07-16
Attending: INTERNAL MEDICINE
Payer: COMMERCIAL

## 2025-07-16 VITALS
HEART RATE: 73 BPM | OXYGEN SATURATION: 94 % | SYSTOLIC BLOOD PRESSURE: 113 MMHG | DIASTOLIC BLOOD PRESSURE: 65 MMHG | RESPIRATION RATE: 18 BRPM | TEMPERATURE: 98.2 F

## 2025-07-16 DIAGNOSIS — D50.0 IRON DEFICIENCY ANEMIA DUE TO CHRONIC BLOOD LOSS: Primary | ICD-10-CM

## 2025-07-16 DIAGNOSIS — E53.8 B12 DEFICIENCY: ICD-10-CM

## 2025-07-16 PROCEDURE — 96365 THER/PROPH/DIAG IV INF INIT: CPT

## 2025-07-16 RX ORDER — SODIUM CHLORIDE 9 MG/ML
20 INJECTION, SOLUTION INTRAVENOUS ONCE
Status: CANCELLED | OUTPATIENT
Start: 2025-07-23

## 2025-07-16 RX ORDER — SODIUM CHLORIDE 9 MG/ML
20 INJECTION, SOLUTION INTRAVENOUS ONCE
Status: COMPLETED | OUTPATIENT
Start: 2025-07-16 | End: 2025-07-16

## 2025-07-16 RX ADMIN — SODIUM CHLORIDE 20 ML/HR: 0.9 INJECTION, SOLUTION INTRAVENOUS at 15:15

## 2025-07-16 RX ADMIN — IRON SUCROSE 300 MG: 20 INJECTION, SOLUTION INTRAVENOUS at 15:18

## 2025-07-16 NOTE — PROGRESS NOTES
Patient presents to the [facility] for the treatment of Venofer. He offers no concerns at this time. Vital signs stable. PIV placed in his Right forearm with good blood return. Patient is resting comfortably in the chair, call bell within reach.     Patient questioned if he was getting a blood transfusion today- he was under the impression that he was. Explained that his HgB level is at 11.2- and reading Yina's note, there is only mention of him getting iron, not a transfusion. Patient understands explanation- will reach out to his provider with any future questions about his treatment plan.

## 2025-07-16 NOTE — PROGRESS NOTES
Patient tolerated treatment well with no adverse reactions. PIV removed without difficulty. Declined AVS. Confirmed next appointment at the [facility] for 07/24/2025 @ 1530 (offered other dates / times at [facility] but patient could not make what was available). Patient ambulatory at discharge.

## 2025-07-24 ENCOUNTER — HOSPITAL ENCOUNTER (OUTPATIENT)
Dept: INFUSION CENTER | Facility: CLINIC | Age: 50
Discharge: HOME | End: 2025-07-24
Attending: INTERNAL MEDICINE
Payer: COMMERCIAL

## 2025-07-24 VITALS
HEART RATE: 73 BPM | TEMPERATURE: 97.7 F | SYSTOLIC BLOOD PRESSURE: 135 MMHG | DIASTOLIC BLOOD PRESSURE: 69 MMHG | RESPIRATION RATE: 20 BRPM

## 2025-07-24 DIAGNOSIS — E53.8 B12 DEFICIENCY: ICD-10-CM

## 2025-07-24 DIAGNOSIS — D50.0 IRON DEFICIENCY ANEMIA DUE TO CHRONIC BLOOD LOSS: Primary | ICD-10-CM

## 2025-07-24 PROCEDURE — 96365 THER/PROPH/DIAG IV INF INIT: CPT

## 2025-07-24 RX ORDER — SODIUM CHLORIDE 9 MG/ML
20 INJECTION, SOLUTION INTRAVENOUS ONCE
Status: CANCELLED | OUTPATIENT
Start: 2025-08-01

## 2025-07-24 RX ORDER — SODIUM CHLORIDE 9 MG/ML
20 INJECTION, SOLUTION INTRAVENOUS ONCE
Status: COMPLETED | OUTPATIENT
Start: 2025-07-24 | End: 2025-07-24

## 2025-07-24 RX ADMIN — SODIUM CHLORIDE 20 ML/HR: 9 INJECTION, SOLUTION INTRAVENOUS at 15:36

## 2025-07-24 RX ADMIN — IRON SUCROSE 300 MG: 20 INJECTION, SOLUTION INTRAVENOUS at 15:38

## 2025-07-29 ENCOUNTER — DOCUMENTATION (OUTPATIENT)
Dept: HEMATOLOGY ONCOLOGY | Facility: CLINIC | Age: 50
End: 2025-07-29

## 2025-07-29 ENCOUNTER — TELEPHONE (OUTPATIENT)
Dept: HEMATOLOGY ONCOLOGY | Facility: CLINIC | Age: 50
End: 2025-07-29

## 2025-07-29 DIAGNOSIS — D69.3 IDIOPATHIC THROMBOCYTOPENIA (HCC): Primary | ICD-10-CM

## 2025-07-30 ENCOUNTER — DOCUMENTATION (OUTPATIENT)
Dept: HEMATOLOGY ONCOLOGY | Facility: CLINIC | Age: 50
End: 2025-07-30

## 2025-07-31 ENCOUNTER — TELEPHONE (OUTPATIENT)
Dept: HEMATOLOGY ONCOLOGY | Facility: CLINIC | Age: 50
End: 2025-07-31

## 2025-08-01 ENCOUNTER — HOSPITAL ENCOUNTER (OUTPATIENT)
Dept: INFUSION CENTER | Facility: CLINIC | Age: 50
Discharge: HOME | End: 2025-08-01
Attending: INTERNAL MEDICINE
Payer: COMMERCIAL

## 2025-08-01 VITALS
RESPIRATION RATE: 18 BRPM | DIASTOLIC BLOOD PRESSURE: 65 MMHG | TEMPERATURE: 97.8 F | HEART RATE: 70 BPM | SYSTOLIC BLOOD PRESSURE: 103 MMHG | OXYGEN SATURATION: 96 %

## 2025-08-01 DIAGNOSIS — D50.0 IRON DEFICIENCY ANEMIA DUE TO CHRONIC BLOOD LOSS: Primary | ICD-10-CM

## 2025-08-01 DIAGNOSIS — E53.8 B12 DEFICIENCY: ICD-10-CM

## 2025-08-01 PROCEDURE — 96365 THER/PROPH/DIAG IV INF INIT: CPT

## 2025-08-01 PROCEDURE — 96366 THER/PROPH/DIAG IV INF ADDON: CPT

## 2025-08-01 RX ORDER — SODIUM CHLORIDE 9 MG/ML
20 INJECTION, SOLUTION INTRAVENOUS ONCE
Status: CANCELLED | OUTPATIENT
Start: 2025-08-07

## 2025-08-01 RX ORDER — SODIUM CHLORIDE 9 MG/ML
20 INJECTION, SOLUTION INTRAVENOUS ONCE
Status: COMPLETED | OUTPATIENT
Start: 2025-08-01 | End: 2025-08-01

## 2025-08-01 RX ADMIN — IRON SUCROSE 300 MG: 20 INJECTION, SOLUTION INTRAVENOUS at 09:28

## 2025-08-01 RX ADMIN — SODIUM CHLORIDE 20 ML/HR: 0.9 INJECTION, SOLUTION INTRAVENOUS at 09:26

## 2025-08-04 ENCOUNTER — TELEPHONE (OUTPATIENT)
Dept: HEMATOLOGY ONCOLOGY | Facility: CLINIC | Age: 50
End: 2025-08-04

## 2025-08-04 DIAGNOSIS — D69.3 IDIOPATHIC THROMBOCYTOPENIA (HCC): ICD-10-CM

## 2025-08-05 RX ORDER — ELTROMBOPAG 75 MG/1
TABLET, FILM COATED ORAL
Qty: 30 TABLET | Refills: 3 | Status: SHIPPED | OUTPATIENT
Start: 2025-08-05